# Patient Record
Sex: MALE | URBAN - METROPOLITAN AREA
[De-identification: names, ages, dates, MRNs, and addresses within clinical notes are randomized per-mention and may not be internally consistent; named-entity substitution may affect disease eponyms.]

---

## 2023-06-11 ENCOUNTER — HOSPITAL ENCOUNTER (OUTPATIENT)
Dept: RADIOLOGY | Facility: MEDICAL CENTER | Age: 78
End: 2023-06-11

## 2023-06-11 ENCOUNTER — TELEPHONE (OUTPATIENT)
Dept: SLEEP MEDICINE | Facility: MEDICAL CENTER | Age: 78
End: 2023-06-11

## 2023-06-11 ENCOUNTER — HOSPITAL ENCOUNTER (INPATIENT)
Facility: MEDICAL CENTER | Age: 78
LOS: 4 days | DRG: 871 | End: 2023-06-15
Attending: INTERNAL MEDICINE | Admitting: INTERNAL MEDICINE
Payer: MEDICARE

## 2023-06-11 DIAGNOSIS — N17.1: ICD-10-CM

## 2023-06-11 DIAGNOSIS — R65.21: ICD-10-CM

## 2023-06-11 DIAGNOSIS — Z95.5 HISTORY OF CORONARY ANGIOPLASTY WITH INSERTION OF STENT: ICD-10-CM

## 2023-06-11 DIAGNOSIS — L03.113 CELLULITIS OF RIGHT UPPER EXTREMITY: ICD-10-CM

## 2023-06-11 DIAGNOSIS — A41.9: ICD-10-CM

## 2023-06-11 PROBLEM — I50.20 HEART FAILURE WITH REDUCED EJECTION FRACTION (HCC): Status: ACTIVE | Noted: 2023-06-11

## 2023-06-11 PROBLEM — N18.9 CKD (CHRONIC KIDNEY DISEASE): Status: ACTIVE | Noted: 2023-06-11

## 2023-06-11 PROBLEM — C61 PROSTATE CANCER (HCC): Status: ACTIVE | Noted: 2023-06-11

## 2023-06-11 PROBLEM — J96.01 ACUTE RESPIRATORY FAILURE WITH HYPOXIA (HCC): Status: ACTIVE | Noted: 2023-06-11

## 2023-06-11 PROBLEM — E11.9 DIABETES (HCC): Status: ACTIVE | Noted: 2023-06-11

## 2023-06-11 PROBLEM — I10 HYPERTENSION: Status: ACTIVE | Noted: 2023-06-11

## 2023-06-11 PROBLEM — R53.81 DEBILITY: Status: ACTIVE | Noted: 2023-06-11

## 2023-06-11 LAB
ALBUMIN SERPL BCP-MCNC: 3.3 G/DL (ref 3.2–4.9)
ALBUMIN/GLOB SERPL: 1.1 G/DL
ALP SERPL-CCNC: 35 U/L (ref 30–99)
ALT SERPL-CCNC: <5 U/L (ref 2–50)
ANION GAP SERPL CALC-SCNC: 12 MMOL/L (ref 7–16)
AST SERPL-CCNC: 10 U/L (ref 12–45)
BASOPHILS # BLD AUTO: 0.3 % (ref 0–1.8)
BASOPHILS # BLD: 0.04 K/UL (ref 0–0.12)
BILIRUB SERPL-MCNC: 0.5 MG/DL (ref 0.1–1.5)
BUN SERPL-MCNC: 21 MG/DL (ref 8–22)
CALCIUM ALBUM COR SERPL-MCNC: 9.4 MG/DL (ref 8.5–10.5)
CALCIUM SERPL-MCNC: 8.8 MG/DL (ref 8.5–10.5)
CHLORIDE SERPL-SCNC: 110 MMOL/L (ref 96–112)
CO2 SERPL-SCNC: 20 MMOL/L (ref 20–33)
CREAT SERPL-MCNC: 1.65 MG/DL (ref 0.5–1.4)
EKG IMPRESSION: NORMAL
EOSINOPHIL # BLD AUTO: 0.24 K/UL (ref 0–0.51)
EOSINOPHIL NFR BLD: 2 % (ref 0–6.9)
ERYTHROCYTE [DISTWIDTH] IN BLOOD BY AUTOMATED COUNT: 50.6 FL (ref 35.9–50)
GFR SERPLBLD CREATININE-BSD FMLA CKD-EPI: 43 ML/MIN/1.73 M 2
GLOBULIN SER CALC-MCNC: 3.1 G/DL (ref 1.9–3.5)
GLUCOSE SERPL-MCNC: 138 MG/DL (ref 65–99)
HCT VFR BLD AUTO: 34.1 % (ref 42–52)
HGB BLD-MCNC: 10.9 G/DL (ref 14–18)
IMM GRANULOCYTES # BLD AUTO: 0.05 K/UL (ref 0–0.11)
IMM GRANULOCYTES NFR BLD AUTO: 0.4 % (ref 0–0.9)
LACTATE SERPL-SCNC: 1 MMOL/L (ref 0.5–2)
LYMPHOCYTES # BLD AUTO: 1.02 K/UL (ref 1–4.8)
LYMPHOCYTES NFR BLD: 8.7 % (ref 22–41)
MAGNESIUM SERPL-MCNC: 1.8 MG/DL (ref 1.5–2.5)
MCH RBC QN AUTO: 27.6 PG (ref 27–33)
MCHC RBC AUTO-ENTMCNC: 32 G/DL (ref 32.3–36.5)
MCV RBC AUTO: 86.3 FL (ref 81.4–97.8)
MONOCYTES # BLD AUTO: 1.02 K/UL (ref 0–0.85)
MONOCYTES NFR BLD AUTO: 8.7 % (ref 0–13.4)
NEUTROPHILS # BLD AUTO: 9.37 K/UL (ref 1.82–7.42)
NEUTROPHILS NFR BLD: 79.9 % (ref 44–72)
NRBC # BLD AUTO: 0 K/UL
NRBC BLD-RTO: 0 /100 WBC (ref 0–0.2)
PLATELET # BLD AUTO: 262 K/UL (ref 164–446)
PMV BLD AUTO: 10.8 FL (ref 9–12.9)
POTASSIUM SERPL-SCNC: 4.1 MMOL/L (ref 3.6–5.5)
PROCALCITONIN SERPL-MCNC: 1.13 NG/ML
PROT SERPL-MCNC: 6.4 G/DL (ref 6–8.2)
RBC # BLD AUTO: 3.95 M/UL (ref 4.7–6.1)
SODIUM SERPL-SCNC: 142 MMOL/L (ref 135–145)
WBC # BLD AUTO: 11.7 K/UL (ref 4.8–10.8)

## 2023-06-11 PROCEDURE — 93005 ELECTROCARDIOGRAM TRACING: CPT | Performed by: STUDENT IN AN ORGANIZED HEALTH CARE EDUCATION/TRAINING PROGRAM

## 2023-06-11 PROCEDURE — 87086 URINE CULTURE/COLONY COUNT: CPT

## 2023-06-11 PROCEDURE — 81001 URINALYSIS AUTO W/SCOPE: CPT

## 2023-06-11 PROCEDURE — 770022 HCHG ROOM/CARE - ICU (200)

## 2023-06-11 PROCEDURE — 80053 COMPREHEN METABOLIC PANEL: CPT

## 2023-06-11 PROCEDURE — 83735 ASSAY OF MAGNESIUM: CPT

## 2023-06-11 PROCEDURE — 700101 HCHG RX REV CODE 250: Performed by: INTERNAL MEDICINE

## 2023-06-11 PROCEDURE — 85025 COMPLETE CBC W/AUTO DIFF WBC: CPT

## 2023-06-11 PROCEDURE — 700105 HCHG RX REV CODE 258: Performed by: STUDENT IN AN ORGANIZED HEALTH CARE EDUCATION/TRAINING PROGRAM

## 2023-06-11 PROCEDURE — 87040 BLOOD CULTURE FOR BACTERIA: CPT

## 2023-06-11 PROCEDURE — 99291 CRITICAL CARE FIRST HOUR: CPT | Mod: GC | Performed by: INTERNAL MEDICINE

## 2023-06-11 PROCEDURE — 700102 HCHG RX REV CODE 250 W/ 637 OVERRIDE(OP): Performed by: STUDENT IN AN ORGANIZED HEALTH CARE EDUCATION/TRAINING PROGRAM

## 2023-06-11 PROCEDURE — 83605 ASSAY OF LACTIC ACID: CPT

## 2023-06-11 PROCEDURE — 84145 PROCALCITONIN (PCT): CPT

## 2023-06-11 PROCEDURE — 700105 HCHG RX REV CODE 258: Performed by: INTERNAL MEDICINE

## 2023-06-11 PROCEDURE — 93010 ELECTROCARDIOGRAM REPORT: CPT | Performed by: STUDENT IN AN ORGANIZED HEALTH CARE EDUCATION/TRAINING PROGRAM

## 2023-06-11 PROCEDURE — A9270 NON-COVERED ITEM OR SERVICE: HCPCS | Performed by: STUDENT IN AN ORGANIZED HEALTH CARE EDUCATION/TRAINING PROGRAM

## 2023-06-11 RX ORDER — INSULIN LISPRO 100 [IU]/ML
0.2 INJECTION, SOLUTION INTRAVENOUS; SUBCUTANEOUS
Status: DISCONTINUED | OUTPATIENT
Start: 2023-06-12 | End: 2023-06-11

## 2023-06-11 RX ORDER — ONDANSETRON 4 MG/1
4 TABLET, ORALLY DISINTEGRATING ORAL EVERY 8 HOURS PRN
Status: DISCONTINUED | OUTPATIENT
Start: 2023-06-11 | End: 2023-06-15 | Stop reason: HOSPADM

## 2023-06-11 RX ORDER — SODIUM CHLORIDE, SODIUM LACTATE, POTASSIUM CHLORIDE, CALCIUM CHLORIDE 600; 310; 30; 20 MG/100ML; MG/100ML; MG/100ML; MG/100ML
INJECTION, SOLUTION INTRAVENOUS CONTINUOUS
Status: DISCONTINUED | OUTPATIENT
Start: 2023-06-11 | End: 2023-06-14

## 2023-06-11 RX ORDER — FUROSEMIDE 10 MG/ML
20 INJECTION INTRAMUSCULAR; INTRAVENOUS
Status: DISCONTINUED | OUTPATIENT
Start: 2023-06-12 | End: 2023-06-15 | Stop reason: HOSPADM

## 2023-06-11 RX ORDER — SPIRONOLACTONE 25 MG/1
25 TABLET ORAL
Status: DISCONTINUED | OUTPATIENT
Start: 2023-06-12 | End: 2023-06-15 | Stop reason: HOSPADM

## 2023-06-11 RX ORDER — ASPIRIN 81 MG/1
81 TABLET ORAL DAILY
Status: DISCONTINUED | OUTPATIENT
Start: 2023-06-12 | End: 2023-06-11

## 2023-06-11 RX ORDER — ACETAMINOPHEN 325 MG/1
650 TABLET ORAL EVERY 6 HOURS PRN
Status: DISCONTINUED | OUTPATIENT
Start: 2023-06-11 | End: 2023-06-15 | Stop reason: HOSPADM

## 2023-06-11 RX ORDER — ASPIRIN 81 MG/1
81 TABLET ORAL DAILY
Status: DISCONTINUED | OUTPATIENT
Start: 2023-06-12 | End: 2023-06-15 | Stop reason: HOSPADM

## 2023-06-11 RX ORDER — CLOPIDOGREL BISULFATE 75 MG/1
75 TABLET ORAL DAILY
Status: DISCONTINUED | OUTPATIENT
Start: 2023-06-12 | End: 2023-06-15 | Stop reason: HOSPADM

## 2023-06-11 RX ORDER — TAMSULOSIN HYDROCHLORIDE 0.4 MG/1
0.4 CAPSULE ORAL
Status: DISCONTINUED | OUTPATIENT
Start: 2023-06-12 | End: 2023-06-15 | Stop reason: HOSPADM

## 2023-06-11 RX ORDER — AZITHROMYCIN 250 MG/1
500 TABLET, FILM COATED ORAL DAILY
Status: COMPLETED | OUTPATIENT
Start: 2023-06-12 | End: 2023-06-14

## 2023-06-11 RX ORDER — BISACODYL 10 MG
10 SUPPOSITORY, RECTAL RECTAL
Status: DISCONTINUED | OUTPATIENT
Start: 2023-06-11 | End: 2023-06-15 | Stop reason: HOSPADM

## 2023-06-11 RX ORDER — POLYETHYLENE GLYCOL 3350 17 G/17G
1 POWDER, FOR SOLUTION ORAL
Status: DISCONTINUED | OUTPATIENT
Start: 2023-06-11 | End: 2023-06-15 | Stop reason: HOSPADM

## 2023-06-11 RX ORDER — LABETALOL HYDROCHLORIDE 5 MG/ML
10 INJECTION, SOLUTION INTRAVENOUS EVERY 4 HOURS PRN
Status: DISCONTINUED | OUTPATIENT
Start: 2023-06-11 | End: 2023-06-15 | Stop reason: HOSPADM

## 2023-06-11 RX ORDER — NOREPINEPHRINE BITARTRATE 0.03 MG/ML
0-1 INJECTION, SOLUTION INTRAVENOUS CONTINUOUS
Status: DISCONTINUED | OUTPATIENT
Start: 2023-06-11 | End: 2023-06-12

## 2023-06-11 RX ORDER — GABAPENTIN 300 MG/1
600 CAPSULE ORAL EVERY EVENING
Status: DISCONTINUED | OUTPATIENT
Start: 2023-06-11 | End: 2023-06-11

## 2023-06-11 RX ORDER — MAGNESIUM SULFATE HEPTAHYDRATE 40 MG/ML
2 INJECTION, SOLUTION INTRAVENOUS ONCE
Status: COMPLETED | OUTPATIENT
Start: 2023-06-11 | End: 2023-06-12

## 2023-06-11 RX ORDER — AMOXICILLIN 250 MG
2 CAPSULE ORAL 2 TIMES DAILY
Status: DISCONTINUED | OUTPATIENT
Start: 2023-06-12 | End: 2023-06-15 | Stop reason: HOSPADM

## 2023-06-11 RX ORDER — GABAPENTIN 300 MG/1
600 CAPSULE ORAL 2 TIMES DAILY
Status: DISCONTINUED | OUTPATIENT
Start: 2023-06-11 | End: 2023-06-15 | Stop reason: HOSPADM

## 2023-06-11 RX ORDER — ENOXAPARIN SODIUM 100 MG/ML
40 INJECTION SUBCUTANEOUS DAILY
Status: DISCONTINUED | OUTPATIENT
Start: 2023-06-12 | End: 2023-06-15 | Stop reason: HOSPADM

## 2023-06-11 RX ORDER — FENOFIBRATE 67 MG/1
67 CAPSULE ORAL DAILY
Status: DISCONTINUED | OUTPATIENT
Start: 2023-06-12 | End: 2023-06-15 | Stop reason: HOSPADM

## 2023-06-11 RX ORDER — INSULIN LISPRO 100 [IU]/ML
1-6 INJECTION, SOLUTION INTRAVENOUS; SUBCUTANEOUS
Status: DISCONTINUED | OUTPATIENT
Start: 2023-06-12 | End: 2023-06-15 | Stop reason: HOSPADM

## 2023-06-11 RX ADMIN — NOREPINEPHRINE BITARTRATE 0.1 MCG/KG/MIN: 1 INJECTION, SOLUTION, CONCENTRATE INTRAVENOUS at 21:11

## 2023-06-11 RX ADMIN — SODIUM CHLORIDE, POTASSIUM CHLORIDE, SODIUM LACTATE AND CALCIUM CHLORIDE: 600; 310; 30; 20 INJECTION, SOLUTION INTRAVENOUS at 21:14

## 2023-06-11 RX ADMIN — GABAPENTIN 600 MG: 300 CAPSULE ORAL at 22:45

## 2023-06-11 ASSESSMENT — PAIN DESCRIPTION - PAIN TYPE
TYPE: ACUTE PAIN
TYPE: ACUTE PAIN

## 2023-06-11 ASSESSMENT — ENCOUNTER SYMPTOMS
HEARTBURN: 0
WEIGHT LOSS: 0
HALLUCINATIONS: 0
HEMOPTYSIS: 0
ORTHOPNEA: 0
CLAUDICATION: 0
FOCAL WEAKNESS: 0
ABDOMINAL PAIN: 0
FEVER: 1
BACK PAIN: 0
PALPITATIONS: 0
DIARRHEA: 0
SPUTUM PRODUCTION: 1
CONSTIPATION: 0
DIZZINESS: 0
FLANK PAIN: 0
SPEECH CHANGE: 0
HEADACHES: 0
WEAKNESS: 1
CHILLS: 0
TINGLING: 0
NAUSEA: 0
COUGH: 1
SENSORY CHANGE: 0
NECK PAIN: 0
MYALGIAS: 0
VOMITING: 0
WHEEZING: 0
INSOMNIA: 0
MEMORY LOSS: 0
NERVOUS/ANXIOUS: 0
TREMORS: 0
DEPRESSION: 0
SHORTNESS OF BREATH: 1

## 2023-06-11 ASSESSMENT — LIFESTYLE VARIABLES: SUBSTANCE_ABUSE: 0

## 2023-06-11 NOTE — TELEPHONE ENCOUNTER
"ICU transfer/acceptance call for a Direct Admit    I received a phone call from Dr Cedillo from the [facility] about patient Donte Agustin who is 77 y.o. with the medical problems of sepsis, CHF, cellulitis, renal insuficiency, UTI and on vasopressor support. His last reported EF was 30%.  Chief complain is: \"SOB\"  Patient has a history of 2 days of subjective fever, myalgias, diarrhea, vomiting, sta 78%, vomiting, cellulitis in elbow, dizzines, thirsty. Was found later saturating at 86%--> 2 lt 92%. Initially Tachycardic at 125, s/p 3 lt cristaloids but map was 60, so he was started on levophed. He has an open wound in the right antecubital fossa, but looks dry. His Ekg showed ST, Bnp 3k, trop 0.03, no pulm edema. Current Vassopressor is levophed at 5.      PMH  CHF, cardiomyopathy, CAD s/p stents. DM, HTN, Cr 1.9 baseline CKD, hx of prostate cancer.  ADT order placed for accepted patient.     Please call Dr Intensivist when patient arrives to the hospital.  Please inform the intensivist upon assignment of an ICU bed and then again on arrival of the patient      Gigi Minor MD FACP [facility]  Pulmonary/Critical Care  Available through [employer].    "

## 2023-06-11 NOTE — PROGRESS NOTES
"ICU transfer/acceptance call for a Direct Admit     I received a phone call from Dr Cedillo from the [facility] about patient Donte Agustin who is 77 y.o. with the medical problems of sepsis, CHF, cellulitis, renal insuficiency, UTI and on vasopressor support. His last reported EF was 30%.  Chief complain is: \"SOB\"  Patient has a history of 2 days of subjective fever, myalgias, diarrhea, vomiting, sta 78%, vomiting, cellulitis in elbow, dizzines, thirsty. Was found later saturating at 86%--> 2 lt 92%. Initially Tachycardic at 125, s/p 3 lt cristaloids but map was 60, so he was started on levophed. He has an open wound in the right antecubital fossa, but looks dry. His Ekg showed ST, Bnp 3k, trop 0.03, no pulm edema. Current Vassopressor is levophed at 5.       PMH  CHF, cardiomyopathy, CAD s/p stents. DM, HTN, Cr 1.9 baseline CKD, hx of prostate cancer.  ADT order placed for accepted patient.      Please call Dr Intensivist when patient arrives to the hospital.  Please inform the intensivist upon assignment of an ICU bed and then again on arrival of the patient        Gigi Minor MD FACP [facility]  Pulmonary/Critical Care  Available through [facility].     "

## 2023-06-12 ENCOUNTER — APPOINTMENT (OUTPATIENT)
Dept: RADIOLOGY | Facility: MEDICAL CENTER | Age: 78
DRG: 871 | End: 2023-06-12
Attending: STUDENT IN AN ORGANIZED HEALTH CARE EDUCATION/TRAINING PROGRAM
Payer: MEDICARE

## 2023-06-12 PROBLEM — N39.0 ACUTE UTI: Status: ACTIVE | Noted: 2023-06-12

## 2023-06-12 PROBLEM — L03.113 CELLULITIS OF RIGHT UPPER EXTREMITY: Status: ACTIVE | Noted: 2023-06-12

## 2023-06-12 PROBLEM — N30.00 ACUTE CYSTITIS WITHOUT HEMATURIA: Status: ACTIVE | Noted: 2023-06-12

## 2023-06-12 LAB
ALBUMIN SERPL BCP-MCNC: 3.3 G/DL (ref 3.2–4.9)
ALBUMIN/GLOB SERPL: 1 G/DL
ALP SERPL-CCNC: 35 U/L (ref 30–99)
ALT SERPL-CCNC: <5 U/L (ref 2–50)
ANION GAP SERPL CALC-SCNC: 10 MMOL/L (ref 7–16)
APPEARANCE UR: ABNORMAL
AST SERPL-CCNC: 12 U/L (ref 12–45)
BACTERIA #/AREA URNS HPF: ABNORMAL /HPF
BASOPHILS # BLD AUTO: 0.4 % (ref 0–1.8)
BASOPHILS # BLD: 0.03 K/UL (ref 0–0.12)
BILIRUB SERPL-MCNC: 0.4 MG/DL (ref 0.1–1.5)
BILIRUB UR QL STRIP.AUTO: NEGATIVE
BUN SERPL-MCNC: 17 MG/DL (ref 8–22)
CALCIUM ALBUM COR SERPL-MCNC: 9.6 MG/DL (ref 8.5–10.5)
CALCIUM SERPL-MCNC: 9 MG/DL (ref 8.5–10.5)
CHLORIDE SERPL-SCNC: 108 MMOL/L (ref 96–112)
CO2 SERPL-SCNC: 23 MMOL/L (ref 20–33)
COLOR UR: YELLOW
CREAT SERPL-MCNC: 1.49 MG/DL (ref 0.5–1.4)
EOSINOPHIL # BLD AUTO: 0.26 K/UL (ref 0–0.51)
EOSINOPHIL NFR BLD: 3.3 % (ref 0–6.9)
EPI CELLS #/AREA URNS HPF: NEGATIVE /HPF
ERYTHROCYTE [DISTWIDTH] IN BLOOD BY AUTOMATED COUNT: 52.6 FL (ref 35.9–50)
GFR SERPLBLD CREATININE-BSD FMLA CKD-EPI: 48 ML/MIN/1.73 M 2
GLOBULIN SER CALC-MCNC: 3.2 G/DL (ref 1.9–3.5)
GLUCOSE BLD STRIP.AUTO-MCNC: 115 MG/DL (ref 65–99)
GLUCOSE BLD STRIP.AUTO-MCNC: 117 MG/DL (ref 65–99)
GLUCOSE BLD STRIP.AUTO-MCNC: 121 MG/DL (ref 65–99)
GLUCOSE BLD STRIP.AUTO-MCNC: 131 MG/DL (ref 65–99)
GLUCOSE SERPL-MCNC: 99 MG/DL (ref 65–99)
GLUCOSE UR STRIP.AUTO-MCNC: >=1000 MG/DL
HCT VFR BLD AUTO: 35.3 % (ref 42–52)
HGB BLD-MCNC: 11 G/DL (ref 14–18)
HYALINE CASTS #/AREA URNS LPF: ABNORMAL /LPF
IMM GRANULOCYTES # BLD AUTO: 0.02 K/UL (ref 0–0.11)
IMM GRANULOCYTES NFR BLD AUTO: 0.3 % (ref 0–0.9)
KETONES UR STRIP.AUTO-MCNC: ABNORMAL MG/DL
LACTATE SERPL-SCNC: 0.7 MMOL/L (ref 0.5–2)
LEUKOCYTE ESTERASE UR QL STRIP.AUTO: ABNORMAL
LYMPHOCYTES # BLD AUTO: 0.95 K/UL (ref 1–4.8)
LYMPHOCYTES NFR BLD: 12.1 % (ref 22–41)
MCH RBC QN AUTO: 27.7 PG (ref 27–33)
MCHC RBC AUTO-ENTMCNC: 31.2 G/DL (ref 32.3–36.5)
MCV RBC AUTO: 88.9 FL (ref 81.4–97.8)
MICRO URNS: ABNORMAL
MONOCYTES # BLD AUTO: 0.84 K/UL (ref 0–0.85)
MONOCYTES NFR BLD AUTO: 10.7 % (ref 0–13.4)
NEUTROPHILS # BLD AUTO: 5.78 K/UL (ref 1.82–7.42)
NEUTROPHILS NFR BLD: 73.2 % (ref 44–72)
NITRITE UR QL STRIP.AUTO: NEGATIVE
NRBC # BLD AUTO: 0 K/UL
NRBC BLD-RTO: 0 /100 WBC (ref 0–0.2)
PH UR STRIP.AUTO: 6 [PH] (ref 5–8)
PLATELET # BLD AUTO: 213 K/UL (ref 164–446)
PMV BLD AUTO: 10.6 FL (ref 9–12.9)
POTASSIUM SERPL-SCNC: 4.3 MMOL/L (ref 3.6–5.5)
PROT SERPL-MCNC: 6.5 G/DL (ref 6–8.2)
PROT UR QL STRIP: 30 MG/DL
RBC # BLD AUTO: 3.97 M/UL (ref 4.7–6.1)
RBC # URNS HPF: ABNORMAL /HPF
RBC UR QL AUTO: ABNORMAL
SODIUM SERPL-SCNC: 141 MMOL/L (ref 135–145)
SP GR UR STRIP.AUTO: 1.01
UROBILINOGEN UR STRIP.AUTO-MCNC: 1 MG/DL
WBC # BLD AUTO: 7.9 K/UL (ref 4.8–10.8)
WBC #/AREA URNS HPF: ABNORMAL /HPF

## 2023-06-12 PROCEDURE — 700101 HCHG RX REV CODE 250: Performed by: STUDENT IN AN ORGANIZED HEALTH CARE EDUCATION/TRAINING PROGRAM

## 2023-06-12 PROCEDURE — 99233 SBSQ HOSP IP/OBS HIGH 50: CPT | Performed by: NURSE PRACTITIONER

## 2023-06-12 PROCEDURE — 85025 COMPLETE CBC W/AUTO DIFF WBC: CPT

## 2023-06-12 PROCEDURE — 700102 HCHG RX REV CODE 250 W/ 637 OVERRIDE(OP): Performed by: STUDENT IN AN ORGANIZED HEALTH CARE EDUCATION/TRAINING PROGRAM

## 2023-06-12 PROCEDURE — A9270 NON-COVERED ITEM OR SERVICE: HCPCS | Performed by: STUDENT IN AN ORGANIZED HEALTH CARE EDUCATION/TRAINING PROGRAM

## 2023-06-12 PROCEDURE — 97166 OT EVAL MOD COMPLEX 45 MIN: CPT

## 2023-06-12 PROCEDURE — 700105 HCHG RX REV CODE 258: Performed by: NURSE PRACTITIONER

## 2023-06-12 PROCEDURE — 80053 COMPREHEN METABOLIC PANEL: CPT

## 2023-06-12 PROCEDURE — 99223 1ST HOSP IP/OBS HIGH 75: CPT | Performed by: HOSPITALIST

## 2023-06-12 PROCEDURE — 770020 HCHG ROOM/CARE - TELE (206)

## 2023-06-12 PROCEDURE — 73551 X-RAY EXAM OF FEMUR 1: CPT | Mod: RT

## 2023-06-12 PROCEDURE — 92610 EVALUATE SWALLOWING FUNCTION: CPT

## 2023-06-12 PROCEDURE — 97602 WOUND(S) CARE NON-SELECTIVE: CPT

## 2023-06-12 PROCEDURE — 700111 HCHG RX REV CODE 636 W/ 250 OVERRIDE (IP): Performed by: STUDENT IN AN ORGANIZED HEALTH CARE EDUCATION/TRAINING PROGRAM

## 2023-06-12 PROCEDURE — 83605 ASSAY OF LACTIC ACID: CPT

## 2023-06-12 PROCEDURE — 82962 GLUCOSE BLOOD TEST: CPT

## 2023-06-12 PROCEDURE — 700105 HCHG RX REV CODE 258: Performed by: STUDENT IN AN ORGANIZED HEALTH CARE EDUCATION/TRAINING PROGRAM

## 2023-06-12 PROCEDURE — 700111 HCHG RX REV CODE 636 W/ 250 OVERRIDE (IP): Performed by: INTERNAL MEDICINE

## 2023-06-12 PROCEDURE — 97162 PT EVAL MOD COMPLEX 30 MIN: CPT

## 2023-06-12 RX ORDER — ASPIRIN 81 MG/1
81 TABLET ORAL 3 TIMES DAILY
Status: ON HOLD | COMMUNITY
End: 2023-06-15

## 2023-06-12 RX ORDER — SPIRONOLACTONE 25 MG/1
25 TABLET ORAL DAILY
COMMUNITY

## 2023-06-12 RX ORDER — CLOPIDOGREL BISULFATE 75 MG/1
75 TABLET ORAL DAILY
COMMUNITY

## 2023-06-12 RX ORDER — ACETAMINOPHEN 325 MG/1
650 TABLET ORAL EVERY 4 HOURS PRN
COMMUNITY

## 2023-06-12 RX ORDER — FENOFIBRATE 54 MG/1
54 TABLET ORAL DAILY
COMMUNITY

## 2023-06-12 RX ORDER — FUROSEMIDE 20 MG/1
20 TABLET ORAL DAILY
COMMUNITY

## 2023-06-12 RX ORDER — EMPAGLIFLOZIN 10 MG/1
10 TABLET, FILM COATED ORAL DAILY
COMMUNITY

## 2023-06-12 RX ORDER — SILICONES/ADHESIVE TAPE
COMBINATION PACKAGE (EA) TOPICAL 2 TIMES DAILY
Status: DISCONTINUED | OUTPATIENT
Start: 2023-06-12 | End: 2023-06-15 | Stop reason: HOSPADM

## 2023-06-12 RX ORDER — SACUBITRIL AND VALSARTAN 24; 26 MG/1; MG/1
1 TABLET, FILM COATED ORAL DAILY
COMMUNITY

## 2023-06-12 RX ORDER — GABAPENTIN 600 MG/1
600 TABLET ORAL 2 TIMES DAILY
COMMUNITY

## 2023-06-12 RX ORDER — OMEPRAZOLE 20 MG/1
20 CAPSULE, DELAYED RELEASE ORAL DAILY
COMMUNITY

## 2023-06-12 RX ORDER — TAMSULOSIN HYDROCHLORIDE 0.4 MG/1
0.4 CAPSULE ORAL DAILY
COMMUNITY

## 2023-06-12 RX ADMIN — SODIUM CHLORIDE, POTASSIUM CHLORIDE, SODIUM LACTATE AND CALCIUM CHLORIDE: 600; 310; 30; 20 INJECTION, SOLUTION INTRAVENOUS at 07:29

## 2023-06-12 RX ADMIN — SODIUM CHLORIDE, POTASSIUM CHLORIDE, SODIUM LACTATE AND CALCIUM CHLORIDE: 600; 310; 30; 20 INJECTION, SOLUTION INTRAVENOUS at 13:16

## 2023-06-12 RX ADMIN — ENOXAPARIN SODIUM 40 MG: 100 INJECTION SUBCUTANEOUS at 17:08

## 2023-06-12 RX ADMIN — FENOFIBRATE 67 MG: 67 CAPSULE ORAL at 05:17

## 2023-06-12 RX ADMIN — ASPIRIN 81 MG: 81 TABLET, COATED ORAL at 05:16

## 2023-06-12 RX ADMIN — CLOPIDOGREL BISULFATE 75 MG: 75 TABLET ORAL at 05:16

## 2023-06-12 RX ADMIN — SENNOSIDES AND DOCUSATE SODIUM 2 TABLET: 50; 8.6 TABLET ORAL at 05:17

## 2023-06-12 RX ADMIN — MAGNESIUM SULFATE HEPTAHYDRATE 2 G: 2 INJECTION, SOLUTION INTRAVENOUS at 00:22

## 2023-06-12 RX ADMIN — SENNOSIDES AND DOCUSATE SODIUM 2 TABLET: 50; 8.6 TABLET ORAL at 17:07

## 2023-06-12 RX ADMIN — ACETAMINOPHEN 650 MG: 325 TABLET, FILM COATED ORAL at 17:07

## 2023-06-12 RX ADMIN — ONDANSETRON 4 MG: 4 TABLET, ORALLY DISINTEGRATING ORAL at 20:42

## 2023-06-12 RX ADMIN — GABAPENTIN 600 MG: 300 CAPSULE ORAL at 05:16

## 2023-06-12 RX ADMIN — AZITHROMYCIN MONOHYDRATE 500 MG: 250 TABLET ORAL at 05:16

## 2023-06-12 RX ADMIN — GABAPENTIN 600 MG: 300 CAPSULE ORAL at 17:07

## 2023-06-12 RX ADMIN — ACETAMINOPHEN 650 MG: 325 TABLET, FILM COATED ORAL at 04:31

## 2023-06-12 RX ADMIN — CEFTRIAXONE SODIUM 2000 MG: 10 INJECTION, POWDER, FOR SOLUTION INTRAVENOUS at 05:17

## 2023-06-12 ASSESSMENT — ENCOUNTER SYMPTOMS
MUSCULOSKELETAL NEGATIVE: 1
NAUSEA: 1
FOCAL WEAKNESS: 0
NERVOUS/ANXIOUS: 1
CHILLS: 0
NEUROLOGICAL NEGATIVE: 1
ABDOMINAL PAIN: 0
WEAKNESS: 0
SHORTNESS OF BREATH: 0
NAUSEA: 0
SPUTUM PRODUCTION: 0
SENSORY CHANGE: 0
FEVER: 0
SPEECH CHANGE: 0
EYES NEGATIVE: 1
DIZZINESS: 0
MYALGIAS: 0
VOMITING: 0
RESPIRATORY NEGATIVE: 1
COUGH: 0
HEARTBURN: 0
CARDIOVASCULAR NEGATIVE: 1
FEVER: 1
VOMITING: 1
HEADACHES: 0

## 2023-06-12 ASSESSMENT — COGNITIVE AND FUNCTIONAL STATUS - GENERAL
SUGGESTED CMS G CODE MODIFIER DAILY ACTIVITY: CJ
STANDING UP FROM CHAIR USING ARMS: A LITTLE
DAILY ACTIVITIY SCORE: 24
HELP NEEDED FOR BATHING: A LITTLE
MOBILITY SCORE: 18
TOILETING: A LITTLE
STANDING UP FROM CHAIR USING ARMS: A LITTLE
MOBILITY SCORE: 18
CLIMB 3 TO 5 STEPS WITH RAILING: A LITTLE
WALKING IN HOSPITAL ROOM: A LITTLE
MOVING TO AND FROM BED TO CHAIR: A LITTLE
DAILY ACTIVITIY SCORE: 22
MOVING FROM LYING ON BACK TO SITTING ON SIDE OF FLAT BED: A LITTLE
CLIMB 3 TO 5 STEPS WITH RAILING: A LITTLE
MOVING FROM LYING ON BACK TO SITTING ON SIDE OF FLAT BED: A LOT
SUGGESTED CMS G CODE MODIFIER DAILY ACTIVITY: CH
SUGGESTED CMS G CODE MODIFIER MOBILITY: CK
TURNING FROM BACK TO SIDE WHILE IN FLAT BAD: A LITTLE
SUGGESTED CMS G CODE MODIFIER MOBILITY: CK
WALKING IN HOSPITAL ROOM: A LITTLE
MOVING TO AND FROM BED TO CHAIR: A LITTLE

## 2023-06-12 ASSESSMENT — PATIENT HEALTH QUESTIONNAIRE - PHQ9
1. LITTLE INTEREST OR PLEASURE IN DOING THINGS: NOT AT ALL
SUM OF ALL RESPONSES TO PHQ9 QUESTIONS 1 AND 2: 0
2. FEELING DOWN, DEPRESSED, IRRITABLE, OR HOPELESS: NOT AT ALL

## 2023-06-12 ASSESSMENT — PAIN DESCRIPTION - PAIN TYPE
TYPE: ACUTE PAIN

## 2023-06-12 ASSESSMENT — LIFESTYLE VARIABLES
TOTAL SCORE: 0
ALCOHOL_USE: NO
TOTAL SCORE: 0
AVERAGE NUMBER OF DAYS PER WEEK YOU HAVE A DRINK CONTAINING ALCOHOL: 0
CONSUMPTION TOTAL: NEGATIVE
EVER FELT BAD OR GUILTY ABOUT YOUR DRINKING: NO
TOTAL SCORE: 0
HAVE YOU EVER FELT YOU SHOULD CUT DOWN ON YOUR DRINKING: NO
ON A TYPICAL DAY WHEN YOU DRINK ALCOHOL HOW MANY DRINKS DO YOU HAVE: 0
DOES PATIENT WANT TO STOP DRINKING: NO
EVER HAD A DRINK FIRST THING IN THE MORNING TO STEADY YOUR NERVES TO GET RID OF A HANGOVER: NO
HAVE PEOPLE ANNOYED YOU BY CRITICIZING YOUR DRINKING: NO
HOW MANY TIMES IN THE PAST YEAR HAVE YOU HAD 5 OR MORE DRINKS IN A DAY: 0

## 2023-06-12 ASSESSMENT — FIBROSIS 4 INDEX: FIB4 SCORE: 1.99

## 2023-06-12 ASSESSMENT — ACTIVITIES OF DAILY LIVING (ADL): TOILETING: INDEPENDENT

## 2023-06-12 ASSESSMENT — GAIT ASSESSMENTS
GAIT LEVEL OF ASSIST: STANDBY ASSIST
ASSISTIVE DEVICE: SINGLE POINT CANE
DEVIATION: BRADYKINETIC;SHUFFLED GAIT;DECREASED HEEL STRIKE;DECREASED TOE OFF
DISTANCE (FEET): 40

## 2023-06-12 NOTE — ASSESSMENT & PLAN NOTE
Uses walker at home and reports malaise/fatigue  Likely debility from infection and hospital course    -PT/OT consult  -Fall precautions

## 2023-06-12 NOTE — WOUND TEAM
[facility]  Inpatient Services  Initial Wound and Skin Care Evaluation    Admission Date: 6/11/2023     Last order of IP CONSULT TO WOUND CARE was found on 6/12/2023 from Hospital Encounter on 6/11/2023     HPI, PMH, SH: Reviewed    No past surgical history on file.  Social History     Tobacco Use    Smoking status: Not on file    Smokeless tobacco: Not on file   Substance Use Topics    Alcohol use: Not on file     No chief complaint on file.    Diagnosis: Sepsis (HCC) [A41.9]    Unit where seen by Wound Team: T710/01     WOUND CONSULT/FOLLOW UP RELATED TO:  right elbow      WOUND HISTORY:  Patient states the wound started as a spider bite.     WOUND ASSESSMENT/LDA  Wound 06/12/23 Full Thickness Wound Elbow Lateral Right POA (Active)   Wound Image   06/12/23 1600   Site Assessment Dry;Pink;Pale;Red 06/12/23 1600   Periwound Assessment Pink;Intact 06/12/23 1600   Margins Attached edges;Defined edges 06/12/23 1600   Closure Secondary intention 06/12/23 1600   Drainage Amount None 06/12/23 1600   Treatments Cleansed;Site care;Tape change 06/12/23 1600   Wound Cleansing Normal Saline Irrigation 06/12/23 1600   Periwound Protectant Skin Protectant Wipes to Periwound 06/12/23 1600   Dressing Cleansing/Solutions Not Applicable 06/12/23 1600   Dressing Options Hydrocolloid Thin;Silicone Adhesive Foam 06/12/23 1600   Dressing Changed New 06/12/23 1600   Dressing Status Clean;Dry;Intact 06/12/23 1600   Dressing Change/Treatment Frequency Every 48 hrs, and As Needed 06/12/23 1600   NEXT Dressing Change/Treatment Date 06/14/23 06/12/23 1600   NEXT Weekly Photo (Inpatient Only) 06/19/23 06/12/23 1600   Non-staged Wound Description Full thickness 06/12/23 1600   Wound Length (cm) 3 cm 06/12/23 1600   Wound Width (cm) 2.5 cm 06/12/23 1600   Wound Depth (cm) 0.2 cm 06/12/23 1600   Wound Surface Area (cm^2) 7.5 cm^2 06/12/23 1600   Wound Volume (cm^3) 1.5 cm^3 06/12/23 1600   Number of days: 0         Vascular:    YEHUDA:   No results found.    Lab Values:    Lab Results   Component Value Date/Time    WBC 7.9 06/12/2023 05:14 AM    RBC 3.97 (L) 06/12/2023 05:14 AM    HEMOGLOBIN 11.0 (L) 06/12/2023 05:14 AM    HEMATOCRIT 35.3 (L) 06/12/2023 05:14 AM        Culture Results show:  No results found for this or any previous visit (from the past 720 hour(s)).    Pain Level/Medicated:  no premed, tolerated well.        INTERVENTIONS BY WOUND TEAM:  Chart and images reviewed. Discussed with bedside RN. All areas of concern (based on picture review, LDA review and discussion with bedside RN) have been thoroughly assessed. Documentation of areas based on significant findings. This RN in to assess patient. Performed standard wound care which includes appropriate positioning, dressing removal and non-selective debridement. Pictures and measurements obtained weekly if/when required.  Preparation for Dressing removal: n/a. Open to air  Non-selectively Debrided with:  NS and gauze.  Sharp debridement: n/a  Brenda wound: Cleansed with NS and gauze, Prepped with no sting skin prep  Primary Dressing: hydrocolloid thin  Secondary (Outer) Dressing: adhesive foam     Advanced Wound Care Discharge Planning  Number of Clinicians necessary to complete wound care: 1  Is patient requiring IV pain medications for dressing changes: no  Length of time for dressing change 5 min. (This does not include chart review, pre-medication time, set up, clean up or time spent charting.)    Interdisciplinary consultation: Patient, Bedside RN, Maggy CARLOS (Wound RN),    EVALUATION / RATIONALE FOR TREATMENT:  Most Recent Date:  6/12/23: Patient admitted with full thickness wound to right lateral elbow. Patient states it started as a spider bite, however the wound appears more similar to a moisture and friction injury. Wound is very dry and intact so applied hydrocolloid thin to assist in autolytic debridement of dry intact tissue and maintain a moist wound  healing environment. Surrounding skin is pink and soft.      Goals: Steady decrease in wound area and depth weekly.    WOUND TEAM PLAN OF CARE ([X] for frequency of wound follow up,):   Nursing to follow dressing orders written for wound care. Contact wound team if area fails to progress, deteriorates or with any questions/concerns if something comes up before next scheduled follow up (See below as to whether wound is following and frequency of wound follow up)  Dressing changes by wound team:                   Follow up 3 times weekly:                NPWT change 3 times weekly:     Follow up 1-2 times weekly:      Follow up Bi-Monthly:           Follow up Monthly (High Risk):                        Follow up as needed:   X - please call or reconsult if Right elbow worsens  Other (explain):     NURSING PLAN OF CARE ORDERS (X):  Dressing changes: See Dressing Care orders: X  Skin care: See Skin Care orders:   RN Prevention Protocol:   Rectal tube care: See Rectal Tube Care orders:   Other orders:    RSKIN:   CURRENTLY IN PLACE (X), APPLIED THIS VISIT (A), ORDERED (O):   Q shift :  X  Q shift pressure point assessments:  X    Surface/Positioning   Standard Mattress/Trauma Bed   X       Low Airloss          ICU Low Airloss   Bariatric MELINDA     Waffle cushion        Waffle Overlay          Reposition q 2 hours    turn self  TAPs Turning system     Z Zan Pillow     Offloading/Redistribution LASHELL  Sacral Offloading Dressing (Silicone dressing)     Heel Offloading Dressing (Silicone dressing)         Heel float boots (Prevalon boot)             Float Heels off Bed with Pillows           Respiratory room air  Silicone O2 tubing         Gray Foam Ear protectors     Cannula fixation Device (Tender )          High flow offloading Clip    Elastic head band offloading device      Anchorfast                                                         Trach with Optifoam split foam             Containment/Moisture Prevention      Rectal tube or BMS    Purwick/Condom Cath        Jensen Catheter  X  Barrier wipes           Barrier paste       Antifungal tx      Interdry        Mobilization       Up to chair        Ambulate    X  PT/OT      Nutrition       Dietician        Diabetes Education      PO X    TF     TPN     NPO   # days     Other        Anticipated discharge plans: n/a  LTACH:        SNF/Rehab:                  Home Health Care:           Outpatient Wound Center:            Self/Family Care:        Other:                  Vac Discharge Needs:   Vac Discharge plan is purely a recommendation from wound team and not a requirement for discharge unless otherwise stated by physician.  Not Applicable Pt not on a wound vac:   X    Regular Vac while inpatient, alternative dressing at DC:        Regular Vac in use and continued at DC:            Reg. Vac w/ Skin Sub/Biologic in use. Will need to be changed 2x wkly:      Veraflo Vac while inpatient, ok to transition to Regular Vac on Discharge (Bedside RN to Clamp small instillation tubing at time of DC):           Veraflo Vac while inpatient, would benefit from remaining on Veraflo Vac upon discharge:

## 2023-06-12 NOTE — THERAPY
"Occupational Therapy   Initial Evaluation     Patient Name: Donte Agustin  Age:  77 y.o., Sex:  male  Medical Record #: [id number]  Today's Date: 6/12/2023     Precautions  Precautions: Fall Risk    Assessment    Patient is 77 y.o. male admitted for generalized malaise, fevers, chills, sore throat, RUE bug bite. Pt normally independent with functional mobility and ADLs living in a [facility] apartment with spouse. Pt able to complete functional mobility and ADLs with supervision and use of walking stick. Would ultimately recommend home health therapy but pt politely does not think he needs it. No acute OT needs identified, anticipate pt is at or near his functional baseline.    Plan    DC Equipment Recommendations: None  Discharge Recommendations: Anticipate that the patient will have no further occupational therapy needs after discharge from the hospital (pt refusing need for home health)     Subjective    \"Im 77 not 87, I don't need to go to a care home\"     Objective       06/12/23 1412   Prior Living Situation   Prior Services Home-Independent   Housing / Facility Motel   Bathroom Set up Bathtub / Shower Combination;Grab Bars   Equipment Owned Grab Bar(s) In Tub / Shower;Grab Bar(s) By Toilet;Other (Comments)  (walking stick)   Lives with - Patient's Self Care Capacity Spouse   Prior Level of ADL Function   Self Feeding Independent   Grooming / Hygiene Independent   Bathing Independent   Dressing Independent   Toileting Independent   Prior Level of IADL Function   Medication Management Independent   Laundry Independent   Kitchen Mobility Independent   Finances Independent   Home Management Independent   Shopping Independent   Prior Level Of Mobility Independent With Device in Community   History of Falls   History of Falls Yes   Date of Last Fall   (\"awhile ago\" per patient)   Precautions   Precautions Fall Risk   Cognition    Cognition / Consciousness WDL   Level of Consciousness Alert   Active ROM Upper Body "   Active ROM Upper Body  WDL   Dominant Hand Right   Strength Upper Body   Upper Body Strength  WDL   Sensation Upper Body   Upper Extremity Sensation  WDL   Upper Body Muscle Tone   Upper Body Muscle Tone  WDL   Neurological Concerns   Neurological Concerns No   Coordination Upper Body   Coordination WDL   Balance Assessment   Sitting Balance (Static) Fair   Sitting Balance (Dynamic) Fair   Standing Balance (Static) Fair   Standing Balance (Dynamic) Fair   Weight Shift Sitting Fair   Weight Shift Standing Fair   Comments w/ walking stick   Bed Mobility    Supine to Sit Supervised   Scooting Supervised   Rolling Supervised   ADL Assessment   Grooming Supervision   Upper Body Dressing Supervision   Lower Body Dressing Minimal Assist  (exhibited the ability became fatigued, asked for assistance)   How much help from another person does the patient currently need...   Putting on and taking off regular lower body clothing? 4   Bathing (including washing, rinsing, and drying)? 4   Toileting, which includes using a toilet, bedpan, or urinal? 4   Putting on and taking off regular upper body clothing? 4   Taking care of personal grooming such as brushing teeth? 4   Eating meals? 4   6 Clicks Daily Activity Score 24   Functional Mobility   Sit to Stand Standby Assist   Bed, Chair, Wheelchair Transfer Standby Assist   Transfer Method Stand Step   Mobility bed mobility, hallway mobility, up to chair   Comments w/ walking stick   Activity Tolerance   Sitting in Chair left seated in chair   Standing 10 min   Education Group   Education Provided Role of Occupational Therapist   Role of Occupational Therapist Patient Response Patient;Acceptance;Explanation   Problem List   Problem List None   Anticipated Discharge Equipment and Recommendations   DC Equipment Recommendations None   Discharge Recommendations Anticipate that the patient will have no further occupational therapy needs after discharge from the hospital  (pt refusing  need for home health)   Interdisciplinary Plan of Care Collaboration   IDT Collaboration with  Nursing;Physical Therapist   Patient Position at End of Therapy Seated;Chair Alarm On;Call Light within Reach;Tray Table within Reach;Phone within Reach   Collaboration Comments RN updated

## 2023-06-12 NOTE — CONSULTS
Hospital Medicine Consultation    Date of Service  6/12/2023    Referring Physician  Roosevelt Sterling M.D.    Consulting Physician  Roger Arreguin M.D.    Reason for Consultation  Hospital medicine consultation requested patient admitted from an outlying facility with concerns of sepsis    History of Presenting Illness  Donte Agustin is a 77-year-old male with a past medical history of systolic heart failure (EF 35%, TTE 3-4 months ago), hypertension, diabetes, CAD (s/p stents X 2 10/22) and prostate cancer (radiation over 10 yrs ago), with recent urologic procedure secondary to retention (recent laser therapy),CKI, who was transferred from [facility] to [facility] last night (6/11/23) for sepsis with vasopressor therapy.  He states that for at least 2 days he experienced  fever, chills, nausea, vomiting , myalgias, and new cough. He also states that he was bit by something on his R antecubital area that developed some redness, erythema,that is now improved.  The patient primarily presented to the emergency rooms secondary to urinary retention, lower abdominal pain, upon arrival he was found to be hypoxic with a RA sat of 86%, tachycardia with a HR in the 120's and MAP barely at 60 despite 3L of crystalloid.  Vasopressors and  ABX were initiated and he was transferred to [facility] ICU for critical care management.   The patient at the facility had a Jensen placed and had a large volume urine output, he does have a abnormal urine analysis for infection, but given history of urologic difficulties and prostate cancer.  The patient was volume resuscitated, placed on empiric antibiotics, was weaned off IV pressor medication and was able to be transferred to the medical unit on 6/12.      Review of Systems  Review of Systems   Constitutional:  Positive for fever and malaise/fatigue.   HENT: Negative.     Eyes: Negative.    Respiratory: Negative.     Cardiovascular: Negative.    Gastrointestinal:  Positive for  nausea and vomiting.   Genitourinary:  Positive for urgency.        Urinary retention   Musculoskeletal: Negative.    Skin: Negative.    Neurological: Negative.    Endo/Heme/Allergies: Negative.    Psychiatric/Behavioral:  The patient is nervous/anxious.    All other systems reviewed and are negative.      Past Medical History  Chronic systolic heart failure, ejection fraction 35%  Hypertension  Diabetes type 2  History of coronary artery disease with stenting x2 in 10 of 22,  Prostate cancer, radiation and recent urologic procedure  Chronic kidney condition  BRD    Surgical History  No recent surgical procedure  Several months ago the patient had a urologic procedure that is somewhat unclear at this time  Prior cardiac stenting  Appendectomy history of      Family History  Patient denies contributory family history  His father had prostate cancer as well    Social History  Patient denies current use of tobacco, alcohol    Medications  Per outside records  Jardiance 10 mg daily  Clopidogrel 75 mg daily  Fenofibrate 54 mg daily  Lasix 20 mg daily  Neurontin 600 mg p.o. twice daily  Nitroglycerin as needed  Zofran as needed  Aldactone 25 mg daily  Flomax 0.4 mg daily       Allergies  No Known Allergies    Physical Exam  Temp:  [36.4 °C (97.5 °F)-37 °C (98.6 °F)] 37 °C (98.6 °F)  Pulse:  [63-96] 92  Resp:  [10-30] 18  BP: ()/(50-78) 122/68  SpO2:  [93 %-98 %] 96 %    Physical Exam  Vitals and nursing note reviewed.   Constitutional:       Appearance: He is well-developed. He is ill-appearing.   HENT:      Head: Normocephalic.   Eyes:      Pupils: Pupils are equal, round, and reactive to light.   Cardiovascular:      Rate and Rhythm: Normal rate and regular rhythm.      Heart sounds: Normal heart sounds.   Pulmonary:      Effort: Pulmonary effort is normal.      Breath sounds: Rhonchi present.   Abdominal:      General: Bowel sounds are normal.      Palpations: Abdomen is soft.   Genitourinary:     Penis: Normal.        Rectum: Normal.      Comments: Jensen in place  Musculoskeletal:         General: Normal range of motion.      Cervical back: Normal range of motion.   Skin:     General: Skin is warm.      Comments: Right upper extremity, antecubital superficial skin ulceration with surrounding erythema   Neurological:      Mental Status: He is alert and oriented to person, place, and time.         Fluids  Date 06/12/23 0700 - 06/13/23 0659   Shift 7322-1305 8667-0948 9334-3898 24 Hour Total   INTAKE   P.O. 100   100   I.V. 198.4   198.4   Shift Total 298.4   298.4   OUTPUT   Urine 140   140   Shift Total 140   140   Weight (kg) 95.1 95.1 95.1 95.1       Laboratory  Recent Labs     06/11/23 2126 06/12/23  0514   WBC 11.7* 7.9   RBC 3.95* 3.97*   HEMOGLOBIN 10.9* 11.0*   HEMATOCRIT 34.1* 35.3*   MCV 86.3 88.9   MCH 27.6 27.7   MCHC 32.0* 31.2*   RDW 50.6* 52.6*   PLATELETCT 262 213   MPV 10.8 10.6     Recent Labs     06/11/23 2126 06/12/23  0514   SODIUM 142 141   POTASSIUM 4.1 4.3   CHLORIDE 110 108   CO2 20 23   GLUCOSE 138* 99   BUN 21 17   CREATININE 1.65* 1.49*   CALCIUM 8.8 9.0                     Imaging  DX-FEMUR-1 VIEW RIGHT   Final Result         1.  No radiographic evidence of acute traumatic injury.          Assessment/Plan  * Sepsis (HCC)- (present on admission)  Assessment & Plan  Present on admission  Improved/resolved now, continue medical treatment    Cellulitis of right upper extremity  Assessment & Plan  Currently covered with IV antibiotics in conjunction to other treatment  Topical treatments    Acute cystitis without hematuria  Assessment & Plan  The patient with urinary retention, likely in the basis of prostate cancer  The patient states that he had a Jensen catheter up until not too long ago, this was then removed  The patient presented to the outlying facility with significant urinary retention and UTI  Continue empiric antibiotics  Await cultures  Voiding trials  Might need to have rather urgent  referral back to urology    CKD (chronic kidney disease)  Assessment & Plan  Chronic,  Unclear baseline, the patient currently with a improved creatinine to 1.49  Avoid nephrotoxins    Hypertension  Assessment & Plan  History of, monitor,  Consider beta-blockade and further titration as indicated    Prostate cancer ([facility])  Assessment & Plan  With history of urologic procedure, details unknown  Did have prior urinary retention or urinary incontinence per the patient's report  Jensen catheter placement  Treated UTI  Restart Flomax    Debility  Assessment & Plan  Chronic, consider PT OT    Acute respiratory failure with hypoxia ([facility])  Assessment & Plan  Improved, the patient previously not on home oxygen  Diuresis as tolerated after volume resuscitation  Wean oxygen as tolerated    History of coronary angioplasty with insertion of stent  Assessment & Plan  Patient previously on clopidogrel, resume  GDMT    Heart failure with reduced ejection fraction ([facility])  Assessment & Plan  The patient previously on Lasix and Aldactone therapy  Restart once patient is able to tolerate    Diabetes ([facility])  Assessment & Plan  History of,  Sliding scale insulin for the time being, the patient was previously on oral agents      Plan  Continue current antibiotic therapy  Continue Plavix, Lofibra, Neurontin as previously taking,  Restart Aldactone, Flomax, consider Lasix the patient is improving  Continue Jensen drainage  Follow cultures closely  Topical treatment to the patient's right lower extremity cellulitis  Patient is has a high medical complexity, complex decision making and is at high risk for complication, morbidity, and mortality.  My total time spent caring for the patient on the day of the encounter was 65 minutes.   This does not include time spent on separately billable procedures/tests.    Thank you for consulting with us, we will follow closely while the patient is hospitalized      Please note that this dictation was created  using voice recognition software. I have made every reasonable attempt to correct obvious errors, but I expect that there are errors of grammar and possibly context that I did not discover before finalizing the note.

## 2023-06-12 NOTE — HOSPITAL COURSE
Donte Agustin is a 77-year-old male with a past medical history of systolic heart failure (EF 35%, TTE 3-4 months ago), hypertension, diabetes, CAD (s/p stents X 2 10/22) and prostate cancer (radiation over 10 yrs ago), BPH (recent laser therapy),CKI, who was transferred from [facility] to [facility] last night (6/11/23) for sepsis with vasopressor therapy.  He states that for at least 2 days he experienced  fever, chills, nausea, vomiting , myalgias, and new cough.He also states that he was bit by something on his R antecubital area that developed some redness, erythema,that is now improved.  Upon arrival to [facility] he was found to be hypoxic with a RA sat of 86%, tachycardia with a HR in the 120's and MAP barely at 60 despite 3L of crystalloid.  Vasopressors and  ABX were initiated and he was transferred to [facility] ICU for critical care management.

## 2023-06-12 NOTE — ASSESSMENT & PLAN NOTE
History of stent to RCA, OM October 23, 2022.  Is on Plavix according to wife and outside records, PLUS according to wife only 81mg Aspirin TID?!    -Ordered Fenofibrate equivalent  -Continue DAPT with 75mg Plavix daily and 81mg Aspirin daily as he is 6-12 months post stent  -Also work with wife to bring med list and Pharmacy for confirming outside medications

## 2023-06-12 NOTE — THERAPY
Physical Therapy   Initial Evaluation     Patient Name: Donte Agustin  Age:  77 y.o., Sex:  male  Medical Record #: [id number]  Today's Date: 6/12/2023     Precautions  Precautions: Fall Risk    Assessment  Patient is 77 y.o. male admitted from outside hospital for vasopressor support needed for sepsis and respiratory failure. PMH includes DM, HTN, HFrEF, CAD, prostate cancer, renal insufficiency. Pt agreeable to work with therapist. He appears to be functioning at his prior level and is limited by B foot pain 2/2 OA.  If agreeable, pt would benefit from home health PT to address higher level balance deficits putting him at risk for falls. Patient will not be actively followed for physical therapy services at this time, however may be seen if requested by physician for 1 more visit within 30 days to address any discharge or equipment needs.    Plan    Physical Therapy Initial Treatment Plan   Duration: Discharge Needs Only    DC Equipment Recommendations: None  Discharge Recommendations: Recommend home health for continued physical therapy services (if pt agreeable to HHPT)        06/12/23 1410   Prior Living Situation   Prior Services Home-Independent   Housing / Facility Motel   Steps Into Home 0   Steps In Home 0   Equipment Owned Single Point Cane   Lives with - Patient's Self Care Capacity Spouse   Comments reports wife can assist   Prior Level of Functional Mobility   Bed Mobility Independent   Transfer Status Independent   Ambulation Independent   Ambulation Distance short community   Assistive Devices Used Single Point Cane   Comments reports independent with mobility prior   History of Falls   History of Falls Yes   Cognition    Level of Consciousness Alert   Active ROM Lower Body    Active ROM Lower Body  WDL   Comments functional for gait   Strength Lower Body   Lower Body Strength  WDL   Vision   Vision Comments cataracts   Other Treatments   Other Treatments Provided pt reports B foot OA which limits his  walking   Balance Assessment   Sitting Balance (Static) Fair +   Sitting Balance (Dynamic) Fair   Standing Balance (Static) Fair   Standing Balance (Dynamic) Fair -   Weight Shift Sitting Fair   Weight Shift Standing Fair   Comments SPC   Bed Mobility    Supine to Sit Supervised   Scooting Supervised   Comments left in chair   Gait Analysis   Gait Level Of Assist Standby Assist   Assistive Device Single Point Cane   Distance (Feet) 40   # of Times Distance was Traveled 1   Deviation Bradykinetic;Shuffled Gait;Decreased Heel Strike;Decreased Toe Off   Weight Bearing Status no restrictions   Comments limited by chronic foot OA, baseline gait   Functional Mobility   Sit to Stand Standby Assist   Bed, Chair, Wheelchair Transfer Standby Assist   Transfer Method Stand Step   Mobility in room and hallway   Education Group   Education Provided Role of Physical Therapist   Role of Physical Therapist Patient Response Patient;Acceptance;Demonstration;Action Demonstration   Anticipated Discharge Equipment and Recommendations   DC Equipment Recommendations None   Discharge Recommendations Recommend home health for continued physical therapy services  (if pt agreeable to HHPT)

## 2023-06-12 NOTE — ASSESSMENT & PLAN NOTE
Improved, the patient previously not on home oxygen  Will resume diuresis today, following closely  Repeat bmp in am  Continues to improve, continue to wean as tolerated

## 2023-06-12 NOTE — CARE PLAN
The patient is Watcher - Medium risk of patient condition declining or worsening    Shift Goals  Clinical Goals: MAP >65  Patient Goals: Feel better  Family Goals: Communication    Progress made toward(s) clinical / shift goals:      Problem: Skin Integrity  Goal: Skin integrity is maintained or improved  Outcome: Progressing  Note: Patient turns themself and ICU low air loss mattress in use     Problem: Fall Risk  Goal: Patient will remain free from falls  Outcome: Progressing  Note: Bed alarm on     Problem: Hemodynamics  Goal: Patient's hemodynamics, fluid balance and neurologic status will be stable or improve  Outcome: Progressing  Note: Off pressor support      Problem: Urinary - Renal Perfusion  Goal: Ability to achieve and maintain adequate renal perfusion and functioning will improve  Outcome: Progressing  Note: Adequate urinary output Q2 hours     Patient is not progressing towards the following goals:    Problem: Bowel Elimination  Goal: Establish and maintain regular bowel function  Outcome: Not Progressing  Note: BM PTA - bowel protocol ordered

## 2023-06-12 NOTE — ASSESSMENT & PLAN NOTE
Resolving, will change iv abx to augmentin and possible d/c tomorrow if stable  Will continue to follow  Topical treatments

## 2023-06-12 NOTE — H&P
"History & Physical Note    Date of Admission: 6/11/2023  Admission Status: Inpatient  [facility] Team: [facility] Gold Team  Attending: Jeremy Gonda, M.D.   Senior Resident: Dr. Les Bean  Contact Number: [phone]    Chief Complaint: transfer for vasopressors    History of Present Illness (HPI):   Donte is a 77 y.o. male with PMH of Diabetes Mellitus, HTN, HFrEF (Per wife 35% LVEF on TTE 3-4 months ago), CAD (s/p 2 stent Oct 2022), prostate cancer (s/p radiation and TURP 14 years ago), and renal insufficiency (1.9 baseline creatinine), who was transferred from [facility] on 6/11/2023 for vasopressor support. History obtained via transferring records, patient, and patient's wife/DPOA via phone. Patient recalls 2 days of subjective fever, myalgias, diarrhea, vomiting, new cough, and dizziness. Also reports a right antecubital \"spider bite\" that had purulent discharge, erythema, and swelling during this time as well. At [place], he was found to be hypoxic at 86%, requiring 2L and does not use oxygen at home. He also presented with tachycardia at 125 and despite 3L of crystalloid fluids, his MAP was barely at 60 so he was started on Levophed and antibiotics. He denies chest pain, sick contacts, new medications, bleeding, dysuria (though had boone placed at [place] and has appt with Urology in 2 months for \"reoccurring Prostate cancer\"), unintentional weight loss, or trauma.    Home meds per wife/DPOA (unable to tell dosing as she is legally blind):   Plavix  Lasix  Spironolactone  Jardiance  Coreg PRN for HTN, has not needed to take it  Omeprazole  Zofran  Aspirin (apparently 81mg TID)  Entresto  10. Gabapentin    Patient's Cardiologist is in [facility] named, Dr. DAYTON Gama    At [facility]:  Vitals: Hypotensive, tachycardic up to 125, hypoxic needing 2L NC, but otherwise unremarkable  Labs: Normal WBC at 9.9 (with 85% neutrophils), 11.5 Hgb, 1.9 creatinine, 22 CO2,  normal " lactic acid of 1.0, Troponin-I 0.3, and BNP elevated at 3090.  CXR: Cardiomegaly with minimal atelectasis at left lung base  UA: Leukocyte esterase 25 (small), WBC 21-50/HPF, Bacteria (UR) Few/HPF, Yeast (UR) Present, WBC Clump (UR) Present  Received: 2L NS bolus, 1L LR bolius Zofran, TDAP IM, Zosyn, Tylenol, Vancomycin, Levophed, gabapentin, and topical Lidocaine    Medications per transferring records  10mg Jardiance daily  100,000 units/g topical powder of Nyamyc  75mg Clopidogrel daily  54mg Fenofibrate daily  600mg Gabapentin BID  0.3mg sublingual Nitroglycerine PRN Chest pain  4mg Zofran Q8H PRN Nausea/Vomiting  25mg Spironolactone daily  0.4mg Tamsulosin daily    Review of Systems:   Review of Systems   Constitutional:  Positive for fever and malaise/fatigue. Negative for chills and weight loss.   Respiratory:  Positive for cough, sputum production (white) and shortness of breath. Negative for hemoptysis and wheezing.    Cardiovascular:  Negative for chest pain, palpitations, orthopnea, claudication and leg swelling.   Gastrointestinal:  Negative for abdominal pain, constipation, diarrhea, heartburn, nausea and vomiting.   Genitourinary:  Negative for dysuria, flank pain, frequency, hematuria and urgency.   Musculoskeletal:  Negative for back pain, joint pain, myalgias and neck pain.   Skin:         Right antecubital lesion   Neurological:  Positive for weakness (Generalized). Negative for dizziness, tingling, tremors, sensory change, speech change, focal weakness and headaches.   Psychiatric/Behavioral:  Negative for depression, hallucinations, memory loss, substance abuse and suicidal ideas. The patient is not nervous/anxious and does not have insomnia.        Past Medical History:   Past Medical History was reviewed with patient.   has no past medical history on file.    Past Surgical History: Past Surgical History was reviewed with patient.   has no past surgical history on file.    Medications:  Medications have been reviewed with patient.  None        Allergies: Allergies have been reviewed with patient.  Not on File    Family History:   family history is not on file.     Social History:   Tobacco: Reports recent cigar use but not tobacco use  Alcohol: Reports having a beer, but denies otherwise  Recreational drugs (illegal and prescription):  Denies  Activity Level: Uses walker at home  Living situation:  Lives with wife  Primary Care Provider: reviewed No primary care provider on file.    Physical Exam:   Vitals:  Temp:  [36.7 °C (98 °F)] 36.7 °C (98 °F)    Physical Exam  Constitutional:       General: He is not in acute distress.     Appearance: Normal appearance. He is normal weight. He is not ill-appearing.      Comments: Pleasant demeanor and lying comfortably in bed.   HENT:      Mouth/Throat:      Mouth: Mucous membranes are dry.      Pharynx: Oropharynx is clear. No oropharyngeal exudate or posterior oropharyngeal erythema.   Cardiovascular:      Rate and Rhythm: Normal rate and regular rhythm.      Pulses: Normal pulses.      Heart sounds: Normal heart sounds. No murmur heard.  Pulmonary:      Effort: Pulmonary effort is normal. No respiratory distress.      Breath sounds: No stridor. No wheezing.      Comments: +Bilateral crackles  Abdominal:      General: Abdomen is flat. Bowel sounds are normal. There is no distension.      Palpations: Abdomen is soft. There is no mass.      Tenderness: There is no abdominal tenderness.      Hernia: No hernia is present.   Musculoskeletal:         General: No swelling or tenderness. Normal range of motion.      Right lower leg: No edema.      Left lower leg: No edema.   Skin:     General: Skin is warm and dry.      Coloration: Skin is not jaundiced.      Findings: Bruising (right forearm), erythema (Minimal surround right forearm lesion) and lesion (Right antecubital lesion, without purulent discharge) present.   Neurological:      General: No focal deficit  present.      Mental Status: He is alert and oriented to person, place, and time.      Cranial Nerves: No cranial nerve deficit.      Sensory: No sensory deficit.      Motor: Weakness (5-/5 on B/L LEs) present.      Coordination: Coordination normal.      Deep Tendon Reflexes: Reflexes normal.   Psychiatric:         Mood and Affect: Mood normal.         Behavior: Behavior normal.         Thought Content: Thought content normal.         Labs:   Recent Labs     06/11/23 2126   WBC 11.7*   RBC 3.95*   HEMOGLOBIN 10.9*   HEMATOCRIT 34.1*   MCV 86.3   MCH 27.6   RDW 50.6*   PLATELETCT 262   MPV 10.8   NEUTSPOLYS 79.90*   LYMPHOCYTES 8.70*   MONOCYTES 8.70   EOSINOPHILS 2.00   BASOPHILS 0.30     Recent Labs     06/11/23 2126   SODIUM 142   POTASSIUM 4.1   CHLORIDE 110   CO2 20   GLUCOSE 138*   BUN 21     EKG:  Per my read, sinus rhythm with rate 68, , and evidence of LBBB. No clear evidence of STEMI or infarction.     Imaging:   No orders to display         Previous Data Review: reviewed    Problem Representation:   77 y.o. male with PMH of Diabetes Mellitus, HTN, HFrEF (Per wife 35% LVEF on TTE 3-4 months ago), CAD (s/p 2 stent Oct 2022), prostate cancer (s/p radiation and TURP 14 years ago), and renal insufficiency (1.9 baseline creatinine), who was transferred from [facility] on 6/11/2023 for vasopressor support.     * Sepsis (HCC)- (present on admission)  Assessment & Plan  This is Sepsis Present on admission  SIRS criteria identified on my evaluation include: Tachycardia, with heart rate greater than 90 BPM  Source is unclear, but differential includes CAP, cellulitis, or UTI  Sepsis protocol initiated  Fluid resuscitation ordered per protocol  Crystalloid Fluid Administration: Fluid resuscitation ordered per standard protocol - 30 mL/kg per current or ideal body weight  IV antibiotics as appropriate for source of sepsis  Reassessment: I have reassessed the patient's hemodynamic  status    -Will repeat blood cultures x2 and UA  -Received Zosyn and Vanco at [facility] with UA that could be colonization given patient's history of prolonged boone but is not reporting any urinary symptoms  -Start Ceftriaxone and Azithromycin  -Continue Levophed, will try to wean off  -LR at 100mL/hr as he received 3L in total from [facility]  -If right forearm wound produces any purulent discharge, will get culture of  -Replacing boone with one of our own to reduce infection risk  -Orthostatic vitals, can consider Midodrine if +  -Discussed with Pharmacy over discrepancies between [facility] med list and med list that wife reports. Pharmacy to call patient's outside pharmacy for more details  -Asked wife to bring medications or med list in      Acute respiratory failure with hypoxia (HCC)  Assessment & Plan  On 2L NC and does not use oxygen at home  No tobacco history or COPD, but no PFTs on file here  Etiology possible CAP or HFrEF exacerbation from sepsis  Not hypervolemic on exam    -Pulse ox  -Continue supplemental oxygen  -Goal SPO2 > 90%  -Holding Lasix, Spironolactone, either Entresto/Jardiance given hypotension and Levophed, will resume when appropriate  -Aspiration precautions    History of coronary angioplasty with insertion of stent  Assessment & Plan  History of, according to wife he had 2 coronary stents (unclear which vessel) around October 2022.  Is on Plavix according to wife and outside records, PLUS according to wife only 81mg Aspirin TID?!    -Continue DAPT with 75mg Plavix daily and 81mg Aspirin daily as he is 6-12 months post stent  -Also work with wife to bring med list and Pharmacy for confirming outside medications  -Day team to consider getting records from patient's cardiologist, if needed      Heart failure with reduced ejection fraction (HCC)  Assessment & Plan  Per wife 35% LVEF on TTE 3-4 months ago and history of CAD (s/p 2 stent Oct 2022)  Is on Lasix, Spironolactone, either  "Entresto/Jardiance, and Coreg PRN for HTN (Has not needed to take it for quite awhile according to wife)  Patient's Cardiologist is in [facility] named, Dr. DAYTON Gama    -Telemetry  -ECG here: Per my read, sinus rhythm with rate 68, , and evidence of LBBB. No clear evidence of STEMI or infarction.   -Holding Lasix, Spironolactone, either Entresto/Jardiance given hypotension and Levophed, will resume when appropriate  -Discussed with Pharmacy over discrepancies between [facility] med list and med list that wife reports. Pharmacy to call patient's outside pharmacy for more details  -Asked wife to bring medications or med list in  -Day team to consider getting records from patient's cardiologist, if needed      Hypertension  Assessment & Plan  History of, on HFrEF medications that can lower his BP and \"Coreg as needed for hypertension\" according to his wife, but has not needed any of Coreg for quite awhile    -Holding home medications until off Levophed and will resume as needed  -Discussed with Pharmacy over discrepancies between [facility] med list and med list that wife reports. Pharmacy to call patient's outside pharmacy for more details  -Asked wife to bring medications or med list in      Prostate cancer (HCC)  Assessment & Plan  Prostate cancer (s/p radiation and TURP 14 years ago) and follows with outside Urology. Had long term boone and f/u with Urology in roughly 2 months according to patient for \"reoccurrance of my prostate cancer\"  No unintentional weight loss, dysuria, hematuria (before boone removal/reinsertion), suprapubic pain, etc    -Replacing outside boone with one here  -UA  -CTM, patient to follow up with outside Urology    Debility  Assessment & Plan  Uses walker at home and reports malaise/fatigue  Likely debility from infection and hospital course    -PT/OT consult  -Fall precautions    Diabetes (HCC)  Assessment & Plan  History of, patient states he \"used to have " "diabetes but then I lost like 80 lbs and was told I no longer have it.\" Outside records and med list his wife states are not clear but taking Gabapentin and either Entresto or Jardiance (more for HFrEF).    -Diabetic diet  -SSI  -Holding any Lantus pending blood glucose        "

## 2023-06-12 NOTE — PROGRESS NOTES
4 Eyes Skin Assessment Completed by Burak RN and MARYBEL Horta.    Head WDL  Ears WDL  Nose WDL  Mouth WDL  Neck WDL  Breast/Chest WDL  Shoulder Blades WDL  Spine WDL  (R) Arm/Elbow/Hand Redness and Abrasion insect bite  (L) Arm/Elbow/Hand WDL  Abdomen WDL  Groin WDL  Scrotum/Coccyx/Buttocks WDL  (R) Leg WDL  (L) Leg WDL  (R) Heel/Foot/Toe WDL  (L) Heel/Foot/Toe WDL          Devices In Places ECG, Blood Pressure Cuff, Pulse Ox, Jensen, and Nasal Cannula      Interventions In Place Gray Ear Foams, Sacral Mepilex, Pillows, Q2 Turns, and Low Air Loss Mattress    Possible Skin Injury Yes    Pictures Uploaded Into Epic Yes  Wound Consult Placed N/A  RN Wound Prevention Protocol Ordered Yes    1 bag of personal belongings and a wooden cane at bedside

## 2023-06-12 NOTE — PROGRESS NOTES
4 Eyes Skin Assessment Completed by Parveen GUILLEN RN and Fay SHELDON RN.    Head WDL  Ears WDL  Nose WDL  Mouth WDL  Neck WDL  Breast/Chest WDL  Shoulder Blades WDL  Spine WDL  (R) Arm/Elbow/Hand Redness, Scab, and Discoloration  (L) Arm/Elbow/Hand WDL  Abdomen WDL  Groin Redness and Excoriation  Scrotum/Coccyx/Buttocks WDL  (R) Leg WDL  (L) Leg WDL  (R) Heel/Foot/Toe WDL  (L) Heel/Foot/Toe WDL          Devices In Places Tele Box, Blood Pressure Cuff, Pulse Ox, Jensen, and Nasal Cannula      Interventions In Place NC W/Ear Foams, Pillows, and Heels Loaded W/Pillows    Possible Skin Injury Yes    Pictures Uploaded Into Epic No, needs to be completed  Wound Consult Placed Yes  RN Wound Prevention Protocol Ordered Yes

## 2023-06-12 NOTE — ASSESSMENT & PLAN NOTE
"History of, on HFrEF medications that can lower his BP and \"Coreg as needed for hypertension\" according to his wife, but has not needed any of Coreg for quite awhile    -Holding home medications until blood pressure tolerates being without vasopressor support  -Asked wife to bring medications or med list in    "

## 2023-06-12 NOTE — ASSESSMENT & PLAN NOTE
Present on admission  Improved/resolved now, continue medical treatment, will de escalate abx  Continue to follow

## 2023-06-12 NOTE — ASSESSMENT & PLAN NOTE
With history of urologic procedure, details unknown  Did have prior urinary retention or urinary incontinence per the patient's report  Boone catheter placement - will d/c with booen and he will follow up with his urologist in [place] [facility] [place]  Treated UTI  continue Flomax

## 2023-06-12 NOTE — ASSESSMENT & PLAN NOTE
Apparently has history of renal insufficiency with baseline Cr of 1.9 (unconfirmed and that was his creatinine from transferring hospital)  S/P 3L IV fluids at [place]    -Intake and Output  -Jensen replaced  -LR at 100mL/hr-DC fluids once patient taking p.o.

## 2023-06-12 NOTE — ASSESSMENT & PLAN NOTE
On 2L NC and does not use oxygen at home  No tobacco history or COPD, but no PFTs on file here  Etiology possible CAP or HFrEF exacerbation from sepsis  Not hypervolemic on exam    -Pulse ox  -Continue supplemental oxygen  -Goal SPO2 > 90%  -Holding Lasix, Spironolactone, either Entresto/Jardiance given hypotension and Levophed, will resume when appropriate  -Aspiration precautions

## 2023-06-12 NOTE — ASSESSMENT & PLAN NOTE
"History of, patient states he \"used to have diabetes but then I lost like 80 lbs and was told I no longer have it.\" Outside records and med list his wife states are not clear but taking Gabapentin and either Entresto or Jardiance (more for HFrEF).    -Diabetic diet  -SSI  -Holding any Lantus pending blood glucose  "

## 2023-06-12 NOTE — PROGRESS NOTES
Critical Care Progress Note    Date of admission  6/11/2023    Chief Complain  77 y.o. male admitted 6/11/2023 with sepsis    Hospital Course  Donte Agustin is a 77-year-old male with a past medical history of systolic heart failure (EF 35%, TTE 3-4 months ago), hypertension, diabetes, CAD (s/p stents X 2 10/22) and prostate cancer (radiation over 10 yrs ago), BPH (recent laser therapy),CKI, who was transferred from  to [facility] last night (6/11/23) for sepsis with vasopressor therapy.  He states that for at least 2 days he experienced  fever, chills, nausea, vomiting , myalgias, and new cough.He also states that he was bit by something on his R antecubital area that developed some redness, erythema,that is now improved.  Upon arrival to [facility] he was found to be hypoxic with a RA sat of 86%, tachycardia with a HR in the 120's and MAP barely at 60 despite 3L of crystalloid.  Vasopressors and  ABX were initiated and he was transferred to [facility] ICU for critical care management.      Interval Problem Update  Reviewed last 24 hour events:    -Afebrile  -UA + for infection  -Urine sent for culture  -ABX C3, azithromycin   -SBP goal > 90; MAP goal  >65  -Actively weaned off Levophed early this am- SBP and MAP goal   -2L NC  - lactic normalized     - Pt no longer requires ICU  management and will be transferred to telemetry for continuation telemetry monitoring post treatment of sepsis and HX of HFrF. Vasopressors weaned off this am  Discussed with my attending, Dr. Sterling and the Hospitalist, Dr. Arreguin who will assume care. Please contact Critical Care service for any questions and or concerns        Review of Systems  Review of Systems   Constitutional:  Negative for chills, fever and malaise/fatigue.   HENT:  Negative for hearing loss.    Respiratory:  Negative for cough, sputum production and shortness of breath.    Cardiovascular:  Negative for chest pain.   Gastrointestinal:  Negative for  "abdominal pain, heartburn, nausea and vomiting.   Genitourinary:  Negative for dysuria.        \"I retain urine.  I don't pee that well\"   Musculoskeletal:  Negative for myalgias.   Skin:  Negative for rash.        \"Something bit me on my arm a few days ago.  Its getting better\"   Neurological:  Negative for dizziness, sensory change, speech change, focal weakness, weakness and headaches.        Vital Signs for last 24 hours   Temp:  [36.4 °C (97.5 °F)-36.7 °C (98 °F)] 36.4 °C (97.5 °F)  Pulse:  [63-96] 85  Resp:  [10-30] 13  BP: ()/(50-76) 112/60  SpO2:  [94 %-98 %] 95 %    Hemodynamic parameters for last 24 hours       Respiratory Information for the last 24 hours       Physical Exam   Physical Exam  Vitals and nursing note reviewed.   Constitutional:       Appearance: Normal appearance.   HENT:      Head: Normocephalic and atraumatic.      Mouth/Throat:      Mouth: Mucous membranes are dry.      Pharynx: Oropharynx is clear.   Eyes:      Pupils: Pupils are equal, round, and reactive to light.   Cardiovascular:      Rate and Rhythm: Normal rate and regular rhythm.      Pulses:           Radial pulses are 2+ on the right side and 2+ on the left side.        Dorsalis pedis pulses are 1+ on the right side and 1+ on the left side.      Heart sounds: Heart sounds are distant.   Pulmonary:      Effort: Pulmonary effort is normal.      Breath sounds: Normal air entry. Examination of the right-lower field reveals decreased breath sounds. Examination of the left-lower field reveals decreased breath sounds. Decreased breath sounds present.      Comments: Without resp distress  Chest:   Breasts:     Breasts are symmetrical.   Abdominal:      Palpations: Abdomen is soft.      Comments: Bowel sounds present   Genitourinary:     Comments: Jensen to down drain clear yellow urine   Musculoskeletal:      Right lower leg: No edema.      Left lower leg: No edema.   Skin:     Comments:  Right forearm at antecubital region with " some erythema, bruising and scab noted-No drainage- site is normal temp to touch   Neurological:      Mental Status: He is alert and oriented to person, place, and time. Mental status is at baseline.      GCS: GCS eye subscore is 4. GCS verbal subscore is 5. GCS motor subscore is 6.   Psychiatric:         Attention and Perception: Perception normal.         Mood and Affect: Mood normal.         Behavior: Behavior is cooperative.         Medications  Current Facility-Administered Medications   Medication Dose Route Frequency Provider Last Rate Last Admin    norepinephrine (Levophed) 8 mg in 250 mL NS infusion (premix)  0-1 mcg/kg/min (Ideal) Intravenous Continuous Jeremy M Gonda, M.D.   Paused at 06/12/23 0033    senna-docusate (PERICOLACE or SENOKOT S) 8.6-50 MG per tablet 2 Tablet  2 Tablet Oral BID Lse Bean M.D.   2 Tablet at 06/12/23 0517    And    polyethylene glycol/lytes (MIRALAX) PACKET 1 Packet  1 Packet Oral QDAY PRN Les Bean M.D.        And    magnesium hydroxide (MILK OF MAGNESIA) suspension 30 mL  30 mL Oral QDAY PRN Les Bean M.D.        And    bisacodyl (DULCOLAX) suppository 10 mg  10 mg Rectal QDAY PRN Les Bean M.D.        enoxaparin (Lovenox) inj 40 mg  40 mg Subcutaneous DAILY AT 1800 Les Bean M.D.        acetaminophen (Tylenol) tablet 650 mg  650 mg Oral Q6HRS PRN Les Bean M.D.   650 mg at 06/12/23 0431    labetalol (NORMODYNE/TRANDATE) injection 10 mg  10 mg Intravenous Q4HRS PRN Les Bean M.D.        lactated ringers infusion   Intravenous Continuous Les Bean M.D. 100 mL/hr at 06/11/23 2114 New Bag at 06/11/23 2114    cefTRIAXone (Rocephin) syringe 2,000 mg  2,000 mg Intravenous Q24HRS Les Bean M.D.   2,000 mg at 06/12/23 0517    azithromycin (ZITHROMAX) tablet 500 mg  500 mg Oral DAILY Les Bean M.D.   500 mg at 06/12/23 0516    insulin lispro (HumaLOG,AdmeLOG) injection  1-6 Units Subcutaneous 4X/DAY LORE Bean M.D.        And    dextrose  10 % BOLUS 25 g  25 g Intravenous Q15 MIN PRN Les Bean M.D.        clopidogrel (PLAVIX) tablet 75 mg  75 mg Oral DAILY Les Bean M.D.   75 mg at 06/12/23 0516    [Held by provider] furosemide (LASIX) injection 20 mg  20 mg Intravenous Q DAY Les Bean M.D.        [Held by provider] spironolactone (ALDACTONE) tablet 25 mg  25 mg Oral Q DAY Les Bean M.D.        [Held by provider] tamsulosin (FLOMAX) capsule 0.4 mg  0.4 mg Oral AFTER BREAKFAST Les Bean M.D.        gabapentin (NEURONTIN) capsule 600 mg  600 mg Oral BID Les Bean M.D.   600 mg at 06/12/23 0516    fenofibrate micronized (LOFIBRA) capsule 67 mg  67 mg Oral DAILY Les Bean M.D.   67 mg at 06/12/23 0517    ondansetron (ZOFRAN ODT) dispertab 4 mg  4 mg Oral Q8HRS PRN Les Bean M.D.        aspirin EC tablet 81 mg  81 mg Oral DAILY Les Bean M.D.   81 mg at 06/12/23 0516    MD Alert...ICU Electrolyte Replacement per Pharmacy   Other PHARMACY TO DOSE Jeremy M Gonda, M.D.           Fluids    Intake/Output Summary (Last 24 hours) at 6/12/2023 0617  Last data filed at 6/12/2023 0600  Gross per 24 hour   Intake 1696.46 ml   Output 1975 ml   Net -278.54 ml       Laboratory          Recent Labs     06/11/23 2126 06/12/23  0514   SODIUM 142 141   POTASSIUM 4.1 4.3   CHLORIDE 110 108   CO2 20 23   BUN 21 17   CREATININE 1.65* 1.49*   MAGNESIUM 1.8  --    CALCIUM 8.8 9.0     Recent Labs     06/11/23 2126 06/12/23  0514   ALTSGPT <5 <5   ASTSGOT 10* 12   ALKPHOSPHAT 35 35   TBILIRUBIN 0.5 0.4   GLUCOSE 138* 99     Recent Labs     06/11/23 2126 06/12/23  0514   WBC 11.7* 7.9   NEUTSPOLYS 79.90* 73.20*   LYMPHOCYTES 8.70* 12.10*   MONOCYTES 8.70 10.70   EOSINOPHILS 2.00 3.30   BASOPHILS 0.30 0.40   ASTSGOT 10* 12   ALTSGPT <5 <5   ALKPHOSPHAT 35 35   TBILIRUBIN 0.5 0.4     Recent Labs     06/11/23 2126 06/12/23  0514   RBC 3.95* 3.97*   HEMOGLOBIN 10.9* 11.0*   HEMATOCRIT 34.1* 35.3*   PLATELETCT 262 213       Imaging  X-Ray:  I  have personally reviewed the images and compared with prior images.  EKG:  I have personally reviewed the images and compared with prior images.    Assessment/Plan  * Sepsis (HCC)- (present on admission)  Assessment & Plan  This is Sepsis Present on admission  SIRS criteria identified on my evaluation include: Tachycardia, with heart rate greater than 90 BPM  Source-differential includes CAP, cellulitis,  UTI.  Most likely UTI  Sepsis protocol initiated  Fluid resuscitation ordered per protocol  Crystalloid Fluid Administration: Fluid resuscitation ordered per standard protocol - 30 mL/kg per current or ideal body weight  IV antibiotics as appropriate for source of sepsis  Reassessment: I have reassessed the patient's hemodynamic status    -Will repeat blood cultures x2 and UA- UA + for infection with HX of retention BPH- Has not had chronic boone cath in 2 months  -Received Zosyn and Vanco at [place] with UA that could be colonization given patient's history of prolonged bonoe but is not reporting any urinary symptoms  -Continue Ceftriaxone and Azithromycin  -Levophed, weaned off  -LR at 100mL/hr as he received 3L in total from [facility] - dc once taking PO  -If right forearm wound produces any purulent discharge, will get culture of  -Replacing boone with one of our own to reduce infection risk  -Repeat CBC, CMP, Procal, and Lactic Acid-Lactic acid normalizing      Acute cystitis without hematuria  Assessment & Plan  -Patient reports recurrent urinary retention  -Reports history of BPH with recent laser surgery (within the last 6 months) reports requiring Boone catheter as an outpatient.  States that he has not had Boone catheter in over 2 months  -On arrival to the ED he was found to have urinary retention and positive UA  -Urine culture sent  -Continue ceftriaxone  -Recommend exchanging Boone catheter while on antibiotics      CKD (chronic kidney disease)  Assessment & Plan  Apparently has history of renal  "insufficiency with baseline Cr of 1.9 (unconfirmed and that was his creatinine from transferring hospital)  S/P 3L IV fluids at [place]    -Intake and Output  -Jensen replaced  -LR at 100mL/hr-DC fluids once patient taking p.o.    Hypertension  Assessment & Plan  History of, on HFrEF medications that can lower his BP and \"Coreg as needed for hypertension\" according to his wife, but has not needed any of Coreg for quite awhile    -Holding home medications until blood pressure tolerates being without vasopressor support  -Asked wife to bring medications or med list in      Prostate cancer (HCC)  Assessment & Plan  -Follows with outpatient urology in [place]  -History of prostate cancer (s/p radiation approximately 12 years ago)  -Patient states laser therapy on prostate for treatment of BPH \"a few months ago\"   -Recurrent episodes of urinary retention requiring Jensen catheter placement.  Last had Jensen catheter approximately 2 months ago  -Outside hospital Jensen replaced  -UA appears to be positive for infection-culture sent  -Voiding trial prior to removal of Jensen catheter and or DC home with Jensen catheter with close follow-up with urology-if he DC his home with catheter he will need it exchanged within 1 month time  -CTM, patient to follow up with outside Urology    Debility  Assessment & Plan  Uses walker at home and reports malaise/fatigue  Likely debility from infection and hospital course    -PT/OT consult  -Fall precautions    Acute respiratory failure with hypoxia (HCC)  Assessment & Plan  On 2L NC and does not use oxygen at home  No tobacco history or COPD, but no PFTs on file here  Etiology possible CAP or HFrEF exacerbation from sepsis  Not hypervolemic on exam    -Pulse ox  -Continue supplemental oxygen  -Goal SPO2 > 90%  -Holding Lasix, Spironolactone, either Entresto/Jardiance given hypotension and Levophed, will resume when appropriate  -Aspiration precautions    History of coronary angioplasty with " "insertion of stent  Assessment & Plan  History of stent to RCA, OM October 23, 2022.  Is on Plavix according to wife and outside records, PLUS according to wife only 81mg Aspirin TID?!    -Ordered Fenofibrate equivalent  -Continue DAPT with 75mg Plavix daily and 81mg Aspirin daily as he is 6-12 months post stent  -Also work with wife to bring med list and Pharmacy for confirming outside medications        Heart failure with reduced ejection fraction (HCC)  Assessment & Plan  Per wife 35% LVEF on TTE 3-4 months ago and history of CAD (s/p 2 stent Oct 2022)  Is on Lasix, Spironolactone, either Entresto/Jardiance, and Coreg PRN for HTN (Has not needed to take it for quite awhile according to wife)  Patient's Cardiologist is in [facility] named, Dr. DAYTON Gama  -Holding Lasix, Spironolactone, either Entresto/Jardiance given hypotension and recent wean from Levophed, will resume when appropriate  --Not clear on dry weight, but has boone and will get Is & Os plus daily weights      Diabetes (HCC)  Assessment & Plan  History of, patient states he \"used to have diabetes but then I lost like 80 lbs and was told I no longer have it.\" Outside records and med list his wife states are not clear but taking Gabapentin and either Entresto or Jardiance (more for HFrEF).    -Diabetic diet  -SSI  -Holding any Lantus pending blood glucose         VTE:  Lovenox  Ulcer: Not Indicated  Lines: Central Line  Ongoing indication addressed and Boone Catheter  Ongoing indication addressed    I have performed a physical exam and reviewed and updated ROS and Plan today (6/12/2023). In review of yesterday's note (6/11/2023), there are no changes except as documented above.     Discussed patient condition and risk of morbidity and/or mortality with Family, RN, RT, Therapies, Pharmacy, Dietary, Charge nurse / hot rounds, and Patient  The patient remains critically ill.  Please note that this dictation was created using voice recognition " software. The accuracy of the dictation is limited to the abilities of the software. I have made every reasonable attempt to correct obvious errors, but I expect that there are errors of grammar and possibly content that I did not discover before finalizing the note.

## 2023-06-12 NOTE — ASSESSMENT & PLAN NOTE
Per wife 35% LVEF on TTE 3-4 months ago and history of CAD (s/p 2 stent Oct 2022)  Is on Lasix, Spironolactone, either Entresto/Jardiance, and Coreg PRN for HTN (Has not needed to take it for quite awhile according to wife)  Patient's Cardiologist is in [facility] named, Dr. DAYTON Gama  -Holding Lasix, Spironolactone, either Entresto/Jardiance given hypotension and recent wean from Levophed, will resume when appropriate  --Not clear on dry weight, but has boone and will get Is & Os plus daily weights

## 2023-06-12 NOTE — PROGRESS NOTES
Patient transported via bed, on cardiac monitor.  VSS.  All belongings transported with patient.  Fem TLC removed and new PIV placed.

## 2023-06-12 NOTE — THERAPY
"Speech Language Pathology   Clinical Swallow Evaluation     Patient Name: Donte Agustin  AGE:  77 y.o., SEX:  male  Medical Record #: [id number]  Date of Service: 6/12/2023      History of Present Illness  Patient is a 77 y.o. male who presented to an outside facility with c/o 3 days of generalized malaise, fevers, chills, sore throat, productive cough, and a \"bug bite to his right arm\" (antecubital fossa).  He also noted some nausea and vomiting and diarrhea. Patient was found to be hypoxic with an SPO2 initially of 78% but improved to 92% on 2 L/min nasal cannula.  He was transferred to [facility] for higher level of care.    PMHx CHF, cardiomyopathy, CAD s/p stents. DM, HTN, Cr 1.9 baseline CKD, hx of prostate cancer    No previous hx of SLP services at [facility]    CMHx Sepsis, Acute resp failure w/hypoxia, HTN, Debility      General Information:  Vitals  O2 (LPM): 1  O2 Delivery Device: Silicone Nasal Cannula  Level of Consciousness: Alert, Awake  Patient Behaviors: Agitated, Uncooperative  Orientation: Oriented x 4  Follows Directives: Inconsistent      Prior Living Situation & Level of Function:  Housing / Facility: [facility]  Lives with - Patient's Self Care Capacity: Spouse     Communication: WFL  Swallowing: Patient endorsed hx of \"food getting stuck,\" has not modified his diet or have had significant weightloss. Patient reported having a study completed 10yrs ago; however, does not recall type of study only that he had a \"pocket\" in his throat.       Oral Mechanism Evaluation:  Dentition: Edentulous (does not own dentures)   Facial Symmetry: Equal  Facial Sensation: Equal     Labial Observations: WFL   Lingual Observations: Midline  Motor Speech: WFL            Laryngeal Function:  Secretion Management: Adequate  Voice Quality: WFL  Cough: Perceptually WNL       Subjective  Patient cleared by RN for evaluation. Patient needing max encouragement to cooperate with SLP tasks. Patient c/o hx of food getting \"stuck,\" had some " "type of study done 10yrs ago though unable to recall type of study. Pt reported being told he had a \"pocket\" in his throat. He has not modified diet, no significant weight loss. Pt stated, \"I think it's throat cancer.\"      Assessment  Current Method of Nutrition: Oral diet (RG7/TN0)  Positioning: Siddiqui's (60-90 degrees)  Bolus Administration: Patient  O2 (LPM): 1 O2 Delivery Device: Silicone Nasal Cannula  Factor(s) Affecting Performance: None  Tracheostomy : No        Swallowing Trials:  Swallowing Trials  Thin Liquid (TN0): WFL  Liquidised (LQ3): WFL  Soft & Bite Sized (SB6): WFL  Regular (RG7): WFL      Comments: Oral phrase broadly intact. No cough/throat clear appreciated with PO. Vocal quality remained stable throughout PO trials. Single swallow completed per bolus. No signs of esophageal dysfunction. No overt s/sx of aspiration or c/o globus during evaluation. Given hx of \"food getting stuck,\" patient could benefit from an MBSS, pt declined ALL diagnotic studies. Patient stating, \"I think it's throat cancer,\" \"you can't help me,\" and \"I don't want you to take pictures of my head.\" Discussed patient with MD. Given patient is tolerating regular diet and is declining to participate in dx study/speech therapy, will hold all studies and not actively follow patient. Re-consult with any change in status.     Clinical Impressions  Patient presents with a functional swallow. Given c/o hx of globus sensation, patient would benefit from MBSS to objectively assess swallow function and further POC. Patient declining to participate in diagnotic study/speech therapy, will hold all studies. SLP will not actively follow patient, MD aware. Re-consult with any change in status.      Recommendations  Diet Consistency: Regular solids and thin liquids  Instrumentation: None indicated at this time  Medication: As tolerated  Supervision: Independent  Positioning: Fully upright and midline during oral intake  Risk Management : " Small bites/sips, Slow rate of intake  Oral Care: BID         SLP Treatment Plan  Treatment Plan: None Indicated  SLP Frequency: N/A - Evaluation Only  Estimated Duration: N/A - Evaluation Only      Anticipated Discharge Needs  Discharge Recommendations: Anticipate that the patient will have no further speech therapy needs after discharge from the hospital   Therapy Recommendations Upon DC: Not Indicated                  Erikarichard Sandoval MS,CCC-SLP

## 2023-06-12 NOTE — ASSESSMENT & PLAN NOTE
"-Follows with outpatient urology in [place]  -History of prostate cancer (s/p radiation approximately 12 years ago)  -Patient states laser therapy on prostate for treatment of BPH \"a few months ago\"   -Recurrent episodes of urinary retention requiring Jensen catheter placement.  Last had Jensen catheter approximately 2 months ago  -Outside hospital Jensen replaced  -UA appears to be positive for infection-culture sent  -Voiding trial prior to removal of Jensen catheter and or DC home with Jensen catheter with close follow-up with urology-if he DC his home with catheter he will need it exchanged within 1 month time  -CTM, patient to follow up with outside Urology  "

## 2023-06-12 NOTE — ASSESSMENT & PLAN NOTE
The patient with urinary retention, likely in the basis of prostate cancer  The patient states that he had a Boone catheter up until not too long ago, this was then removed  The patient presented to the outlying facility with significant urinary retention and UTI  Continue empiric antibiotics  Cultures so far negative  Will continue boone due to retention  Patient plans to follow up with his urologist in [place]

## 2023-06-12 NOTE — ASSESSMENT & PLAN NOTE
-Patient reports recurrent urinary retention  -Reports history of BPH with recent laser surgery (within the last 6 months) reports requiring Jensen catheter as an outpatient.  States that he has not had Jensen catheter in over 2 months  -On arrival to the ED he was found to have urinary retention and positive UA  -Urine culture sent  -Continue ceftriaxone  -Recommend exchanging Jensen catheter while on antibiotics

## 2023-06-12 NOTE — DISCHARGE PLANNING
Case Management Discharge Planning    Admission Date: 6/11/2023  GMLOS: 5  ALOS: 1    6-Clicks ADL Score: 22  6-Clicks Mobility Score: 18      Anticipated Discharge Dispo: Discharge Disposition: D/T to home under HHA care in anticipation of covered skilled care (06)    DME Needed: pending hospital course     Action(s) Taken: chart reviewed    Escalations Completed: PFA    Medically Clear: No    Next Steps: f/u with pt and medical team to discuss dc needs and barriers.  Assessment to be completed to assess for HCM needs.    Barriers to Discharge: Medical clearance and Pending PT Evaluation    RNCM noted demographics sheet in media with pt's information and transcribed to Epic; Wife and daughters contact information also added. Pt noted to have Medicare and [employer]; PFA notified. [employer] has MTM benefit's.    0945: as pt was being wheeled out of room to transfer to floor, pt bedside RN stated pt will need a ride home at DC to [place].

## 2023-06-12 NOTE — ASSESSMENT & PLAN NOTE
The patient previously on Lasix and Aldactone therapy  Will resume low dose aldactone today and follow, to insure tolerance  Continue tele

## 2023-06-13 ENCOUNTER — PATIENT OUTREACH (OUTPATIENT)
Dept: SCHEDULING | Facility: IMAGING CENTER | Age: 78
End: 2023-06-13
Payer: MEDICARE

## 2023-06-13 LAB
ALBUMIN SERPL BCP-MCNC: 3.1 G/DL (ref 3.2–4.9)
ALBUMIN/GLOB SERPL: 1 G/DL
ALP SERPL-CCNC: 34 U/L (ref 30–99)
ALT SERPL-CCNC: <5 U/L (ref 2–50)
ANION GAP SERPL CALC-SCNC: 10 MMOL/L (ref 7–16)
AST SERPL-CCNC: 8 U/L (ref 12–45)
BASOPHILS # BLD AUTO: 0.4 % (ref 0–1.8)
BASOPHILS # BLD: 0.02 K/UL (ref 0–0.12)
BILIRUB SERPL-MCNC: 0.2 MG/DL (ref 0.1–1.5)
BUN SERPL-MCNC: 14 MG/DL (ref 8–22)
CALCIUM ALBUM COR SERPL-MCNC: 9.8 MG/DL (ref 8.5–10.5)
CALCIUM SERPL-MCNC: 9.1 MG/DL (ref 8.5–10.5)
CHLORIDE SERPL-SCNC: 106 MMOL/L (ref 96–112)
CO2 SERPL-SCNC: 23 MMOL/L (ref 20–33)
CREAT SERPL-MCNC: 1.43 MG/DL (ref 0.5–1.4)
EOSINOPHIL # BLD AUTO: 0.27 K/UL (ref 0–0.51)
EOSINOPHIL NFR BLD: 5 % (ref 0–6.9)
ERYTHROCYTE [DISTWIDTH] IN BLOOD BY AUTOMATED COUNT: 51 FL (ref 35.9–50)
GFR SERPLBLD CREATININE-BSD FMLA CKD-EPI: 51 ML/MIN/1.73 M 2
GLOBULIN SER CALC-MCNC: 3 G/DL (ref 1.9–3.5)
GLUCOSE BLD STRIP.AUTO-MCNC: 104 MG/DL (ref 65–99)
GLUCOSE BLD STRIP.AUTO-MCNC: 110 MG/DL (ref 65–99)
GLUCOSE BLD STRIP.AUTO-MCNC: 82 MG/DL (ref 65–99)
GLUCOSE BLD STRIP.AUTO-MCNC: 96 MG/DL (ref 65–99)
GLUCOSE SERPL-MCNC: 128 MG/DL (ref 65–99)
HCT VFR BLD AUTO: 33.6 % (ref 42–52)
HGB BLD-MCNC: 10.5 G/DL (ref 14–18)
IMM GRANULOCYTES # BLD AUTO: 0.02 K/UL (ref 0–0.11)
IMM GRANULOCYTES NFR BLD AUTO: 0.4 % (ref 0–0.9)
LYMPHOCYTES # BLD AUTO: 0.99 K/UL (ref 1–4.8)
LYMPHOCYTES NFR BLD: 18.3 % (ref 22–41)
MAGNESIUM SERPL-MCNC: 2.1 MG/DL (ref 1.5–2.5)
MCH RBC QN AUTO: 27.4 PG (ref 27–33)
MCHC RBC AUTO-ENTMCNC: 31.3 G/DL (ref 32.3–36.5)
MCV RBC AUTO: 87.7 FL (ref 81.4–97.8)
MONOCYTES # BLD AUTO: 0.58 K/UL (ref 0–0.85)
MONOCYTES NFR BLD AUTO: 10.7 % (ref 0–13.4)
NEUTROPHILS # BLD AUTO: 3.54 K/UL (ref 1.82–7.42)
NEUTROPHILS NFR BLD: 65.2 % (ref 44–72)
NRBC # BLD AUTO: 0 K/UL
NRBC BLD-RTO: 0 /100 WBC (ref 0–0.2)
NT-PROBNP SERPL IA-MCNC: 8204 PG/ML (ref 0–125)
PHOSPHATE SERPL-MCNC: 2.7 MG/DL (ref 2.5–4.5)
PLATELET # BLD AUTO: 199 K/UL (ref 164–446)
PMV BLD AUTO: 10.7 FL (ref 9–12.9)
POTASSIUM SERPL-SCNC: 4.3 MMOL/L (ref 3.6–5.5)
PROT SERPL-MCNC: 6.1 G/DL (ref 6–8.2)
RBC # BLD AUTO: 3.83 M/UL (ref 4.7–6.1)
SODIUM SERPL-SCNC: 139 MMOL/L (ref 135–145)
WBC # BLD AUTO: 5.4 K/UL (ref 4.8–10.8)

## 2023-06-13 PROCEDURE — 83735 ASSAY OF MAGNESIUM: CPT

## 2023-06-13 PROCEDURE — 770020 HCHG ROOM/CARE - TELE (206)

## 2023-06-13 PROCEDURE — 85025 COMPLETE CBC W/AUTO DIFF WBC: CPT

## 2023-06-13 PROCEDURE — 83880 ASSAY OF NATRIURETIC PEPTIDE: CPT

## 2023-06-13 PROCEDURE — 99232 SBSQ HOSP IP/OBS MODERATE 35: CPT | Performed by: HOSPITALIST

## 2023-06-13 PROCEDURE — 700111 HCHG RX REV CODE 636 W/ 250 OVERRIDE (IP): Performed by: STUDENT IN AN ORGANIZED HEALTH CARE EDUCATION/TRAINING PROGRAM

## 2023-06-13 PROCEDURE — 36415 COLL VENOUS BLD VENIPUNCTURE: CPT

## 2023-06-13 PROCEDURE — 84100 ASSAY OF PHOSPHORUS: CPT

## 2023-06-13 PROCEDURE — 700102 HCHG RX REV CODE 250 W/ 637 OVERRIDE(OP): Performed by: STUDENT IN AN ORGANIZED HEALTH CARE EDUCATION/TRAINING PROGRAM

## 2023-06-13 PROCEDURE — 700105 HCHG RX REV CODE 258: Performed by: NURSE PRACTITIONER

## 2023-06-13 PROCEDURE — 80053 COMPREHEN METABOLIC PANEL: CPT

## 2023-06-13 PROCEDURE — A9270 NON-COVERED ITEM OR SERVICE: HCPCS | Performed by: STUDENT IN AN ORGANIZED HEALTH CARE EDUCATION/TRAINING PROGRAM

## 2023-06-13 PROCEDURE — 82962 GLUCOSE BLOOD TEST: CPT | Mod: 91

## 2023-06-13 RX ADMIN — CLOPIDOGREL BISULFATE 75 MG: 75 TABLET ORAL at 05:25

## 2023-06-13 RX ADMIN — SENNOSIDES AND DOCUSATE SODIUM 2 TABLET: 50; 8.6 TABLET ORAL at 05:24

## 2023-06-13 RX ADMIN — ACETAMINOPHEN 650 MG: 325 TABLET, FILM COATED ORAL at 08:12

## 2023-06-13 RX ADMIN — ENOXAPARIN SODIUM 40 MG: 100 INJECTION SUBCUTANEOUS at 16:29

## 2023-06-13 RX ADMIN — AZITHROMYCIN MONOHYDRATE 500 MG: 250 TABLET ORAL at 05:24

## 2023-06-13 RX ADMIN — FENOFIBRATE 67 MG: 67 CAPSULE ORAL at 05:24

## 2023-06-13 RX ADMIN — GABAPENTIN 600 MG: 300 CAPSULE ORAL at 05:24

## 2023-06-13 RX ADMIN — ASPIRIN 81 MG: 81 TABLET, COATED ORAL at 05:24

## 2023-06-13 RX ADMIN — ACETAMINOPHEN 650 MG: 325 TABLET, FILM COATED ORAL at 01:39

## 2023-06-13 RX ADMIN — SODIUM CHLORIDE, POTASSIUM CHLORIDE, SODIUM LACTATE AND CALCIUM CHLORIDE: 600; 310; 30; 20 INJECTION, SOLUTION INTRAVENOUS at 01:43

## 2023-06-13 RX ADMIN — GABAPENTIN 600 MG: 300 CAPSULE ORAL at 16:29

## 2023-06-13 RX ADMIN — CEFTRIAXONE SODIUM 2000 MG: 10 INJECTION, POWDER, FOR SOLUTION INTRAVENOUS at 05:24

## 2023-06-13 RX ADMIN — TAMSULOSIN HYDROCHLORIDE 0.4 MG: 0.4 CAPSULE ORAL at 08:12

## 2023-06-13 RX ADMIN — ACETAMINOPHEN 650 MG: 325 TABLET, FILM COATED ORAL at 13:51

## 2023-06-13 ASSESSMENT — ENCOUNTER SYMPTOMS
PSYCHIATRIC NEGATIVE: 1
SHORTNESS OF BREATH: 0
BLURRED VISION: 0
PALPITATIONS: 0
ABDOMINAL PAIN: 0
HEADACHES: 0
NAUSEA: 0
EYES NEGATIVE: 1
BRUISES/BLEEDS EASILY: 0
RESPIRATORY NEGATIVE: 1
SORE THROAT: 0
FEVER: 0
COUGH: 0
MUSCULOSKELETAL NEGATIVE: 1
FOCAL WEAKNESS: 0
CHILLS: 0
DEPRESSION: 0
WEAKNESS: 0
CONSTITUTIONAL NEGATIVE: 1
GASTROINTESTINAL NEGATIVE: 1
MYALGIAS: 0
NEUROLOGICAL NEGATIVE: 1
WEIGHT LOSS: 0
DIZZINESS: 0
VOMITING: 0
DIAPHORESIS: 0
CARDIOVASCULAR NEGATIVE: 1

## 2023-06-13 ASSESSMENT — FIBROSIS 4 INDEX: FIB4 SCORE: 1.42

## 2023-06-13 ASSESSMENT — LIFESTYLE VARIABLES: SUBSTANCE_ABUSE: 0

## 2023-06-13 NOTE — FACE TO FACE
Face to Face Supporting Documentation - Home Health    The encounter with this patient was in whole or in part the primary reason for home health admission.    Date of encounter:   Patient:                    MRN:                       YOB: 2021  Donte Agustin  [id number]  1945     Home health to see patient for:  Skilled Nursing care for assessment, interventions & education    Skilled need for:  New Onset Medical Diagnosis sepsis    Skilled nursing interventions to include:  Comment: exam, vitals, pt    Homebound status evidenced by:  Needs the assistance of another person in order to leave the home. Leaving home requires a considerable and taxing effort. There is a normal inability to leave the home.    Community Physician to provide follow up care: Pcp Pt States None     Optional Interventions? No      I certify the face to face encounter for this home health care referral meets the CMS requirements and the encounter/clinical assessment with the patient was, in whole, or in part, for the medical condition(s) listed above, which is the primary reason for home health care. Based on my clinical findings: the service(s) are medically necessary, support the need for home health care, and the homebound criteria are met.  I certify that this patient has had a face to face encounter by myself.  Zeinab Acevedo M.D. - NPI: [id number]

## 2023-06-13 NOTE — CARE PLAN
The patient is Stable - Low risk of patient condition declining or worsening    Shift Goals  Clinical Goals: VS monitoring / Nasuea Control  Patient Goals: Get better / Go home  Family Goals: LASHELL    Progress made toward(s) clinical / shift goals:  Pt Resting throughout night. Pt has headache controlled with prn tylenol. Vitals within normal limits.    Patient is not progressing towards the following goals:

## 2023-06-13 NOTE — DISCHARGE PLANNING
CM visited patient at bedside and spoke to wife Marge via phone to discuss dcp. Choice form signed for [facility]. MD Carrero to put HH order in. Couple lives at the [facility] [address], they have lived there for 3 years. Patient uses a FWW when at home. Patient and wife confirmed that he is getting weaker and needs more assistance at home she is pleased with the recommendation for HH. Mr Agustin will not have a ride back to [place] at the time of discharge, they are both visually impaired and do not drive. Transportation will need to be arranged.       Case Management Discharge Planning    Admission Date: 6/11/2023  GMLOS: 5  ALOS: 2    6-Clicks ADL Score: 24  6-Clicks Mobility Score: 18      Anticipated Discharge Dispo: Discharge Disposition: D/T to home under HHA care in anticipation of covered skilled care (06)    DME Needed: No    Action(s) Taken: Updated Provider/Nurse on Discharge Plan, Patient Conference, DC Assessment Complete (See below), Choice obtained, and Family Conference    Escalations Completed: None    Medically Clear: No    Barriers to Discharge: Medical clearance     Care Transition Team Assessment    Information Source  Orientation Level: Oriented X4  Information Given By: Patient  Informant's Name: Donte  Who is responsible for making decisions for patient? : Patient    Readmission Evaluation  Is this a readmission?: No    Elopement Risk  Legal Hold: No  Ambulatory or Self Mobile in Wheelchair: No-Not an Elopement Risk  Elopement Risk: Not at Risk for Elopement    Interdisciplinary Discharge Planning  Does Admitting Nurse Feel This Could be a Complex Discharge?: No  Primary Care Physician: Kacey Hubbard NP [phone]  Lives with - Patient's Self Care Capacity: Spouse  Patient or legal guardian wants to designate a caregiver: No  Support Systems: Spouse / Significant Other  Housing / Facility: Motel  Do You Take your Prescribed Medications Regularly: Yes  Able to Return to Previous ADL's:  Future Time w/Therapy  Mobility Issues: Yes  Prior Services: Home-Independent  Patient Prefers to be Discharged to:: home  Durable Medical Equipment: Walker    Discharge Preparedness  What is your plan after discharge?: Home health care  What are your discharge supports?: Child, Spouse  Prior Functional Level: Needs Assist with Activities of Daily Living  Difficulity with ADLs: Walking    Functional Assesment  Prior Functional Level: Needs Assist with Activities of Daily Living    Finances  Financial Barriers to Discharge: No  Prescription Coverage: Yes    Vision / Hearing Impairment  Vision Impairment : Yes  Right Eye Vision: Wears Glasses, Impaired  Left Eye Vision: Impaired, Wears Glasses  Hearing Impairment : No         Advance Directive  Advance Directive?: None    Domestic Abuse  Have you ever been the victim of abuse or violence?: No  Physical Abuse or Sexual Abuse: No  Verbal Abuse or Emotional Abuse: No  Possible Abuse/Neglect Reported to:: Not Applicable    Psychological Assessment  History of Substance Abuse: None         Anticipated Discharge Information  Discharge Disposition: D/T to home under HHA care in anticipation of covered skilled care (06)  Discharge Address: [address] [place] [id number]  Discharge Contact Phone Number: [phone]

## 2023-06-13 NOTE — PROGRESS NOTES
Hospital Medicine Daily Progress Note    Date of Service  6/13/2023    Chief Complaint  Donte Agustin is a 77 y.o. male admitted 6/11/2023 with septic shock     Hospital Course  Patient is a 77 year old male with a past medical history of systolic heart failure (EF 35%, TTE 3-4 months ago), hypertension, diabetes, CAD (s/p stents X 2 10/22) and prostate cancer (radiation over 10 yrs ago). He also had a recent urologic procedure secondary to retention (recent laser therapy),CKI/ He was transferred from [facility] to [facility] on 6/11/23 for ICU treatment for septic shock with vasopressor therapy.  He stated that prior to his hospitalization, he had fever, chills, nausea, vomiting , myalgias, and a new cough. He also stated that he was bit by something on his R antecubital area that developed some redness, erythema - that subsequently improved.    He initially presented to the ER at [facility] with urinary retention and lower abdominal pain, but upon arrival he was found to be hypoxic with a RA sat of 86%, tachycardia with a HR in the 120's and MAP barely at 60 despite 3L of crystalloid.  Vasopressors and  ABX were initiated and he was transferred to [facility].  At [facility], a Boone was placed and had a large volume urine output. He did have an abnormal urine analysis for infection.   The patient was volume resuscitated, placed on empiric antibiotics, was weaned off IV pressor medication and was able to be transferred to the medical unit on 6/12.    Interval Problem Update  Axox3, but poor historian, denies pain, denies sob,boone in place, exam and vitals stable. Right AC wound improving, cultures pending. ROS otherwise negative.     I have discussed this patient's plan of care and discharge plan at IDT rounds today with Case Management, Nursing, Nursing leadership, and other members of the IDT team.    Consultants/Specialty  GI    Code Status  Full Code    Disposition  The patient is not medically cleared for  discharge to home or a post-acute facility.  Anticipate discharge to: home with close outpatient follow-up    I have placed the appropriate orders for post-discharge needs.    Review of Systems  Review of Systems   Constitutional: Negative.  Negative for chills, diaphoresis, fever, malaise/fatigue and weight loss.   HENT: Negative.  Negative for sore throat.    Eyes: Negative.  Negative for blurred vision.   Respiratory: Negative.  Negative for cough and shortness of breath.    Cardiovascular: Negative.  Negative for chest pain, palpitations and leg swelling.   Gastrointestinal: Negative.  Negative for abdominal pain, nausea and vomiting.   Genitourinary: Negative.  Negative for dysuria.   Musculoskeletal: Negative.  Negative for myalgias.   Skin: Negative.  Negative for itching and rash.   Neurological: Negative.  Negative for dizziness, focal weakness, weakness and headaches.   Endo/Heme/Allergies: Negative.  Does not bruise/bleed easily.   Psychiatric/Behavioral: Negative.  Negative for depression, substance abuse and suicidal ideas.    All other systems reviewed and are negative.       Physical Exam  Temp:  [36.4 °C (97.5 °F)-37 °C (98.6 °F)] 36.7 °C (98.1 °F)  Pulse:  [82-96] 96  Resp:  [17-18] 17  BP: (100-131)/(56-82) 118/71  SpO2:  [90 %-95 %] 94 %    Physical Exam  Vitals and nursing note reviewed. Exam conducted with a chaperone present.   Constitutional:       General: He is not in acute distress.     Appearance: Normal appearance. He is not diaphoretic.   HENT:      Head: Normocephalic.      Nose: Nose normal.      Mouth/Throat:      Mouth: Mucous membranes are moist.   Eyes:      Pupils: Pupils are equal, round, and reactive to light.   Cardiovascular:      Rate and Rhythm: Normal rate and regular rhythm.      Pulses: Normal pulses.      Heart sounds: Normal heart sounds.   Pulmonary:      Effort: Pulmonary effort is normal.      Breath sounds: Normal breath sounds.   Abdominal:      General: Abdomen is  flat. Bowel sounds are normal.      Palpations: Abdomen is soft.   Musculoskeletal:         General: No swelling or deformity. Normal range of motion.   Skin:     General: Skin is warm and dry.      Capillary Refill: Capillary refill takes less than 2 seconds.   Neurological:      General: No focal deficit present.      Mental Status: He is alert and oriented to person, place, and time.      Cranial Nerves: No cranial nerve deficit.   Psychiatric:         Mood and Affect: Mood normal.         Behavior: Behavior normal.         Fluids    Intake/Output Summary (Last 24 hours) at 6/13/2023 1657  Last data filed at 6/13/2023 0800  Gross per 24 hour   Intake 1120.67 ml   Output 2900 ml   Net -1779.33 ml       Laboratory  Recent Labs     06/11/23 2126 06/12/23 0514 06/13/23 0045   WBC 11.7* 7.9 5.4   RBC 3.95* 3.97* 3.83*   HEMOGLOBIN 10.9* 11.0* 10.5*   HEMATOCRIT 34.1* 35.3* 33.6*   MCV 86.3 88.9 87.7   MCH 27.6 27.7 27.4   MCHC 32.0* 31.2* 31.3*   RDW 50.6* 52.6* 51.0*   PLATELETCT 262 213 199   MPV 10.8 10.6 10.7     Recent Labs     06/11/23 2126 06/12/23 0514 06/13/23 0045   SODIUM 142 141 139   POTASSIUM 4.1 4.3 4.3   CHLORIDE 110 108 106   CO2 20 23 23   GLUCOSE 138* 99 128*   BUN 21 17 14   CREATININE 1.65* 1.49* 1.43*   CALCIUM 8.8 9.0 9.1                   Imaging  DX-FEMUR-1 VIEW RIGHT   Final Result         1.  No radiographic evidence of acute traumatic injury.           Assessment/Plan  * Sepsis (HCC)- (present on admission)  Assessment & Plan  Present on admission  Improved/resolved now, continue medical treatment    Cellulitis of right upper extremity  Assessment & Plan  Currently covered with IV antibiotics in conjunction to other treatment  Slowly improving  Will continue to follow  Topical treatments    Acute cystitis without hematuria  Assessment & Plan  The patient with urinary retention, likely in the basis of prostate cancer  The patient states that he had a Jensen catheter up until not too  long ago, this was then removed  The patient presented to the outlying facility with significant urinary retention and UTI  Continue empiric antibiotics  Cultures so far negative  Will continue boone due to retention  Patient plans to follow up with his urologist in [facility]      CKD (chronic kidney disease)  Assessment & Plan  Chronic,  Unclear baseline, the patient currently with a improved creatinine, now continuing to improve, following  Avoid nephrotoxins    Hypertension  Assessment & Plan  History of, monitor,  Following, will resume aldactone tomorrow if able  following    Prostate cancer ([facility])  Assessment & Plan  With history of urologic procedure, details unknown  Did have prior urinary retention or urinary incontinence per the patient's report  Boone catheter placement  Treated UTI  continue Flomax    Debility  Assessment & Plan  Chronic, consider PT OT    Acute respiratory failure with hypoxia ([facility])  Assessment & Plan  Improved, the patient previously not on home oxygen  Diuresis as tolerated after volume resuscitation  Continues to improve, continue to wean as tolerated    History of coronary angioplasty with insertion of stent  Assessment & Plan  Continue asa and plavix, following    Heart failure with reduced ejection fraction ([facility])  Assessment & Plan  The patient previously on Lasix and Aldactone therapy  Will start to taper back on tomorrow if bp remains stable  following    Diabetes ([facility])  Assessment & Plan  History of,  Sliding scale insulin for the time being, the patient was previously on oral agents         VTE prophylaxis: SCDs/TEDs    I have performed a physical exam and reviewed and updated ROS and Plan today (6/13/2023). In review of yesterday's note (6/12/2023), there are no changes except as documented above.

## 2023-06-13 NOTE — PROGRESS NOTES
Med rec complete per patient's spouse over phone  No known allergies  Pharmacy: [facility] in [place]

## 2023-06-13 NOTE — PROGRESS NOTES
Unable to obtain med rec at time  Patient unable to report home meds  Left VM for spouse and daughter @ [phone]

## 2023-06-13 NOTE — DISCHARGE PLANNING
1015  Received Choice form at 1002  Agency/Facility Name: [facility]   Referral sent per Choice form @ Unable to send, need HH order & F2F.

## 2023-06-14 LAB
ALBUMIN SERPL BCP-MCNC: 3.3 G/DL (ref 3.2–4.9)
ALBUMIN/GLOB SERPL: 1 G/DL
ALP SERPL-CCNC: 36 U/L (ref 30–99)
ALT SERPL-CCNC: <5 U/L (ref 2–50)
ANION GAP SERPL CALC-SCNC: 12 MMOL/L (ref 7–16)
AST SERPL-CCNC: 14 U/L (ref 12–45)
BACTERIA UR CULT: NORMAL
BASOPHILS # BLD AUTO: 0.4 % (ref 0–1.8)
BASOPHILS # BLD: 0.02 K/UL (ref 0–0.12)
BILIRUB SERPL-MCNC: 0.2 MG/DL (ref 0.1–1.5)
BUN SERPL-MCNC: 14 MG/DL (ref 8–22)
CALCIUM ALBUM COR SERPL-MCNC: 9.8 MG/DL (ref 8.5–10.5)
CALCIUM SERPL-MCNC: 9.2 MG/DL (ref 8.5–10.5)
CHLORIDE SERPL-SCNC: 105 MMOL/L (ref 96–112)
CO2 SERPL-SCNC: 23 MMOL/L (ref 20–33)
CREAT SERPL-MCNC: 1.4 MG/DL (ref 0.5–1.4)
EOSINOPHIL # BLD AUTO: 0.26 K/UL (ref 0–0.51)
EOSINOPHIL NFR BLD: 5.5 % (ref 0–6.9)
ERYTHROCYTE [DISTWIDTH] IN BLOOD BY AUTOMATED COUNT: 50.4 FL (ref 35.9–50)
GFR SERPLBLD CREATININE-BSD FMLA CKD-EPI: 52 ML/MIN/1.73 M 2
GLOBULIN SER CALC-MCNC: 3.4 G/DL (ref 1.9–3.5)
GLUCOSE BLD STRIP.AUTO-MCNC: 102 MG/DL (ref 65–99)
GLUCOSE BLD STRIP.AUTO-MCNC: 134 MG/DL (ref 65–99)
GLUCOSE BLD STRIP.AUTO-MCNC: 148 MG/DL (ref 65–99)
GLUCOSE BLD STRIP.AUTO-MCNC: 151 MG/DL (ref 65–99)
GLUCOSE SERPL-MCNC: 109 MG/DL (ref 65–99)
HCT VFR BLD AUTO: 34.9 % (ref 42–52)
HGB BLD-MCNC: 10.9 G/DL (ref 14–18)
IMM GRANULOCYTES # BLD AUTO: 0.01 K/UL (ref 0–0.11)
IMM GRANULOCYTES NFR BLD AUTO: 0.2 % (ref 0–0.9)
LYMPHOCYTES # BLD AUTO: 0.85 K/UL (ref 1–4.8)
LYMPHOCYTES NFR BLD: 17.9 % (ref 22–41)
MAGNESIUM SERPL-MCNC: 1.9 MG/DL (ref 1.5–2.5)
MCH RBC QN AUTO: 27 PG (ref 27–33)
MCHC RBC AUTO-ENTMCNC: 31.2 G/DL (ref 32.3–36.5)
MCV RBC AUTO: 86.6 FL (ref 81.4–97.8)
MONOCYTES # BLD AUTO: 0.52 K/UL (ref 0–0.85)
MONOCYTES NFR BLD AUTO: 10.9 % (ref 0–13.4)
NEUTROPHILS # BLD AUTO: 3.1 K/UL (ref 1.82–7.42)
NEUTROPHILS NFR BLD: 65.1 % (ref 44–72)
NRBC # BLD AUTO: 0 K/UL
NRBC BLD-RTO: 0 /100 WBC (ref 0–0.2)
PHOSPHATE SERPL-MCNC: 3.2 MG/DL (ref 2.5–4.5)
PLATELET # BLD AUTO: 234 K/UL (ref 164–446)
PMV BLD AUTO: 10.6 FL (ref 9–12.9)
POTASSIUM SERPL-SCNC: 4.6 MMOL/L (ref 3.6–5.5)
PROT SERPL-MCNC: 6.7 G/DL (ref 6–8.2)
RBC # BLD AUTO: 4.03 M/UL (ref 4.7–6.1)
SIGNIFICANT IND 70042: NORMAL
SITE SITE: NORMAL
SODIUM SERPL-SCNC: 140 MMOL/L (ref 135–145)
SOURCE SOURCE: NORMAL
WBC # BLD AUTO: 4.8 K/UL (ref 4.8–10.8)

## 2023-06-14 PROCEDURE — 700102 HCHG RX REV CODE 250 W/ 637 OVERRIDE(OP): Performed by: STUDENT IN AN ORGANIZED HEALTH CARE EDUCATION/TRAINING PROGRAM

## 2023-06-14 PROCEDURE — 36415 COLL VENOUS BLD VENIPUNCTURE: CPT

## 2023-06-14 PROCEDURE — 85025 COMPLETE CBC W/AUTO DIFF WBC: CPT

## 2023-06-14 PROCEDURE — 770020 HCHG ROOM/CARE - TELE (206)

## 2023-06-14 PROCEDURE — A9270 NON-COVERED ITEM OR SERVICE: HCPCS | Performed by: STUDENT IN AN ORGANIZED HEALTH CARE EDUCATION/TRAINING PROGRAM

## 2023-06-14 PROCEDURE — 700111 HCHG RX REV CODE 636 W/ 250 OVERRIDE (IP): Performed by: STUDENT IN AN ORGANIZED HEALTH CARE EDUCATION/TRAINING PROGRAM

## 2023-06-14 PROCEDURE — 80053 COMPREHEN METABOLIC PANEL: CPT

## 2023-06-14 PROCEDURE — 83735 ASSAY OF MAGNESIUM: CPT

## 2023-06-14 PROCEDURE — 82962 GLUCOSE BLOOD TEST: CPT | Mod: 91

## 2023-06-14 PROCEDURE — 99232 SBSQ HOSP IP/OBS MODERATE 35: CPT | Performed by: HOSPITALIST

## 2023-06-14 PROCEDURE — 84100 ASSAY OF PHOSPHORUS: CPT

## 2023-06-14 RX ORDER — AMOXICILLIN AND CLAVULANATE POTASSIUM 875; 125 MG/1; MG/1
1 TABLET, FILM COATED ORAL EVERY 12 HOURS
Status: DISCONTINUED | OUTPATIENT
Start: 2023-06-15 | End: 2023-06-15 | Stop reason: HOSPADM

## 2023-06-14 RX ADMIN — INSULIN LISPRO 1 UNITS: 100 INJECTION, SOLUTION INTRAVENOUS; SUBCUTANEOUS at 20:10

## 2023-06-14 RX ADMIN — SENNOSIDES AND DOCUSATE SODIUM 2 TABLET: 50; 8.6 TABLET ORAL at 05:09

## 2023-06-14 RX ADMIN — CLOPIDOGREL BISULFATE 75 MG: 75 TABLET ORAL at 05:09

## 2023-06-14 RX ADMIN — GABAPENTIN 600 MG: 300 CAPSULE ORAL at 16:32

## 2023-06-14 RX ADMIN — GABAPENTIN 600 MG: 300 CAPSULE ORAL at 05:09

## 2023-06-14 RX ADMIN — ACETAMINOPHEN 650 MG: 325 TABLET, FILM COATED ORAL at 20:07

## 2023-06-14 RX ADMIN — TAMSULOSIN HYDROCHLORIDE 0.4 MG: 0.4 CAPSULE ORAL at 07:33

## 2023-06-14 RX ADMIN — FENOFIBRATE 67 MG: 67 CAPSULE ORAL at 05:09

## 2023-06-14 RX ADMIN — ENOXAPARIN SODIUM 40 MG: 100 INJECTION SUBCUTANEOUS at 16:33

## 2023-06-14 RX ADMIN — AZITHROMYCIN MONOHYDRATE 500 MG: 250 TABLET ORAL at 05:09

## 2023-06-14 RX ADMIN — ASPIRIN 81 MG: 81 TABLET, COATED ORAL at 05:09

## 2023-06-14 RX ADMIN — CEFTRIAXONE SODIUM 2000 MG: 10 INJECTION, POWDER, FOR SOLUTION INTRAVENOUS at 05:08

## 2023-06-14 ASSESSMENT — ENCOUNTER SYMPTOMS
GASTROINTESTINAL NEGATIVE: 1
HEADACHES: 0
NEUROLOGICAL NEGATIVE: 1
CONSTITUTIONAL NEGATIVE: 1
SORE THROAT: 0
PSYCHIATRIC NEGATIVE: 1
CHILLS: 0
DIZZINESS: 0
COUGH: 0
WEAKNESS: 0
SHORTNESS OF BREATH: 0
ABDOMINAL PAIN: 0
EYES NEGATIVE: 1
RESPIRATORY NEGATIVE: 1
DEPRESSION: 0
CARDIOVASCULAR NEGATIVE: 1
NAUSEA: 0
VOMITING: 0
DIAPHORESIS: 0
BLURRED VISION: 0
MUSCULOSKELETAL NEGATIVE: 1
FOCAL WEAKNESS: 0
PALPITATIONS: 0
FEVER: 0
MYALGIAS: 0
BRUISES/BLEEDS EASILY: 0
WEIGHT LOSS: 0

## 2023-06-14 ASSESSMENT — LIFESTYLE VARIABLES: SUBSTANCE_ABUSE: 0

## 2023-06-14 ASSESSMENT — PATIENT HEALTH QUESTIONNAIRE - PHQ9
SUM OF ALL RESPONSES TO PHQ9 QUESTIONS 1 AND 2: 0
2. FEELING DOWN, DEPRESSED, IRRITABLE, OR HOPELESS: NOT AT ALL
1. LITTLE INTEREST OR PLEASURE IN DOING THINGS: NOT AT ALL

## 2023-06-14 ASSESSMENT — FIBROSIS 4 INDEX: FIB4 SCORE: 2.12

## 2023-06-14 ASSESSMENT — PAIN DESCRIPTION - PAIN TYPE: TYPE: ACUTE PAIN

## 2023-06-14 NOTE — FACE TO FACE
Face to Face Supporting Documentation - Home Health    The encounter with this patient was in whole or in part the primary reason for home health admission.    Date of encounter:   Patient:                    MRN:                       YOB: 2021  Donte Agustin  [id number]  1945     Home health to see patient for:  Skilled Nursing care for assessment, interventions & education    Skilled need for:  New Onset Medical Diagnosis sepsis    Skilled nursing interventions to include:  Comment: exam, vitals    Homebound status evidenced by:  Needs the assistance of another person in order to leave the home. Leaving home requires a considerable and taxing effort. There is a normal inability to leave the home.    Community Physician to provide follow up care: Pcp Pt States None     Optional Interventions? No      I certify the face to face encounter for this home health care referral meets the CMS requirements and the encounter/clinical assessment with the patient was, in whole, or in part, for the medical condition(s) listed above, which is the primary reason for home health care. Based on my clinical findings: the service(s) are medically necessary, support the need for home health care, and the homebound criteria are met.  I certify that this patient has had a face to face encounter by myself.  Zeinab Acevedo M.D. - NPI: [id number]

## 2023-06-14 NOTE — PROGRESS NOTES
Hospital Medicine Daily Progress Note    Date of Service  6/14/2023    Chief Complaint  Donte Agustin is a 77 y.o. male admitted 6/11/2023 with septic shock     Hospital Course  Patient is a 77 year old male with a past medical history of systolic heart failure (EF 35%, TTE 3-4 months ago), hypertension, diabetes, CAD (s/p stents X 2 10/22) and prostate cancer (radiation over 10 yrs ago). He also had a recent urologic procedure secondary to retention (recent laser therapy),CKI/ He was transferred from [facility] to [facility] on 6/11/23 for ICU treatment for septic shock with vasopressor therapy.  He stated that prior to his hospitalization, he had fever, chills, nausea, vomiting , myalgias, and a new cough. He also stated that he was bit by something on his R antecubital area that developed some redness, erythema - that subsequently improved.    He initially presented to the ER at [facility] with urinary retention and lower abdominal pain, but upon arrival he was found to be hypoxic with a RA sat of 86%, tachycardia with a HR in the 120's and MAP barely at 60 despite 3L of crystalloid.  Vasopressors and  ABX were initiated and he was transferred to [facility].  At [facility], a Jensen was placed and had a large volume urine output. He did have an abnormal urine analysis  The patient was volume resuscitated, placed on empiric antibiotics, was weaned off IV pressor medication and was able to be transferred to the medical unit on 6/12.    Interval Problem Update  Axox3, recall improving, he denies pain, R arm infection improving, did well with PT and OT., vitals stable. ROS otherwise negative     I have discussed this patient's plan of care and discharge plan at IDT rounds today with Case Management, Nursing, Nursing leadership, and other members of the IDT team.    Consultants/Specialty  GI    Code Status  Full Code    Disposition  Medically Cleared  I have placed the appropriate orders for post-discharge  needs.    Review of Systems  Review of Systems   Constitutional: Negative.  Negative for chills, diaphoresis, fever, malaise/fatigue and weight loss.   HENT: Negative.  Negative for sore throat.    Eyes: Negative.  Negative for blurred vision.   Respiratory: Negative.  Negative for cough and shortness of breath.    Cardiovascular: Negative.  Negative for chest pain, palpitations and leg swelling.   Gastrointestinal: Negative.  Negative for abdominal pain, nausea and vomiting.   Genitourinary: Negative.  Negative for dysuria.   Musculoskeletal: Negative.  Negative for myalgias.   Skin: Negative.  Negative for itching and rash.   Neurological: Negative.  Negative for dizziness, focal weakness, weakness and headaches.   Endo/Heme/Allergies: Negative.  Does not bruise/bleed easily.   Psychiatric/Behavioral: Negative.  Negative for depression, substance abuse and suicidal ideas.    All other systems reviewed and are negative.       Physical Exam  Temp:  [36.7 °C (98.1 °F)-37 °C (98.6 °F)] 36.7 °C (98.1 °F)  Pulse:  [] 105  Resp:  [17-18] 18  BP: (109-131)/(51-87) 126/87  SpO2:  [91 %-94 %] 94 %    Physical Exam  Vitals and nursing note reviewed. Exam conducted with a chaperone present.   Constitutional:       General: He is not in acute distress.     Appearance: Normal appearance. He is not diaphoretic.   HENT:      Head: Normocephalic.      Nose: Nose normal.      Mouth/Throat:      Mouth: Mucous membranes are moist.   Eyes:      Pupils: Pupils are equal, round, and reactive to light.   Cardiovascular:      Rate and Rhythm: Normal rate and regular rhythm.      Pulses: Normal pulses.      Heart sounds: Normal heart sounds.   Pulmonary:      Effort: Pulmonary effort is normal.      Breath sounds: Normal breath sounds.   Abdominal:      General: Abdomen is flat. Bowel sounds are normal.      Palpations: Abdomen is soft.   Musculoskeletal:         General: No swelling or deformity. Normal range of motion.   Skin:      General: Skin is warm and dry.      Capillary Refill: Capillary refill takes less than 2 seconds.   Neurological:      General: No focal deficit present.      Mental Status: He is alert and oriented to person, place, and time.      Cranial Nerves: No cranial nerve deficit.   Psychiatric:         Mood and Affect: Mood normal.         Behavior: Behavior normal.         Fluids    Intake/Output Summary (Last 24 hours) at 6/14/2023 0730  Last data filed at 6/14/2023 0503  Gross per 24 hour   Intake 2559 ml   Output 5200 ml   Net -2641 ml         Laboratory  Recent Labs     06/12/23  0514 06/13/23  0045 06/14/23  0236   WBC 7.9 5.4 4.8   RBC 3.97* 3.83* 4.03*   HEMOGLOBIN 11.0* 10.5* 10.9*   HEMATOCRIT 35.3* 33.6* 34.9*   MCV 88.9 87.7 86.6   MCH 27.7 27.4 27.0   MCHC 31.2* 31.3* 31.2*   RDW 52.6* 51.0* 50.4*   PLATELETCT 213 199 234   MPV 10.6 10.7 10.6       Recent Labs     06/12/23 0514 06/13/23  0045 06/14/23  0236   SODIUM 141 139 140   POTASSIUM 4.3 4.3 4.6   CHLORIDE 108 106 105   CO2 23 23 23   GLUCOSE 99 128* 109*   BUN 17 14 14   CREATININE 1.49* 1.43* 1.40   CALCIUM 9.0 9.1 9.2                     Imaging  DX-FEMUR-1 VIEW RIGHT   Final Result         1.  No radiographic evidence of acute traumatic injury.             Assessment/Plan  * Sepsis (HCC)- (present on admission)  Assessment & Plan  Present on admission  Improved/resolved now, continue medical treatment    Cellulitis of right upper extremity  Assessment & Plan  Currently covered with IV antibiotics in conjunction to other treatment  Slowly improving  Will continue to follow  Topical treatments    Acute cystitis without hematuria  Assessment & Plan  The patient with urinary retention, likely in the basis of prostate cancer  The patient states that he had a Jensen catheter up until not too long ago, this was then removed  The patient presented to the outlying facility with significant urinary retention and UTI  Continue empiric antibiotics  Cultures so  far negative  Will continue boone due to retention  Patient plans to follow up with his urologist in [facility]      CKD (chronic kidney disease)  Assessment & Plan  Chronic,  Unclear baseline, the patient currently with a improved creatinine, now continuing to improve, following  Avoid nephrotoxins    Hypertension  Assessment & Plan  History of, monitor,  Following, will resume aldactone tomorrow if able  following    Prostate cancer (HCC)  Assessment & Plan  With history of urologic procedure, details unknown  Did have prior urinary retention or urinary incontinence per the patient's report  Boone catheter placement  Treated UTI  continue Flomax    Debility  Assessment & Plan  Chronic, consider PT OT    Acute respiratory failure with hypoxia ([facility])  Assessment & Plan  Improved, the patient previously not on home oxygen  Diuresis as tolerated after volume resuscitation  Continues to improve, continue to wean as tolerated    History of coronary angioplasty with insertion of stent  Assessment & Plan  Continue asa and plavix, following    Heart failure with reduced ejection fraction ([facility])  Assessment & Plan  The patient previously on Lasix and Aldactone therapy  Will start to taper back on tomorrow if bp remains stable  following    Diabetes ([facility])  Assessment & Plan  History of,  Sliding scale insulin for the time being, the patient was previously on oral agents         VTE prophylaxis: SCDs/TEDs    I have performed a physical exam and reviewed and updated ROS and Plan today (6/14/2023). In review of yesterday's note (6/13/2023), there are no changes except as documented above.

## 2023-06-14 NOTE — DISCHARGE PLANNING
0755  Received Choice form on 6/13  Agency/Facility Name: [facility]   Referral sent per Choice form @ 0755     1042  [facility]ency[facility] Name: [facility]   Spoke To: Amara   Outcome: DPA called to verify referral received, and per Amara not quite yet received but may have been faxed to the [facility] office and will be forwarded over to the [facility] office. Amara to call back to verify received.     1458  Agency/Facility Name: [facility]   Outcome: DPA left VM regarding referral updates.

## 2023-06-14 NOTE — DOCUMENTATION QUERY
[facility]                                                                       Query Response Note      PATIENT:               LEIGH ANN MOORE  ACCT #:                  [id number]  MRN:                     [id number]  :                      1945  ADMIT DATE:       2023 8:31 PM  DISCH DATE:          RESPONDING  PROVIDER #:        [id number]           QUERY TEXT:    Based on the noted clinical findings and your clinical judgement, can you please clarify in documentation the relationship, if any, between UTI/acute cystitis and the boone catheter.    The patient's Clinical Indicators include:  Documentation noted that this patient was a transfer from [facility] for sepsis from unclear but differential dx includes; CAP, cellulitis, or UTI source (H&P).  He has a history of prostate cancer with possible recurrence s/p radiation/TURP 14 yrs ago.  Boone placed at [place], replaced on arrival to [facility].  Findings:  urinalysis showed packed with WBC's; lg leukocyte esterase; few bacteria; neg nitrite; lg occult blood.  Urine and blood cult negative  Treatment:  IV abx changed to ceftriaxone and azithromycin; replaced boone; ? urine colonization given history of prolonged boone (denies urinary s/s)  Risk:  Patient age with sepsis and significant urinary tract history     Thank you,  Do Ferrera RN, BSN  Clinical    [facility] via [employer]  X [phone]  [email or web address]  Options provided:   -- Condition UTI/acute cystitis is due to or associated with the boone catheter   -- Condition UTI/acute cystitis is not due to or associated with the boone catheter   -- Other explanation, Please specify      Query created by: Do Ferrera on 2023 12:40 PM    RESPONSE TEXT:    Condition UTI/acute cystitis is not due to or associated with the boone catheter          Electronically signed by:   JENNIFER GARZA MD 6/14/2023 2:14 PM

## 2023-06-14 NOTE — CARE PLAN
The patient is Stable - Low risk of patient condition declining or worsening    Shift Goals  Clinical Goals: VS monitoring / [place] control  Patient Goals: Get Better / Pain Control  Family Goals: LASHELL    Progress made toward(s) clinical / shift goals:  Pt resting comfortably throughout shift. Vitals within normal limits. IV fluids appropriate. Pt alert and Oriented.    Patient is not progressing towards the following goals:

## 2023-06-15 ENCOUNTER — PHARMACY VISIT (OUTPATIENT)
Dept: PHARMACY | Facility: MEDICAL CENTER | Age: 78
End: 2023-06-15
Payer: COMMERCIAL

## 2023-06-15 VITALS
SYSTOLIC BLOOD PRESSURE: 119 MMHG | TEMPERATURE: 98.1 F | RESPIRATION RATE: 16 BRPM | HEART RATE: 114 BPM | OXYGEN SATURATION: 90 % | BODY MASS INDEX: 30.2 KG/M2 | WEIGHT: 203.93 LBS | HEIGHT: 69 IN | DIASTOLIC BLOOD PRESSURE: 78 MMHG

## 2023-06-15 PROBLEM — A41.9 SEPSIS (HCC): Status: RESOLVED | Noted: 2023-06-11 | Resolved: 2023-06-15

## 2023-06-15 PROBLEM — N30.00 ACUTE CYSTITIS WITHOUT HEMATURIA: Status: RESOLVED | Noted: 2023-06-12 | Resolved: 2023-06-15

## 2023-06-15 PROBLEM — J96.01 ACUTE RESPIRATORY FAILURE WITH HYPOXIA (HCC): Status: RESOLVED | Noted: 2023-06-11 | Resolved: 2023-06-15

## 2023-06-15 LAB
ALBUMIN SERPL BCP-MCNC: 3.2 G/DL (ref 3.2–4.9)
ALBUMIN/GLOB SERPL: 0.9 G/DL
ALP SERPL-CCNC: 37 U/L (ref 30–99)
ALT SERPL-CCNC: 7 U/L (ref 2–50)
ANION GAP SERPL CALC-SCNC: 16 MMOL/L (ref 7–16)
AST SERPL-CCNC: 13 U/L (ref 12–45)
BASOPHILS # BLD AUTO: 0.8 % (ref 0–1.8)
BASOPHILS # BLD: 0.04 K/UL (ref 0–0.12)
BILIRUB SERPL-MCNC: 0.2 MG/DL (ref 0.1–1.5)
BUN SERPL-MCNC: 14 MG/DL (ref 8–22)
CALCIUM ALBUM COR SERPL-MCNC: 9.9 MG/DL (ref 8.5–10.5)
CALCIUM SERPL-MCNC: 9.3 MG/DL (ref 8.5–10.5)
CHLORIDE SERPL-SCNC: 102 MMOL/L (ref 96–112)
CO2 SERPL-SCNC: 19 MMOL/L (ref 20–33)
CREAT SERPL-MCNC: 1.36 MG/DL (ref 0.5–1.4)
EOSINOPHIL # BLD AUTO: 0.27 K/UL (ref 0–0.51)
EOSINOPHIL NFR BLD: 5.4 % (ref 0–6.9)
ERYTHROCYTE [DISTWIDTH] IN BLOOD BY AUTOMATED COUNT: 48.7 FL (ref 35.9–50)
GFR SERPLBLD CREATININE-BSD FMLA CKD-EPI: 54 ML/MIN/1.73 M 2
GLOBULIN SER CALC-MCNC: 3.7 G/DL (ref 1.9–3.5)
GLUCOSE SERPL-MCNC: 89 MG/DL (ref 65–99)
HCT VFR BLD AUTO: 37.4 % (ref 42–52)
HGB BLD-MCNC: 11.9 G/DL (ref 14–18)
IMM GRANULOCYTES # BLD AUTO: 0.02 K/UL (ref 0–0.11)
IMM GRANULOCYTES NFR BLD AUTO: 0.4 % (ref 0–0.9)
LYMPHOCYTES # BLD AUTO: 0.97 K/UL (ref 1–4.8)
LYMPHOCYTES NFR BLD: 19.2 % (ref 22–41)
MAGNESIUM SERPL-MCNC: 1.8 MG/DL (ref 1.5–2.5)
MCH RBC QN AUTO: 27.2 PG (ref 27–33)
MCHC RBC AUTO-ENTMCNC: 31.8 G/DL (ref 32.3–36.5)
MCV RBC AUTO: 85.4 FL (ref 81.4–97.8)
MONOCYTES # BLD AUTO: 0.63 K/UL (ref 0–0.85)
MONOCYTES NFR BLD AUTO: 12.5 % (ref 0–13.4)
NEUTROPHILS # BLD AUTO: 3.11 K/UL (ref 1.82–7.42)
NEUTROPHILS NFR BLD: 61.7 % (ref 44–72)
NRBC # BLD AUTO: 0 K/UL
NRBC BLD-RTO: 0 /100 WBC (ref 0–0.2)
PHOSPHATE SERPL-MCNC: 3.3 MG/DL (ref 2.5–4.5)
PLATELET # BLD AUTO: 261 K/UL (ref 164–446)
PMV BLD AUTO: 10.7 FL (ref 9–12.9)
POTASSIUM SERPL-SCNC: 4.3 MMOL/L (ref 3.6–5.5)
PROT SERPL-MCNC: 6.9 G/DL (ref 6–8.2)
RBC # BLD AUTO: 4.38 M/UL (ref 4.7–6.1)
SODIUM SERPL-SCNC: 137 MMOL/L (ref 135–145)
WBC # BLD AUTO: 5 K/UL (ref 4.8–10.8)

## 2023-06-15 PROCEDURE — 84100 ASSAY OF PHOSPHORUS: CPT

## 2023-06-15 PROCEDURE — 99239 HOSP IP/OBS DSCHRG MGMT >30: CPT | Performed by: HOSPITALIST

## 2023-06-15 PROCEDURE — 80053 COMPREHEN METABOLIC PANEL: CPT

## 2023-06-15 PROCEDURE — 83735 ASSAY OF MAGNESIUM: CPT

## 2023-06-15 PROCEDURE — 700102 HCHG RX REV CODE 250 W/ 637 OVERRIDE(OP): Performed by: HOSPITALIST

## 2023-06-15 PROCEDURE — RXMED WILLOW AMBULATORY MEDICATION CHARGE: Performed by: HOSPITALIST

## 2023-06-15 PROCEDURE — A9270 NON-COVERED ITEM OR SERVICE: HCPCS | Performed by: HOSPITALIST

## 2023-06-15 PROCEDURE — 85025 COMPLETE CBC W/AUTO DIFF WBC: CPT

## 2023-06-15 PROCEDURE — A9270 NON-COVERED ITEM OR SERVICE: HCPCS | Performed by: STUDENT IN AN ORGANIZED HEALTH CARE EDUCATION/TRAINING PROGRAM

## 2023-06-15 PROCEDURE — 36415 COLL VENOUS BLD VENIPUNCTURE: CPT

## 2023-06-15 PROCEDURE — 700102 HCHG RX REV CODE 250 W/ 637 OVERRIDE(OP): Performed by: STUDENT IN AN ORGANIZED HEALTH CARE EDUCATION/TRAINING PROGRAM

## 2023-06-15 RX ORDER — AMOXICILLIN AND CLAVULANATE POTASSIUM 875; 125 MG/1; MG/1
1 TABLET, FILM COATED ORAL EVERY 12 HOURS
Qty: 6 TABLET | Refills: 0 | Status: ACTIVE | OUTPATIENT
Start: 2023-06-15 | End: 2023-06-18

## 2023-06-15 RX ORDER — SILICONES/ADHESIVE TAPE
1 COMBINATION PACKAGE (EA) TOPICAL 2 TIMES DAILY
Qty: 14 G | Refills: 0 | Status: ACTIVE | OUTPATIENT
Start: 2023-06-15

## 2023-06-15 RX ORDER — ASPIRIN 81 MG/1
81 TABLET ORAL DAILY
Qty: 100 TABLET | Refills: 0 | Status: SHIPPED | OUTPATIENT
Start: 2023-06-16

## 2023-06-15 RX ADMIN — AMOXICILLIN AND CLAVULANATE POTASSIUM 1 TABLET: 875; 125 TABLET, FILM COATED ORAL at 05:45

## 2023-06-15 RX ADMIN — DEXTROMETHORPHAN HYDROBROMIDE, GUAIFENESIN, PHENYLEPHRINE HYDROCHLORIDE: 20; 200; 10 SOLUTION ORAL at 08:31

## 2023-06-15 RX ADMIN — TAMSULOSIN HYDROCHLORIDE 0.4 MG: 0.4 CAPSULE ORAL at 08:31

## 2023-06-15 RX ADMIN — ACETAMINOPHEN 650 MG: 325 TABLET, FILM COATED ORAL at 02:19

## 2023-06-15 RX ADMIN — FENOFIBRATE 67 MG: 67 CAPSULE ORAL at 05:45

## 2023-06-15 RX ADMIN — ASPIRIN 81 MG: 81 TABLET, COATED ORAL at 05:44

## 2023-06-15 RX ADMIN — SPIRONOLACTONE 25 MG: 25 TABLET ORAL at 05:44

## 2023-06-15 RX ADMIN — SENNOSIDES AND DOCUSATE SODIUM 2 TABLET: 50; 8.6 TABLET ORAL at 05:45

## 2023-06-15 RX ADMIN — CLOPIDOGREL BISULFATE 75 MG: 75 TABLET ORAL at 05:44

## 2023-06-15 RX ADMIN — GABAPENTIN 600 MG: 300 CAPSULE ORAL at 05:44

## 2023-06-15 ASSESSMENT — PAIN DESCRIPTION - PAIN TYPE: TYPE: ACUTE PAIN

## 2023-06-15 NOTE — DISCHARGE SUMMARY
Discharge Summary    CHIEF COMPLAINT ON ADMISSION  No chief complaint on file.      Reason for Admission  Sepsis     Admission Date  6/11/2023    CODE STATUS  Full Code    HPI & HOSPITAL COURSE  Patient is a 77 year old male with a past medical history of systolic heart failure (EF 35%, TTE 3-4 months ago), hypertension, diabetes, CAD (s/p stents X 2 10/22) and prostate cancer (radiation over 10 yrs ago). He also had a recent urologic procedure secondary to retention (recent laser therapy),CKI/ He was transferred from [facility] to [facility] on 6/11/23 for ICU treatment for septic shock with vasopressor therapy.  He stated that prior to his hospitalization, he had fever, chills, nausea, vomiting , myalgias, and a new cough. He also stated that he was bit by something on his R antecubital area that developed some redness, erythema - that subsequently improved.    He initially presented to the ER at [place] with urinary retention and lower abdominal pain, but upon arrival he was found to be hypoxic with a RA sat of 86%, tachycardia with a HR in the 120's and MAP barely at 60 despite 3L of crystalloid.  Vasopressors and  ABX were initiated and he was transferred to [facility].  At [place], a Boone was placed and had a large volume urine output. He did have an abnormal urine analysis  The patient was volume resuscitated, placed on empiric antibiotics, was weaned off IV pressor medication and was able to be transferred to the medical unit on 6/12. Cultures remained negative and his sepsis and cellulitis greatly improved. He was then transitioned to oral antibiotics to complete the treatment course. Due to his acute and recurrent urinary retention, a boone was placed and he is being discharged with this and has plans to follow up with his urologist in [place] in 1-2 weeks. His chronic renal disease is stable and will require ongoing outpatient monitoring and management along with his htn, dm, chf, chronic anemia  and prostate cancer. His acute hypoxic respiratory failure resolved with diuretics and was related to acute on chronic heart failure.     He is being discharged with home health and agrees to close outpatient follow up and to return to the er if needed.     Therefore, he is discharged in fair and stable condition to home with organized home healthcare and close outpatient follow-up.    The patient met 2-midnight criteria for an inpatient stay at the time of discharge.    Discharge Date  6/15/23    FOLLOW UP ITEMS POST DISCHARGE  Pcp  Urology in [place]    DISCHARGE DIAGNOSES  Principal Problem (Resolved):    Sepsis (HCC) (POA: Yes)  Active Problems:    Diabetes (HCC) (POA: Unknown)    Heart failure with reduced ejection fraction (HCC) (POA: Unknown)    History of coronary angioplasty with insertion of stent (POA: Unknown)    Debility (POA: Unknown)    Prostate cancer (HCC) (POA: Unknown)    Hypertension (POA: Unknown)    CKD (chronic kidney disease) (POA: Unknown)    Cellulitis of right upper extremity (POA: Unknown)  Resolved Problems:    Acute respiratory failure with hypoxia (HCC) (POA: Unknown)    Acute cystitis without hematuria (POA: Unknown)      FOLLOW UP  No future appointments.  Quinn BIRD  [address] [place] [address]# [phone]  Go on 6/23/2023  Please go to your follow up appointment on Friday June 23, 2023 at 10:30am.    [place] [address]  [phone]  Go on 6/21/2023  Arrive at 8:00am for hospital follow up care for your heart failure. This is a walk in clinic, you can go anytime between 8am and 5pm but the wait will be longer the later in the day you go.      MEDICATIONS ON DISCHARGE     Medication List        START taking these medications        Instructions   amoxicillin-clavulanate 875-125 MG Tabs  Commonly known as: AUGMENTIN   Take 1 Tablet by mouth every 12 hours for 3 days.  Dose: 1 Tablet      Poly Bacitracin [id number] UNIT/GM Oint   Apply 1 Each topically 2 times a day.  Dose: 1 Each            CHANGE how you take these medications        Instructions   aspirin 81 MG EC tablet  Start taking on: June 16, 2023  What changed: when to take this   Take 1 Tablet by mouth every day.  Dose: 81 mg            CONTINUE taking these medications        Instructions   acetaminophen 325 MG Tabs  Commonly known as: Tylenol   Take 650 mg by mouth every four hours as needed.  Dose: 650 mg     clopidogrel 75 MG Tabs  Commonly known as: PLAVIX   Take 75 mg by mouth every day.  Dose: 75 mg     Entresto 24-26 MG Tabs  Generic drug: sacubitril-valsartan   Take 1 Tablet by mouth every day.  Dose: 1 Tablet     fenofibrate 54 MG tablet  Commonly known as: TRICOR   Take 54 mg by mouth every day.  Dose: 54 mg     furosemide 20 MG Tabs  Commonly known as: LASIX   Take 20 mg by mouth every day.  Dose: 20 mg     gabapentin 600 MG tablet  Commonly known as: NEURONTIN   Take 600 mg by mouth 2 times a day.  Dose: 600 mg     Jardiance 10 MG Tabs tablet  Generic drug: Empagliflozin   Take 10 mg by mouth every day.  Dose: 10 mg     omeprazole 20 MG delayed-release capsule  Commonly known as: PRILOSEC   Take 20 mg by mouth every day.  Dose: 20 mg     spironolactone 25 MG Tabs  Commonly known as: ALDACTONE   Take 25 mg by mouth every day.  Dose: 25 mg     tamsulosin 0.4 MG capsule  Commonly known as: FLOMAX   Take 0.4 mg by mouth every day.  Dose: 0.4 mg              Allergies  No Known Allergies    DIET  Orders Placed This Encounter   Procedures    Diet Order Diet: Consistent CHO (Diabetic)     Standing Status:   Standing     Number of Occurrences:   1     Order Specific Question:   Diet:     Answer:   Consistent CHO (Diabetic) [4]       ACTIVITY  As tolerated.  Weight bearing as tolerated    CONSULTATIONS  Critical care    PROCEDURES  DX-FEMUR-1 VIEW RIGHT   Final Result         1.  No radiographic evidence of acute traumatic injury.             LABORATORY  Lab Results   Component Value Date    SODIUM 137 06/15/2023    POTASSIUM 4.3 06/15/2023    CHLORIDE 102 06/15/2023    CO2 19 (L) 06/15/2023    GLUCOSE 89 06/15/2023    BUN 14 06/15/2023    CREATININE 1.36 06/15/2023        Lab Results   Component Value Date    WBC 5.0 06/15/2023    HEMOGLOBIN 11.9 (L) 06/15/2023    HEMATOCRIT 37.4 (L) 06/15/2023    PLATELETCT 261 06/15/2023        Total time of the discharge process exceeds 45 minutes.

## 2023-06-15 NOTE — WOUND TEAM
Received call from floor RNFay regarding pts right AC. Per RN and chart review wound team evaluated the wound on 06/12/23 by Annie and a hydrocolloid thin was ordered. MD has since ordered polysporin BID. Wound care dressing order discontinued please continue to follow MD order.

## 2023-06-15 NOTE — DISCHARGE PLANNING
DC Transport Scheduled    Received request at: 6/15/2023 at 1103    Transport Company Scheduled:  [employer]  Spoke with Apurva at [facility] to schedule transport.    Scheduled Date: 6/15/2023  Scheduled Time: 1300    Destination: Home at [address] [facility]     Notified care team of scheduled transport via Voalte.     If there are any changes needed to the DC transportation scheduled, please contact [facility] at ext. [phone] between the hours of 7402-4283 Mon-Fri. If outside those hours, contact the ED Case Manager at ext. [phone].

## 2023-06-15 NOTE — DISCHARGE PLANNING
"Patient previously agreeable to HH for physical therapy, he is now declining HH. His wife Sherin confirms that \"he will be ok with his 2 sons and me taking care of him\". Patient will be transported back to his home with no DME, or the support of home health per patient and spouse request        Case Management Discharge Planning    Admission Date: 6/11/2023  GMLOS: 5  ALOS: 4    6-Clicks ADL Score: 24  6-Clicks Mobility Score: 18      Anticipated Discharge Dispo: Discharge Disposition: Discharged to home/self care (01)  Discharge Address: [address] [place] [address]  Discharge Contact Phone Number: [phone]    DME Needed: No    Action(s) Taken: Updated Provider/Nurse on Discharge Plan, Patient Conference, Transport Arranged , and Family Conference    Escalations Completed: None    Medically Clear: Yes    Barriers to Discharge: None         "

## 2023-06-15 NOTE — DISCHARGE PLANNING
0840  Agency/Facility Name: [facility]    Spoke To: Amara   Outcome: Amara called to notify DPA that Pt does not have PCP in [place]. Amara is asking if CM can ask Pt he does see any kind of doctor in [place].

## 2023-06-15 NOTE — PROGRESS NOTES
Received bedside report from RN, pt care assumed, VSS, pt assessment complete. Pt AAOx4, tele monitor on. No signs of acute distress noted at this time. Plan of care discussed with pt and verbalizes no questions. Pt denies any additional needs at this time. Bed locked/in lowest position, bed alarm on, pt educated on fall risk and verbalized understanding, call light within reach, hourly rounding initiated.

## 2023-06-15 NOTE — DISCHARGE INSTRUCTIONS
HF Patient Discharge Instructions  Monitor your weight daily, and maintain a weight chart, to track your weight changes.   Activity as tolerated, unless your Doctor has ordered otherwise.  Follow a low fat, low cholesterol, low salt diet unless instructed otherwise by your Doctor. Read the labels on the back of food products and track your intake of fat, cholesterol and salt.   Fluid Restriction No. If a Fluid Restriction has been ordered by your Doctor, measure fluids with a measuring cup to ensure that you are not exceeding the restriction.   No smoking.  Oxygen No. If your Doctor has ordered that you wear Oxygen at home, it is important to wear it as ordered.  Did you receive an explanation from staff on the importance of taking each of your medications and why it is necessary to keep taking them unless your doctor says to stop? Yes  Were all of your questions answered about how to manage your heart failure and what to do if you have increased signs and symptoms after you go home? Yes  Do you feel like your heart failure care team involved you in the care treatment plan and allowed you to make decisions regarding your care while in the hospital and addressed any discharge needs you might have? Yes    See the educational handout provided at discharge for more information on monitoring your daily weight, activity and diet. This also explains more about Heart Failure, symptoms of a flare-up and some of the tests that you have undergone.     Warning Signs of a Flare-Up include:  Swelling in the ankles or lower legs.  Shortness of breath, while at rest, or while doing normal activities.   Shortness of breath at night when in bed, or coughing in bed.   Requiring more pillows to sleep at night, or needing to sit up at night to sleep.  Feeling weak, dizzy or fatigued.     When to call your Doctor:  Call [facility] seven days a week from 8:00 a.m. to 8:00 p.m. for medical questions [phone].  Call your  Primary Care Physician or Cardiologist if:   You experience any pain radiating to your jaw or neck.  You have any difficulty breathing.  You experience weight gain of 3 lbs in a day or 5 lbs in a week.   You feel any palpitations or irregular heartbeats.  You become dizzy or lose consciousness.   If you have had an angiogram or had a pacemaker or AICD placed, and experience:  Bleeding, drainage or swelling at the surgical / puncture site.  Fever greater than 100.0 F  Shock from internal defibrillator.  Cool and / or numb extremities.     Please access the [facility] My HF Guide/Heart Failure Interactive Workbook:   [email or web address]

## 2023-06-16 LAB
BACTERIA BLD CULT: NORMAL
SIGNIFICANT IND 70042: NORMAL
SITE SITE: NORMAL
SOURCE SOURCE: NORMAL

## 2023-06-17 LAB
BACTERIA BLD CULT: NORMAL
SIGNIFICANT IND 70042: NORMAL
SITE SITE: NORMAL
SOURCE SOURCE: NORMAL

## 2023-10-30 NOTE — ASSESSMENT & PLAN NOTE
----- Message from Anu Contreras PharmD sent at 10/30/2023 10:49 AM CDT -----  Regarding: Tymlos  Our system dispense quantity for Tymlos is 1.56ml or one pen per month. Do you want a one month or 3 month supply dispensed? And with how many refills?     Thanks,  Anu     This is Sepsis Present on admission  SIRS criteria identified on my evaluation include: Tachycardia, with heart rate greater than 90 BPM  Source-differential includes CAP, cellulitis,  UTI.  Most likely UTI  Sepsis protocol initiated  Fluid resuscitation ordered per protocol  Crystalloid Fluid Administration: Fluid resuscitation ordered per standard protocol - 30 mL/kg per current or ideal body weight  IV antibiotics as appropriate for source of sepsis  Reassessment: I have reassessed the patient's hemodynamic status    -Will repeat blood cultures x2 and UA- UA + for infection with HX of retention BPH- Has not had chronic boone cath in 2 months  -Received Zosyn and Vanco at [place] with UA that could be colonization given patient's history of prolonged boone but is not reporting any urinary symptoms  -Continue Ceftriaxone and Azithromycin  -Levophed, weaned off  -LR at 100mL/hr as he received 3L in total from [facility] - dc once taking PO  -If right forearm wound produces any purulent discharge, will get culture of  -Replacing boone with one of our own to reduce infection risk  -Repeat CBC, CMP, Procal, and Lactic Acid-Lactic acid normalizing

## 2024-05-07 NOTE — ASSESSMENT & PLAN NOTE
Chronic,  Unclear baseline, the patient currently with a improved creatinine, now continuing to improve, following  Avoid nephrotoxins   Yeyo Lo

## 2025-02-18 ENCOUNTER — TELEPHONE (OUTPATIENT)
Dept: CARDIOLOGY | Facility: MEDICAL CENTER | Age: 80
End: 2025-02-18
Payer: MEDICARE

## 2025-02-18 NOTE — TELEPHONE ENCOUNTER
LES    Caller:  Dr. Clay Gomez    Topic/issue:   Calling regarding the Echo that LES read yesterday.     Thank you,   Margy LUCAS

## 2025-02-18 NOTE — TELEPHONE ENCOUNTER
ADD- JI    Caller:  Dr. Clay Gomez     Topic/issue:   Calling regarding the Echo that MK read yesterday.      Thank you,   Margy LUCAS

## 2025-04-06 ENCOUNTER — HOSPITAL ENCOUNTER (INPATIENT)
Facility: MEDICAL CENTER | Age: 80
LOS: 2 days | DRG: 280 | End: 2025-04-08
Attending: EMERGENCY MEDICINE | Admitting: INTERNAL MEDICINE
Payer: MEDICARE

## 2025-04-06 ENCOUNTER — APPOINTMENT (OUTPATIENT)
Dept: RADIOLOGY | Facility: MEDICAL CENTER | Age: 80
DRG: 280 | End: 2025-04-06
Attending: STUDENT IN AN ORGANIZED HEALTH CARE EDUCATION/TRAINING PROGRAM
Payer: MEDICARE

## 2025-04-06 ENCOUNTER — APPOINTMENT (OUTPATIENT)
Dept: RADIOLOGY | Facility: MEDICAL CENTER | Age: 80
DRG: 280 | End: 2025-04-06
Attending: EMERGENCY MEDICINE
Payer: MEDICARE

## 2025-04-06 ENCOUNTER — TELEPHONE (OUTPATIENT)
Dept: CARDIOLOGY | Facility: MEDICAL CENTER | Age: 80
End: 2025-04-06
Payer: MEDICARE

## 2025-04-06 ENCOUNTER — APPOINTMENT (OUTPATIENT)
Dept: CARDIOLOGY | Facility: MEDICAL CENTER | Age: 80
DRG: 280 | End: 2025-04-06
Payer: MEDICARE

## 2025-04-06 DIAGNOSIS — I50.20 HEART FAILURE WITH REDUCED EJECTION FRACTION (HCC): ICD-10-CM

## 2025-04-06 DIAGNOSIS — I21.3 ST ELEVATION MYOCARDIAL INFARCTION (STEMI), UNSPECIFIED ARTERY (HCC): ICD-10-CM

## 2025-04-06 DIAGNOSIS — I25.10 CORONARY ARTERY DISEASE INVOLVING NATIVE HEART WITHOUT ANGINA PECTORIS, UNSPECIFIED VESSEL OR LESION TYPE: ICD-10-CM

## 2025-04-06 DIAGNOSIS — Z95.5 HISTORY OF CORONARY ANGIOPLASTY WITH INSERTION OF STENT: ICD-10-CM

## 2025-04-06 DIAGNOSIS — E11.42 DIABETIC POLYNEUROPATHY ASSOCIATED WITH TYPE 2 DIABETES MELLITUS (HCC): ICD-10-CM

## 2025-04-06 DIAGNOSIS — Z72.0 TOBACCO ABUSE: ICD-10-CM

## 2025-04-06 PROBLEM — I35.0 SEVERE AORTIC STENOSIS: Status: ACTIVE | Noted: 2025-04-06

## 2025-04-06 PROBLEM — R79.89 ELEVATED TROPONIN: Status: ACTIVE | Noted: 2025-04-06

## 2025-04-06 PROBLEM — I21.9 ACUTE MI (HCC): Status: ACTIVE | Noted: 2025-04-06

## 2025-04-06 PROBLEM — K21.9 GERD (GASTROESOPHAGEAL REFLUX DISEASE): Status: ACTIVE | Noted: 2025-04-06

## 2025-04-06 PROBLEM — N40.0 BPH (BENIGN PROSTATIC HYPERPLASIA): Status: ACTIVE | Noted: 2025-04-06

## 2025-04-06 PROBLEM — E11.40 DIABETIC NEUROPATHY (HCC): Status: ACTIVE | Noted: 2025-04-06

## 2025-04-06 PROBLEM — E78.5 HYPERLIPIDEMIA: Status: ACTIVE | Noted: 2025-04-06

## 2025-04-06 PROBLEM — J96.10 CHRONIC RESPIRATORY FAILURE (HCC): Status: ACTIVE | Noted: 2025-04-06

## 2025-04-06 PROBLEM — N40.0 BPH (BENIGN PROSTATIC HYPERPLASIA): Status: RESOLVED | Noted: 2025-04-06 | Resolved: 2025-04-06

## 2025-04-06 LAB
ALBUMIN SERPL BCP-MCNC: 3.5 G/DL (ref 3.2–4.9)
ALBUMIN/GLOB SERPL: 1 G/DL
ALP SERPL-CCNC: 66 U/L (ref 30–99)
ALT SERPL-CCNC: <5 U/L (ref 2–50)
ANION GAP SERPL CALC-SCNC: 12 MMOL/L (ref 7–16)
APTT PPP: 34.4 SEC (ref 24.7–36)
AST SERPL-CCNC: 15 U/L (ref 12–45)
B PARAP IS1001 DNA NPH QL NAA+NON-PROBE: NOT DETECTED
B PERT.PT PRMT NPH QL NAA+NON-PROBE: NOT DETECTED
BASOPHILS # BLD AUTO: 0.2 % (ref 0–1.8)
BASOPHILS # BLD: 0.03 K/UL (ref 0–0.12)
BILIRUB SERPL-MCNC: 0.5 MG/DL (ref 0.1–1.5)
BUN SERPL-MCNC: 13 MG/DL (ref 8–22)
C PNEUM DNA NPH QL NAA+NON-PROBE: NOT DETECTED
CALCIUM ALBUM COR SERPL-MCNC: 9.5 MG/DL (ref 8.5–10.5)
CALCIUM SERPL-MCNC: 9.1 MG/DL (ref 8.5–10.5)
CHLORIDE SERPL-SCNC: 105 MMOL/L (ref 96–112)
CO2 SERPL-SCNC: 23 MMOL/L (ref 20–33)
CREAT SERPL-MCNC: 0.81 MG/DL (ref 0.5–1.4)
EKG IMPRESSION: NORMAL
EOSINOPHIL # BLD AUTO: 0.03 K/UL (ref 0–0.51)
EOSINOPHIL NFR BLD: 0.2 % (ref 0–6.9)
ERYTHROCYTE [DISTWIDTH] IN BLOOD BY AUTOMATED COUNT: 47.4 FL (ref 35.9–50)
FLUAV RNA NPH QL NAA+NON-PROBE: NOT DETECTED
FLUBV RNA NPH QL NAA+NON-PROBE: NOT DETECTED
GFR SERPLBLD CREATININE-BSD FMLA CKD-EPI: 91 ML/MIN/1.73 M 2
GLOBULIN SER CALC-MCNC: 3.6 G/DL (ref 1.9–3.5)
GLUCOSE BLD STRIP.AUTO-MCNC: 119 MG/DL (ref 65–99)
GLUCOSE BLD STRIP.AUTO-MCNC: 127 MG/DL (ref 65–99)
GLUCOSE BLD STRIP.AUTO-MCNC: 156 MG/DL (ref 65–99)
GLUCOSE SERPL-MCNC: 174 MG/DL (ref 65–99)
HADV DNA NPH QL NAA+NON-PROBE: NOT DETECTED
HCOV 229E RNA NPH QL NAA+NON-PROBE: NOT DETECTED
HCOV HKU1 RNA NPH QL NAA+NON-PROBE: NOT DETECTED
HCOV NL63 RNA NPH QL NAA+NON-PROBE: NOT DETECTED
HCOV OC43 RNA NPH QL NAA+NON-PROBE: NOT DETECTED
HCT VFR BLD AUTO: 38.4 % (ref 42–52)
HGB BLD-MCNC: 12.3 G/DL (ref 14–18)
HMPV RNA NPH QL NAA+NON-PROBE: NOT DETECTED
HOLDING TUBE BB 8507: NORMAL
HPIV1 RNA NPH QL NAA+NON-PROBE: NOT DETECTED
HPIV2 RNA NPH QL NAA+NON-PROBE: NOT DETECTED
HPIV3 RNA NPH QL NAA+NON-PROBE: NOT DETECTED
HPIV4 RNA NPH QL NAA+NON-PROBE: NOT DETECTED
IMM GRANULOCYTES # BLD AUTO: 0.06 K/UL (ref 0–0.11)
IMM GRANULOCYTES NFR BLD AUTO: 0.4 % (ref 0–0.9)
INR PPP: 1.11 (ref 0.87–1.13)
LIPASE SERPL-CCNC: 10 U/L (ref 11–82)
LV EJECT FRACT  99904: 25
LYMPHOCYTES # BLD AUTO: 0.53 K/UL (ref 1–4.8)
LYMPHOCYTES NFR BLD: 3.7 % (ref 22–41)
M PNEUMO DNA NPH QL NAA+NON-PROBE: NOT DETECTED
MCH RBC QN AUTO: 28 PG (ref 27–33)
MCHC RBC AUTO-ENTMCNC: 32 G/DL (ref 32.3–36.5)
MCV RBC AUTO: 87.5 FL (ref 81.4–97.8)
MONOCYTES # BLD AUTO: 1.08 K/UL (ref 0–0.85)
MONOCYTES NFR BLD AUTO: 7.5 % (ref 0–13.4)
NEUTROPHILS # BLD AUTO: 12.75 K/UL (ref 1.82–7.42)
NEUTROPHILS NFR BLD: 88 % (ref 44–72)
NRBC # BLD AUTO: 0 K/UL
NRBC BLD-RTO: 0 /100 WBC (ref 0–0.2)
NT-PROBNP SERPL IA-MCNC: 6318 PG/ML (ref 0–125)
PLATELET # BLD AUTO: 198 K/UL (ref 164–446)
PMV BLD AUTO: 10.9 FL (ref 9–12.9)
POTASSIUM SERPL-SCNC: 4.1 MMOL/L (ref 3.6–5.5)
PROT SERPL-MCNC: 7.1 G/DL (ref 6–8.2)
PROTHROMBIN TIME: 14.3 SEC (ref 12–14.6)
RBC # BLD AUTO: 4.39 M/UL (ref 4.7–6.1)
RSV RNA NPH QL NAA+NON-PROBE: NOT DETECTED
RV+EV RNA NPH QL NAA+NON-PROBE: NOT DETECTED
SARS-COV-2 RNA NPH QL NAA+NON-PROBE: NOTDETECTED
SODIUM SERPL-SCNC: 140 MMOL/L (ref 135–145)
TROPONIN T SERPL-MCNC: 60 NG/L (ref 6–19)
TROPONIN T SERPL-MCNC: 72 NG/L (ref 6–19)
TROPONIN T SERPL-MCNC: 75 NG/L (ref 6–19)
TROPONIN T SERPL-MCNC: 78 NG/L (ref 6–19)
TROPONIN T SERPL-MCNC: 81 NG/L (ref 6–19)
UFH PPP CHRO-ACNC: 0.35 IU/ML
UFH PPP CHRO-ACNC: 0.48 IU/ML
WBC # BLD AUTO: 14.5 K/UL (ref 4.8–10.8)

## 2025-04-06 PROCEDURE — A9270 NON-COVERED ITEM OR SERVICE: HCPCS | Performed by: INTERNAL MEDICINE

## 2025-04-06 PROCEDURE — 36415 COLL VENOUS BLD VENIPUNCTURE: CPT

## 2025-04-06 PROCEDURE — 84484 ASSAY OF TROPONIN QUANT: CPT

## 2025-04-06 PROCEDURE — 99291 CRITICAL CARE FIRST HOUR: CPT

## 2025-04-06 PROCEDURE — 85610 PROTHROMBIN TIME: CPT

## 2025-04-06 PROCEDURE — 700111 HCHG RX REV CODE 636 W/ 250 OVERRIDE (IP): Performed by: INTERNAL MEDICINE

## 2025-04-06 PROCEDURE — 99233 SBSQ HOSP IP/OBS HIGH 50: CPT | Performed by: INTERNAL MEDICINE

## 2025-04-06 PROCEDURE — 83690 ASSAY OF LIPASE: CPT

## 2025-04-06 PROCEDURE — 99291 CRITICAL CARE FIRST HOUR: CPT | Mod: GC | Performed by: INTERNAL MEDICINE

## 2025-04-06 PROCEDURE — 82962 GLUCOSE BLOOD TEST: CPT

## 2025-04-06 PROCEDURE — 93005 ELECTROCARDIOGRAM TRACING: CPT | Mod: TC | Performed by: EMERGENCY MEDICINE

## 2025-04-06 PROCEDURE — 70450 CT HEAD/BRAIN W/O DYE: CPT

## 2025-04-06 PROCEDURE — 700117 HCHG RX CONTRAST REV CODE 255: Performed by: INTERNAL MEDICINE

## 2025-04-06 PROCEDURE — 93306 TTE W/DOPPLER COMPLETE: CPT

## 2025-04-06 PROCEDURE — 80053 COMPREHEN METABOLIC PANEL: CPT

## 2025-04-06 PROCEDURE — 700111 HCHG RX REV CODE 636 W/ 250 OVERRIDE (IP)

## 2025-04-06 PROCEDURE — A9270 NON-COVERED ITEM OR SERVICE: HCPCS

## 2025-04-06 PROCEDURE — 83880 ASSAY OF NATRIURETIC PEPTIDE: CPT

## 2025-04-06 PROCEDURE — 770022 HCHG ROOM/CARE - ICU (200)

## 2025-04-06 PROCEDURE — 85520 HEPARIN ASSAY: CPT

## 2025-04-06 PROCEDURE — 700111 HCHG RX REV CODE 636 W/ 250 OVERRIDE (IP): Mod: JZ | Performed by: INTERNAL MEDICINE

## 2025-04-06 PROCEDURE — 700102 HCHG RX REV CODE 250 W/ 637 OVERRIDE(OP): Performed by: INTERNAL MEDICINE

## 2025-04-06 PROCEDURE — 700102 HCHG RX REV CODE 250 W/ 637 OVERRIDE(OP)

## 2025-04-06 PROCEDURE — 85025 COMPLETE CBC W/AUTO DIFF WBC: CPT

## 2025-04-06 PROCEDURE — 99223 1ST HOSP IP/OBS HIGH 75: CPT | Performed by: STUDENT IN AN ORGANIZED HEALTH CARE EDUCATION/TRAINING PROGRAM

## 2025-04-06 PROCEDURE — 85730 THROMBOPLASTIN TIME PARTIAL: CPT

## 2025-04-06 PROCEDURE — 0202U NFCT DS 22 TRGT SARS-COV-2: CPT

## 2025-04-06 PROCEDURE — 93306 TTE W/DOPPLER COMPLETE: CPT | Mod: 26 | Performed by: INTERNAL MEDICINE

## 2025-04-06 PROCEDURE — 71045 X-RAY EXAM CHEST 1 VIEW: CPT

## 2025-04-06 RX ORDER — CLOPIDOGREL BISULFATE 75 MG/1
75 TABLET ORAL DAILY
Status: DISCONTINUED | OUTPATIENT
Start: 2025-04-06 | End: 2025-04-08 | Stop reason: HOSPADM

## 2025-04-06 RX ORDER — FAMOTIDINE 40 MG/1
40 TABLET, FILM COATED ORAL DAILY
Status: ON HOLD | COMMUNITY
End: 2025-04-08

## 2025-04-06 RX ORDER — TAMSULOSIN HYDROCHLORIDE 0.4 MG/1
0.4 CAPSULE ORAL
Status: DISCONTINUED | OUTPATIENT
Start: 2025-04-06 | End: 2025-04-08 | Stop reason: HOSPADM

## 2025-04-06 RX ORDER — FUROSEMIDE 10 MG/ML
40 INJECTION INTRAMUSCULAR; INTRAVENOUS ONCE
Status: COMPLETED | OUTPATIENT
Start: 2025-04-06 | End: 2025-04-06

## 2025-04-06 RX ORDER — OMEPRAZOLE 20 MG/1
20 CAPSULE, DELAYED RELEASE ORAL DAILY
Status: DISCONTINUED | OUTPATIENT
Start: 2025-04-06 | End: 2025-04-08 | Stop reason: HOSPADM

## 2025-04-06 RX ORDER — FUROSEMIDE 10 MG/ML
40 INJECTION INTRAMUSCULAR; INTRAVENOUS
Status: DISCONTINUED | OUTPATIENT
Start: 2025-04-06 | End: 2025-04-08

## 2025-04-06 RX ORDER — HEPARIN SODIUM 5000 [USP'U]/100ML
0-30 INJECTION, SOLUTION INTRAVENOUS CONTINUOUS
Status: DISCONTINUED | OUTPATIENT
Start: 2025-04-06 | End: 2025-04-07

## 2025-04-06 RX ORDER — PANTOPRAZOLE SODIUM 40 MG/1
40 TABLET, DELAYED RELEASE ORAL DAILY
COMMUNITY

## 2025-04-06 RX ORDER — DEXTROSE MONOHYDRATE 25 G/50ML
25 INJECTION, SOLUTION INTRAVENOUS
Status: DISCONTINUED | OUTPATIENT
Start: 2025-04-06 | End: 2025-04-07

## 2025-04-06 RX ORDER — CLOPIDOGREL BISULFATE 75 MG/1
75 TABLET ORAL DAILY
Status: CANCELLED | OUTPATIENT
Start: 2025-04-06

## 2025-04-06 RX ORDER — GABAPENTIN 300 MG/1
600 CAPSULE ORAL 2 TIMES DAILY
Status: DISCONTINUED | OUTPATIENT
Start: 2025-04-06 | End: 2025-04-08 | Stop reason: HOSPADM

## 2025-04-06 RX ORDER — ASPIRIN 81 MG/1
81 TABLET ORAL DAILY
Status: DISCONTINUED | OUTPATIENT
Start: 2025-04-07 | End: 2025-04-08 | Stop reason: HOSPADM

## 2025-04-06 RX ORDER — NITROGLYCERIN 0.4 MG/1
0.4 TABLET SUBLINGUAL
Status: DISCONTINUED | OUTPATIENT
Start: 2025-04-06 | End: 2025-04-08 | Stop reason: HOSPADM

## 2025-04-06 RX ORDER — FENOFIBRATE 54 MG/1
54 TABLET ORAL DAILY
Status: DISCONTINUED | OUTPATIENT
Start: 2025-04-06 | End: 2025-04-06

## 2025-04-06 RX ORDER — GUAIFENESIN/DEXTROMETHORPHAN 100-10MG/5
10 SYRUP ORAL EVERY 6 HOURS PRN
Status: DISCONTINUED | OUTPATIENT
Start: 2025-04-06 | End: 2025-04-08 | Stop reason: HOSPADM

## 2025-04-06 RX ORDER — ASPIRIN 325 MG
650 TABLET ORAL
Status: ON HOLD | COMMUNITY
End: 2025-04-08

## 2025-04-06 RX ORDER — ACETAMINOPHEN 325 MG/1
650 TABLET ORAL EVERY 6 HOURS PRN
Status: DISCONTINUED | OUTPATIENT
Start: 2025-04-06 | End: 2025-04-07

## 2025-04-06 RX ORDER — DIGOXIN 125 MCG
125 TABLET ORAL
Status: ON HOLD | COMMUNITY
End: 2025-04-08

## 2025-04-06 RX ORDER — NICOTINE 21 MG/24HR
14 PATCH, TRANSDERMAL 24 HOURS TRANSDERMAL
Status: DISCONTINUED | OUTPATIENT
Start: 2025-04-06 | End: 2025-04-06

## 2025-04-06 RX ORDER — HEPARIN SODIUM 1000 [USP'U]/ML
4000 INJECTION, SOLUTION INTRAVENOUS; SUBCUTANEOUS ONCE
Status: COMPLETED | OUTPATIENT
Start: 2025-04-06 | End: 2025-04-06

## 2025-04-06 RX ORDER — HEPARIN SODIUM 1000 [USP'U]/ML
2000 INJECTION, SOLUTION INTRAVENOUS; SUBCUTANEOUS PRN
Status: DISCONTINUED | OUTPATIENT
Start: 2025-04-06 | End: 2025-04-07

## 2025-04-06 RX ORDER — HYDROMORPHONE HYDROCHLORIDE 1 MG/ML
0.25 INJECTION, SOLUTION INTRAMUSCULAR; INTRAVENOUS; SUBCUTANEOUS
Status: DISCONTINUED | OUTPATIENT
Start: 2025-04-06 | End: 2025-04-08 | Stop reason: HOSPADM

## 2025-04-06 RX ORDER — INSULIN LISPRO 100 [IU]/ML
1-6 INJECTION, SOLUTION INTRAVENOUS; SUBCUTANEOUS EVERY 6 HOURS
Status: DISCONTINUED | OUTPATIENT
Start: 2025-04-06 | End: 2025-04-07

## 2025-04-06 RX ORDER — NICOTINE 21 MG/24HR
14 PATCH, TRANSDERMAL 24 HOURS TRANSDERMAL
Status: DISCONTINUED | OUTPATIENT
Start: 2025-04-07 | End: 2025-04-07

## 2025-04-06 RX ORDER — ASPIRIN 81 MG/1
81 TABLET ORAL DAILY
Status: ON HOLD | COMMUNITY
End: 2025-04-08

## 2025-04-06 RX ORDER — OXYCODONE HYDROCHLORIDE 5 MG/1
5 TABLET ORAL
Refills: 0 | Status: DISCONTINUED | OUTPATIENT
Start: 2025-04-06 | End: 2025-04-08 | Stop reason: HOSPADM

## 2025-04-06 RX ORDER — ACETAMINOPHEN AND CODEINE PHOSPHATE 300; 30 MG/1; MG/1
1 TABLET ORAL 4 TIMES DAILY
COMMUNITY

## 2025-04-06 RX ORDER — PANTOPRAZOLE SODIUM 40 MG/1
40 TABLET, DELAYED RELEASE ORAL DAILY
Status: DISCONTINUED | OUTPATIENT
Start: 2025-04-06 | End: 2025-04-06

## 2025-04-06 RX ORDER — OMEPRAZOLE 20 MG/1
20 CAPSULE, DELAYED RELEASE ORAL DAILY
Status: DISCONTINUED | OUTPATIENT
Start: 2025-04-06 | End: 2025-04-06

## 2025-04-06 RX ORDER — OXYCODONE HYDROCHLORIDE 5 MG/1
2.5 TABLET ORAL
Refills: 0 | Status: DISCONTINUED | OUTPATIENT
Start: 2025-04-06 | End: 2025-04-08 | Stop reason: HOSPADM

## 2025-04-06 RX ADMIN — INSULIN LISPRO 1 UNITS: 100 INJECTION, SOLUTION INTRAVENOUS; SUBCUTANEOUS at 17:48

## 2025-04-06 RX ADMIN — OXYCODONE 5 MG: 5 TABLET ORAL at 12:01

## 2025-04-06 RX ADMIN — HUMAN ALBUMIN MICROSPHERES AND PERFLUTREN 3 ML: 10; .22 INJECTION, SOLUTION INTRAVENOUS at 13:45

## 2025-04-06 RX ADMIN — GABAPENTIN 600 MG: 300 CAPSULE ORAL at 17:35

## 2025-04-06 RX ADMIN — TAMSULOSIN HYDROCHLORIDE 0.4 MG: 0.4 CAPSULE ORAL at 10:45

## 2025-04-06 RX ADMIN — HEPARIN SODIUM 12 UNITS/KG/HR: 5000 INJECTION, SOLUTION INTRAVENOUS at 11:46

## 2025-04-06 RX ADMIN — FUROSEMIDE 40 MG: 10 INJECTION, SOLUTION INTRAVENOUS at 17:35

## 2025-04-06 RX ADMIN — GABAPENTIN 600 MG: 300 CAPSULE ORAL at 10:45

## 2025-04-06 RX ADMIN — FUROSEMIDE 40 MG: 10 INJECTION INTRAMUSCULAR; INTRAVENOUS at 10:46

## 2025-04-06 RX ADMIN — OMEPRAZOLE 20 MG: 20 CAPSULE, DELAYED RELEASE ORAL at 10:45

## 2025-04-06 RX ADMIN — CLOPIDOGREL BISULFATE 75 MG: 75 TABLET, FILM COATED ORAL at 12:43

## 2025-04-06 RX ADMIN — HEPARIN SODIUM 4000 UNITS: 1000 INJECTION, SOLUTION INTRAVENOUS; SUBCUTANEOUS at 11:46

## 2025-04-06 RX ADMIN — OXYCODONE 2.5 MG: 5 TABLET ORAL at 19:06

## 2025-04-06 ASSESSMENT — PAIN DESCRIPTION - PAIN TYPE
TYPE: ACUTE PAIN;SURGICAL PAIN
TYPE: ACUTE PAIN
TYPE: ACUTE PAIN;CHRONIC PAIN
TYPE: ACUTE PAIN
TYPE: ACUTE PAIN
TYPE: ACUTE PAIN;SURGICAL PAIN
TYPE: ACUTE PAIN;SURGICAL PAIN

## 2025-04-06 ASSESSMENT — ENCOUNTER SYMPTOMS
FEVER: 1
PALPITATIONS: 0
VOMITING: 0
DIZZINESS: 1
NAUSEA: 0
CONSTIPATION: 0
COUGH: 1
ABDOMINAL PAIN: 0
SHORTNESS OF BREATH: 1
HEADACHES: 1
DIARRHEA: 0
SPUTUM PRODUCTION: 1
CHILLS: 0

## 2025-04-06 ASSESSMENT — COPD QUESTIONNAIRES
DURING THE PAST 4 WEEKS HOW MUCH DID YOU FEEL SHORT OF BREATH: NONE/LITTLE OF THE TIME
HAVE YOU SMOKED AT LEAST 100 CIGARETTES IN YOUR ENTIRE LIFE: YES
DO YOU EVER COUGH UP ANY MUCUS OR PHLEGM?: NO/ONLY WITH OCCASIONAL COLDS OR INFECTIONS
COPD SCREENING SCORE: 4

## 2025-04-06 ASSESSMENT — FIBROSIS 4 INDEX: FIB4 SCORE: 1.45

## 2025-04-06 NOTE — TELEPHONE ENCOUNTER
Brief Cardiology Note:    I was called to discuss this patients care with Dr. Thorpe at [facility] for patient Mr. Agustin for suspected STEMI. Patient is a 80 yo M with medical history of CAD s/p stents, severe aortic stenosis, systolic heart failure with LVEF 35%, chronic LBBB, diabetes, remote prostate cancer (radiation over 10 years ago) came to [facility] due to chest pain and syncope. Stable vitals. His ECG showed sinus tachycardia or junctional tachycardia with significant ST elevation more than 1 mm over inferior leads, more than 5 mm over V2-V5, compared to his prior ECGs. Not sure the transfer time from [place] to [facility] probably more than 120 min. tPK was recommended and indications/contraindications by [place]. Loading dose of asa and heparin gtt by [place]. Patient will be transferred to [facility]. We will see patient once he is here.     Electronically Signed by:  Estefani Abbott MD  Cardiac electrophysiologist  4/6/2025  4:50 AM

## 2025-04-06 NOTE — CONSULTS
"  Cardiology Progress Note  Date:    4/6/2025     Patient ID:  Name:              Donte Agustin     YOB: 1945  Age:                 79 y.o.  male   MRN:               [id number]                                                             History of Present Illness:  Per Dr. Abbott's note, \"Mr. Agustin is a 80 yo M with medical history of CAD s/p stents, severe aortic stenosis, systolic heart failure with LVEF 35%, chronic LBBB, diabetes, remote prostate cancer (radiation over 10 years ago) was transferred from [facility] for STEMI. Patient reports that he has intermittent chest pain for 2-3 days. He took tylenol for chest pain but not improve his symptoms and he went to [place] ER. At [facility], stable vitals. His ECG showed sinus tachycardia or junctional tachycardia with significant ST elevation more than 1 mm over inferior leads, more than 5 mm over V2-V5, compared to his prior ECGs. I was called at 4 am. I consider pt has STEMI. Not sure the transfer time from [place] to [facility] probably more than 120 min. tPA was recommended and indications/contraindications by . Loading dose of asa and heparin gtt by [place]. Pt got tPA at 4:22 am and actually pt didn't get heparin gtt instead of therapeutic Lovenox at 4:23 am. After patient transferred to [facility], his chest pain resolved, but a little bit nausea. Stable vitals on admission. ECG showed sinus tachycardia or junctional tachycardia. Inferior ST elevation resolved and ST elevation over precordial leads much improved. I did bedside echo showed LVEF 15-20%, no pericardial effusion, no significant evidence of aortic dissection.\"    Events/Subjective:  This morning, he reports no significant chest pain.  He is not able to describe the events of last night other than to say that he felt distressed.  He defers questioning to his wife and states that she would better be able to describe his symptoms and what occurred.  His troponin level did not " significantly elevate overnight.  He remains tachycardic with unclear underlying rhythm.    Hospital Medications:    Current Facility-Administered Medications:     heparin infusion 25,000 units in 500 mL 0.45% NACL, 0-30 Units/kg/hr (Adjusted), Intravenous, Continuous, Nathaniel Balderrama M.D., Last Rate: 18.2 mL/hr at 04/06/25 1146, 12 Units/kg/hr at 04/06/25 1146    heparin injection 2,000 Units, 2,000 Units, Intravenous, PRN, Nathaniel Balderrama M.D.    Respiratory Therapy Consult, , Nebulization, Continuous RT, Prince ROSALIND Wood D.O.    acetaminophen (Tylenol) tablet 650 mg, 650 mg, Oral, Q6HRS PRN, Prince ROSALIND Wood D.O.    Notify provider if pain remains uncontrolled, , , CONTINUOUS **AND** Use the Numeric Rating Scale (NRS), Calderón-Baker Faces (WBF), or FLACC on regular floors and Critical-Care Pain Observation Tool (CPOT) on ICUs/Trauma to assess pain, , , CONTINUOUS **AND** Pulse Ox, , , CONTINUOUS **AND** Pharmacy Consult Request ...Pain Management Review 1 Each, 1 Each, Other, PHARMACY TO DOSE **AND** If patient difficult to arouse and/or has respiratory depression (respiratory rate of 10 or less), stop any opiates that are currently infusing and call a Rapid Response., , , CONTINUOUS, Prince ROSALIND Wood D.O.    oxyCODONE immediate-release (Roxicodone) tablet 2.5 mg, 2.5 mg, Oral, Q3HRS PRN **OR** oxyCODONE immediate-release (Roxicodone) tablet 5 mg, 5 mg, Oral, Q3HRS PRN, 5 mg at 04/06/25 1201 **OR** HYDROmorphone (Dilaudid) injection 0.25 mg, 0.25 mg, Intravenous, Q3HRS PRN, Prince ROSALIND Wood D.O.    guaiFENesin dextromethorphan (Robitussin DM) 100-10 MG/5ML syrup 10 mL, 10 mL, Oral, Q6HRS PRN, Prince ROSALIND Wood D.O.    insulin GLARGINE (Lantus,Semglee) injection, 0.2 Units/kg/day, Subcutaneous, Q EVENING **AND** insulin lispro (HumaLOG,AdmeLOG) subcutaneous injection, 1-6 Units, Subcutaneous, Q6HRS **AND** POC blood glucose manual result, , , Q6H **AND** NOTIFY MD and PharmD, , , Once **AND** Administer 20 grams of glucose  (approximately 8 ounces of fruit juice) every 15 minutes PRN FSBG less than 70 mg/dL, , , PRN **AND** dextrose 50 % (D50W) injection 25 g, 25 g, Intravenous, Q15 MIN PRN, Prince ROSALIND Wood D.O.    tamsulosin (Flomax) capsule 0.4 mg, 0.4 mg, Oral, AFTER BREAKFAST, Prince ROSALIND Wood D.O., 0.4 mg at 04/06/25 1045    gabapentin (Neurontin) capsule 600 mg, 600 mg, Oral, BID, Prince ROSALIND Wood D.O., 600 mg at 04/06/25 1045    [START ON 4/7/2025] aspirin EC tablet 81 mg, 81 mg, Oral, DAILY, Prince ROSALIND Wood D.O.    [Held by provider] nicotine (Nicoderm) 14 MG/24HR 14 mg, 14 mg, Transdermal, Daily-0600 **AND** [START ON 4/7/2025] Nicotine Replacement Patient Education Materials, , , Once **AND** [DISCONTINUED] nicotine polacrilex (Nicorette) 2 MG piece 2 mg, 2 mg, Oral, Q HOUR PRN, Prince ROSALIND Wood D.O.    nitroglycerin (Nitrostat) tablet 0.4 mg, 0.4 mg, Sublingual, Q5 MIN PRN, Prince ROSALIND Wood D.O.    omeprazole (PriLOSEC) capsule 20 mg, 20 mg, Oral, DAILY, Nathaniel Balderrama M.D., 20 mg at 04/06/25 1045    clopidogrel (Plavix) tablet 75 mg, 75 mg, Oral, DAILY, Charbel Hoffman M.D., 75 mg at 04/06/25 1243    Current Outpatient Medications:  Medications Prior to Admission   Medication Sig Dispense Refill Last Dose/Taking    aspirin 81 MG EC tablet Take 81 mg by mouth every day.   4/5/2025    acetaminophen-codeine #3 (TYLENOL #3) 300-30 MG Tab Take 1 Tablet by mouth 4 times a day.   4/5/2025 at  6:00 PM    digoxin (LANOXIN) 125 MCG Tab Take 125 mcg by mouth every 48 hours.   4/5/2025 Morning    pantoprazole (PROTONIX) 40 MG Tablet Delayed Response Take 40 mg by mouth every day.   4/5/2025    famotidine (PEPCID) 40 MG Tab Take 40 mg by mouth every day.   4/5/2025    aspirin (ASA) 325 MG Tab Take 650 mg by mouth one time as needed for Mild Pain. 650 mg = 2 tabs   4/5/2025 at 11:00 PM    furosemide (LASIX) 40 MG Tab Take 40 mg by mouth every 48 hours.   4/5/2025 Morning    clopidogrel (PLAVIX) 75 MG Tab Take 75 mg by mouth every day.   4/5/2025  "   tamsulosin (FLOMAX) 0.4 MG capsule Take 0.4 mg by mouth every day.   4/5/2025    gabapentin (NEURONTIN) 600 MG tablet Take 600 mg by mouth 3 times a day.   4/5/2025 Evening    acetaminophen (TYLENOL) 500 MG Tab Take 1,000 mg by mouth every four hours as needed for Mild Pain. 1,000 mg = 2 tabs   4/5/2025 at 11:00 PM     Medication Allergy:  No Known Allergies    Vitals:  Weight/BMI: Body mass index is 31.18 kg/m².  /63   Pulse 99   Temp 36.8 °C (98.2 °F) (Temporal)   Resp (!) 21   Ht 1.702 m (5' 7\")   Wt 90.3 kg (199 lb 1.2 oz)   SpO2 93%   Vitals:    04/06/25 1200 04/06/25 1201 04/06/25 1300 04/06/25 1400   BP: 109/66 109/66 118/67 120/63   Pulse: (!) 110 (!) 111 (!) 105 99   Resp: (!) 22 13 (!) 22 (!) 21   Temp:       TempSrc:       SpO2: 94% 94% 93% 93%   Weight:       Height:         Oxygen Therapy:  Pulse Oximetry: 93 %, O2 (LPM): 2, O2 Delivery Device: Nasal Cannula    Physical Exam:  Constitutional: Chronically ill-appearing, no acute distress  HENMT: Normocephalic, atraumatic  Eyes: EOMI, no discharge.  Neck: JVD to mid neck.  Cardiovascular: Regular, tachycardic, 1+ bilateral lower extremity edema, warm to touch  Lungs: Increased respiratory effort, 3L supplemental oxygen  Abdomen: Milldy obese, nondistended  Neurologic: Alert, AO x 2  Psychiatric: Difficult to asses    Lab Data Review:  Recent Results (from the past 24 hours)   Troponin    Collection Time: 04/06/25  5:40 AM   Result Value Ref Range    Troponin T 60 (H) 6 - 19 ng/L   proBrain Natriuretic Peptide, NT    Collection Time: 04/06/25  5:40 AM   Result Value Ref Range    NT-proBNP 6318 (H) 0 - 125 pg/mL   CBC With Differential    Collection Time: 04/06/25  5:40 AM   Result Value Ref Range    WBC 14.5 (H) 4.8 - 10.8 K/uL    RBC 4.39 (L) 4.70 - 6.10 M/uL    Hemoglobin 12.3 (L) 14.0 - 18.0 g/dL    Hematocrit 38.4 (L) 42.0 - 52.0 %    MCV 87.5 81.4 - 97.8 fL    MCH 28.0 27.0 - 33.0 pg    MCHC 32.0 (L) 32.3 - 36.5 g/dL    RDW 47.4 35.9 - " 50.0 fL    Platelet Count 198 164 - 446 K/uL    MPV 10.9 9.0 - 12.9 fL    Neutrophils-Polys 88.00 (H) 44.00 - 72.00 %    Lymphocytes 3.70 (L) 22.00 - 41.00 %    Monocytes 7.50 0.00 - 13.40 %    Eosinophils 0.20 0.00 - 6.90 %    Basophils 0.20 0.00 - 1.80 %    Immature Granulocytes 0.40 0.00 - 0.90 %    Nucleated RBC 0.00 0.00 - 0.20 /100 WBC    Neutrophils (Absolute) 12.75 (H) 1.82 - 7.42 K/uL    Lymphs (Absolute) 0.53 (L) 1.00 - 4.80 K/uL    Monos (Absolute) 1.08 (H) 0.00 - 0.85 K/uL    Eos (Absolute) 0.03 0.00 - 0.51 K/uL    Baso (Absolute) 0.03 0.00 - 0.12 K/uL    Immature Granulocytes (abs) 0.06 0.00 - 0.11 K/uL    NRBC (Absolute) 0.00 K/uL   Comp Metabolic Panel    Collection Time: 04/06/25  5:40 AM   Result Value Ref Range    Sodium 140 135 - 145 mmol/L    Potassium 4.1 3.6 - 5.5 mmol/L    Chloride 105 96 - 112 mmol/L    Co2 23 20 - 33 mmol/L    Anion Gap 12.0 7.0 - 16.0    Glucose 174 (H) 65 - 99 mg/dL    Bun 13 8 - 22 mg/dL    Creatinine 0.81 0.50 - 1.40 mg/dL    Calcium 9.1 8.5 - 10.5 mg/dL    Correct Calcium 9.5 8.5 - 10.5 mg/dL    AST(SGOT) 15 12 - 45 U/L    ALT(SGPT) <5 2 - 50 U/L    Alkaline Phosphatase 66 30 - 99 U/L    Total Bilirubin 0.5 0.1 - 1.5 mg/dL    Albumin 3.5 3.2 - 4.9 g/dL    Total Protein 7.1 6.0 - 8.2 g/dL    Globulin 3.6 (H) 1.9 - 3.5 g/dL    A-G Ratio 1.0 g/dL   Lipase    Collection Time: 04/06/25  5:40 AM   Result Value Ref Range    Lipase 10 (L) 11 - 82 U/L   Prothrombin Time    Collection Time: 04/06/25  5:40 AM   Result Value Ref Range    PT 14.3 12.0 - 14.6 sec    INR 1.11 0.87 - 1.13   aPTT    Collection Time: 04/06/25  5:40 AM   Result Value Ref Range    APTT 34.4 24.7 - 36.0 sec   HOLD BLOOD BANK SPECIMEN (NOT TESTED)    Collection Time: 04/06/25  5:40 AM   Result Value Ref Range    Holding Tube - Bb DONE    ESTIMATED GFR    Collection Time: 04/06/25  5:40 AM   Result Value Ref Range    GFR (CKD-EPI) 91 >60 mL/min/1.73 m 2   EKG    Collection Time: 04/06/25  5:50 AM   Result  Value Ref Range    Report       [facility] Emergency Dept.    Test Date:  2025  Pt Name:    LEIGH ANN MOORE                Department: ER  MRN:        [id number]                      Room:       RD 01  Gender:     Male                         Technician: [id number]  :        1945                   Requested By:ER TRIAGE PROTOCOL  Order #:    [id number]                    Reading MD: Mesfin Sandoval    Measurements  Intervals                                Axis  Rate:       118                          P:          212  HI:         114                          QRS:        106  QRSD:       150                          T:          -11  QT:         367  QTc:        515    Interpretive Statements  Sinus rhythm+  LEFT BUNDLE BRANCH BLOCK  Anterior infarct, acute (LAD)  Compared to ECG 2023 21:40:02  Intraventricular conduction delay now present  Myocardial infarct finding now present  Sinus rhythm no longer present    Electronically Signed On 2025 05:50:54 PDT by Mesfin Sandoval     TROPONIN    Collection Time: 25  8:15 AM   Result Value Ref Range    Troponin T 72 (H) 6 - 19 ng/L   POCT glucose device results    Collection Time: 25  8:16 AM   Result Value Ref Range    POC Glucose, Blood 127 (H) 65 - 99 mg/dL   Respiratory Panel by PCR (Inpatient ONLY)    Collection Time: 25 11:30 AM    Specimen: Nasopharyngeal; Respirate   Result Value Ref Range    Adenovirus, PCR Not Detected     SARS-CoV-2 (COVID-19) RNA by TARA NotDetected     Coronavirus 229E, PCR Not Detected     Coronavirus HKU1, PCR Not Detected     Coronavirus NL63, PCR Not Detected     Coronavirus OC43, PCR Not Detected     Human Metapneumovirus, PCR Not Detected     Rhino/Enterovirus, PCR Not Detected     Influenza A, PCR Not Detected     Influenza B, PCR Not Detected     Parainfluenza 1, PCR Not Detected     Parainfluenza 2, PCR Not Detected     Parainfluenza 3, PCR Not Detected     Parainfluenza 4, PCR Not Detected      RSV (Respiratory Syncytial Virus), PCR Not Detected     Bordetella parapertussis ([id number]), PCR Not Detected     Bordetella pertussis (ptxP), PCR Not Detected     Mycoplasma pneumoniae, PCR Not Detected     Chlamydia pneumoniae, PCR Not Detected    Heparin Xa (Unfractionated)    Collection Time: 04/06/25 11:30 AM   Result Value Ref Range    Heparin Xa (UFH) 0.35 IU/mL   POCT glucose device results    Collection Time: 04/06/25 11:32 AM   Result Value Ref Range    POC Glucose, Blood 119 (H) 65 - 99 mg/dL   TROPONIN    Collection Time: 04/06/25 11:58 AM   Result Value Ref Range    Troponin T 75 (H) 6 - 19 ng/L   EC-ECHOCARDIOGRAM COMPLETE W/ CONT    Collection Time: 04/06/25  1:45 PM   Result Value Ref Range    Left Ventrical Ejection Fraction 25    TROPONIN    Collection Time: 04/06/25  2:15 PM   Result Value Ref Range    Troponin T 78 (H) 6 - 19 ng/L     Echocardiogram:   Pending    Chest X-ray:  Perihilar prominence    ECG:  LBBB, tachycardia, unclear underlying rhythm    Assessment:  # Possible STEMI, s/p administration of fibrinolytics  # Chronic left bundle branch block  # Ischemic cardiomyopathy  # Coronary artery disease, history of PCI  # Aortic stenosis, unclear severity  # Type 2 diabetes    The patient's ECG was potentially consistent with, but not definitive for STEMI.  Assessment is challenging due to underlying left bundle branch block and tachycardia.  He is also unable to describe his symptoms last night well, although he denies any chest pain at this time.  His troponin trend, however, is not suggestive of a significant ischemic event.  On examination, he does appear volume overloaded and, unless his troponin increases in a significant manner that is clearly consistent with an acute coronary event or he develops recurrent chest pain, would not proceed with cardiac catheterization until he is optimized.    Recommendations:  -Keep n.p.o. for now pending troponin trend, review of echocardiogram, and goals  of care  -Transition to heparin drip  -Continue aspirin and clopidogrel  -Initiate intravenous diuresis  -Restart neurohormonal blockade when clinically stable    Disposition:  Cardiology will continue to follow. Timing of discharge at this time remains unclear.      Charbel Hoffman MD  Cardiology Inpatient Service.  [facility].  [phone].  Kim Castillo.

## 2025-04-06 NOTE — ED NOTES
Pt transferred to room from trauma bay, pt on 5 L NC with cardiac and o2 monitor in place. Pt denies nausea at this time.

## 2025-04-06 NOTE — PROGRESS NOTES
Pt BIB [facility] STEMI pre-alert from [facility]. Patient presented to ER there around 0300 today with chief complaint of abdominal pain. Received 45 mg TNK at 0422, in addition to 324 mg ASA, 90 mg subq lovenox, and 80 mg atorvastatin. EKG performed here, STEMI cancelled by Dr. Abbott at 0540. Report given to MARYBEL Chow.

## 2025-04-06 NOTE — ASSESSMENT & PLAN NOTE
AT [facility] ED, presented elevated trop of 60. With EKG, concerning for STEMI  - Same plan as acute MI

## 2025-04-06 NOTE — CONSULTS
"  Cardiology Progress Note  Date:    4/6/2025     Patient ID:  Name:              Donte Agustin     YOB: 1945  Age:                 79 y.o.  male   MRN:               [id number]                                                             History of Present Illness:  Per Dr. Abbott's note, \"Mr. Agustin is a 78 yo M with medical history of CAD s/p stents, severe aortic stenosis, systolic heart failure with LVEF 35%, chronic LBBB, diabetes, remote prostate cancer (radiation over 10 years ago) was transferred from [facility] for STEMI. Patient reports that he has intermittent chest pain for 2-3 days. He took tylenol for chest pain but not improve his symptoms and he went to [place] [facility]. At [facility], stable vitals. His ECG showed sinus tachycardia or junctional tachycardia with significant ST elevation more than 1 mm over inferior leads, more than 5 mm over V2-V5, compared to his prior ECGs. I was called at 4 am. I consider pt has STEMI. Not sure the transfer time from [place] to [facility] probably more than 120 min. tPA was recommended and indications/contraindications by . Loading dose of asa and heparin gtt by [place]. Pt got tPA at 4:22 am and actually pt didn't get heparin gtt instead of therapeutic Lovenox at 4:23 am. After patient transferred to [facility], his chest pain resolved, but a little bit nausea. Stable vitals on admission. ECG showed sinus tachycardia or junctional tachycardia. Inferior ST elevation resolved and ST elevation over precordial leads much improved. I did bedside echo showed LVEF 15-20%, no pericardial effusion, no significant evidence of aortic dissection.\"    Events/Subjective:  This morning, he reports no significant chest pain.  He is not able to describe the events of last night other than to say that he felt distressed.  He defers questioning to his wife and states that she would better be able to describe his symptoms and what occurred.  His troponin level did not " significantly elevate overnight.  He remains tachycardic with unclear underlying rhythm.    Hospital Medications:    Current Facility-Administered Medications:     heparin infusion 25,000 units in 500 mL 0.45% NACL, 0-30 Units/kg/hr (Adjusted), Intravenous, Continuous, Nathaniel Balderrama M.D., Last Rate: 18.2 mL/hr at 04/06/25 1146, 12 Units/kg/hr at 04/06/25 1146    heparin injection 2,000 Units, 2,000 Units, Intravenous, PRN, Nathaniel Balderrama M.D.    Respiratory Therapy Consult, , Nebulization, Continuous RT, Prince ROSALIND Wood D.O.    acetaminophen (Tylenol) tablet 650 mg, 650 mg, Oral, Q6HRS PRN, Prince ROSALIND Wood D.O.    Notify provider if pain remains uncontrolled, , , CONTINUOUS **AND** Use the Numeric Rating Scale (NRS), Calderón-Baker Faces (WBF), or FLACC on regular floors and Critical-Care Pain Observation Tool (CPOT) on ICUs/Trauma to assess pain, , , CONTINUOUS **AND** Pulse Ox, , , CONTINUOUS **AND** Pharmacy Consult Request ...Pain Management Review 1 Each, 1 Each, Other, PHARMACY TO DOSE **AND** If patient difficult to arouse and/or has respiratory depression (respiratory rate of 10 or less), stop any opiates that are currently infusing and call a Rapid Response., , , CONTINUOUS, Prince ROSALIND Wood D.O.    oxyCODONE immediate-release (Roxicodone) tablet 2.5 mg, 2.5 mg, Oral, Q3HRS PRN **OR** oxyCODONE immediate-release (Roxicodone) tablet 5 mg, 5 mg, Oral, Q3HRS PRN, 5 mg at 04/06/25 1201 **OR** HYDROmorphone (Dilaudid) injection 0.25 mg, 0.25 mg, Intravenous, Q3HRS PRN, Prince ROSALIND Wood D.O.    guaiFENesin dextromethorphan (Robitussin DM) 100-10 MG/5ML syrup 10 mL, 10 mL, Oral, Q6HRS PRN, Prince ROSALIND Wood D.O.    insulin GLARGINE (Lantus,Semglee) injection, 0.2 Units/kg/day, Subcutaneous, Q EVENING **AND** insulin lispro (HumaLOG,AdmeLOG) subcutaneous injection, 1-6 Units, Subcutaneous, Q6HRS **AND** POC blood glucose manual result, , , Q6H **AND** NOTIFY MD and PharmD, , , Once **AND** Administer 20 grams of glucose  (approximately 8 ounces of fruit juice) every 15 minutes PRN FSBG less than 70 mg/dL, , , PRN **AND** dextrose 50 % (D50W) injection 25 g, 25 g, Intravenous, Q15 MIN PRN, Prince ROSALIND Wood D.O.    tamsulosin (Flomax) capsule 0.4 mg, 0.4 mg, Oral, AFTER BREAKFAST, Prince ROSALIND Wood D.O., 0.4 mg at 04/06/25 1045    gabapentin (Neurontin) capsule 600 mg, 600 mg, Oral, BID, Prince ROSALIND Wood D.O., 600 mg at 04/06/25 1045    [START ON 4/7/2025] aspirin EC tablet 81 mg, 81 mg, Oral, DAILY, Prince ROSALIND Wood D.O.    [Held by provider] nicotine (Nicoderm) 14 MG/24HR 14 mg, 14 mg, Transdermal, Daily-0600 **AND** [START ON 4/7/2025] Nicotine Replacement Patient Education Materials, , , Once **AND** [DISCONTINUED] nicotine polacrilex (Nicorette) 2 MG piece 2 mg, 2 mg, Oral, Q HOUR PRN, Prince ROSALIND Wood D.O.    nitroglycerin (Nitrostat) tablet 0.4 mg, 0.4 mg, Sublingual, Q5 MIN PRN, Prince ROSALIND Wood D.O.    omeprazole (PriLOSEC) capsule 20 mg, 20 mg, Oral, DAILY, Nathaniel Balderrama M.D., 20 mg at 04/06/25 1045    clopidogrel (Plavix) tablet 75 mg, 75 mg, Oral, DAILY, Charbel Hoffman M.D., 75 mg at 04/06/25 1243    furosemide (Lasix) injection 40 mg, 40 mg, Intravenous, BID DIURETIC, Charbel Hoffman M.D.    Current Outpatient Medications:  Medications Prior to Admission   Medication Sig Dispense Refill Last Dose/Taking    aspirin 81 MG EC tablet Take 81 mg by mouth every day.   4/5/2025    acetaminophen-codeine #3 (TYLENOL #3) 300-30 MG Tab Take 1 Tablet by mouth 4 times a day.   4/5/2025 at  6:00 PM    digoxin (LANOXIN) 125 MCG Tab Take 125 mcg by mouth every 48 hours.   4/5/2025 Morning    pantoprazole (PROTONIX) 40 MG Tablet Delayed Response Take 40 mg by mouth every day.   4/5/2025    famotidine (PEPCID) 40 MG Tab Take 40 mg by mouth every day.   4/5/2025    aspirin (ASA) 325 MG Tab Take 650 mg by mouth one time as needed for Mild Pain. 650 mg = 2 tabs   4/5/2025 at 11:00 PM    furosemide (LASIX) 40 MG Tab Take 40 mg by mouth every 48  "hours.   4/5/2025 Morning    clopidogrel (PLAVIX) 75 MG Tab Take 75 mg by mouth every day.   4/5/2025    tamsulosin (FLOMAX) 0.4 MG capsule Take 0.4 mg by mouth every day.   4/5/2025    gabapentin (NEURONTIN) 600 MG tablet Take 600 mg by mouth 3 times a day.   4/5/2025 Evening    acetaminophen (TYLENOL) 500 MG Tab Take 1,000 mg by mouth every four hours as needed for Mild Pain. 1,000 mg = 2 tabs   4/5/2025 at 11:00 PM     Medication Allergy:  No Known Allergies    Vitals:  Weight/BMI: Body mass index is 31.18 kg/m².  /63   Pulse 99   Temp 36.8 °C (98.2 °F) (Temporal)   Resp (!) 21   Ht 1.702 m (5' 7\")   Wt 90.3 kg (199 lb 1.2 oz)   SpO2 93%   Vitals:    04/06/25 1200 04/06/25 1201 04/06/25 1300 04/06/25 1400   BP: 109/66 109/66 118/67 120/63   Pulse: (!) 110 (!) 111 (!) 105 99   Resp: (!) 22 13 (!) 22 (!) 21   Temp:       TempSrc:       SpO2: 94% 94% 93% 93%   Weight:       Height:         Oxygen Therapy:  Pulse Oximetry: 93 %, O2 (LPM): 2, O2 Delivery Device: Nasal Cannula    Physical Exam:  Constitutional: Chronically ill-appearing, no acute distress  HENMT: Normocephalic, atraumatic  Eyes: EOMI, no discharge.  Neck: JVD to mid neck.  Cardiovascular: Regular, tachycardic, 1+ bilateral lower extremity edema, warm to touch  Lungs: Increased respiratory effort, 3L supplemental oxygen  Abdomen: Milldy obese, nondistended  Neurologic: Alert, AO x 2  Psychiatric: Difficult to asses    Lab Data Review:  Recent Results (from the past 24 hours)   Troponin    Collection Time: 04/06/25  5:40 AM   Result Value Ref Range    Troponin T 60 (H) 6 - 19 ng/L   proBrain Natriuretic Peptide, NT    Collection Time: 04/06/25  5:40 AM   Result Value Ref Range    NT-proBNP 6318 (H) 0 - 125 pg/mL   CBC With Differential    Collection Time: 04/06/25  5:40 AM   Result Value Ref Range    WBC 14.5 (H) 4.8 - 10.8 K/uL    RBC 4.39 (L) 4.70 - 6.10 M/uL    Hemoglobin 12.3 (L) 14.0 - 18.0 g/dL    Hematocrit 38.4 (L) 42.0 - 52.0 %    " MCV 87.5 81.4 - 97.8 fL    MCH 28.0 27.0 - 33.0 pg    MCHC 32.0 (L) 32.3 - 36.5 g/dL    RDW 47.4 35.9 - 50.0 fL    Platelet Count 198 164 - 446 K/uL    MPV 10.9 9.0 - 12.9 fL    Neutrophils-Polys 88.00 (H) 44.00 - 72.00 %    Lymphocytes 3.70 (L) 22.00 - 41.00 %    Monocytes 7.50 0.00 - 13.40 %    Eosinophils 0.20 0.00 - 6.90 %    Basophils 0.20 0.00 - 1.80 %    Immature Granulocytes 0.40 0.00 - 0.90 %    Nucleated RBC 0.00 0.00 - 0.20 /100 WBC    Neutrophils (Absolute) 12.75 (H) 1.82 - 7.42 K/uL    Lymphs (Absolute) 0.53 (L) 1.00 - 4.80 K/uL    Monos (Absolute) 1.08 (H) 0.00 - 0.85 K/uL    Eos (Absolute) 0.03 0.00 - 0.51 K/uL    Baso (Absolute) 0.03 0.00 - 0.12 K/uL    Immature Granulocytes (abs) 0.06 0.00 - 0.11 K/uL    NRBC (Absolute) 0.00 K/uL   Comp Metabolic Panel    Collection Time: 04/06/25  5:40 AM   Result Value Ref Range    Sodium 140 135 - 145 mmol/L    Potassium 4.1 3.6 - 5.5 mmol/L    Chloride 105 96 - 112 mmol/L    Co2 23 20 - 33 mmol/L    Anion Gap 12.0 7.0 - 16.0    Glucose 174 (H) 65 - 99 mg/dL    Bun 13 8 - 22 mg/dL    Creatinine 0.81 0.50 - 1.40 mg/dL    Calcium 9.1 8.5 - 10.5 mg/dL    Correct Calcium 9.5 8.5 - 10.5 mg/dL    AST(SGOT) 15 12 - 45 U/L    ALT(SGPT) <5 2 - 50 U/L    Alkaline Phosphatase 66 30 - 99 U/L    Total Bilirubin 0.5 0.1 - 1.5 mg/dL    Albumin 3.5 3.2 - 4.9 g/dL    Total Protein 7.1 6.0 - 8.2 g/dL    Globulin 3.6 (H) 1.9 - 3.5 g/dL    A-G Ratio 1.0 g/dL   Lipase    Collection Time: 04/06/25  5:40 AM   Result Value Ref Range    Lipase 10 (L) 11 - 82 U/L   Prothrombin Time    Collection Time: 04/06/25  5:40 AM   Result Value Ref Range    PT 14.3 12.0 - 14.6 sec    INR 1.11 0.87 - 1.13   aPTT    Collection Time: 04/06/25  5:40 AM   Result Value Ref Range    APTT 34.4 24.7 - 36.0 sec   HOLD BLOOD BANK SPECIMEN (NOT TESTED)    Collection Time: 04/06/25  5:40 AM   Result Value Ref Range    Holding Tube - Bb DONE    ESTIMATED GFR    Collection Time: 04/06/25  5:40 AM   Result Value  Ref Range    GFR (CKD-EPI) 91 >60 mL/min/1.73 m 2   EKG    Collection Time: 25  5:50 AM   Result Value Ref Range    Report       [facility] Emergency Dept.    Test Date:  2025  Pt Name:    LEIGH ANN MOORE                Department: ER  MRN:        [id number]                      Room:       [facility]  Gender:     Male                         Technician: [id number]  :        1945                   Requested By:ER TRIAGE PROTOCOL  Order #:    [id number]                    Reading MD: Mesfin Sandoval    Measurements  Intervals                                Axis  Rate:       118                          P:          212  OH:         114                          QRS:        106  QRSD:       150                          T:          -11  QT:         367  QTc:        515    Interpretive Statements  Sinus rhythm+  LEFT BUNDLE BRANCH BLOCK  Anterior infarct, acute (LAD)  Compared to ECG 2023 21:40:02  Intraventricular conduction delay now present  Myocardial infarct finding now present  Sinus rhythm no longer present    Electronically Signed On 2025 05:50:54 PDT by Mesfin Sandoval     TROPONIN    Collection Time: 25  8:15 AM   Result Value Ref Range    Troponin T 72 (H) 6 - 19 ng/L   POCT glucose device results    Collection Time: 25  8:16 AM   Result Value Ref Range    POC Glucose, Blood 127 (H) 65 - 99 mg/dL   Respiratory Panel by PCR (Inpatient ONLY)    Collection Time: 25 11:30 AM    Specimen: Nasopharyngeal; Respirate   Result Value Ref Range    Adenovirus, PCR Not Detected     SARS-CoV-2 (COVID-19) RNA by TARA NotDetected     Coronavirus 229E, PCR Not Detected     Coronavirus HKU1, PCR Not Detected     Coronavirus NL63, PCR Not Detected     Coronavirus OC43, PCR Not Detected     Human Metapneumovirus, PCR Not Detected     Rhino/Enterovirus, PCR Not Detected     Influenza A, PCR Not Detected     Influenza B, PCR Not Detected     Parainfluenza 1, PCR Not Detected      Parainfluenza 2, PCR Not Detected     Parainfluenza 3, PCR Not Detected     Parainfluenza 4, PCR Not Detected     RSV (Respiratory Syncytial Virus), PCR Not Detected     Bordetella parapertussis ([id number]), PCR Not Detected     Bordetella pertussis (ptxP), PCR Not Detected     Mycoplasma pneumoniae, PCR Not Detected     Chlamydia pneumoniae, PCR Not Detected    Heparin Xa (Unfractionated)    Collection Time: 04/06/25 11:30 AM   Result Value Ref Range    Heparin Xa (UFH) 0.35 IU/mL   POCT glucose device results    Collection Time: 04/06/25 11:32 AM   Result Value Ref Range    POC Glucose, Blood 119 (H) 65 - 99 mg/dL   TROPONIN    Collection Time: 04/06/25 11:58 AM   Result Value Ref Range    Troponin T 75 (H) 6 - 19 ng/L   EC-ECHOCARDIOGRAM COMPLETE W/ CONT    Collection Time: 04/06/25  1:45 PM   Result Value Ref Range    Left Ventrical Ejection Fraction 25    TROPONIN    Collection Time: 04/06/25  2:15 PM   Result Value Ref Range    Troponin T 78 (H) 6 - 19 ng/L     Echocardiogram:   Pending    Chest X-ray:  Perihilar prominence    ECG:  LBBB, tachycardia, unclear underlying rhythm    Assessment:  # Possible STEMI, s/p administration of fibrinolytics  # Chronic left bundle branch block  # Ischemic cardiomyopathy  # Coronary artery disease, history of PCI  # Aortic stenosis, unclear severity  # Type 2 diabetes    The patient's ECG was potentially consistent with, but not definitive for STEMI.  Assessment is challenging due to underlying left bundle branch block and tachycardia.  He is also unable to describe his symptoms last night well, although he denies any chest pain at this time.  His troponin trend, however, is not suggestive of a significant ischemic event.  On examination, he does appear volume overloaded and, unless his troponin increases in a significant manner that is clearly consistent with an acute coronary event or he develops recurrent chest pain, would not proceed with cardiac catheterization until he  is optimized.    Recommendations:  -Keep n.p.o. for now pending troponin trend, review of echocardiogram, and goals of care  -Transition to heparin drip  -Continue aspirin and clopidogrel  -Initiate intravenous diuresis  -Restart neurohormonal blockade when clinically stable    Disposition:  Cardiology will continue to follow. Timing of discharge at this time remains unclear.    Billing:  A substantial change in the patient's plan was required today, in particular, the decision was made to not take the patient to the cardiac catheterization laboratory urgently as there was not clear evidence of an acute coronary syndrome during follow-up this morning.  As such, a separate and billable reevaluation of the patient was required.      Charbel Hoffman MD  Cardiology Inpatient Service.  [facility].  [phone].  Kim Castillo.

## 2025-04-06 NOTE — ASSESSMENT & PLAN NOTE
From note on 6/11/2023, patient had two stents placed on 10/2022. Patient stated that he had only one stent placed in 10/2022. Received 324mg Aspirin at [facility] this morning  - Continue home Aspirin 81 mg daily (starting from 4/7/25)  - Rest of plan same as Acute MI  - Will need medical record for previous stent placement on 10/2022

## 2025-04-06 NOTE — ASSESSMENT & PLAN NOTE
Patient stated that he used oxygen 5-7L at home. He has been smoking 1.5 pack per day for past one and half year. No significant past medical history of smoking. Patient is poor historian. He stated that he had pulmonary function test in the past. CXR presented left lung opacities, no significant change compared to 6/11/2023. Reports productive cough started about 15 days ago.  - Supplement O2 as needed  - Wean oxygen to 88-92%  - RT therapy protocol placed  - PFT as outpatient

## 2025-04-06 NOTE — ASSESSMENT & PLAN NOTE
AT [facility], EKG presented  ST elevation V2-V4, concerning for acute STEMI. Received 45 mg TNK around 0422, 324 mg ASA,  Lovenox 90mg and atorvastatin 80mg at [place].  Patient is admitted to cardiac ICU for overnight monitoring symptoms and trending troponin.  Overnight, patient continues to have slight chest pain and uptrending troponin.   - Continue heparin drip   - Aspirin 81 mg and Plavix 75 mg daily  - If patient become hypertensive, will consider nitro infusion  - Sublingual nitroglycerin prn for chest pain   - Continue high-dose intensity statin  - Cardiology following, appreciate recommendation. Considering cardiac cath 4/7/25

## 2025-04-06 NOTE — ED TRIAGE NOTES
"Chief Complaint   Patient presents with    Chest Pain     Patient transferred from [facility] for STEMI. Patient initially presented to [facility] for RLQ pain with nausea and vomiting. Code STEMI cancelled by Cardiology in trauma bay     PTA patient received 45mg TNK at 0422, 324 ASA,90mg lovonox, 80mg atorvastatin.    Patient brought in by EMS for above complaint. Patient placed on monitor, and chart up for ERP.    Medications given en route:none    Ht 1.702 m (5' 7\")   Wt 90.3 kg (199 lb 1.2 oz)   BMI 31.18 kg/m²   "

## 2025-04-06 NOTE — PROGRESS NOTES
"  Cardiology Progress Note  Date:    4/6/2025     Patient ID:  Name:              Donte Agustin     YOB: 1945  Age:                 79 y.o.  male   MRN:               [id number]                                                             History of Present Illness:  Per Dr. Abbott's note, \"Mr. Agustin is a 80 yo M with medical history of CAD s/p stents, severe aortic stenosis, systolic heart failure with LVEF 35%, chronic LBBB, diabetes, remote prostate cancer (radiation over 10 years ago) was transferred from [facility] for STEMI. Patient reports that he has intermittent chest pain for 2-3 days. He took tylenol for chest pain but not improve his symptoms and he went to [place] [facility]. At [facility], stable vitals. His ECG showed sinus tachycardia or junctional tachycardia with significant ST elevation more than 1 mm over inferior leads, more than 5 mm over V2-V5, compared to his prior ECGs. I was called at 4 am. I consider pt has STEMI. Not sure the transfer time from [place] to [facility] probably more than 120 min. tPA was recommended and indications/contraindications by . Loading dose of asa and heparin gtt by [place]. Pt got tPA at 4:22 am and actually pt didn't get heparin gtt instead of therapeutic Lovenox at 4:23 am. After patient transferred to [facility], his chest pain resolved, but a little bit nausea. Stable vitals on admission. ECG showed sinus tachycardia or junctional tachycardia. Inferior ST elevation resolved and ST elevation over precordial leads much improved. I did bedside echo showed LVEF 15-20%, no pericardial effusion, no significant evidence of aortic dissection.\"    Events/Subjective:  This morning, he reports no significant chest pain.  He is not able to describe the events of last night other than to say that he felt distressed.  He defers questioning to his wife and states that she would better be able to describe his symptoms and what occurred.  His troponin level did not " significantly elevate overnight.  He remains tachycardic with unclear underlying rhythm.    Hospital Medications:    Current Facility-Administered Medications:     heparin infusion 25,000 units in 500 mL 0.45% NACL, 0-30 Units/kg/hr (Adjusted), Intravenous, Continuous, Nathaniel Balderrama M.D., Last Rate: 18.2 mL/hr at 04/06/25 1146, 12 Units/kg/hr at 04/06/25 1146    heparin injection 2,000 Units, 2,000 Units, Intravenous, PRN, Nathaniel Balderrama M.D.    Respiratory Therapy Consult, , Nebulization, Continuous RT, Prince ROSALIND Wood D.O.    acetaminophen (Tylenol) tablet 650 mg, 650 mg, Oral, Q6HRS PRN, Prince ROSALIND Wood D.O.    Notify provider if pain remains uncontrolled, , , CONTINUOUS **AND** Use the Numeric Rating Scale (NRS), Calderón-Baker Faces (WBF), or FLACC on regular floors and Critical-Care Pain Observation Tool (CPOT) on ICUs/Trauma to assess pain, , , CONTINUOUS **AND** Pulse Ox, , , CONTINUOUS **AND** Pharmacy Consult Request ...Pain Management Review 1 Each, 1 Each, Other, PHARMACY TO DOSE **AND** If patient difficult to arouse and/or has respiratory depression (respiratory rate of 10 or less), stop any opiates that are currently infusing and call a Rapid Response., , , CONTINUOUS, Prince ROSALIND Wood D.O.    oxyCODONE immediate-release (Roxicodone) tablet 2.5 mg, 2.5 mg, Oral, Q3HRS PRN **OR** oxyCODONE immediate-release (Roxicodone) tablet 5 mg, 5 mg, Oral, Q3HRS PRN, 5 mg at 04/06/25 1201 **OR** HYDROmorphone (Dilaudid) injection 0.25 mg, 0.25 mg, Intravenous, Q3HRS PRN, Prince ROSALIND Wood D.O.    guaiFENesin dextromethorphan (Robitussin DM) 100-10 MG/5ML syrup 10 mL, 10 mL, Oral, Q6HRS PRN, Prince ROSALIND Wood D.O.    insulin GLARGINE (Lantus,Semglee) injection, 0.2 Units/kg/day, Subcutaneous, Q EVENING **AND** insulin lispro (HumaLOG,AdmeLOG) subcutaneous injection, 1-6 Units, Subcutaneous, Q6HRS **AND** POC blood glucose manual result, , , Q6H **AND** NOTIFY MD and PharmD, , , Once **AND** Administer 20 grams of glucose  (approximately 8 ounces of fruit juice) every 15 minutes PRN FSBG less than 70 mg/dL, , , PRN **AND** dextrose 50 % (D50W) injection 25 g, 25 g, Intravenous, Q15 MIN PRN, Prince ROSALIND Wood D.O.    tamsulosin (Flomax) capsule 0.4 mg, 0.4 mg, Oral, AFTER BREAKFAST, Prince ROSALIND Wood D.O., 0.4 mg at 04/06/25 1045    gabapentin (Neurontin) capsule 600 mg, 600 mg, Oral, BID, Prince ROSALIND Wood D.O., 600 mg at 04/06/25 1045    [START ON 4/7/2025] aspirin EC tablet 81 mg, 81 mg, Oral, DAILY, Prince ROSALIND Wood D.O.    [Held by provider] nicotine (Nicoderm) 14 MG/24HR 14 mg, 14 mg, Transdermal, Daily-0600 **AND** [START ON 4/7/2025] Nicotine Replacement Patient Education Materials, , , Once **AND** [DISCONTINUED] nicotine polacrilex (Nicorette) 2 MG piece 2 mg, 2 mg, Oral, Q HOUR PRN, Prince ROSALIND Wood D.O.    nitroglycerin (Nitrostat) tablet 0.4 mg, 0.4 mg, Sublingual, Q5 MIN PRN, Prince ROSALIND Wood D.O.    omeprazole (PriLOSEC) capsule 20 mg, 20 mg, Oral, DAILY, Nathaniel Balderrama M.D., 20 mg at 04/06/25 1045    clopidogrel (Plavix) tablet 75 mg, 75 mg, Oral, DAILY, Charbel Hoffman M.D., 75 mg at 04/06/25 1243    furosemide (Lasix) injection 40 mg, 40 mg, Intravenous, BID DIURETIC, Charbel Hoffman M.D.    Current Outpatient Medications:  Medications Prior to Admission   Medication Sig Dispense Refill Last Dose/Taking    aspirin 81 MG EC tablet Take 81 mg by mouth every day.   4/5/2025    acetaminophen-codeine #3 (TYLENOL #3) 300-30 MG Tab Take 1 Tablet by mouth 4 times a day.   4/5/2025 at  6:00 PM    digoxin (LANOXIN) 125 MCG Tab Take 125 mcg by mouth every 48 hours.   4/5/2025 Morning    pantoprazole (PROTONIX) 40 MG Tablet Delayed Response Take 40 mg by mouth every day.   4/5/2025    famotidine (PEPCID) 40 MG Tab Take 40 mg by mouth every day.   4/5/2025    aspirin (ASA) 325 MG Tab Take 650 mg by mouth one time as needed for Mild Pain. 650 mg = 2 tabs   4/5/2025 at 11:00 PM    furosemide (LASIX) 40 MG Tab Take 40 mg by mouth every 48  "hours.   4/5/2025 Morning    clopidogrel (PLAVIX) 75 MG Tab Take 75 mg by mouth every day.   4/5/2025    tamsulosin (FLOMAX) 0.4 MG capsule Take 0.4 mg by mouth every day.   4/5/2025    gabapentin (NEURONTIN) 600 MG tablet Take 600 mg by mouth 3 times a day.   4/5/2025 Evening    acetaminophen (TYLENOL) 500 MG Tab Take 1,000 mg by mouth every four hours as needed for Mild Pain. 1,000 mg = 2 tabs   4/5/2025 at 11:00 PM     Medication Allergy:  No Known Allergies    Vitals:  Weight/BMI: Body mass index is 31.18 kg/m².  /63   Pulse 99   Temp 36.8 °C (98.2 °F) (Temporal)   Resp (!) 21   Ht 1.702 m (5' 7\")   Wt 90.3 kg (199 lb 1.2 oz)   SpO2 93%   Vitals:    04/06/25 1200 04/06/25 1201 04/06/25 1300 04/06/25 1400   BP: 109/66 109/66 118/67 120/63   Pulse: (!) 110 (!) 111 (!) 105 99   Resp: (!) 22 13 (!) 22 (!) 21   Temp:       TempSrc:       SpO2: 94% 94% 93% 93%   Weight:       Height:         Oxygen Therapy:  Pulse Oximetry: 93 %, O2 (LPM): 2, O2 Delivery Device: Nasal Cannula    Physical Exam:  Constitutional: Chronically ill-appearing, no acute distress  HENMT: Normocephalic, atraumatic  Eyes: EOMI, no discharge.  Neck: JVD to mid neck.  Cardiovascular: Regular, tachycardic, 1+ bilateral lower extremity edema, warm to touch  Lungs: Increased respiratory effort, 3L supplemental oxygen  Abdomen: Milldy obese, nondistended  Neurologic: Alert, AO x 2  Psychiatric: Difficult to asses    Lab Data Review:  Recent Results (from the past 24 hours)   Troponin    Collection Time: 04/06/25  5:40 AM   Result Value Ref Range    Troponin T 60 (H) 6 - 19 ng/L   proBrain Natriuretic Peptide, NT    Collection Time: 04/06/25  5:40 AM   Result Value Ref Range    NT-proBNP 6318 (H) 0 - 125 pg/mL   CBC With Differential    Collection Time: 04/06/25  5:40 AM   Result Value Ref Range    WBC 14.5 (H) 4.8 - 10.8 K/uL    RBC 4.39 (L) 4.70 - 6.10 M/uL    Hemoglobin 12.3 (L) 14.0 - 18.0 g/dL    Hematocrit 38.4 (L) 42.0 - 52.0 %    " MCV 87.5 81.4 - 97.8 fL    MCH 28.0 27.0 - 33.0 pg    MCHC 32.0 (L) 32.3 - 36.5 g/dL    RDW 47.4 35.9 - 50.0 fL    Platelet Count 198 164 - 446 K/uL    MPV 10.9 9.0 - 12.9 fL    Neutrophils-Polys 88.00 (H) 44.00 - 72.00 %    Lymphocytes 3.70 (L) 22.00 - 41.00 %    Monocytes 7.50 0.00 - 13.40 %    Eosinophils 0.20 0.00 - 6.90 %    Basophils 0.20 0.00 - 1.80 %    Immature Granulocytes 0.40 0.00 - 0.90 %    Nucleated RBC 0.00 0.00 - 0.20 /100 WBC    Neutrophils (Absolute) 12.75 (H) 1.82 - 7.42 K/uL    Lymphs (Absolute) 0.53 (L) 1.00 - 4.80 K/uL    Monos (Absolute) 1.08 (H) 0.00 - 0.85 K/uL    Eos (Absolute) 0.03 0.00 - 0.51 K/uL    Baso (Absolute) 0.03 0.00 - 0.12 K/uL    Immature Granulocytes (abs) 0.06 0.00 - 0.11 K/uL    NRBC (Absolute) 0.00 K/uL   Comp Metabolic Panel    Collection Time: 04/06/25  5:40 AM   Result Value Ref Range    Sodium 140 135 - 145 mmol/L    Potassium 4.1 3.6 - 5.5 mmol/L    Chloride 105 96 - 112 mmol/L    Co2 23 20 - 33 mmol/L    Anion Gap 12.0 7.0 - 16.0    Glucose 174 (H) 65 - 99 mg/dL    Bun 13 8 - 22 mg/dL    Creatinine 0.81 0.50 - 1.40 mg/dL    Calcium 9.1 8.5 - 10.5 mg/dL    Correct Calcium 9.5 8.5 - 10.5 mg/dL    AST(SGOT) 15 12 - 45 U/L    ALT(SGPT) <5 2 - 50 U/L    Alkaline Phosphatase 66 30 - 99 U/L    Total Bilirubin 0.5 0.1 - 1.5 mg/dL    Albumin 3.5 3.2 - 4.9 g/dL    Total Protein 7.1 6.0 - 8.2 g/dL    Globulin 3.6 (H) 1.9 - 3.5 g/dL    A-G Ratio 1.0 g/dL   Lipase    Collection Time: 04/06/25  5:40 AM   Result Value Ref Range    Lipase 10 (L) 11 - 82 U/L   Prothrombin Time    Collection Time: 04/06/25  5:40 AM   Result Value Ref Range    PT 14.3 12.0 - 14.6 sec    INR 1.11 0.87 - 1.13   aPTT    Collection Time: 04/06/25  5:40 AM   Result Value Ref Range    APTT 34.4 24.7 - 36.0 sec   HOLD BLOOD BANK SPECIMEN (NOT TESTED)    Collection Time: 04/06/25  5:40 AM   Result Value Ref Range    Holding Tube - Bb DONE    ESTIMATED GFR    Collection Time: 04/06/25  5:40 AM   Result Value  Ref Range    GFR (CKD-EPI) 91 >60 mL/min/1.73 m 2   EKG    Collection Time: 25  5:50 AM   Result Value Ref Range    Report       [facility] Emergency Dept.    Test Date:  2025  Pt Name:    LEIGH ANN MOORE                Department: ER  MRN:        [id number]                      Room:       [facility]  Gender:     Male                         Technician: [id number]  :        1945                   Requested By:ER TRIAGE PROTOCOL  Order #:    [id number]                    Reading MD: Mesfin Sandoval    Measurements  Intervals                                Axis  Rate:       118                          P:          212  NJ:         114                          QRS:        106  QRSD:       150                          T:          -11  QT:         367  QTc:        515    Interpretive Statements  Sinus rhythm+  LEFT BUNDLE BRANCH BLOCK  Anterior infarct, acute (LAD)  Compared to ECG 2023 21:40:02  Intraventricular conduction delay now present  Myocardial infarct finding now present  Sinus rhythm no longer present    Electronically Signed On 2025 05:50:54 PDT by Mesfin Sandoval     TROPONIN    Collection Time: 25  8:15 AM   Result Value Ref Range    Troponin T 72 (H) 6 - 19 ng/L   POCT glucose device results    Collection Time: 25  8:16 AM   Result Value Ref Range    POC Glucose, Blood 127 (H) 65 - 99 mg/dL   Respiratory Panel by PCR (Inpatient ONLY)    Collection Time: 25 11:30 AM    Specimen: Nasopharyngeal; Respirate   Result Value Ref Range    Adenovirus, PCR Not Detected     SARS-CoV-2 (COVID-19) RNA by TARA NotDetected     Coronavirus 229E, PCR Not Detected     Coronavirus HKU1, PCR Not Detected     Coronavirus NL63, PCR Not Detected     Coronavirus OC43, PCR Not Detected     Human Metapneumovirus, PCR Not Detected     Rhino/Enterovirus, PCR Not Detected     Influenza A, PCR Not Detected     Influenza B, PCR Not Detected     Parainfluenza 1, PCR Not Detected      Parainfluenza 2, PCR Not Detected     Parainfluenza 3, PCR Not Detected     Parainfluenza 4, PCR Not Detected     RSV (Respiratory Syncytial Virus), PCR Not Detected     Bordetella parapertussis (UP5391), PCR Not Detected     Bordetella pertussis (ptxP), PCR Not Detected     Mycoplasma pneumoniae, PCR Not Detected     Chlamydia pneumoniae, PCR Not Detected    Heparin Xa (Unfractionated)    Collection Time: 04/06/25 11:30 AM   Result Value Ref Range    Heparin Xa (UFH) 0.35 IU/mL   POCT glucose device results    Collection Time: 04/06/25 11:32 AM   Result Value Ref Range    POC Glucose, Blood 119 (H) 65 - 99 mg/dL   TROPONIN    Collection Time: 04/06/25 11:58 AM   Result Value Ref Range    Troponin T 75 (H) 6 - 19 ng/L   EC-ECHOCARDIOGRAM COMPLETE W/ CONT    Collection Time: 04/06/25  1:45 PM   Result Value Ref Range    Left Ventrical Ejection Fraction 25    TROPONIN    Collection Time: 04/06/25  2:15 PM   Result Value Ref Range    Troponin T 78 (H) 6 - 19 ng/L     Echocardiogram:   Pending    Chest X-ray:  Perihilar prominence    ECG:  LBBB, tachycardia, unclear underlying rhythm    Assessment:  # Possible STEMI, s/p administration of fibrinolytics  # Chronic left bundle branch block  # Ischemic cardiomyopathy  # Coronary artery disease, history of PCI  # Aortic stenosis, unclear severity  # Type 2 diabetes    The patient's ECG was potentially consistent with, but not definitive for STEMI.  Assessment is challenging due to underlying left bundle branch block and tachycardia.  He is also unable to describe his symptoms last night well, although he denies any chest pain at this time.  His troponin trend, however, is not suggestive of a significant ischemic event.  On examination, he does appear volume overloaded and, unless his troponin increases in a significant manner that is clearly consistent with an acute coronary event or he develops recurrent chest pain, would not proceed with cardiac catheterization until he  is optimized.    Recommendations:  -Keep n.p.o. for now pending troponin trend, review of echocardiogram, and goals of care  -Transition to heparin drip  -Continue aspirin and clopidogrel  -Initiate intravenous diuresis  -Restart neurohormonal blockade when clinically stable    Disposition:  Cardiology will continue to follow. Timing of discharge at this time remains unclear.    Billing:  A substantial change in the patient's plan was required today, in particular, the decision was made to not take the patient to the cardiac catheterization laboratory urgently as there was not clear evidence of an acute coronary syndrome during follow-up this morning.  As such, a separate and billable reevaluation of the patient was required.      Charbel Hoffman MD  Cardiology Inpatient Service.  [facility].  [phone].  Kim Castillo.

## 2025-04-06 NOTE — ED NOTES
Med Rec complete per spouse via phone call   Allergies reviewed  Antibiotics in the past 30 days:no  Anticoagulant in past 14 days:no  Pharmacy patient utilizes:[facility] in [place]    Spouse states pt takes both Aspirin strength's     Pt also on Plavix     Spouse states pt was on antibiotics 1.5 months ago for an infection of his right ear

## 2025-04-06 NOTE — H&P
[facility] Internal Medicine History & Physical Note    Date of Service  4/6/2025    [facility] Team: [facility] ICU Gold Team   Attending: Nathaniel Balderrama M.d.  Resident: Dr. Wood  Contact Number: [phone]    Primary Care Physician  Pcp Pt States None    Consultants  cardiology    Specialist Names: Dr. Abbott    Code Status  Full Code    Chief Complaint  Chief Complaint   Patient presents with    Chest Pain     Patient transferred from [facility] for STEMI. Patient initially presented to [facility] for RLQ pain with nausea and vomiting. Code STEMI cancelled by Cardiology in trauma bay       History of Presenting Illness (HPI):  Donte Agustin is a 79 y.o. male with medial history of systolic heart failure (EF 35%), ), hypertension, diabetes, chronic respiratory failure (on 5-7L at home?)  CAD (s/p stents X 2 10/22) and prostate cancer (radiation over 10 yrs ago). who was transferred from [facility] due to STEMI.    Patient stated that he has been having chest pain past 3-4 days, located at left lateral chest wall and described as sharp. Tylenol helps relives the pain but not all the time. Walking makes the pain worse. He reports fevers and sweats that started about the same time. He also reports more leg swelling and cough with sputum that describes as dark green or clear. His wife recommended him to visit emergency room so he presented [facility]. Patient stated that he had one stent placed in October 2022. Denies ongoing chest pain.     At [facility] from chart review, vital sign was remarkable of tachycardia 128 and tachypnea 22. CBC presented leukocytosis of 12.1 and chronic anemia of 13.1. CMP was remarkable of hypomagnesia of 1.1. Elevated Pro-BNP 5150. Normal lactic acid of 1.6. Troponin was 0.04. EKG presented ST elevation V2-V4, concerning for STEMI. Received 45 mg TNK around 0422, 324 mg ASA,  Lovenox 90mg and atorvastatin 80mg. He was transferred to [facility] for further  treatment including LHC.    From chart review, he was admitted on 2/15/25 for acute heart failure and discharged on 2/19/2025. He has echocardiogram during hospitalization and presented severe hear failure with EF of 20-25% and severe aortic stenosis, low-grade, low flow with valve area of 1cm^2, grade 2 diastolic function with mildly enlarged right ventricle, decreased right ventricular systolic function, mild MR, TR and mild pulmonary hypertension. He was offered to transfer to different hospital for TAVR but patient refused and wanted to go home.    At [facility] ED, vital sign was remarkable for tachycardia of 110 and saturating 90s at 5L O2. CBC was remarkable of leukocytosis of 14.5 and chronic normocytic anemia of 12.3. CMP is generally unremarkable. Elevated trop 60 and BNP of 6318 (8204 06/2023). CXR presented persistent left sided opacities similar to last CXR 6/11/2023. Cardiologist was consulted and considering cardiac cath early today.     I discussed the plan of care with patient.    Review of Systems  Review of Systems   Constitutional:  Positive for fever. Negative for chills.   Respiratory:  Positive for cough, sputum production and shortness of breath.    Cardiovascular:  Positive for chest pain and leg swelling. Negative for palpitations.   Gastrointestinal:  Negative for abdominal pain, constipation, diarrhea, nausea and vomiting.   Neurological:  Positive for dizziness and headaches.       Past Medical History   has no past medical history on file.    Surgical History   has no past surgical history on file.     Family History  family history is not on file.   Family history reviewed with patient.     Social History  Tobacco: Smoked 1.5 packs per day started 1.5 years ago  Alcohol: Never  Recreational drugs (illegal or prescription): Never  Employment: Retired  Living Situation: Living with his wife    Allergies  No Known Allergies    Medications  Prior to Admission Medications   Prescriptions Last  Dose Informant Patient Reported? Taking?   Empagliflozin (JARDIANCE) 10 MG Tab tablet  Family Member Yes No   Sig: Take 10 mg by mouth every day.   acetaminophen (TYLENOL) 325 MG Tab  Family Member Yes No   Sig: Take 650 mg by mouth every four hours as needed.   aspirin 81 MG EC tablet   No No   Sig: Take 1 Tablet by mouth every day.   bacitracin-polymyxin b (POLYSPORIN) 500-40515 UNIT/GM Ointment   No No   Sig: Apply 1 Each topically 2 times a day.   clopidogrel (PLAVIX) 75 MG Tab  Family Member Yes No   Sig: Take 75 mg by mouth every day.   fenofibrate (TRICOR) 54 MG tablet  Family Member Yes No   Sig: Take 54 mg by mouth every day.   furosemide (LASIX) 20 MG Tab  Family Member Yes No   Sig: Take 20 mg by mouth every day.   gabapentin (NEURONTIN) 600 MG tablet  Family Member Yes No   Sig: Take 600 mg by mouth 2 times a day.   omeprazole (PRILOSEC) 20 MG delayed-release capsule  Family Member Yes No   Sig: Take 20 mg by mouth every day.   sacubitril-valsartan (ENTRESTO) 24-26 MG Tab  Family Member Yes No   Sig: Take 1 Tablet by mouth every day.   spironolactone (ALDACTONE) 25 MG Tab  Family Member Yes No   Sig: Take 25 mg by mouth every day.   tamsulosin (FLOMAX) 0.4 MG capsule  Family Member Yes No   Sig: Take 0.4 mg by mouth every day.      Facility-Administered Medications: None       Physical Exam  Temp:  [36.8 °C (98.2 °F)] 36.8 °C (98.2 °F)  Pulse:  [101-110] 101  Resp:  [16-24] 24  BP: ()/(50-67) 105/57  SpO2:  [90 %-96 %] 96 %  Blood Pressure : 115/65       Pulse: (!) 110   Respiration: 18   Pulse Oximetry: 92 %       Physical Exam  Constitutional:       General: He is not in acute distress.     Appearance: He is not ill-appearing.   HENT:      Head: Normocephalic and atraumatic.      Mouth/Throat:      Mouth: Mucous membranes are moist.      Pharynx: Oropharynx is clear. No oropharyngeal exudate.   Eyes:      Extraocular Movements: Extraocular movements intact.      Pupils: Pupils are equal, round,  and reactive to light.   Cardiovascular:      Rate and Rhythm: Regular rhythm. Tachycardia present.      Heart sounds: Murmur heard.      Comments: Systolic murmur  Pulmonary:      Effort: Pulmonary effort is normal. No respiratory distress.      Breath sounds: No wheezing.   Abdominal:      General: Abdomen is flat.      Palpations: Abdomen is soft.      Tenderness: There is no abdominal tenderness. There is no guarding or rebound.   Musculoskeletal:         General: No tenderness. Normal range of motion.      Cervical back: Normal range of motion.      Right lower leg: No edema.      Left lower leg: No edema.   Skin:     General: Skin is warm and dry.   Neurological:      General: No focal deficit present.      Mental Status: He is alert and oriented to person, place, and time.         Laboratory:  Recent Labs     04/06/25  0540   WBC 14.5*   RBC 4.39*   HEMOGLOBIN 12.3*   HEMATOCRIT 38.4*   MCV 87.5   MCH 28.0   MCHC 32.0*   RDW 47.4   PLATELETCT 198   MPV 10.9     Recent Labs     04/06/25  0540   SODIUM 140   POTASSIUM 4.1   CHLORIDE 105   CO2 23   GLUCOSE 174*   BUN 13   CREATININE 0.81   CALCIUM 9.1     Recent Labs     04/06/25  0540   ALTSGPT <5   ASTSGOT 15   ALKPHOSPHAT 66   TBILIRUBIN 0.5   LIPASE 10*   GLUCOSE 174*     Recent Labs     04/06/25  0540   APTT 34.4   INR 1.11     Recent Labs     04/06/25  0540   NTPROBNP 6318*         Recent Labs     04/06/25  0540 04/06/25  0815   TROPONINT 60* 72*       Imaging:  DX-CHEST-PORTABLE (1 VIEW)   Final Result      Persistent left-sided airspace disease      EC-ECHOCARDIOGRAM COMPLETE W/O CONT    (Results Pending)       X-Ray:  I have personally reviewed the images and compared with prior images.  EKG:  I have personally reviewed the images and compared with prior images.    Assessment/Plan:  Problem Representation:   Donte Agustin is a 79 y.o. male with medial history of systolic heart failure (EF 35%), ), hypertension, diabetes, chronic respiratory failure (on  5-7L at home?)  CAD (s/p stents X 2 10/22) and prostate cancer (radiation over 10 yrs ago). who was transferred from [facility] due to STEMI.    I anticipate this patient will require at least two midnights for appropriate medical management, necessitating inpatient admission because to treat STEMI     Patient will need a ICU (Adult and Pediatrics) bed on EMERGENCY service .  The need is secondary to STEMI .    * Acute MI (HCC)- (present on admission)  Assessment & Plan  AT [facility], EKG presented  ST elevation V2-V4, concerning for acute STEMI. Received 45 mg TNK around 0422, 324 mg ASA,  Lovenox 90mg and atorvastatin 80mg at [facility]. Cardiology was consulted at [facility], considering cardiac cath early today (4/6/25)  - Admit to ICU cardiac floor  - Tele  - Start heparin drip with bolus 1030 (6 hours after last dose of Lovenox)  - If patient become hypertensive, will consider nitro infusion  - Sublingual nitroglycerin prn for chest pain   - Cardiology following, appreciate recommendation. Considering cardiac cath his morning (4/6/25)    CAD (coronary artery disease) s/p Stent 10/2022  Assessment & Plan  From note on 6/11/2023, patient had two stents placed on 10/2022. Patient stated that he had only one stent placed in 10/2022. Received 324mg Aspirin at [facility] this morning  - Continue home Aspirin 81 mg daily (starting from 4/7/25)  - Hold home plavix since patient received TNK this morning at [facility]  - Rest of plan same as Acute MI  - Will need medical record for previous stent placement on 10/2022    Heart failure with reduced ejection fraction (HCC) (LVEF 20-25% Echo 2/2025)- (present on admission)  Assessment & Plan  Echocardiogram on 2/25 presented severe hear failure with EF of 20-25% and severe aortic stenosis, low-grade, low flow with valve area of 1cm^2, grade 2 diastolic function with mildly enlarged right ventricle, decreased right ventricular systolic function,  mild MR, TR and mild pulmonary hypertension.   - Hold home digoxin an lasix in the setting of acute coronary syndrome.  - After [facility], he will need GDMT medications  - Repeating Echocardiogram      Elevated troponin  Assessment & Plan  AT [facility] ED, presented elevated trop of 60. With EKG, concerning for STEMI  - Same plan as acute MI    Chronic respiratory failure (HCC)  Assessment & Plan  Patient stated that he used oxygen 5-7L at home. He has been smoking 1.5 pack per day for past one and half year. No significant past medical history of smoking. Patient is poor historian. He stated that he had pulmonary function test in the past. CXR presented left lung opacities, no significant change compared to 6/11/2023. Reports productive cough started about 15 days ago.  - Supplement O2 as needed  - Wean oxygen to 88-92%  - RT therapy protocol placed  - PFT as outpatient    Severe aortic stenosis  Assessment & Plan  Echo 2/2025 presented severe aortic stenosis. He was offered TAVR in 2/25 but patient refused to proceed it.  - Repeating echocardiogram    Diabetes (HCC)- (present on admission)  Assessment & Plan  Not on any home medication  - Repeating A1c AM  - SSI  - Hypoglycemic protocol    GERD (gastroesophageal reflux disease)  Assessment & Plan  - Continue home pantoperazole    BPH (benign prostatic hyperplasia)  Assessment & Plan  - Continue home tamsulosin    Hyperlipidemia  Assessment & Plan  Received atorvastatin 80mg at [facility]  - Continue home fenofibrate  - Consider high intensity statin if patient does not have allergy nor intolerance in the past.  - Lipid panel tomorrow AM    Diabetic neuropathy (HCC)  Assessment & Plan  - Continue home gabapentin 600mg BID      Code Status = Full code  VTE prophylaxis: SCDs/TEDs and therapeutic anticoagulation with Heparin infusion

## 2025-04-06 NOTE — ASSESSMENT & PLAN NOTE
Echocardiogram on 2/25 presented severe hear failure with EF of 20-25% and severe aortic stenosis, low-grade, low flow with valve area of 1cm^2, grade 2 diastolic function with mildly enlarged right ventricle, decreased right ventricular systolic function, mild MR, TR and mild pulmonary hypertension.   Repeated echo on 4/6 shows severe heart failure with LVEF 25%, NT proBNP above 6000  Patient is not obvious fluid overload  - Lasix twice daily  - Daily weight and strict I/O  - Metoprolol tartrate 25 mg twice daily, Farxiga 10 mg after cardiac cath procedure  - Daily BMP

## 2025-04-06 NOTE — ED PROVIDER NOTES
ED Provider Note        CHIEF COMPLAINT  Chief Complaint   Patient presents with    Chest Pain     Patient transferred from [facility] for STEMI. Patient initially presented to [facility] for RLQ pain with nausea and vomiting. Code STEMI cancelled by Cardiology in trauma bay         [facility]    OUTSIDE HISTORIAN(S):  EMS provide report    Donte Agustin is a 79 y.o. male who presents to the Emergency Department with STEMI.  The patient began having abdominal pain and presented to outside emergency department where he had acute EKG changes concerning for STEMI.  The patient was given TNK and Lovenox and transferred.  He reports that he is currently pain-free.  He denies any abdominal pain, only nausea.    REVIEW OF SYSTEMS  See [facility] for further details. All other systems are negative.     PAST MEDICAL HISTORY   History reviewed. No pertinent past medical history.    SURGICAL HISTORY  History reviewed. No pertinent surgical history.    FAMILY HISTORY  History reviewed. No pertinent family history.    SOCIAL HISTORY        CURRENT MEDICATIONS  Home Medications       Reviewed by Geraldo Burton R.N. (Registered Nurse) on 04/06/25 at 0557  Med List Status: Not Addressed     Medication Last Dose Status   acetaminophen (TYLENOL) 325 MG Tab  Active   aspirin 81 MG EC tablet  Active   bacitracin-polymyxin b (POLYSPORIN) 500-10830 UNIT/GM Ointment  Active   clopidogrel (PLAVIX) 75 MG Tab  Active   Empagliflozin (JARDIANCE) 10 MG Tab tablet  Active   fenofibrate (TRICOR) 54 MG tablet  Active   furosemide (LASIX) 20 MG Tab  Active   gabapentin (NEURONTIN) 600 MG tablet  Active   omeprazole (PRILOSEC) 20 MG delayed-release capsule  Active   sacubitril-valsartan (ENTRESTO) 24-26 MG Tab  Active   spironolactone (ALDACTONE) 25 MG Tab  Active   tamsulosin (FLOMAX) 0.4 MG capsule  Active                  Audit from Redirected Encounters    **Home medications have not yet been reviewed for this encounter**         ALLERGIES  No Known  "Allergies    PHYSICAL EXAM  VITAL SIGNS: /53   Pulse (!) 109   Resp 18   Ht 1.702 m (5' 7\")   Wt 90.3 kg (199 lb 1.2 oz)   SpO2 90%   BMI 31.18 kg/m²   Gen: Alert unwell appearing  HENT: ATNC  Eyes: Normal conjunctiva  Neck: trachea midline  Resp: no respiratory distress, CTAB  CV: No JVD, RRR, no m/r/g. Equal radial pulses  Abd: non-distended, non-tender  Ext: No deformities, no edema  Psych: normal mood  Neuro: speech fluent, moves all extremities    DIAGNOSTIC STUDIES / PROCEDURES  EKG  Results for orders placed or performed during the hospital encounter of 25   EKG   Result Value Ref Range    Report       [facility] Emergency Dept.    Test Date:  2025  Pt Name:    LEIGH ANN MOORE                Department: ER  MRN:        [id number]                      Room:       [facility]  Gender:     Male                         Technician: [id number]  :        [id number]                   Requested By:ER TRIAGE PROTOCOL  Order #:    [id number]                    Reading MD: Mesfin Sandoval    Measurements  Intervals                                Axis  Rate:       118                          P:          212  DC:         114                          QRS:        106  QRSD:       150                          T:          -11  QT:         367  QTc:        515    Interpretive Statements  Sinus rhythm+  LEFT BUNDLE BRANCH BLOCK  Anterior infarct, acute (LAD)  Compared to ECG 2023 21:40:02  Intraventricular conduction delay now present  Myocardial infarct finding now present  Sinus rhythm no longer present    Electronically Signed On 2025 05:50:54 PDT by Mesfin Sandoval       I independently interpreted this EKG    LABS  Labs Reviewed   CBC WITH DIFFERENTIAL - Abnormal; Notable for the following components:       Result Value    WBC 14.5 (*)     RBC 4.39 (*)     Hemoglobin 12.3 (*)     Hematocrit 38.4 (*)     MCHC 32.0 (*)     Neutrophils-Polys 88.00 (*)     Lymphocytes 3.70 (*)     Neutrophils " (Absolute) 12.75 (*)     Lymphs (Absolute) 0.53 (*)     Monos (Absolute) 1.08 (*)     All other components within normal limits   HOLD BLOOD BANK SPECIMEN (NOT TESTED)   TROPONIN   PROBRAIN NATRIURETIC PEPTIDE, NT   COMP METABOLIC PANEL   LIPASE   PROTHROMBIN TIME   APTT         RADIOLOGY  I have independently interpreted the diagnostic imaging associated with this visit.  My preliminary interpretation is as follows: CXR: cardiomegaly  Radiologist interpretation:    DX-CHEST-PORTABLE (1 VIEW)   Final Result      Persistent left-sided airspace disease          COURSE & MEDICAL DECISION MAKING  Pertinent Labs & Imaging studies were reviewed. (See chart for details)        EXTERNAL RECORDS REVIEWED  External ED Note STEMI at outside facility, received TNK, Lovenox      INITIAL ASSESSMENT AND PLAN  Care Narrative: Patient presents with STEMI after receiving lytics.  He has already received Lovenox, will defer continuing heparin as the patient's is not due for redosing.  Arrival to the emergency department he is having no chest pain, no indication for Cath Lab activation at this time.  Will continue medical management according to cardiology.    Patient's presentation does not appear to be consistent with aortic dissection.  Low suspicion for DVT/PE    ADDITIONAL PROBLEM LIST AND DISPOSITION  Point of Care Ultrasound    ED POINT OF CARE ULTRASOUND: LIMITED CARDIAC    Indication for exam: Chest pain  LVEF: Markedly diminished  Pericardial Effusion:not present  RV Strain:not present  Pulmonary B Lines:not present    Image retained through [facility] as seen below:             This study is a limited ultrasound examination performed and interpreted to evaluate for limited conditions as outlined above. There may be other clinically important information contained in the images that is outside this scope. When clinically warranted, a comprehensive ultrasound through the appropriate department is considered.    I have discussed  management of the patient with the following medical professionals: Cardiology, Dr. Abbott, intensivist          FINAL IMPRESSION  1. ST elevation myocardial infarction (STEMI), unspecified artery (HCC)             Case discussed with Dr. Balderrama , who will evaluate the patient for hospitalization. Patient will be hospitalized in critical condition.        CRITICAL CARE  The very real possibilty of a deterioration of this patient's condition required the highest level of my preparedness for sudden, emergent intervention.  I provided critical care services, which included medication orders, frequent reevaluations of the patient's condition and response to treatment, ordering and reviewing test results, and discussing the case with various consultants.  The critical care time associated with the care of the patient was 31 minutes. Review chart for interventions. This time is exclusive of any other billable procedures.         This dictation was created using voice recognition software. The accuracy of the dictation is limited to the abilities of the software. I expect there may be some errors of grammar and possibly content. The nursing notes were reviewed and certain aspects of this information were incorporated into this note.

## 2025-04-06 NOTE — ASSESSMENT & PLAN NOTE
Echo 2/2025 presented severe aortic stenosis. He was offered TAVR in 2/25 but patient refused to proceed it.  Repeated echo on 4/6 showed mild aortic stenosis

## 2025-04-06 NOTE — ED NOTES
Bedside report received from off going RN, assumed care of patient.  POC discussed with patient. Call light within reach, all needs addressed at this time.       Fall risk interventions in place: Keep floor surfaces clean and dry (all applicable per [facility] Fall risk assessment)   Continuous monitoring: Cardiac Leads, Pulse Ox, or Blood Pressure  IVF/IV medications: Not Applicable   Oxygen: How many liters 5L  Bedside sitter: Not Applicable   Isolation: Not Applicable

## 2025-04-06 NOTE — CONSULTS
Cardiology Consult Note  Date & Time note created:    4/6/2025   6:13 AM     Referring MD:  Dr. Mesfin Sandoval    Patient ID:   Name:             Donte Agustin     YOB: 1945  Age:                 79 y.o.  male   MRN:               [id number]                                                             Chief Complaint / Reason for consult:  STEMI s/p tPA     History of Present Illness:    Mr. Agustin is a 80 yo M with medical history of CAD s/p stents, severe aortic stenosis, systolic heart failure with LVEF 35%, chronic LBBB, diabetes, remote prostate cancer (radiation over 10 years ago) was transferred from [facility] for STEMI. Patient reports that he has intermittent chest pain for 2-3 days. He took tylenol for chest pain but not improve his symptoms and he went to [place] [facility]. At [facility], stable vitals. His ECG showed sinus tachycardia or junctional tachycardia with significant ST elevation more than 1 mm over inferior leads, more than 5 mm over V2-V5, compared to his prior ECGs. I was called at 4 am. I consider pt has STEMI. Not sure the transfer time from [place] to [facility] probably more than 120 min. tPA was recommended and indications/contraindications by [place]. Loading dose of asa and heparin gtt by [place]. Pt got tPA at 4:22 am and actually pt didn't get heparin gtt instead of therapeutic Lovenox at 4:23 am. After patient transferred to [facility], his chest pain resolved, but a little bit nausea. Stable vitals on admission. ECG showed sinus tachycardia or junctional tachycardia. Inferior ST elevation resolved and ST elevation over precordial leads much improved. I did bedside echo showed LVEF 15-20%, no pericardial effusion, no significant evidence of aortic dissection.     Review of Systems:      Constitutional: Denies fevers, Denies weight changes  Eyes: Denies changes in vision, no eye pain  Ears/Nose/Throat/Mouth: Denies nasal congestion or sore throat   Cardiovascular: Improved chest  pain, denies palpitations   Respiratory: improved shortness of breath , Denies cough  Gastrointestinal/Hepatic: reports nausea, Denies abdominal pain, vomiting, diarrhea, constipation or GI bleeding   Genitourinary: Denies dysuria or frequency  Musculoskeletal/Rheum: Denies  joint pain and swelling   Neurological: Denies headache, confusion, memory loss or focal weakness/parasthesias  Psychiatric: denies mood disorder             Past Medical History:   History reviewed. No pertinent past medical history.  There are no active hospital problems to display for this patient.      Past Surgical History:  History reviewed. No pertinent surgical history.    Hospital Medications:  No current facility-administered medications for this encounter.    Current Outpatient Medications:     bacitracin-polymyxin b (POLYSPORIN) [id number] UNIT/GM Ointment, Apply 1 Each topically 2 times a day., Disp: 14 g, Rfl: 0    aspirin 81 MG EC tablet, Take 1 Tablet by mouth every day., Disp: 100 Tablet, Rfl: 0    omeprazole (PRILOSEC) 20 MG delayed-release capsule, Take 20 mg by mouth every day., Disp: , Rfl:     Empagliflozin (JARDIANCE) 10 MG Tab tablet, Take 10 mg by mouth every day., Disp: , Rfl:     sacubitril-valsartan (ENTRESTO) 24-26 MG Tab, Take 1 Tablet by mouth every day., Disp: , Rfl:     furosemide (LASIX) 20 MG Tab, Take 20 mg by mouth every day., Disp: , Rfl:     clopidogrel (PLAVIX) 75 MG Tab, Take 75 mg by mouth every day., Disp: , Rfl:     tamsulosin (FLOMAX) 0.4 MG capsule, Take 0.4 mg by mouth every day., Disp: , Rfl:     spironolactone (ALDACTONE) 25 MG Tab, Take 25 mg by mouth every day., Disp: , Rfl:     fenofibrate (TRICOR) 54 MG tablet, Take 54 mg by mouth every day., Disp: , Rfl:     gabapentin (NEURONTIN) 600 MG tablet, Take 600 mg by mouth 2 times a day., Disp: , Rfl:     acetaminophen (TYLENOL) 325 MG Tab, Take 650 mg by mouth every four hours as needed., Disp: , Rfl:     Current Outpatient Medications:  (Not in a  "hospital admission)      Medication Allergy:  No Known Allergies    Family History:  History reviewed. No pertinent family history.    Social History:  Social History     Socioeconomic History    Marital status: Unknown     Spouse name: Not on file    Number of children: Not on file    Years of education: Not on file    Highest education level: Not on file   Occupational History    Not on file   Tobacco Use    Smoking status: Not on file    Smokeless tobacco: Not on file   Substance and Sexual Activity    Alcohol use: Not on file    Drug use: Not on file    Sexual activity: Not on file   Other Topics Concern    Not on file   Social History Narrative    Not on file     Social Drivers of Health     Financial Resource Strain: Not on file   Food Insecurity: Not on file   Transportation Needs: Not on file   Physical Activity: Not on file   Stress: Not on file   Social Connections: Not on file   Intimate Partner Violence: Not on file   Housing Stability: Not on file         Physical Exam:  Vitals/ General Appearance:   Weight/BMI: Body mass index is 31.18 kg/m².  /67   Pulse (!) 110   Resp 18   Ht 1.702 m (5' 7\")   Wt 90.3 kg (199 lb 1.2 oz)   SpO2 95%   Vitals:    04/06/25 0500 04/06/25 0555 04/06/25 0558   BP:   115/67   Pulse:   (!) 110   Resp:   18   SpO2:   95%   Weight: 90.3 kg (199 lb 1.2 oz)     Height:  1.702 m (5' 7\")      Oxygen Therapy:  Pulse Oximetry: 95 %    Constitutional:   No acute distress  HENMT:  Normocephalic, Atraumatic.  Eyes:  EOMI, No discharge.  Neck:  no JVD.  Cardiovascular:  Normal heart rate, tachycardia, regular rhythm, systolic murmur.  Extremitites with trace edema.  Lungs:  No respiratory distress.  Abdomen: Soft, No tenderness, No guarding, No rebound.  Skin: No significant rash.  Neurologic: Alert & oriented x 3, No focal deficits noted, cranial nerves II through X are intact.  Psychiatric: Affect normal, Judgment normal, Mood normal.      MDM (Data Review):     Records " reviewed and summarized in current documentation    Lab Data Review:  Recent Results (from the past 24 hours)   CBC With Differential    Collection Time: 25  5:40 AM   Result Value Ref Range    WBC 14.5 (H) 4.8 - 10.8 K/uL    RBC 4.39 (L) 4.70 - 6.10 M/uL    Hemoglobin 12.3 (L) 14.0 - 18.0 g/dL    Hematocrit 38.4 (L) 42.0 - 52.0 %    MCV 87.5 81.4 - 97.8 fL    MCH 28.0 27.0 - 33.0 pg    MCHC 32.0 (L) 32.3 - 36.5 g/dL    RDW 47.4 35.9 - 50.0 fL    Platelet Count 198 164 - 446 K/uL    MPV 10.9 9.0 - 12.9 fL    Neutrophils-Polys 88.00 (H) 44.00 - 72.00 %    Lymphocytes 3.70 (L) 22.00 - 41.00 %    Monocytes 7.50 0.00 - 13.40 %    Eosinophils 0.20 0.00 - 6.90 %    Basophils 0.20 0.00 - 1.80 %    Immature Granulocytes 0.40 0.00 - 0.90 %    Nucleated RBC 0.00 0.00 - 0.20 /100 WBC    Neutrophils (Absolute) 12.75 (H) 1.82 - 7.42 K/uL    Lymphs (Absolute) 0.53 (L) 1.00 - 4.80 K/uL    Monos (Absolute) 1.08 (H) 0.00 - 0.85 K/uL    Eos (Absolute) 0.03 0.00 - 0.51 K/uL    Baso (Absolute) 0.03 0.00 - 0.12 K/uL    Immature Granulocytes (abs) 0.06 0.00 - 0.11 K/uL    NRBC (Absolute) 0.00 K/uL   HOLD BLOOD BANK SPECIMEN (NOT TESTED)    Collection Time: 25  5:40 AM   Result Value Ref Range    Holding Tube - Bb DONE    EKG    Collection Time: 25  5:50 AM   Result Value Ref Range    Report       [facility] Emergency Dept.    Test Date:  2025  Pt Name:    LEIGH ANN MOORE                Department: ER  MRN:        [id number]                      Room:       [facility]  Gender:     Male                         Technician: [id number]  :        1945                   Requested By:ER TRIAGE PROTOCOL  Order #:    [id number]                    Reading MD: Mesfin Sandoval    Measurements  Intervals                                Axis  Rate:       118                          P:          212  WY:         114                          QRS:        106  QRSD:       150                          T:          -11  QT:          367  QTc:        515    Interpretive Statements  Sinus rhythm+  LEFT BUNDLE BRANCH BLOCK  Anterior infarct, acute (LAD)  Compared to ECG 06/11/2023 21:40:02  Intraventricular conduction delay now present  Myocardial infarct finding now present  Sinus rhythm no longer present    Electronically Signed On 04- 05:50:54 PDT by Mesfin Sandoval         Imaging/Procedures Review:    Chest Xray:  Reviewed    EKG:   As in HPI.     MDM (Assessment and Plan):       STEMI s/p tPA  History of CAD s/p stents  Dilated cardiomyopathy with LVEF 15-20%  History of severe aortic stenosis    - s/p tPA at 4:22 am and actually pt didn't get heparin gtt instead of therapeutic Lovenox at 4:23 am, and much improved ST elevation and symptoms  - Hold STEMI activation for now, will consider cardiac cath early today. Discussed risks, benefits, rationale, appropriateness and alternatives to coronary angiography with IV sedation in great detail with the patient.  Complications including but not limited to death, stroke, MI, urgent bypass surgery, contrast nephropathy, vascular complications, bleeding and infection were explained.  In addition, we discussed that 10% of patients will experience small to moderate bruising at the side of the arterial puncture.  Risks of major complications such as heart attack or stroke caused by the angiogram is less than 1%; the risk of death is approximately 1 in 1000.  The potential outcomes associated with the procedure (possible PCI, possible CABG, possible medical Rx only) were also discussed at length.  Patient voices understanding and with shared decision making, is in agreement to proceed.  - We will continue to follow him    Thank you for referring this patient to our cardiology service.  We will follow patient with you.      Estefani Abbott MD. PhD  Cardiology Inpatient Service.  [facility].  [phone].  [place], [place].

## 2025-04-07 ENCOUNTER — APPOINTMENT (OUTPATIENT)
Dept: RADIOLOGY | Facility: MEDICAL CENTER | Age: 80
DRG: 280 | End: 2025-04-07
Attending: STUDENT IN AN ORGANIZED HEALTH CARE EDUCATION/TRAINING PROGRAM
Payer: MEDICARE

## 2025-04-07 ENCOUNTER — APPOINTMENT (OUTPATIENT)
Dept: CARDIOLOGY | Facility: MEDICAL CENTER | Age: 80
DRG: 280 | End: 2025-04-07
Payer: MEDICARE

## 2025-04-07 ENCOUNTER — APPOINTMENT (OUTPATIENT)
Dept: RADIOLOGY | Facility: MEDICAL CENTER | Age: 80
DRG: 280 | End: 2025-04-07
Payer: MEDICARE

## 2025-04-07 PROBLEM — I27.20 PULMONARY HYPERTENSION (HCC): Status: ACTIVE | Noted: 2025-04-07

## 2025-04-07 PROBLEM — J44.9 COPD (CHRONIC OBSTRUCTIVE PULMONARY DISEASE) (HCC): Status: ACTIVE | Noted: 2025-04-07

## 2025-04-07 PROBLEM — J90 PLEURAL EFFUSION: Status: ACTIVE | Noted: 2025-04-07

## 2025-04-07 PROBLEM — Z72.0 TOBACCO ABUSE: Status: ACTIVE | Noted: 2025-04-07

## 2025-04-07 LAB
ALBUMIN SERPL BCP-MCNC: 3.4 G/DL (ref 3.2–4.9)
ALBUMIN/GLOB SERPL: 0.9 G/DL
ALP SERPL-CCNC: 62 U/L (ref 30–99)
ALT SERPL-CCNC: <5 U/L (ref 2–50)
ANION GAP SERPL CALC-SCNC: 10 MMOL/L (ref 7–16)
AST SERPL-CCNC: 13 U/L (ref 12–45)
BASOPHILS # BLD AUTO: 0.2 % (ref 0–1.8)
BASOPHILS # BLD: 0.02 K/UL (ref 0–0.12)
BILIRUB SERPL-MCNC: 0.8 MG/DL (ref 0.1–1.5)
BUN SERPL-MCNC: 15 MG/DL (ref 8–22)
CALCIUM ALBUM COR SERPL-MCNC: 9.7 MG/DL (ref 8.5–10.5)
CALCIUM SERPL-MCNC: 9.2 MG/DL (ref 8.5–10.5)
CHLORIDE SERPL-SCNC: 98 MMOL/L (ref 96–112)
CHOLEST SERPL-MCNC: 126 MG/DL (ref 100–199)
CO2 SERPL-SCNC: 28 MMOL/L (ref 20–33)
CREAT SERPL-MCNC: 0.89 MG/DL (ref 0.5–1.4)
EKG IMPRESSION: NORMAL
EOSINOPHIL # BLD AUTO: 0.1 K/UL (ref 0–0.51)
EOSINOPHIL NFR BLD: 0.9 % (ref 0–6.9)
ERYTHROCYTE [DISTWIDTH] IN BLOOD BY AUTOMATED COUNT: 47.8 FL (ref 35.9–50)
EST. AVERAGE GLUCOSE BLD GHB EST-MCNC: 140 MG/DL
GFR SERPLBLD CREATININE-BSD FMLA CKD-EPI: 88 ML/MIN/1.73 M 2
GLOBULIN SER CALC-MCNC: 3.6 G/DL (ref 1.9–3.5)
GLUCOSE BLD STRIP.AUTO-MCNC: 126 MG/DL (ref 65–99)
GLUCOSE BLD STRIP.AUTO-MCNC: 89 MG/DL (ref 65–99)
GLUCOSE BLD STRIP.AUTO-MCNC: 91 MG/DL (ref 65–99)
GLUCOSE SERPL-MCNC: 112 MG/DL (ref 65–99)
HBA1C MFR BLD: 6.5 % (ref 4–5.6)
HCT VFR BLD AUTO: 38.1 % (ref 42–52)
HDLC SERPL-MCNC: 53 MG/DL
HGB BLD-MCNC: 12.7 G/DL (ref 14–18)
IMM GRANULOCYTES # BLD AUTO: 0.04 K/UL (ref 0–0.11)
IMM GRANULOCYTES NFR BLD AUTO: 0.4 % (ref 0–0.9)
LDLC SERPL CALC-MCNC: 48 MG/DL
LYMPHOCYTES # BLD AUTO: 1.06 K/UL (ref 1–4.8)
LYMPHOCYTES NFR BLD: 10 % (ref 22–41)
MCH RBC QN AUTO: 29.3 PG (ref 27–33)
MCHC RBC AUTO-ENTMCNC: 33.3 G/DL (ref 32.3–36.5)
MCV RBC AUTO: 88 FL (ref 81.4–97.8)
MONOCYTES # BLD AUTO: 1.01 K/UL (ref 0–0.85)
MONOCYTES NFR BLD AUTO: 9.6 % (ref 0–13.4)
NEUTROPHILS # BLD AUTO: 8.32 K/UL (ref 1.82–7.42)
NEUTROPHILS NFR BLD: 78.9 % (ref 44–72)
NRBC # BLD AUTO: 0 K/UL
NRBC BLD-RTO: 0 /100 WBC (ref 0–0.2)
PLATELET # BLD AUTO: 172 K/UL (ref 164–446)
PMV BLD AUTO: 11.3 FL (ref 9–12.9)
POTASSIUM SERPL-SCNC: 3.5 MMOL/L (ref 3.6–5.5)
PROT SERPL-MCNC: 7 G/DL (ref 6–8.2)
RBC # BLD AUTO: 4.33 M/UL (ref 4.7–6.1)
SODIUM SERPL-SCNC: 136 MMOL/L (ref 135–145)
TRIGL SERPL-MCNC: 127 MG/DL (ref 0–149)
TROPONIN T SERPL-MCNC: 78 NG/L (ref 6–19)
TROPONIN T SERPL-MCNC: 80 NG/L (ref 6–19)
UFH PPP CHRO-ACNC: 0.11 IU/ML
UFH PPP CHRO-ACNC: 0.3 IU/ML
WBC # BLD AUTO: 10.6 K/UL (ref 4.8–10.8)

## 2025-04-07 PROCEDURE — 93458 L HRT ARTERY/VENTRICLE ANGIO: CPT | Mod: 26 | Performed by: INTERNAL MEDICINE

## 2025-04-07 PROCEDURE — A9270 NON-COVERED ITEM OR SERVICE: HCPCS | Performed by: EMERGENCY MEDICINE

## 2025-04-07 PROCEDURE — 82962 GLUCOSE BLOOD TEST: CPT | Mod: 91

## 2025-04-07 PROCEDURE — 99222 1ST HOSP IP/OBS MODERATE 55: CPT | Performed by: HOSPITALIST

## 2025-04-07 PROCEDURE — 700102 HCHG RX REV CODE 250 W/ 637 OVERRIDE(OP)

## 2025-04-07 PROCEDURE — A9270 NON-COVERED ITEM OR SERVICE: HCPCS

## 2025-04-07 PROCEDURE — 700101 HCHG RX REV CODE 250

## 2025-04-07 PROCEDURE — 700102 HCHG RX REV CODE 250 W/ 637 OVERRIDE(OP): Performed by: EMERGENCY MEDICINE

## 2025-04-07 PROCEDURE — 700102 HCHG RX REV CODE 250 W/ 637 OVERRIDE(OP): Performed by: HOSPITALIST

## 2025-04-07 PROCEDURE — 700117 HCHG RX CONTRAST REV CODE 255: Performed by: INTERNAL MEDICINE

## 2025-04-07 PROCEDURE — 93010 ELECTROCARDIOGRAM REPORT: CPT | Performed by: INTERNAL MEDICINE

## 2025-04-07 PROCEDURE — 700111 HCHG RX REV CODE 636 W/ 250 OVERRIDE (IP): Performed by: INTERNAL MEDICINE

## 2025-04-07 PROCEDURE — 700111 HCHG RX REV CODE 636 W/ 250 OVERRIDE (IP): Mod: JZ | Performed by: INTERNAL MEDICINE

## 2025-04-07 PROCEDURE — 700111 HCHG RX REV CODE 636 W/ 250 OVERRIDE (IP): Mod: JZ

## 2025-04-07 PROCEDURE — 700105 HCHG RX REV CODE 258: Performed by: INTERNAL MEDICINE

## 2025-04-07 PROCEDURE — 99233 SBSQ HOSP IP/OBS HIGH 50: CPT | Mod: GV | Performed by: EMERGENCY MEDICINE

## 2025-04-07 PROCEDURE — 770020 HCHG ROOM/CARE - TELE (206)

## 2025-04-07 PROCEDURE — 84484 ASSAY OF TROPONIN QUANT: CPT

## 2025-04-07 PROCEDURE — 93005 ELECTROCARDIOGRAM TRACING: CPT | Mod: TC | Performed by: STUDENT IN AN ORGANIZED HEALTH CARE EDUCATION/TRAINING PROGRAM

## 2025-04-07 PROCEDURE — A9270 NON-COVERED ITEM OR SERVICE: HCPCS | Performed by: INTERNAL MEDICINE

## 2025-04-07 PROCEDURE — 700102 HCHG RX REV CODE 250 W/ 637 OVERRIDE(OP): Performed by: INTERNAL MEDICINE

## 2025-04-07 PROCEDURE — 302135 SEQUENTIAL COMPRESSION MACHINE: Performed by: EMERGENCY MEDICINE

## 2025-04-07 PROCEDURE — A9270 NON-COVERED ITEM OR SERVICE: HCPCS | Performed by: HOSPITALIST

## 2025-04-07 PROCEDURE — 80061 LIPID PANEL: CPT

## 2025-04-07 PROCEDURE — 700111 HCHG RX REV CODE 636 W/ 250 OVERRIDE (IP)

## 2025-04-07 PROCEDURE — 4A023N7 MEASUREMENT OF CARDIAC SAMPLING AND PRESSURE, LEFT HEART, PERCUTANEOUS APPROACH: ICD-10-PCS | Performed by: INTERNAL MEDICINE

## 2025-04-07 PROCEDURE — 71045 X-RAY EXAM CHEST 1 VIEW: CPT

## 2025-04-07 PROCEDURE — 83036 HEMOGLOBIN GLYCOSYLATED A1C: CPT

## 2025-04-07 PROCEDURE — B2111ZZ FLUOROSCOPY OF MULTIPLE CORONARY ARTERIES USING LOW OSMOLAR CONTRAST: ICD-10-PCS | Performed by: INTERNAL MEDICINE

## 2025-04-07 PROCEDURE — 94669 MECHANICAL CHEST WALL OSCILL: CPT

## 2025-04-07 PROCEDURE — 80053 COMPREHEN METABOLIC PANEL: CPT

## 2025-04-07 PROCEDURE — 99152 MOD SED SAME PHYS/QHP 5/>YRS: CPT | Performed by: INTERNAL MEDICINE

## 2025-04-07 PROCEDURE — 99232 SBSQ HOSP IP/OBS MODERATE 35: CPT | Mod: FS | Performed by: INTERNAL MEDICINE

## 2025-04-07 PROCEDURE — 85520 HEPARIN ASSAY: CPT | Mod: 91

## 2025-04-07 PROCEDURE — 85025 COMPLETE CBC W/AUTO DIFF WBC: CPT

## 2025-04-07 PROCEDURE — 99152 MOD SED SAME PHYS/QHP 5/>YRS: CPT

## 2025-04-07 RX ORDER — LIDOCAINE HYDROCHLORIDE 20 MG/ML
INJECTION, SOLUTION INFILTRATION; PERINEURAL
Status: COMPLETED
Start: 2025-04-07 | End: 2025-04-07

## 2025-04-07 RX ORDER — LIDOCAINE 4 G/G
1 PATCH TOPICAL EVERY 24 HOURS
Status: DISCONTINUED | OUTPATIENT
Start: 2025-04-07 | End: 2025-04-08 | Stop reason: HOSPADM

## 2025-04-07 RX ORDER — HEPARIN SODIUM 1000 [USP'U]/ML
INJECTION, SOLUTION INTRAVENOUS; SUBCUTANEOUS
Status: COMPLETED
Start: 2025-04-07 | End: 2025-04-07

## 2025-04-07 RX ORDER — INSULIN LISPRO 100 [IU]/ML
1-6 INJECTION, SOLUTION INTRAVENOUS; SUBCUTANEOUS
Status: DISCONTINUED | OUTPATIENT
Start: 2025-04-08 | End: 2025-04-08 | Stop reason: HOSPADM

## 2025-04-07 RX ORDER — HEPARIN SODIUM 200 [USP'U]/100ML
INJECTION, SOLUTION INTRAVENOUS
Status: COMPLETED
Start: 2025-04-07 | End: 2025-04-07

## 2025-04-07 RX ORDER — ATORVASTATIN CALCIUM 40 MG/1
40 TABLET, FILM COATED ORAL EVERY EVENING
Status: DISCONTINUED | OUTPATIENT
Start: 2025-04-07 | End: 2025-04-08 | Stop reason: HOSPADM

## 2025-04-07 RX ORDER — VERAPAMIL HYDROCHLORIDE 2.5 MG/ML
INJECTION INTRAVENOUS
Status: COMPLETED
Start: 2025-04-07 | End: 2025-04-07

## 2025-04-07 RX ORDER — DAPAGLIFLOZIN 10 MG/1
10 TABLET, FILM COATED ORAL DAILY
Status: DISCONTINUED | OUTPATIENT
Start: 2025-04-07 | End: 2025-04-08 | Stop reason: HOSPADM

## 2025-04-07 RX ORDER — DEXTROSE MONOHYDRATE 25 G/50ML
25 INJECTION, SOLUTION INTRAVENOUS
Status: DISCONTINUED | OUTPATIENT
Start: 2025-04-07 | End: 2025-04-08 | Stop reason: HOSPADM

## 2025-04-07 RX ORDER — POTASSIUM CHLORIDE 1500 MG/1
60 TABLET, EXTENDED RELEASE ORAL ONCE
Status: COMPLETED | OUTPATIENT
Start: 2025-04-07 | End: 2025-04-07

## 2025-04-07 RX ORDER — ACETAMINOPHEN 500 MG
1000 TABLET ORAL EVERY 8 HOURS PRN
Status: DISCONTINUED | OUTPATIENT
Start: 2025-04-07 | End: 2025-04-08 | Stop reason: HOSPADM

## 2025-04-07 RX ORDER — MIDAZOLAM HYDROCHLORIDE 1 MG/ML
INJECTION INTRAMUSCULAR; INTRAVENOUS
Status: COMPLETED
Start: 2025-04-07 | End: 2025-04-07

## 2025-04-07 RX ORDER — METOPROLOL TARTRATE 25 MG/1
25 TABLET, FILM COATED ORAL 2 TIMES DAILY
Status: DISCONTINUED | OUTPATIENT
Start: 2025-04-07 | End: 2025-04-08

## 2025-04-07 RX ORDER — SODIUM CHLORIDE 9 MG/ML
INJECTION, SOLUTION INTRAVENOUS CONTINUOUS
Status: DISCONTINUED | OUTPATIENT
Start: 2025-04-07 | End: 2025-04-08

## 2025-04-07 RX ORDER — NICOTINE 21 MG/24HR
21 PATCH, TRANSDERMAL 24 HOURS TRANSDERMAL
Status: DISCONTINUED | OUTPATIENT
Start: 2025-04-07 | End: 2025-04-08 | Stop reason: HOSPADM

## 2025-04-07 RX ADMIN — LIDOCAINE 1 PATCH: 4 PATCH TOPICAL at 05:55

## 2025-04-07 RX ADMIN — SODIUM CHLORIDE: 9 INJECTION, SOLUTION INTRAVENOUS at 17:14

## 2025-04-07 RX ADMIN — FENTANYL CITRATE 50 MCG: 50 INJECTION, SOLUTION INTRAMUSCULAR; INTRAVENOUS at 16:37

## 2025-04-07 RX ADMIN — FUROSEMIDE 40 MG: 10 INJECTION, SOLUTION INTRAVENOUS at 05:19

## 2025-04-07 RX ADMIN — POTASSIUM CHLORIDE 60 MEQ: 1500 TABLET, EXTENDED RELEASE ORAL at 09:36

## 2025-04-07 RX ADMIN — NICOTINE TRANSDERMAL SYSTEM 21 MG: 21 PATCH, EXTENDED RELEASE TRANSDERMAL at 15:39

## 2025-04-07 RX ADMIN — NITROGLYCERIN 10 ML: 20 INJECTION INTRAVENOUS at 16:27

## 2025-04-07 RX ADMIN — CLOPIDOGREL BISULFATE 75 MG: 75 TABLET, FILM COATED ORAL at 05:17

## 2025-04-07 RX ADMIN — FUROSEMIDE 40 MG: 10 INJECTION, SOLUTION INTRAVENOUS at 15:39

## 2025-04-07 RX ADMIN — HEPARIN SODIUM 2000 UNITS: 1000 INJECTION, SOLUTION INTRAVENOUS; SUBCUTANEOUS at 07:08

## 2025-04-07 RX ADMIN — HEPARIN SODIUM 2000 UNITS: 200 INJECTION, SOLUTION INTRAVENOUS at 16:24

## 2025-04-07 RX ADMIN — OXYCODONE 5 MG: 5 TABLET ORAL at 17:54

## 2025-04-07 RX ADMIN — HEPARIN SODIUM: 1000 INJECTION, SOLUTION INTRAVENOUS; SUBCUTANEOUS at 16:28

## 2025-04-07 RX ADMIN — GABAPENTIN 600 MG: 300 CAPSULE ORAL at 17:08

## 2025-04-07 RX ADMIN — ASPIRIN 81 MG: 81 TABLET, COATED ORAL at 05:17

## 2025-04-07 RX ADMIN — VERAPAMIL HYDROCHLORIDE 2.5 MG: 2.5 INJECTION, SOLUTION INTRAVENOUS at 16:27

## 2025-04-07 RX ADMIN — DAPAGLIFLOZIN 10 MG: 10 TABLET, FILM COATED ORAL at 14:25

## 2025-04-07 RX ADMIN — HEPARIN SODIUM 16 UNITS/KG/HR: 5000 INJECTION, SOLUTION INTRAVENOUS at 13:19

## 2025-04-07 RX ADMIN — OMEPRAZOLE 20 MG: 20 CAPSULE, DELAYED RELEASE ORAL at 05:17

## 2025-04-07 RX ADMIN — IOHEXOL 44 ML: 350 INJECTION, SOLUTION INTRAVENOUS at 16:37

## 2025-04-07 RX ADMIN — TAMSULOSIN HYDROCHLORIDE 0.4 MG: 0.4 CAPSULE ORAL at 09:36

## 2025-04-07 RX ADMIN — METOPROLOL TARTRATE 25 MG: 25 TABLET, FILM COATED ORAL at 17:08

## 2025-04-07 RX ADMIN — MIDAZOLAM HYDROCHLORIDE 2 MG: 1 INJECTION, SOLUTION INTRAMUSCULAR; INTRAVENOUS at 16:35

## 2025-04-07 RX ADMIN — LIDOCAINE HYDROCHLORIDE: 20 INJECTION, SOLUTION INFILTRATION; PERINEURAL at 16:24

## 2025-04-07 RX ADMIN — ATORVASTATIN CALCIUM 40 MG: 40 TABLET, FILM COATED ORAL at 17:08

## 2025-04-07 RX ADMIN — ACETAMINOPHEN 1000 MG: 500 TABLET ORAL at 15:39

## 2025-04-07 RX ADMIN — GABAPENTIN 600 MG: 300 CAPSULE ORAL at 05:17

## 2025-04-07 SDOH — ECONOMIC STABILITY: TRANSPORTATION INSECURITY
IN THE PAST 12 MONTHS, HAS LACK OF RELIABLE TRANSPORTATION KEPT YOU FROM MEDICAL APPOINTMENTS, MEETINGS, WORK OR FROM GETTING THINGS NEEDED FOR DAILY LIVING?: NO

## 2025-04-07 SDOH — ECONOMIC STABILITY: TRANSPORTATION INSECURITY
IN THE PAST 12 MONTHS, HAS THE LACK OF TRANSPORTATION KEPT YOU FROM MEDICAL APPOINTMENTS OR FROM GETTING MEDICATIONS?: NO

## 2025-04-07 ASSESSMENT — COGNITIVE AND FUNCTIONAL STATUS - GENERAL
WALKING IN HOSPITAL ROOM: A LOT
CLIMB 3 TO 5 STEPS WITH RAILING: TOTAL
TURNING FROM BACK TO SIDE WHILE IN FLAT BAD: A LITTLE
HELP NEEDED FOR BATHING: A LOT
TOILETING: A LOT
SUGGESTED CMS G CODE MODIFIER DAILY ACTIVITY: CJ
DAILY ACTIVITIY SCORE: 20
SUGGESTED CMS G CODE MODIFIER MOBILITY: CK
STANDING UP FROM CHAIR USING ARMS: A LITTLE
MOBILITY SCORE: 16
MOVING FROM LYING ON BACK TO SITTING ON SIDE OF FLAT BED: A LITTLE

## 2025-04-07 ASSESSMENT — PAIN DESCRIPTION - PAIN TYPE
TYPE: ACUTE PAIN
TYPE: CHRONIC PAIN
TYPE: ACUTE PAIN

## 2025-04-07 ASSESSMENT — ENCOUNTER SYMPTOMS
PALPITATIONS: 0
SHORTNESS OF BREATH: 1
NAUSEA: 0
HEADACHES: 0
VOMITING: 0
MYALGIAS: 1
ORTHOPNEA: 0
BACK PAIN: 0
CHILLS: 0
DIZZINESS: 0
PSYCHIATRIC NEGATIVE: 1
FEVER: 0
SHORTNESS OF BREATH: 0
BLURRED VISION: 0
COUGH: 0
COUGH: 1
ABDOMINAL PAIN: 0
HEARTBURN: 0
NECK PAIN: 0

## 2025-04-07 ASSESSMENT — LIFESTYLE VARIABLES
ON A TYPICAL DAY WHEN YOU DRINK ALCOHOL HOW MANY DRINKS DO YOU HAVE: 0
TOTAL SCORE: 0
HAVE PEOPLE ANNOYED YOU BY CRITICIZING YOUR DRINKING: NO
TOTAL SCORE: 0
HOW MANY TIMES IN THE PAST YEAR HAVE YOU HAD 5 OR MORE DRINKS IN A DAY: 0
TOTAL SCORE: 0
HAVE YOU EVER FELT YOU SHOULD CUT DOWN ON YOUR DRINKING: NO
EVER HAD A DRINK FIRST THING IN THE MORNING TO STEADY YOUR NERVES TO GET RID OF A HANGOVER: NO
AVERAGE NUMBER OF DAYS PER WEEK YOU HAVE A DRINK CONTAINING ALCOHOL: 0
EVER FELT BAD OR GUILTY ABOUT YOUR DRINKING: NO
CONSUMPTION TOTAL: NEGATIVE
ALCOHOL_USE: NO

## 2025-04-07 ASSESSMENT — SOCIAL DETERMINANTS OF HEALTH (SDOH)
WITHIN THE PAST 12 MONTHS, THE FOOD YOU BOUGHT JUST DIDN'T LAST AND YOU DIDN'T HAVE MONEY TO GET MORE: NEVER TRUE
WITHIN THE LAST YEAR, HAVE YOU BEEN AFRAID OF YOUR PARTNER OR EX-PARTNER?: NO
WITHIN THE LAST YEAR, HAVE TO BEEN RAPED OR FORCED TO HAVE ANY KIND OF SEXUAL ACTIVITY BY YOUR PARTNER OR EX-PARTNER?: NO
WITHIN THE LAST YEAR, HAVE YOU BEEN KICKED, HIT, SLAPPED, OR OTHERWISE PHYSICALLY HURT BY YOUR PARTNER OR EX-PARTNER?: NO
WITHIN THE LAST YEAR, HAVE YOU BEEN HUMILIATED OR EMOTIONALLY ABUSED IN OTHER WAYS BY YOUR PARTNER OR EX-PARTNER?: NO
WITHIN THE PAST 12 MONTHS, YOU WORRIED THAT YOUR FOOD WOULD RUN OUT BEFORE YOU GOT THE MONEY TO BUY MORE: NEVER TRUE
IN THE PAST 12 MONTHS, HAS THE ELECTRIC, GAS, OIL, OR WATER COMPANY THREATENED TO SHUT OFF SERVICE IN YOUR HOME?: NO

## 2025-04-07 ASSESSMENT — PATIENT HEALTH QUESTIONNAIRE - PHQ9
2. FEELING DOWN, DEPRESSED, IRRITABLE, OR HOPELESS: NOT AT ALL
SUM OF ALL RESPONSES TO PHQ9 QUESTIONS 1 AND 2: 0
1. LITTLE INTEREST OR PLEASURE IN DOING THINGS: NOT AT ALL

## 2025-04-07 ASSESSMENT — FIBROSIS 4 INDEX: FIB4 SCORE: 2.74

## 2025-04-07 NOTE — ASSESSMENT & PLAN NOTE
Ischemic cardiomyopathy ejection fraction 2025%  Currently on IV Lasix  Once he is stabilized consider goal-directed medical therapy as tolerated based on creatinine and blood pressure

## 2025-04-07 NOTE — CONSULTS
Hospital Medicine Consultation    Date of Service  4/7/2025    Referring Physician  Mesfin Ham M.D.    Consulting Physician  Richard Liao M.D.    Reason for Consultation  STEMI    History of Presenting Illness  79 y.o. male who presented 4/6/2025 with chest pain and shortness of breath.  Mr. Agustin has past medical history of coronary artery disease with 2 previous stents 2022, COPD 2 liters oxygen dependent still smoking,  ischemic cardiomyopathy last ejection fraction was 35%, prostate cancer that was admitted to this hospital 2023 with septic shock due to cellulitis.  On 4/6/2025 he presented to [facility] with chest pain and shortness of breath where he was found to have dynamic EKG changes concerning for STEMI.  He was given TNK, weight-based Lovenox, and transferred here for higher level of care.  He was admitted to the intensive care unit with cardiology consultation.  Echocardiogram revealed ejection fraction of 25% with RSVP of 55 mmHg.  He was given dual antiplatelet therapy and IV heparin drip.  Due to volume overload he was initiated on IV Lasix.     His wife of 54 years is at bedside.     Review of Systems  Review of Systems   Respiratory:  Negative for shortness of breath.    Cardiovascular:  Positive for chest pain.       Past Medical History  Prostate cancer  CAD with CHF  COPD on O2    Surgical History  Prostate cancer surgery twice    Family History  family history is not on file.    Social History   Lives with his wife  He smokes    Medications  Prior to Admission Medications   Prescriptions Last Dose Informant Patient Reported? Taking?   acetaminophen (TYLENOL) 500 MG Tab 4/5/2025 at 11:00 PM Significant Other Yes Yes   Sig: Take 1,000 mg by mouth every four hours as needed for Mild Pain. 1,000 mg = 2 tabs   acetaminophen-codeine #3 (TYLENOL #3) 300-30 MG Tab 4/5/2025 at  6:00 PM Significant Other Yes Yes   Sig: Take 1 Tablet by mouth 4 times a day.    aspirin (ASA) 325 MG Tab 4/5/2025 at 11:00 PM Significant Other Yes Yes   Sig: Take 650 mg by mouth one time as needed for Mild Pain. 650 mg = 2 tabs   aspirin 81 MG EC tablet 4/5/2025 Significant Other Yes Yes   Sig: Take 81 mg by mouth every day.   clopidogrel (PLAVIX) 75 MG Tab 4/5/2025 Significant Other Yes Yes   Sig: Take 75 mg by mouth every day.   digoxin (LANOXIN) 125 MCG Tab 4/5/2025 Morning Significant Other Yes Yes   Sig: Take 125 mcg by mouth every 48 hours.   famotidine (PEPCID) 40 MG Tab 4/5/2025 Significant Other Yes Yes   Sig: Take 40 mg by mouth every day.   furosemide (LASIX) 40 MG Tab 4/5/2025 Morning Significant Other Yes Yes   Sig: Take 40 mg by mouth every 48 hours.   gabapentin (NEURONTIN) 600 MG tablet 4/5/2025 Evening Significant Other Yes Yes   Sig: Take 600 mg by mouth 3 times a day.   pantoprazole (PROTONIX) 40 MG Tablet Delayed Response 4/5/2025 Significant Other Yes Yes   Sig: Take 40 mg by mouth every day.   tamsulosin (FLOMAX) 0.4 MG capsule 4/5/2025 Significant Other Yes Yes   Sig: Take 0.4 mg by mouth every day.      Facility-Administered Medications: None       Allergies  No Known Allergies    Physical Exam  Temp:  [35.6 °C (96.1 °F)-36.1 °C (96.9 °F)] 36.1 °C (96.9 °F)  Pulse:  [] 88  Resp:  [7-33] 21  BP: ()/(44-67) 98/54  SpO2:  [87 %-99 %] 99 %    Physical Exam  Vitals and nursing note reviewed.   Constitutional:       Appearance: He is ill-appearing.   Cardiovascular:      Rate and Rhythm: Normal rate and regular rhythm.   Pulmonary:      Effort: Pulmonary effort is normal.      Breath sounds: Normal breath sounds.      Comments: 3 liters oxygen  Musculoskeletal:      Right lower leg: No edema.      Left lower leg: No edema.   Neurological:      General: No focal deficit present.      Mental Status: He is alert and oriented to person, place, and time.         Fluids  Date 04/07/25 0700 - 04/08/25 0659   Shift 8814-0343 7152-9109 5127-4941 24 Hour Total   INTAKE    I.V. 74.2   74.2   Shift Total 74.2   74.2   OUTPUT   Urine 725   725   Shift Total 725   725   Weight (kg) 90.3 90.3 90.3 90.3       Laboratory  Recent Labs     04/06/25  0540 04/07/25  0213   WBC 14.5* 10.6   RBC 4.39* 4.33*   HEMOGLOBIN 12.3* 12.7*   HEMATOCRIT 38.4* 38.1*   MCV 87.5 88.0   MCH 28.0 29.3   MCHC 32.0* 33.3   RDW 47.4 47.8   PLATELETCT 198 172   MPV 10.9 11.3     Recent Labs     04/06/25  0540 04/07/25  0213   SODIUM 140 136   POTASSIUM 4.1 3.5*   CHLORIDE 105 98   CO2 23 28   GLUCOSE 174* 112*   BUN 13 15   CREATININE 0.81 0.89   CALCIUM 9.1 9.2     Recent Labs     04/06/25  0540   APTT 34.4   INR 1.11                 Imaging  DX-CHEST-LIMITED (1 VIEW)   Final Result      1.  Left lower lung airspace disease.   2.  Possible left pleural effusion.   3.  Cardiomegaly.      DX-CHEST-PORTABLE (1 VIEW)   Final Result         1.  Hazy left pulmonary infiltrates, slightly increased since prior study   2.  Right lung base atelectasis or infiltrate, new since prior study   3.  Cardiomegaly      CT-HEAD W/O   Final Result         1.  No acute intracranial abnormality is identified, there are nonspecific white matter changes, commonly associated with small vessel ischemic disease.  Associated mild cerebral atrophy is noted.   2.  Atherosclerosis.               EC-ECHOCARDIOGRAM COMPLETE W/ CONT   Final Result      EC-ECHOCARDIOGRAM COMPLETE W/O CONT         DX-CHEST-PORTABLE (1 VIEW)   Final Result      Persistent left-sided airspace disease      CL-LEFT HEART CATHETERIZATION WITH POSSIBLE INTERVENTION    (Results Pending)       Assessment/Plan  * Acute MI (HCC)- (present on admission)  Assessment & Plan  Status post TNK prior to transfer  ICU admission on an IV heparin drip  Echo reveals EF 25%  Heart cath on 4/7  Aspirin and plavix  High intensity statin    Heart failure with reduced ejection fraction (HCC) (LVEF 20-25% Echo 2/2025)- (present on admission)  Assessment & Plan  Ischemic cardiomyopathy  ejection fraction 2025%  Currently on IV Lasix  Once he is stabilized consider goal-directed medical therapy as tolerated based on creatinine and blood pressure    Tobacco abuse- (present on admission)  Assessment & Plan  Cessation discussed  Nicotine patch     Pleural effusion- (present on admission)  Assessment & Plan  Left sided.   IV lasix  Follow urine output  BMP ordered for the morning    COPD (chronic obstructive pulmonary disease) ([facility])- (present on admission)  Assessment & Plan  Oxygen dependent 2 liters  Without exacerbation     Pulmonary hypertension ([facility])- (present on admission)  Assessment & Plan  RSVP 55 mmHg  This in conjunction with decreased ejection fraction but him at significant risk of volume overload    Chronic respiratory failure ([facility])  Assessment & Plan  On 2 liters baseline  On 3 liters currently  Continuous pulse oxymetry    CAD (coronary artery disease) s/p Stent 10/2022  Assessment & Plan  Previously 2 stents    Diabetes ([facility])- (present on admission)  Assessment & Plan  A1C 6.5  Farxiga started.

## 2025-04-07 NOTE — PROGRESS NOTES
Report received from [facility] Cath lab RN. Patient brought to T625. Patient is alert and on 3L oxymask (increased up to 5L by this RN for hypoxia); he denies chest pain. Patient has a right radial cath site with a TR band with 13ml, per report received. Patient's vital signs are set to take automatically on the GE monitor every 15 minutes. Patient has been educated to limit movement with his right wrist; he verbalizes understanding.

## 2025-04-07 NOTE — PROGRESS NOTES
After 2200 neuro assessment on 4/6, notified MD that pt appeared increasingly lethargic with increasingly slurred speech. MD noted minor R facial drooping on exam. STAT head CT ordered and pt transported to CT.

## 2025-04-07 NOTE — PROGRESS NOTES
4 Eyes Skin Assessment Completed by MARYBEL Recinos and MARYBEL Boyd.    Head WDL  Ears WDL  Nose WDL  Mouth WDL  Neck WDL  Breast/Chest WDL  Shoulder Blades WDL  Spine WDL  (R) Arm/Elbow/Hand Bruising  (L) Arm/Elbow/Hand Bruising  Abdomen Bruising  Groin WDL  Scrotum/Coccyx/Buttocks Redness and Blanching  (R) Leg WDL  (L) Leg bruising  (R) Heel/Foot/Toe Scab  (L) Heel/Foot/Toe Scab          Devices In Places ECG, Blood Pressure Cuff, and Pulse Ox      Interventions In Place Gray Ear Foams, Heel Mepilex, Sacral Mepilex, Pillows, Heels Loaded W/Pillows, and Pressure Redistribution Mattress    Possible Skin Injury No    Pictures Uploaded Into Epic Yes  Wound Consult Placed N/A  RN Wound Prevention Protocol Ordered No

## 2025-04-07 NOTE — PROCEDURES
Cardiac Catheterization report    4/7/2025  4:42 PM    Referring MD: Dr. Fischer    Indication/Preoperative diagnosis:  Chest pain rule out ACS  Left lower lobe pneumonia  Cardiomyopathy  CAD status post prior PCI  TNK administration for left bundle branch block    Postoperative diagnosis:  Patent previously placed RCA stent  Mild nonobstructive coronary artery disease  LVEDP 9 mmHg  Left lower lobe pneumonia  No evidence of acute coronary syndrome  Nonischemic cardiomyopathy    Recommendations:  Guideline directed medical therapy   Cardiovascular Risk factor modification      Procedures performed:  Coronary arteriograms  Left heart catheterization   Supervision moderate sedation      FINDINGS:  I.  HEMODYNAMICS   Ao: 92/51 mmHg   LVEDP: 9 mmHg   Gradient on LV pullback: No    II. CORONARY ANGIOGRAPHY:  Left main coronary artery: Large bifurcating no CAD  Left anterior descending artery: Large-caliber wraparound apex mild nonobstructive CAD  Left circumflex coronary artery: Moderate caliber to large caliber mild nonobstructive CAD.  Right coronary artery: Small caliber widely patent previously placed stent.      HISTORY:  79-year-old male presents with an outside facility for shortness of breath and left-sided chest discomfort.  He was noted to have a left bundle branch block and was administered thrombolytic therapy for presumed acute coronary syndrome.  Since arrival his troponins have been flat and he has no objective evidence of acute coronary syndrome.  He does however have leukocytosis that is improving, elevated procalcitonin, consolidation of the left lower lobe with overlying pleural effusion.  He was subsequently found to have a reduced ejection fraction not previously known.  He is referred for coronary angiography in this context.    Procedure details:  The risks and benefits of cardiac catheterization and possible intervention were explained to the patient including death, heart attack, stroke, and  emergency surgery.  The patient verbalized understanding and wished to proceed.  The patient was brought to the cardiac catheterization laboratory in the fasting state and prepped and draped in the usual sterile fashion.  The right wrist was locally anesthetized with lidocaine and the right radial artery was cannulated with 5/6-Malagasy equipment and standard radial cocktail was given.  Coronary angiography was performed using standard  diagnostic catheters in the usual fashion and used to cross the aortic valve to perform  left heart catheterization. All catheters and guidewires were removed.  A TR-Band was placed without difficulty to achieve patent hemostasis.  Patient tolerated procedure well left the Cath Lab in stable condition.    Complications:  None apparent    Sedation time:  Moderate sedation directly monitored by me during the case while supervising the administration of the sedation medication by an independent trained RN to assist in the monitoring of the patient's level of consciousness and physiological status. I, the supervising physician was present the entire time from beginning of medication administration until the end of the procedure from 1620 until 1639. For detailed administration records please see the moderate sedation documentation in the media tab.    Following the procedure I discussed the results with the patient and the patients designated contact/family member who did not answer and a voicemail was left.    Jay Gates MD, FACC, [facility]

## 2025-04-07 NOTE — CARE PLAN
Problem: Pain - Standard  Goal: Alleviation of pain or a reduction in pain to the patient’s comfort goal  Outcome: Progressing  Note: Pain improved with ordered PRNs      Problem: Acute Care of the Heart Failure Patient  Goal: Optimal Outcome for the HF Patient  Outcome: Progressing     Problem: Oxygenation/Respiratory Function  Goal: Patient will Achieve/Maintain Optimum Respiratory Rate/Effort  Outcome: Progressing     Problem: Urinary Elimination  Goal: Establish and maintain regular urinary output  Outcome: Progressing  Note: Condom cath applied, increased UOP after diuretics

## 2025-04-07 NOTE — WOUND TEAM
[facility]  Inpatient Services  Wound and Skin Care Brief Evaluation    Admission Date: 4/6/2025     Last order of IP CONSULT TO WOUND CARE was found on 4/6/2025 from Hospital Encounter on 4/6/2025     HPI, PMH, SH: Reviewed    Chief Complaint   Patient presents with    Chest Pain     Patient transferred from [facility] for STEMI. Patient initially presented to [facility] for RLQ pain with nausea and vomiting. Code STEMI cancelled by Cardiology in trauma bay     Diagnosis: Acute MI (HCC) [I21.9]    Unit where seen by Wound Team: T625/00     Wound consult placed regarding left posterior ear. Chart and images reviewed. This discussed with bedside RN Grisel. This clinician in to assess patient. Patient pleasant and agreeable. Left posterior ear pink and intact.    No pressure injuries or advanced wound care needs identified. Wound consult completed. No further follow up unless indicated and consulted.     Left Ear         PREVENTATIVE INTERVENTIONS:    Q shift  - performed per nursing policy  Q shift pressure point assessments - performed per nursing policy    Surface/Positioning  ICU Low Airloss - Currently in Place  Reposition q 2 hours - Currently in Place  TAPs Turning system - Currently in Place    Offloading/Redistribution  Sacral offloading dressing (Silicone dressing) - Currently in Place  Heel offloading dressing (Silicone dressing) - Currently in Place  Float Heels off Bed with Pillows - Currently in Place           Respiratory  Silicone O2 tubing - Currently in Place  Gray Foam Ear protectors - Currently in Place

## 2025-04-07 NOTE — CARE PLAN
The patient is Watcher - Medium risk of patient condition declining or worsening    Shift Goals  Clinical Goals: serial neuro assessments, hemodynamic stability  Patient Goals: rest  Family Goals: updates    Problem: Pain - Standard  Goal: Alleviation of pain or a reduction in pain to the patient’s comfort goal  Outcome: Progressing  Note: Assessed q2h and PRN. Pt c/o 7/10 L arm and chest pain moderately relieved by oxycodone and lidocaine patch.     Problem: Hemodynamic Status  Goal: Stable Vital Signs and Fluid Balance  Outcome: Progressing  Note: Pt hemodynamically stable this shift.     Progress made toward(s) clinical / shift goals:  See above.

## 2025-04-07 NOTE — PROGRESS NOTES
Cardiology Follow-up Note    Name:   Donte Agustin     YOB: 1945  Age:   79 y.o.  male   MRN:   [id number]        Attending Provider: Dr Mesfin Ham M.D.     Chief Complaint: Chest Pain (Patient transferred from [facility] for STEMI. Patient initially presented to [facility] for RLQ pain with nausea and vomiting. Code STEMI cancelled by Cardiology in trauma bay)       Reason for cardiology consult: STEMI    Outpatient Cardiologist:  Dr. Gama, in [place], [place]    History of Present Illness  Donte Agustin is 79 y.o. male with prior medical history significant for CAD s/p stents x 2 in 2022, severe aortic stenosis, systolic heart failure with LVEF 35%, chronic LBBB, diabetes, remote prostate cancer (radiation over 10 years ago)  who was admitted transfer from [facility] on 4/6/2025 with STEMI.  S/p tPA and Lovenox initiation at [facility].  On arrival to [facility], STEMI was canceled due to the resolution of ST elevation over precordial leads.      Interim Events 04/07/25   :  - Personal Telemetry interpretation: Sinus-sinus tachycardia.  1 episode of 7 beats of V. tach at almost 10 PM yesterday  - Overnight events: No Cardiac events   Reports left arm soreness that radiates to the left armpit.  Unsure of the quality or severity.  Patient denies, shortness of breath, edema, dizziness/lightheadedness, or palpitations.   - Vitals: 102/56  - Labs reviewed: Potassium 3.5, creatinine 0.89.  Troponin remains flat 60s-80  - I/O's: 1 L out yesterday  - Weight: 199 pounds           Review of Systems   Constitutional:  Positive for malaise/fatigue.   Respiratory:  Positive for cough. Negative for shortness of breath.    Cardiovascular:  Negative for chest pain, palpitations and leg swelling.   Musculoskeletal:  Positive for myalgias.        Left arm is sore   Neurological:  Negative for dizziness and headaches.   Psychiatric/Behavioral: Negative.          Medical History  History reviewed.  "No pertinent past medical history.      History reviewed. No pertinent family history.             No Known Allergies      Medications   Scheduled Medications   Medication Dose Frequency    lidocaine  1 Patch Q24HR    Pharmacy Consult Request  1 Each PHARMACY TO DOSE    insulin lispro  1-6 Units Q6HRS    tamsulosin  0.4 mg AFTER BREAKFAST    gabapentin  600 mg BID    aspirin  81 mg DAILY    [Held by provider] nicotine  14 mg Daily-0600    omeprazole  20 mg DAILY    clopidogrel  75 mg DAILY    furosemide  40 mg BID DIURETIC           Physical Exam    Body mass index is 31.18 kg/m².     /56   Pulse 89   Temp (!) 35.6 °C (96.1 °F) (Temporal)   Resp 19   Ht 1.702 m (5' 7\")   Wt 90.3 kg (199 lb 1.2 oz)   SpO2 96%      Vitals:    04/07/25 0500 04/07/25 0515 04/07/25 0600 04/07/25 0700   BP: 101/58  103/56 102/56   Pulse: 86 93 92 89   Resp: 15 (!) 27 (!) 33 19   Temp:       TempSrc:       SpO2: 99% 96% 93% 96%   Weight:       Height:            Oxygen Therapy:  Pulse Oximetry: 96 %, O2 (LPM): 5, O2 Delivery Device: Silicone Nasal Cannula      Physical Exam  Constitutional:       Appearance: He is ill-appearing.   Cardiovascular:      Rate and Rhythm: Regular rhythm. Tachycardia present.      Heart sounds: No murmur heard.  Pulmonary:      Breath sounds: Decreased breath sounds present. No wheezing or rales.   Abdominal:      General: There is no distension.      Palpations: Abdomen is soft.   Musculoskeletal:      Right lower leg: No edema.      Left lower leg: No edema.   Skin:     General: Skin is warm and dry.      Coloration: Skin is pale.   Neurological:      Mental Status: He is alert. Mental status is at baseline. He is disoriented.   Psychiatric:         Behavior: Behavior normal.               Labs (personally reviewed):     Estimated Creatinine Clearance: 74.5 mL/min (by C-G formula based on SCr of 0.89 mg/dL).    No results found for: \"BNPBTYPENAT\"  Recent Labs     04/06/25  0540 04/07/25  0213 " "  CREATININE 0.81 0.89   BUN 13 15   POTASSIUM 4.1 3.5*   SODIUM 140 136   CALCIUM 9.1 9.2   CO2 23 28   ALBUMIN 3.5 3.4     Recent Labs     04/06/25  0540 04/07/25  0213   GLUCOSE 174* 112*     Recent Labs     04/06/25  0540 04/07/25  0213   ASTSGOT 15 13   ALTSGPT <5 <5   ALKPHOSPHAT 66 62   INR 1.11  --      Recent Labs     04/06/25  0540 04/07/25  0213   WBC 14.5* 10.6   HEMOGLOBIN 12.3* 12.7*   PLATELETCT 198 172     Recent Labs     04/06/25  0540 04/06/25  0815 04/06/25  1415 04/06/25  1947/25  0213   TROPONINT 60*   < > 78* 81* 80*   NTPROBNP 6318*  --   --   --   --    HBA1C  --   --   --   --  6.5*    < > = values in this interval not displayed.     No results found for: \"CHOLSTRLTOT\", \"LDL\", \"HDL\", \"TRIGLYCERIDE\"    Imaging:  DX-CHEST-PORTABLE (1 VIEW)   Final Result         1.  Hazy left pulmonary infiltrates, slightly increased since prior study   2.  Right lung base atelectasis or infiltrate, new since prior study   3.  Cardiomegaly      CT-HEAD W/O   Final Result         1.  No acute intracranial abnormality is identified, there are nonspecific white matter changes, commonly associated with small vessel ischemic disease.  Associated mild cerebral atrophy is noted.   2.  Atherosclerosis.               EC-ECHOCARDIOGRAM COMPLETE W/ CONT   Final Result      EC-ECHOCARDIOGRAM COMPLETE W/O CONT         DX-CHEST-PORTABLE (1 VIEW)   Final Result      Persistent left-sided airspace disease          Cardiac Imaging and Procedures Review        ECHOCARDIOGRAM 4/6/2025:  Severely reduced left ventricular systolic function.  The left ventricular ejection fraction is visually estimated to be 25-  30%.  Severe global hypokinesis with abnormal septal motion.  Diastolic function is difficult to assess due to underlying rhythm.  The right ventricle is not well visualized, but appears grossly normal   in size with reduced systolic function.  Estimated right ventricular systolic pressure is 55 mmHg.  Biatrial " dilation.  Mild mitral regurgitation.  Mild aortic stenosis. Dimensionless index is 0.46.  Dilated inferior vena cava without inspiratory collapse.        Principal Problem:    Acute MI (HCC) (POA: Yes)  Active Problems:    Diabetes (HCC) (POA: Yes)    Heart failure with reduced ejection fraction (HCC) (LVEF 20-25% Echo 2/2025) (POA: Yes)    CAD (coronary artery disease) s/p Stent 10/2022 (POA: Unknown)    Severe aortic stenosis (POA: Unknown)    Chronic respiratory failure (HCC) (POA: Unknown)    Elevated troponin (POA: Unknown)    Diabetic neuropathy (HCC) (POA: Unknown)    Hyperlipidemia (POA: Unknown)    GERD (gastroesophageal reflux disease) (POA: Unknown)    BPH (benign prostatic hyperplasia) (POA: Unknown)  Resolved Problems:    * No resolved hospital problems. *      Assessment and Medical Decision Making:    STEMI  S/p tPA administration on 4/6/2025  Chronic left bundle branch block  Ischemic cardiomyopathy  HFrEF, LVEF 25-30%  Coronary artery disease, s/p PCI 2022  Aortic stenosis, mild  Mitral regurgitation, mild  Diabetes mellitus type 2  Hypertension      Recommendations:  STEMI s/p tPA  Coronary artery disease,   -S/p PCI in 2022 at [place], [place] on 10/23/2022 -First OM ([place] stent 2.5 x 22) and to proximal RCA ([place] stent 2.25 x 18) per wallet stent card  -Antiocoagulant: Heparin gtt   -continue aspirin 81 mg daily  -Clopidogrel 75 mg daily   -HI Statin, start atorvastatin 40 mg nightly- for goal LDL <70, ideally <55. Last LDL  -Obtain lipid panel  -BB -start metoprolol tartrate 25 mg twice daily.  Transition to succinate if tolerates well.  - echocardiogram shows severely decreased systolic function with LVEF 25-30% and severe global hypokinesis with abnormal septal motion.  Mild MR, mild AS.  Preserved RV systolic function.  -Plan for coronary angiogram today  -Keep n.p.o. status    Ischemic cardiomyopathy  LVEF 25-30%  Volume status  - appears slightly overloaded.  NT proBNP  60 318  Systolic blood pressures.  Initiate GDMT when clinically appropriate  -Diuretic: Furosemide 40 mg IV twice daily  -SGLT2i: Start Farxiga 10 milligram daily after the procedure  -Labs: Daily BMP  -Daily weights; Strict I+O’s: 1 L out yesterday          No future appointments.     I personally discussed his case with  Dr Mesfin Ham M.D.    Please contact me with any questions.  Thank you for allowing us to participate in the care of Mr. Agustin.        COLIN Singh  [facility]  [phone]      Please see Dr. Fischer  attestation for further details and MDM. I, COLIN Singh performed a portion of the service face-to-face with the same patient on the same date of service independently of Dr. Fischer FOR 15 minutes preparing to see the patient, reviewing hospital notes and tests, obtaining history from the patient, performing a medically appropriate exam, counseling and educating the patient, ordering medications/tests/procedures/referrals as clinically indicated, and documenting information in the electronic medical record.    Please note this dictation was created using voice recognition software.  I have made every reasonable attempt to correct obvious errors, but there may be errors of grammar and possibly content that I did not discover before finalizing the note.

## 2025-04-07 NOTE — ASSESSMENT & PLAN NOTE
RSVP 55 mmHg  This in conjunction with decreased ejection fraction but him at significant risk of volume overload

## 2025-04-07 NOTE — PROGRESS NOTES
4 Eyes Skin Assessment Completed by MARYBEL Comer and MARYBEL Rodriguez.    Head WDL  Ears Redness and Blanching, Non-asif on left ear   Nose WDL  Mouth WDL  Neck WDL  Breast/Chest WDL  Shoulder Blades WDL  Spine Redness and Blanching  (R) Arm/Elbow/Hand Redness, Blanching, and Scab, Bruising   (L) Arm/Elbow/Hand Redness, Blanching, and Scab  Abdomen WDL  Groin Redness and Blanching  Scrotum/Coccyx/Buttocks Redness and Blanching  (R) Leg Scab and Swelling  (L) Leg Scab and Swelling  (R) Heel/Foot/Toe Redness and Blanching  (L) Heel/Foot/Toe Redness and Blanching          Devices In Places ECG, Blood Pressure Cuff, Pulse Ox, SCD's, and Nasal Cannula      Interventions In Place Gray Ear Foams, Heel Mepilex, Sacral Mepilex, TAP System, Q2 Turns, Barrier Cream, and Heels Loaded W/Pillows    Possible Skin Injury Yes    Pictures Uploaded Into Epic Yes  Wound Consult Placed Yes  RN Wound Prevention Protocol Ordered Yes

## 2025-04-07 NOTE — PROGRESS NOTES
This RN contacted Anna Tabares, APRN, to clarify if patient is ok to get Farxiga before going to cath lab. Per Francy, patient is ok to get Farxiga. She questioned whether patient is on 9L now, and this RN confirmed that he is. Raysa came to bedside to assess patient. STAT chest x ray ordered and reviewed by APRN. Patient sat up straight (he was previously refusing) and his oxygen saturation improved. According to patient he is on a baseline of 2L home O2 (this RN was told in report that patient is on 5L baseline). Chest X ray reviewed by APRN. Patient titrated down to 7L oxymask with an oxygen saturation of 94%.

## 2025-04-07 NOTE — PROGRESS NOTES
[facility] Internal Medicine ICU Daily Progress Note    Date of Service  4/7/2025    [facility] Team: [facility] ICU Gold Team   Attending: Mefsin Ham M.d.  Senior Resident: Dr. Isra Todd  Contact Number: [phone]    Chief Complaint  Donte Agustin is a 79 y.o. male admitted 4/6/2025 with chest pain.     Hospital Course  Donte Agustin is 79 y.o. male with prior medical history significant for CAD s/p stents x 2 in 2022, severe aortic stenosis, systolic heart failure with LVEF 35%, chronic LBBB, diabetes, remote prostate cancer (radiation over 10 years ago)  who was admitted transfer from [facility] on 4/6/2025 with STEMI.  S/p tPA and Lovenox initiation at [place] [facility].  On arrival to [facility], STEMI was canceled due to the resolution of ST elevation over precordial leads.     Interval Problem Update  24-hour update:   -Overnight no acute distress, Patient continues to complain chest pain, but is getting better on the Nitropatch.   -Troponin slightly trending up, peaked at around 81.  EKG has no significant change. Cardiologist has evaluated him at bedside in the morning and plan to do cardiac cath today.    -Patient is otherwise hemodynamically stable, no pressor needed; he is on 5 L oxygen which is close to his baseline  -24-hour I/O: -482.4  -Hemoglobin 12.7, normal renal and liver function, LDL 48  -Echo shows severely reduced left ventricular systolic function, which is estimated to be 25-30%.  -Patient will be transferred out of ICU today.     I have discussed this patient's plan of care and discharge plan at IDT rounds today with Case Management, Nursing, Nursing leadership, and other members of the IDT team.    Consultants/Specialty  cardiology    Code Status  Full Code    Disposition  The patient is not medically cleared for discharge to home or a post-acute facility.      Patient will be transferred to telemetry today.     Review of Systems  Review of Systems   Constitutional:  Negative for chills and fever.   Eyes:  Negative  for blurred vision.   Respiratory:  Positive for shortness of breath. Negative for cough.    Cardiovascular:  Positive for chest pain. Negative for palpitations, orthopnea and leg swelling.   Gastrointestinal:  Negative for abdominal pain, heartburn, nausea and vomiting.   Musculoskeletal:  Negative for back pain and neck pain.   Neurological:  Negative for dizziness and headaches.        Physical Exam  Temp:  [35.6 °C (96.1 °F)-36.1 °C (96.9 °F)] 35.8 °C (96.4 °F)  Pulse:  [] 93  Resp:  [7-33] 18  BP: ()/(44-67) 104/58  SpO2:  [87 %-99 %] 97 %    Physical Exam  Constitutional:       General: He is not in acute distress.     Appearance: He is ill-appearing.   HENT:      Head: Normocephalic.      Nose: Nose normal.   Eyes:      Extraocular Movements: Extraocular movements intact.      Pupils: Pupils are equal, round, and reactive to light.   Cardiovascular:      Rate and Rhythm: Normal rate and regular rhythm.   Pulmonary:      Effort: Pulmonary effort is normal. No respiratory distress.      Breath sounds: No wheezing.      Comments: Breathing sounds decreased bilateral slightly  Abdominal:      General: Abdomen is flat.      Tenderness: There is no abdominal tenderness. There is no guarding.   Musculoskeletal:         General: No swelling or tenderness.   Skin:     General: Skin is warm.      Capillary Refill: Capillary refill takes 2 to 3 seconds.   Neurological:      General: No focal deficit present.      Mental Status: He is alert and oriented to person, place, and time.         Fluids    Intake/Output Summary (Last 24 hours) at 4/7/2025 1235  Last data filed at 4/7/2025 1200  Gross per 24 hour   Intake 666.8 ml   Output 1950 ml   Net -1283.2 ml       Laboratory  Recent Labs     04/06/25  0540 04/07/25  0213   WBC 14.5* 10.6   RBC 4.39* 4.33*   HEMOGLOBIN 12.3* 12.7*   HEMATOCRIT 38.4* 38.1*   MCV 87.5 88.0   MCH 28.0 29.3   MCHC 32.0* 33.3   RDW 47.4 47.8   PLATELETCT 198 172   MPV 10.9 11.3      Recent Labs     04/06/25  0540 04/07/25  0213   SODIUM 140 136   POTASSIUM 4.1 3.5*   CHLORIDE 105 98   CO2 23 28   GLUCOSE 174* 112*   BUN 13 15   CREATININE 0.81 0.89   CALCIUM 9.1 9.2     Recent Labs     04/06/25  0540   APTT 34.4   INR 1.11         Recent Labs     04/07/25  0937   TRIGLYCERIDE 127   HDL 53   LDL 48       Imaging  DX-CHEST-PORTABLE (1 VIEW)   Final Result         1.  Hazy left pulmonary infiltrates, slightly increased since prior study   2.  Right lung base atelectasis or infiltrate, new since prior study   3.  Cardiomegaly      CT-HEAD W/O   Final Result         1.  No acute intracranial abnormality is identified, there are nonspecific white matter changes, commonly associated with small vessel ischemic disease.  Associated mild cerebral atrophy is noted.   2.  Atherosclerosis.               EC-ECHOCARDIOGRAM COMPLETE W/ CONT   Final Result      EC-ECHOCARDIOGRAM COMPLETE W/O CONT         DX-CHEST-PORTABLE (1 VIEW)   Final Result      Persistent left-sided airspace disease      CL-LEFT HEART CATHETERIZATION WITH POSSIBLE INTERVENTION    (Results Pending)        Assessment/Plan  Problem Representation:    * Acute MI (HCC)- (present on admission)  Assessment & Plan  AT [facility], EKG presented  ST elevation V2-V4, concerning for acute STEMI. Received 45 mg TNK around 0422, 324 mg ASA,  Lovenox 90mg and atorvastatin 80mg at [place].  Patient is admitted to cardiac ICU for overnight monitoring symptoms and trending troponin.  Overnight, patient continues to have slight chest pain and uptrending troponin.   - Continue heparin drip   - Aspirin 81 mg and Plavix 75 mg daily  - If patient become hypertensive, will consider nitro infusion  - Sublingual nitroglycerin prn for chest pain   - Continue high-dose intensity statin  - Cardiology following, appreciate recommendation. Considering cardiac cath 4/7/25    BPH (benign prostatic hyperplasia)  Assessment & Plan  - Continue home  tamsulosin    GERD (gastroesophageal reflux disease)  Assessment & Plan  - Continue home pantoperazole    Hyperlipidemia  Assessment & Plan  Received atorvastatin 80mg at [place], LDL less than 55  - Continue atorvastatin 40 mg      Diabetic neuropathy (HCC)  Assessment & Plan  - Continue home gabapentin 600mg BID    Elevated troponin  Assessment & Plan  AT [facility] ED, presented elevated trop of 60. With EKG, concerning for STEMI  - Same plan as acute MI    Chronic respiratory failure (HCC)  Assessment & Plan  Patient stated that he used oxygen 5-7L at home. He has been smoking 1.5 pack per day for past one and half year. No significant past medical history of smoking. Patient is poor historian. He stated that he had pulmonary function test in the past. CXR presented left lung opacities, no significant change compared to 6/11/2023. Reports productive cough started about 15 days ago.  - Supplement O2 as needed  - Wean oxygen to 88-92%  - RT therapy protocol placed  - PFT as outpatient    Severe aortic stenosis  Assessment & Plan  Echo 2/2025 presented severe aortic stenosis. He was offered TAVR in 2/25 but patient refused to proceed it.  Repeated echo on 4/6 showed mild aortic stenosis    CAD (coronary artery disease) s/p Stent 10/2022  Assessment & Plan  From note on 6/11/2023, patient had two stents placed on 10/2022. Patient stated that he had only one stent placed in 10/2022. Received 324mg Aspirin at [facility] this morning  - Continue home Aspirin 81 mg daily (starting from 4/7/25)  - Rest of plan same as Acute MI  - Will need medical record for previous stent placement on 10/2022    Heart failure with reduced ejection fraction (HCC) (LVEF 20-25% Echo 2/2025)- (present on admission)  Assessment & Plan  Echocardiogram on 2/25 presented severe hear failure with EF of 20-25% and severe aortic stenosis, low-grade, low flow with valve area of 1cm^2, grade 2 diastolic function with mildly enlarged right ventricle,  decreased right ventricular systolic function, mild MR, TR and mild pulmonary hypertension.   Repeated echo on 4/6 shows severe heart failure with LVEF 25%, NT proBNP above 6000  Patient is not obvious fluid overload  - Lasix twice daily  - Daily weight and strict I/O  - Metoprolol tartrate 25 mg twice daily, Farxiga 10 mg after cardiac cath procedure  - Daily BMP      Diabetes (HCC)- (present on admission)  Assessment & Plan  Not on any home medication, HbA1c 6.5  - SSI  - Hypoglycemic protocol         VTE prophylaxis: therapeutic anticoagulation with heaprin    I have performed a physical exam and reviewed and updated ROS and Plan today (4/7/2025). In review of yesterday's note (4/6/2025), there are no changes except as documented above.

## 2025-04-07 NOTE — ASSESSMENT & PLAN NOTE
Status post TNK prior to transfer  ICU admission on an IV heparin drip  Echo reveals EF 25%  Heart cath on 4/7  Aspirin and plavix  High intensity statin

## 2025-04-07 NOTE — HOSPITAL COURSE
Donte Agustin is 79 y.o. male with prior medical history significant for CAD s/p stents x 2 in 2022, severe aortic stenosis, systolic heart failure with LVEF 35%, chronic LBBB, diabetes, remote prostate cancer (radiation over 10 years ago)  who was admitted transfer from [facility] on 4/6/2025 with STEMI.  S/p tPA and Lovenox initiation at [place] [facility].  On arrival to [facility], STEMI was canceled due to the resolution of ST elevation over precordial leads.

## 2025-04-08 ENCOUNTER — PHARMACY VISIT (OUTPATIENT)
Dept: PHARMACY | Facility: MEDICAL CENTER | Age: 80
End: 2025-04-08
Payer: COMMERCIAL

## 2025-04-08 ENCOUNTER — PATIENT OUTREACH (OUTPATIENT)
Dept: SCHEDULING | Facility: IMAGING CENTER | Age: 80
End: 2025-04-08
Payer: MEDICARE

## 2025-04-08 VITALS
BODY MASS INDEX: 30.31 KG/M2 | DIASTOLIC BLOOD PRESSURE: 60 MMHG | SYSTOLIC BLOOD PRESSURE: 107 MMHG | OXYGEN SATURATION: 98 % | RESPIRATION RATE: 20 BRPM | WEIGHT: 193.12 LBS | TEMPERATURE: 97.9 F | HEIGHT: 67 IN | HEART RATE: 84 BPM

## 2025-04-08 LAB
ANION GAP SERPL CALC-SCNC: 10 MMOL/L (ref 7–16)
BUN SERPL-MCNC: 21 MG/DL (ref 8–22)
CALCIUM SERPL-MCNC: 9 MG/DL (ref 8.5–10.5)
CHLORIDE SERPL-SCNC: 100 MMOL/L (ref 96–112)
CHOLEST SERPL-MCNC: 122 MG/DL (ref 100–199)
CO2 SERPL-SCNC: 28 MMOL/L (ref 20–33)
CREAT SERPL-MCNC: 1.02 MG/DL (ref 0.5–1.4)
GFR SERPLBLD CREATININE-BSD FMLA CKD-EPI: 76 ML/MIN/1.73 M 2
GLUCOSE BLD STRIP.AUTO-MCNC: 141 MG/DL (ref 65–99)
GLUCOSE BLD STRIP.AUTO-MCNC: 202 MG/DL (ref 65–99)
GLUCOSE BLD STRIP.AUTO-MCNC: 97 MG/DL (ref 65–99)
GLUCOSE SERPL-MCNC: 137 MG/DL (ref 65–99)
HDLC SERPL-MCNC: 44 MG/DL
LDLC SERPL CALC-MCNC: 53 MG/DL
POTASSIUM SERPL-SCNC: 4 MMOL/L (ref 3.6–5.5)
PROCALCITONIN SERPL-MCNC: 0.1 NG/ML
SODIUM SERPL-SCNC: 138 MMOL/L (ref 135–145)
TRIGL SERPL-MCNC: 127 MG/DL (ref 0–149)

## 2025-04-08 PROCEDURE — 700101 HCHG RX REV CODE 250

## 2025-04-08 PROCEDURE — 80048 BASIC METABOLIC PNL TOTAL CA: CPT

## 2025-04-08 PROCEDURE — 700102 HCHG RX REV CODE 250 W/ 637 OVERRIDE(OP): Performed by: INTERNAL MEDICINE

## 2025-04-08 PROCEDURE — A9270 NON-COVERED ITEM OR SERVICE: HCPCS | Performed by: HOSPITALIST

## 2025-04-08 PROCEDURE — A9270 NON-COVERED ITEM OR SERVICE: HCPCS | Performed by: INTERNAL MEDICINE

## 2025-04-08 PROCEDURE — 700102 HCHG RX REV CODE 250 W/ 637 OVERRIDE(OP)

## 2025-04-08 PROCEDURE — A9270 NON-COVERED ITEM OR SERVICE: HCPCS

## 2025-04-08 PROCEDURE — 94669 MECHANICAL CHEST WALL OSCILL: CPT

## 2025-04-08 PROCEDURE — 99233 SBSQ HOSP IP/OBS HIGH 50: CPT | Mod: FS | Performed by: INTERNAL MEDICINE

## 2025-04-08 PROCEDURE — 84145 PROCALCITONIN (PCT): CPT

## 2025-04-08 PROCEDURE — 99239 HOSP IP/OBS DSCHRG MGMT >30: CPT | Performed by: STUDENT IN AN ORGANIZED HEALTH CARE EDUCATION/TRAINING PROGRAM

## 2025-04-08 PROCEDURE — 82962 GLUCOSE BLOOD TEST: CPT | Mod: 91

## 2025-04-08 PROCEDURE — 700102 HCHG RX REV CODE 250 W/ 637 OVERRIDE(OP): Performed by: HOSPITALIST

## 2025-04-08 PROCEDURE — 80061 LIPID PANEL: CPT

## 2025-04-08 PROCEDURE — 700105 HCHG RX REV CODE 258: Performed by: INTERNAL MEDICINE

## 2025-04-08 PROCEDURE — 36415 COLL VENOUS BLD VENIPUNCTURE: CPT

## 2025-04-08 PROCEDURE — RXMED WILLOW AMBULATORY MEDICATION CHARGE: Performed by: STUDENT IN AN ORGANIZED HEALTH CARE EDUCATION/TRAINING PROGRAM

## 2025-04-08 RX ORDER — FUROSEMIDE 20 MG/1
20 TABLET ORAL DAILY
Qty: 30 TABLET | Refills: 3 | Status: SHIPPED | OUTPATIENT
Start: 2025-04-08

## 2025-04-08 RX ORDER — ACETAMINOPHEN 500 MG
1000 TABLET ORAL EVERY 6 HOURS PRN
Qty: 100 TABLET | Refills: 0 | Status: SHIPPED | OUTPATIENT
Start: 2025-04-08

## 2025-04-08 RX ORDER — LOSARTAN POTASSIUM 25 MG/1
25 TABLET ORAL DAILY
Qty: 100 TABLET | Refills: 3 | Status: SHIPPED | OUTPATIENT
Start: 2025-04-08 | End: 2026-05-13

## 2025-04-08 RX ORDER — METOPROLOL SUCCINATE 50 MG/1
50 TABLET, EXTENDED RELEASE ORAL EVERY EVENING
Qty: 30 TABLET | Refills: 3 | Status: SHIPPED | OUTPATIENT
Start: 2025-04-08

## 2025-04-08 RX ORDER — ATORVASTATIN CALCIUM 40 MG/1
40 TABLET, FILM COATED ORAL EVERY EVENING
Qty: 100 TABLET | Refills: 3 | Status: SHIPPED | OUTPATIENT
Start: 2025-04-08 | End: 2026-05-13

## 2025-04-08 RX ORDER — DAPAGLIFLOZIN 10 MG/1
10 TABLET, FILM COATED ORAL DAILY
Qty: 100 TABLET | Refills: 3 | Status: SHIPPED | OUTPATIENT
Start: 2025-04-09 | End: 2026-05-14

## 2025-04-08 RX ORDER — METOPROLOL SUCCINATE 50 MG/1
50 TABLET, EXTENDED RELEASE ORAL EVERY EVENING
Status: DISCONTINUED | OUTPATIENT
Start: 2025-04-08 | End: 2025-04-08 | Stop reason: HOSPADM

## 2025-04-08 RX ORDER — LOSARTAN POTASSIUM 25 MG/1
25 TABLET ORAL
Status: DISCONTINUED | OUTPATIENT
Start: 2025-04-08 | End: 2025-04-08 | Stop reason: HOSPADM

## 2025-04-08 RX ORDER — NICOTINE 21 MG/24HR
1 PATCH, TRANSDERMAL 24 HOURS TRANSDERMAL EVERY 24 HOURS
Qty: 28 PATCH | Refills: 1 | Status: SHIPPED | OUTPATIENT
Start: 2025-04-08

## 2025-04-08 RX ORDER — ASPIRIN 81 MG/1
81 TABLET ORAL DAILY
Qty: 100 TABLET | Refills: 0 | Status: SHIPPED | OUTPATIENT
Start: 2025-04-08

## 2025-04-08 RX ORDER — NITROGLYCERIN 0.4 MG/1
0.4 TABLET SUBLINGUAL PRN
Qty: 25 TABLET | Refills: 0 | Status: SHIPPED | OUTPATIENT
Start: 2025-04-08

## 2025-04-08 RX ADMIN — METOPROLOL TARTRATE 25 MG: 25 TABLET, FILM COATED ORAL at 04:25

## 2025-04-08 RX ADMIN — TAMSULOSIN HYDROCHLORIDE 0.4 MG: 0.4 CAPSULE ORAL at 08:24

## 2025-04-08 RX ADMIN — INSULIN LISPRO 2 UNITS: 100 INJECTION, SOLUTION INTRAVENOUS; SUBCUTANEOUS at 08:34

## 2025-04-08 RX ADMIN — DAPAGLIFLOZIN 10 MG: 10 TABLET, FILM COATED ORAL at 04:10

## 2025-04-08 RX ADMIN — CLOPIDOGREL BISULFATE 75 MG: 75 TABLET, FILM COATED ORAL at 04:10

## 2025-04-08 RX ADMIN — OMEPRAZOLE 20 MG: 20 CAPSULE, DELAYED RELEASE ORAL at 04:10

## 2025-04-08 RX ADMIN — GABAPENTIN 600 MG: 300 CAPSULE ORAL at 04:10

## 2025-04-08 RX ADMIN — LIDOCAINE 1 PATCH: 4 PATCH TOPICAL at 04:09

## 2025-04-08 RX ADMIN — ASPIRIN 81 MG: 81 TABLET, COATED ORAL at 06:00

## 2025-04-08 RX ADMIN — NICOTINE TRANSDERMAL SYSTEM 21 MG: 21 PATCH, EXTENDED RELEASE TRANSDERMAL at 04:10

## 2025-04-08 RX ADMIN — SODIUM CHLORIDE: 9 INJECTION, SOLUTION INTRAVENOUS at 00:08

## 2025-04-08 ASSESSMENT — ENCOUNTER SYMPTOMS
MYALGIAS: 1
PSYCHIATRIC NEGATIVE: 1
HEADACHES: 0
SHORTNESS OF BREATH: 0
COUGH: 1
DIZZINESS: 0
PALPITATIONS: 0

## 2025-04-08 ASSESSMENT — PAIN DESCRIPTION - PAIN TYPE: TYPE: ACUTE PAIN

## 2025-04-08 ASSESSMENT — FIBROSIS 4 INDEX: FIB4 SCORE: 2.74

## 2025-04-08 NOTE — PROGRESS NOTES
Report received from Laura NO. Updated on POC.  Assumed care of patient upon arrival to unit. Patient currently A & O x 4; on 5 L O2 NC; Pt placed on monitor, monitor room notified. Patient oriented to unit and to call light system. Call light within reach. Pt educated to fall risk. Fall precautions in place. Pt provided with personal grooming items. Bed locked and in lowest position. All questions answered. No other needs indicated at this time.

## 2025-04-08 NOTE — PROGRESS NOTES
4 Eyes Skin Assessment Completed by MARYBEL Ruano and JOSE Fried.    Head WDL  Ears WDL  Nose WDL  Mouth WDL  Neck WDL  Breast/Chest WDL  Shoulder Blades WDL  Spine WDL  (R) Arm/Elbow/Hand Scab  (L) Arm/Elbow/Hand WDL  Abdomen WDL  Groin Moisture  Scrotum/Coccyx/Buttocks Redness and Blanching  (R) Leg WDL  (L) Leg WDL  (R) Heel/Foot/Toe Redness and Blanching  (L) Heel/Foot/Toe Redness and Blanching          Devices In Places Tele Box, Blood Pressure Cuff, and Pulse Ox      Interventions In Place NC W/Ear Foams, Heel Mepilex, Sacral Mepilex, and TAP System    Possible Skin Injury No    Pictures Uploaded Into Epic Yes  Wound Consult Placed N/A  RN Wound Prevention Protocol Ordered Yes

## 2025-04-08 NOTE — PROGRESS NOTES
Bedside report received from off going RN/tech: Rebecca NO, assumed care of patient.     Fall Risk Score: MODERATE RISK  Fall risk interventions in place: Provide patient/family education based on risk assessment, Educate patient/family to call staff for assistance when getting out of bed, Place fall precaution signage outside patient door, Place patient in room close to nursing station, Utilize bed/chair fall alarm, and Bed alarm connected correctly  Bed type: Regular ( Score less than 17 interventions in place)  Patient on cardiac monitor: Yes  IVF/IV medications: Not Applicable   Oxygen: How many liters 5L  Bedside sitter: Not Applicable   Isolation: Not applicable

## 2025-04-08 NOTE — DISCHARGE PLANNING
Case Management Discharge Planning    Admission Date: 4/6/2025  GMLOS: 4.1  ALOS: 2    6-Clicks ADL Score: 20  6-Clicks Mobility Score: 16  PT and/or OT Eval ordered: No  Post-acute Referrals Ordered: No  Post-acute Choice Obtained: No  Has referral(s) been sent to post-acute provider:  No    Anticipated Discharge Dispo: Discharge Disposition: Discharged to home/self care (01)  Discharge Address: [address] [place] [address] [address]OM [address] [place] [address]    DME Needed: No    Action(s) Taken: Transport Arranged     MD has medically cleared patient for discharge home today. Pt has refused SNF placement and would like to D/C back to his motel with his wife.     RNCM requested transportation for 1330 via Ride line to pt's confirmed address on file. Pending confirmation from ride line.     Bedside nurse notified of transport time.     Escalations Completed: None    Medically Clear: Yes    Next Steps: [facility] transported patient and spouse to their home address on file.     Barriers to Discharge: Transportation    Is the patient up for discharge tomorrow: No        165.1

## 2025-04-08 NOTE — PROGRESS NOTES
Monitor Summary:    Sinus rhythm to sinus tachycardia; heart rate range: . Patient has occasional PVCs and a BBB.

## 2025-04-08 NOTE — PROGRESS NOTES
Report given to Rebecca; including that patient is on [id number] vital sign checks post cath lab, and that TR band has 7mls in it left. Patient transported to T802 where Rebecca was at bedside to receive patient.

## 2025-04-08 NOTE — DISCHARGE SUMMARY
Discharge Summary    CHIEF COMPLAINT ON ADMISSION  Chief Complaint   Patient presents with    Chest Pain     Patient transferred from [facility] for STEMI. Patient initially presented to [facility] for RLQ pain with nausea and vomiting. Code STEMI cancelled by Cardiology in trauma bay       Reason for Admission  STEMI     Admission Date  4/6/2025    CODE STATUS  Full Code    HPI & HOSPITAL COURSE  Mr. Donte Agustin is a 79 y.o. male with a  past medical history of coronary artery disease with 2 previous stents 2022, COPD 2 liters oxygen dependent ongoing tobacco use,  ischemic cardiomyopathy last ejection fraction was 35%, prostate cancer, who presented  On 4/6/2025. Initially presented to [facility] with chest pain and shortness of breath where he was found to have dynamic EKG changes concerning for STEMI.  He was given TNK, weight-based Lovenox, and transferred here for higher level of care.  He was admitted to the intensive care unit with cardiology consultation.  Echocardiogram revealed ejection fraction of 25% with RSVP of 55 mmHg.  He was given dual antiplatelet therapy and IV heparin drip.  Chest xray does show Left lower lobe opacity/effusion however he has no infection symptoms, likely atelectasis and pulmonary edema. Due to volume overload he was initiated on IV Lasix. He underwent left heart cath on 4/7 which showed patent RCA stent, mild non-obstructing coronary artery disease. Echocardiogram completed showing EF 25% with global hypokinesis with septal akinesis. He was started on goal directed medical therapy with metoprolol XL, losartan, farxiga. MRA was not initiated due to hypotension. Patient was educated on importance of medication compliance and follow up with cardiology (he has not seen in 2 years). Discussed that his new medications need to be closely monitored and may require further adjustment.   Patient is at his functional baseline which is essentially  bedbound, he can assist with transfer, his wife does all his cares. He states he has all DME at home and refuses to go to SNF for rehab.   At this time he is hemodynamically stable and wants to go home. Thus he will discharge home with Outpatient follow up.     Therefore, he is discharged in fair and stable condition to home with close outpatient follow-up.    The patient met 2-midnight criteria for an inpatient stay at the time of discharge.    Discharge Date  4/8/2025    FOLLOW UP ITEMS POST DISCHARGE  Blood pressure/volume status   Cardiology follow up - may need AICD in future if EF remains <30%      DISCHARGE DIAGNOSES  Principal Problem:    Acute MI (HCC) (POA: Yes)  Active Problems:    Diabetes (HCC) (POA: Yes)    Heart failure with reduced ejection fraction (HCC) (LVEF 20-25% Echo 2/2025) (POA: Yes)    CAD (coronary artery disease) s/p Stent 10/2022 (POA: Unknown)    Severe aortic stenosis (POA: Unknown)    Chronic respiratory failure (HCC) (POA: Unknown)    Elevated troponin (POA: Unknown)    Diabetic neuropathy (HCC) (POA: Unknown)    Hyperlipidemia (POA: Unknown)    GERD (gastroesophageal reflux disease) (POA: Unknown)    BPH (benign prostatic hyperplasia) (POA: Unknown)    Pulmonary hypertension (HCC) (POA: Yes)    COPD (chronic obstructive pulmonary disease) (HCC) (POA: Yes)    Pleural effusion (POA: Yes)    Tobacco abuse (POA: Yes)  Resolved Problems:    * No resolved hospital problems. *      FOLLOW UP  Future Appointments   Date Time Provider Department Center   4/22/2025  1:30 PM COLIN Beck None     No follow-up provider specified.    MEDICATIONS ON DISCHARGE     Medication List        START taking these medications        Instructions   atorvastatin 40 MG Tabs  Commonly known as: Lipitor   Take 1 Tablet by mouth every evening.  Dose: 40 mg     dapagliflozin propanediol 10 MG Tabs  Start taking on: April 9, 2025  Commonly known as: Farxiga   Take 1 Tablet by mouth every  day.  Dose: 10 mg     losartan 25 MG Tabs  Commonly known as: Cozaar   Take 1 Tablet by mouth every day.  Dose: 25 mg     metoprolol SR 50 MG Tb24  Commonly known as: Toprol XL   Take 1 Tablet by mouth every evening.  Dose: 50 mg     nicotine 21 MG/24HR Pt24  Commonly known as: Nicoderm   Place 1 Patch on the skin every 24 hours.  Dose: 1 Patch     nitroglycerin 0.4 MG Subl  Commonly known as: Nitrostat   Place 1 Tablet under the tongue as needed for Chest Pain.  Dose: 0.4 mg            CHANGE how you take these medications        Instructions   acetaminophen 500 MG Tabs  What changed: when to take this  Commonly known as: Tylenol   Take 2 Tablets by mouth every 6 hours as needed for Mild Pain. 1,000 mg = 2 tabs  Dose: 1,000 mg     aspirin 81 MG EC tablet  What changed: Another medication with the same name was removed. Continue taking this medication, and follow the directions you see here.   Take 1 Tablet by mouth every day.  Dose: 81 mg     furosemide 20 MG Tabs  What changed:   medication strength  how much to take  when to take this  Commonly known as: Lasix   Take 1 Tablet by mouth every day.  Dose: 20 mg            CONTINUE taking these medications        Instructions   acetaminophen-codeine #3 300-30 MG Tabs  Commonly known as: Tylenol #3   Take 1 Tablet by mouth 4 times a day.  Dose: 1 Tablet     clopidogrel 75 MG Tabs  Commonly known as: Plavix   Take 75 mg by mouth every day.  Dose: 75 mg     gabapentin 600 MG tablet  Commonly known as: Neurontin   Take 600 mg by mouth 3 times a day.  Dose: 600 mg     pantoprazole 40 MG Tbec  Commonly known as: Protonix   Take 40 mg by mouth every day.  Dose: 40 mg     tamsulosin 0.4 MG capsule  Commonly known as: Flomax   Take 0.4 mg by mouth every day.  Dose: 0.4 mg            STOP taking these medications      digoxin 125 MCG Tabs  Commonly known as: Lanoxin     famotidine 40 MG Tabs  Commonly known as: Pepcid              Allergies  No Known Allergies    DIET  Orders  Placed This Encounter   Procedures    Diet Order Diet: Cardiac     Standing Status:   Standing     Number of Occurrences:   1     Diet::   Cardiac [6]       ACTIVITY  As tolerated.      CONSULTATIONS  Cardiology     PROCEDURES  Procedures performed:  Coronary arteriograms  Left heart catheterization   Supervision moderate sedation    LABORATORY  Lab Results   Component Value Date    SODIUM 138 04/08/2025    POTASSIUM 4.0 04/08/2025    CHLORIDE 100 04/08/2025    CO2 28 04/08/2025    GLUCOSE 137 (H) 04/08/2025    BUN 21 04/08/2025    CREATININE 1.02 04/08/2025        Lab Results   Component Value Date    WBC 10.6 04/07/2025    HEMOGLOBIN 12.7 (L) 04/07/2025    HEMATOCRIT 38.1 (L) 04/07/2025    PLATELETCT 172 04/07/2025        Total time of the discharge process exceeds 40 minutes.

## 2025-04-08 NOTE — PROGRESS NOTES
Cardiology Follow-up Note    Name:   Donte Agustin     YOB: 1945  Age:   79 y.o.  male   MRN:   [id number]        Attending Provider: Dr Mesfin Ham M.D.     Chief Complaint: Chest Pain (Patient transferred from [facility] for STEMI. Patient initially presented to [facility] for RLQ pain with nausea and vomiting. Code STEMI cancelled by Cardiology in trauma bay)       Reason for cardiology consult: STEMI    Outpatient Cardiologist:  Dr. Gama, in [place], [place]    History of Present Illness  Donte Agustin is 79 y.o. male with prior medical history significant for CAD s/p stents x 2 in 2022, severe aortic stenosis, systolic heart failure with LVEF 35%, chronic LBBB, diabetes, remote prostate cancer (radiation over 10 years ago)  who was admitted transfer from [facility] on 4/6/2025 with STEMI.  S/p tPA and Lovenox initiation at [facility].  On arrival to [facility], STEMI was canceled due to the resolution of ST elevation over precordial leads.      Interim Events 04/07/25   :  - Personal Telemetry interpretation: Sinus-sinus tachycardia.  1 episode of 7 beats of V. tach at almost 10 PM yesterday  - Overnight events: No Cardiac events   Reports left arm soreness that radiates to the left armpit.  Unsure of the quality or severity.  Patient denies, shortness of breath, edema, dizziness/lightheadedness, or palpitations.   - Vitals: 102/56  - Labs reviewed: Potassium 3.5, creatinine 0.89.  Troponin remains flat 60s-80  - I/O's: 1 L out yesterday  - Weight: 199 pounds    Interim Events 04/08/25   :  - Personal Telemetry interpretation: Sinus rhythm with bundle branch block  - Overnight events: Underwent left heart cath yesterday, nonobstructive, patent stents  Patient denies chest pain, edema, dizziness/lightheadedness, or palpitations.  Reports cough.  - Vitals: 107/60  - Labs reviewed: Creatinine 1.02  - I/O's: 1.9 L out yesterday  - Weight: 193 pounds         Review of Systems  "  Constitutional:  Positive for malaise/fatigue.   Respiratory:  Positive for cough. Negative for shortness of breath.    Cardiovascular:  Negative for chest pain, palpitations and leg swelling.   Musculoskeletal:  Positive for myalgias.   Neurological:  Negative for dizziness and headaches.   Psychiatric/Behavioral: Negative.          Medical History  History reviewed. No pertinent past medical history.      History reviewed. No pertinent family history.      Social History     Tobacco Use    Smoking status: Every Day     Current packs/day: 0.50     Average packs/day: 0.5 packs/day for 40.3 years (20.1 ttl pk-yrs)     Types: Cigarettes     Start date: 1985     Passive exposure: Current    Smokeless tobacco: Never         No Known Allergies      Medications   Scheduled Medications   Medication Dose Frequency    metoprolol SR  50 mg Q EVENING    losartan  25 mg Q DAY    lidocaine  1 Patch Q24HR    atorvastatin  40 mg Q EVENING    dapagliflozin propanediol  10 mg DAILY    nicotine  21 mg Daily-0600    insulin lispro  1-6 Units [facility]X/[facility]    Pharmacy Consult Request  1 Each PHARMACY TO DOSE    tamsulosin  0.4 mg AFTER BREAKFAST    gabapentin  600 mg BID    aspirin  81 mg DAILY    omeprazole  20 mg DAILY    clopidogrel  75 mg DAILY           Physical Exam    Body mass index is 30.25 kg/m².     /60   Pulse 84   Temp 36.6 °C (97.9 °F) (Temporal)   Resp 20   Ht 1.702 m (5' 7\")   Wt 87.6 kg (193 lb 2 oz)   SpO2 98%      Vitals:    04/08/25 0741 04/08/25 0742 04/08/25 1129 04/08/25 1244   BP: 104/53  101/60 107/60   Pulse: 82 75 84    Resp: 20 16 20    Temp: 36.5 °C (97.7 °F)  36.6 °C (97.9 °F)    TempSrc: Temporal  Temporal    SpO2: 96% 93% 96% 98%   Weight:       Height:            Oxygen Therapy:  Pulse Oximetry: 98 %, O2 (LPM): 3, O2 Delivery Device: Silicone Nasal Cannula      Physical Exam  Constitutional:       Appearance: He is ill-appearing.   Cardiovascular:      Rate and Rhythm: Regular rhythm. " "Tachycardia present.      Heart sounds: No murmur heard.  Pulmonary:      Breath sounds: Decreased breath sounds present. No wheezing or rales.   Abdominal:      General: There is no distension.      Palpations: Abdomen is soft.   Musculoskeletal:      Right lower leg: No edema.      Left lower leg: No edema.   Skin:     General: Skin is warm and dry.      Coloration: Skin is pale.   Neurological:      Mental Status: He is alert. Mental status is at baseline. He is disoriented.   Psychiatric:         Behavior: Behavior normal.               Labs (personally reviewed):     Estimated Creatinine Clearance: 64.1 mL/min (by C-G formula based on SCr of 1.02 mg/dL).    No results found for: \"BNPBTYPENAT\"  Recent Labs     04/06/25  0540 04/07/25 0213 04/08/25  0425   CREATININE 0.81 0.89 1.02   BUN 13 15 21   POTASSIUM 4.1 3.5* 4.0   SODIUM 140 136 138   CALCIUM 9.1 9.2 9.0   CO2 23 28 28   ALBUMIN 3.5 3.4  --      Recent Labs     04/06/25  0540 04/07/25 0213 04/08/25  0425   GLUCOSE 174* 112* 137*     Recent Labs     04/06/25  0540 04/07/25 0213   ASTSGOT 15 13   ALTSGPT <5 <5   ALKPHOSPHAT 66 62   INR 1.11  --      Recent Labs     04/06/25  0540 04/07/25  0213   WBC 14.5* 10.6   HEMOGLOBIN 12.3* 12.7*   PLATELETCT 198 172     Recent Labs     04/06/25  0540 04/06/25  0815 04/06/25  1947/25  0213 04/07/25  0937   TROPONINT 60*   < > 81* 80* 78*   NTPROBNP 6318*  --   --   --   --    HBA1C  --   --   --  6.5*  --     < > = values in this interval not displayed.     Lab Results   Component Value Date/Time    CHOLSTRLTOT 122 04/08/2025 04:25 AM    LDL 53 04/08/2025 04:25 AM    HDL 44 04/08/2025 04:25 AM    TRIGLYCERIDE 127 04/08/2025 04:25 AM       Imaging:  DX-CHEST-LIMITED (1 VIEW)   Final Result      1.  Left lower lung airspace disease.   2.  Possible left pleural effusion.   3.  Cardiomegaly.      DX-CHEST-PORTABLE (1 VIEW)   Final Result         1.  Hazy left pulmonary infiltrates, slightly increased since prior " study   2.  Right lung base atelectasis or infiltrate, new since prior study   3.  Cardiomegaly      CT-HEAD W/O   Final Result         1.  No acute intracranial abnormality is identified, there are nonspecific white matter changes, commonly associated with small vessel ischemic disease.  Associated mild cerebral atrophy is noted.   2.  Atherosclerosis.               EC-ECHOCARDIOGRAM COMPLETE W/ CONT   Final Result      EC-ECHOCARDIOGRAM COMPLETE W/O CONT         DX-CHEST-PORTABLE (1 VIEW)   Final Result      Persistent left-sided airspace disease      CL-LEFT HEART CATHETERIZATION WITH POSSIBLE INTERVENTION    (Results Pending)       Cardiac Imaging and Procedures Review        ECHOCARDIOGRAM 4/6/2025:  Severely reduced left ventricular systolic function.  The left ventricular ejection fraction is visually estimated to be 25-  30%.  Severe global hypokinesis with abnormal septal motion.  Diastolic function is difficult to assess due to underlying rhythm.  The right ventricle is not well visualized, but appears grossly normal   in size with reduced systolic function.  Estimated right ventricular systolic pressure is 55 mmHg.  Biatrial dilation.  Mild mitral regurgitation.  Mild aortic stenosis. Dimensionless index is 0.46.  Dilated inferior vena cava without inspiratory collapse.    Cardiac catheterization 4/7/2025:  Indication/Preoperative diagnosis:  Chest pain rule out ACS  Left lower lobe pneumonia  Cardiomyopathy  CAD status post prior PCI  TNK administration for left bundle branch block     Postoperative diagnosis:  Patent previously placed RCA stent  Mild nonobstructive coronary artery disease  LVEDP 9 mmHg  Left lower lobe pneumonia  No evidence of acute coronary syndrome  Nonischemic cardiomyopathy     Recommendations:  Guideline directed medical therapy   Cardiovascular Risk factor modification  FINDINGS:  I.  HEMODYNAMICS              Ao: 92/51 mmHg              LVEDP: 9 mmHg              Gradient on LV  pullback: No     II. CORONARY ANGIOGRAPHY:  Left main coronary artery: Large bifurcating no CAD  Left anterior descending artery: Large-caliber wraparound apex mild nonobstructive CAD  Left circumflex coronary artery: Moderate caliber to large caliber mild nonobstructive CAD.  Right coronary artery: Small caliber widely patent previously placed stent.          Principal Problem:    Acute MI (HCC) (POA: Yes)  Active Problems:    Diabetes (HCC) (POA: Yes)    Heart failure with reduced ejection fraction (HCC) (LVEF 20-25% Echo 2/2025) (POA: Yes)    CAD (coronary artery disease) s/p Stent 10/2022 (POA: Unknown)    Severe aortic stenosis (POA: Unknown)    Chronic respiratory failure (HCC) (POA: Unknown)    Elevated troponin (POA: Unknown)    Diabetic neuropathy (HCC) (POA: Unknown)    Hyperlipidemia (POA: Unknown)    GERD (gastroesophageal reflux disease) (POA: Unknown)    BPH (benign prostatic hyperplasia) (POA: Unknown)    Pulmonary hypertension (HCC) (POA: Yes)    COPD (chronic obstructive pulmonary disease) (HCC) (POA: Yes)    Pleural effusion (POA: Yes)    Tobacco abuse (POA: Yes)  Resolved Problems:    * No resolved hospital problems. *      Assessment and Medical Decision Making:    STEMI  S/p tPA administration on 4/6/2025  Chronic left bundle branch block  Ischemic cardiomyopathy  HFrEF, LVEF 25-30%  Coronary artery disease, s/p PCI 2022  Aortic stenosis, mild  Mitral regurgitation, mild  Diabetes mellitus type 2  Hypertension      Recommendations:  STEMI s/p tPA  Coronary artery disease,   -S/p PCI in 2022 at [place], [place] on 10/23/2022 -First OM ([employer] stent 2.5 x 22) and to proximal RCA ([employer] stent 2.25 x 18) per wallet stent card  -Left heart cath 4/7/2025 - nonobstructive CAD, prior stent to RCA is patent.  -continue aspirin 81 mg daily  -Clopidogrel 75 mg daily   -HI Statin, start atorvastatin 40 mg nightly- for goal LDL <70, ideally <55. Last LDL 53.  -BB -transition to metoprolol  succinate 50 mg nightly  -Start losartan 25 mg daily  - echocardiogram shows severely decreased systolic function with LVEF 25-30% and severe global hypokinesis with abnormal septal motion.  Mild MR, mild AS.  Preserved RV systolic function.    Ischemic cardiomyopathy  LVEF 25-30%  Volume status  -euvolemic status.  LVEDP 9 mmHg.  Chronically on 2 L O2.  -Diuretic: Furosemide 20 mg as needed upon discharge.  -SGLT2i: Farxiga 10 milligram daily   -Losartan 25 mg daily  -Address spironolactone initiation in the outpatient setting given intermittent soft BP  -Labs: Daily BMP  -Daily weights; Strict I+O’s:       Disposition-cardiology will sign off with close cardiology follow-up.  Appointment is arranged.  Patient prefers to stay with [facility] rather than go back to [facility].      Future Appointments   Date Time Provider Department Center   4/22/2025  1:30 PM COLIN Beck [facility] None         Please contact me with any questions.  Thank you for allowing us to participate in the care of Mr. Agustin.        COLIN Singh  [facility]  [phone]      Please see Dr. Heck  attestation for further details and MDM. I, COLIN Singh performed a portion of the service face-to-face with the same patient on the same date of service independently of Dr. Heck FOR 15 minutes preparing to see the patient, reviewing hospital notes and tests, obtaining history from the patient, performing a medically appropriate exam, counseling and educating the patient, ordering medications/tests/procedures/referrals as clinically indicated, and documenting information in the electronic medical record.    Please note this dictation was created using voice recognition software.  I have made every reasonable attempt to correct obvious errors, but there may be errors of grammar and possibly content that I did not discover before finalizing the note.

## 2025-04-08 NOTE — PROGRESS NOTES
4 Eyes Skin Assessment Completed by MARYBEL Ruano and JOSE Fried.    Head WDL  Ears WDL  Nose WDL  Mouth WDL  Neck WDL  Breast/Chest WDL  Shoulder Blades WDL  Spine WDL  (R) Arm/Elbow/Hand Bruising and Scab  (L) Arm/Elbow/Hand Bruising  Abdomen WDL  Groin Redness and Blanching  Scrotum/Coccyx/Buttocks Redness and Blanching  (R) Leg WDL  (L) Leg WDL  (R) Heel/Foot/Toe Redness and Blanching  (L) Heel/Foot/Toe Redness and Blanching          Devices In Places Tele Box and Nasal Cannula      Interventions In Place NC W/Ear Foams    Possible Skin Injury No    Pictures Uploaded Into Epic Yes  Wound Consult Placed N/A  RN Wound Prevention Protocol Ordered No

## 2025-04-08 NOTE — CARE PLAN
The patient is Watcher - Medium risk of patient condition declining or worsening    Shift Goals  Clinical Goals: NIH scale, monitor chest pain, go to cath lab  Patient Goals: rest  Family Goals: food    Progress made toward(s) clinical / shift goals:  NIH scale completed, patient's chest pain has been monitored (he currently denies chest pain, however, he is a poor historian), patient went to cath lab.     Patient is cleared for food now that he is post procedure-snacks offered.     Patient is not progressing towards the following goals: Patient has not received as much rest today as he would have liked.        Problem: Pain - Standard  Goal: Alleviation of pain or a reduction in pain to the patient’s comfort goal  Outcome: Progressing  Note: Patient has two types of pain: chest/left arm pain, and chronic neuropathy pain. He has been given tylenol and gabapentin for his neuropathy pain. He denies chest pain/left arm pain since returning from cath lab. Patient's current bilateral foot pain is a level 7- he has been given gabapentin and has other medications on the MAR if the gabapentin does not work.      Problem: Care Map:  Day 2 Optimal Outcome for the Heart Failure Patient  Goal: Day 2:  Optimal Care of the heart failure patient  Intervention: For patient's with heart failure exacerbation, identify precipitant (diet, med compliance, etc.) and direct education towards lifestyle changes to prevent exacerbations.  Note: Discussed with patient the need to stop smoking to prevent further damage to his heart.   Intervention: Daily weight documented.  Use stand up scale if patient able. Compare to previous weight  Note: Daily weight: 87.1kg bedscale  Intervention: Assess and document 2 hour post diuretic output  Note: Patient received lasix and his urine output is being measured via condom catheter.   Intervention: Document ambulation tolerance (functional assessment) in ADL flowsheet daily  Note: Patient refusing  mobility, despite strong education.  Intervention: Assess edema every shift (peripheral, sacral, periorbital, perineal/scrotal, and abdominal)  Note: Edema not noted by this RN.

## 2025-04-08 NOTE — DISCHARGE PLANNING
"Case Management Discharge Planning    Admission Date: 4/6/2025  GMLOS: 4.1  ALOS: 2    6-Clicks ADL Score: 20  6-Clicks Mobility Score: 16  PT and/or OT Eval ordered: Yes  Post-acute Referrals Ordered: No  Post-acute Choice Obtained: No  Has referral(s) been sent to post-acute provider:  No    Anticipated Discharge Dispo: Discharge Disposition: Discharged to home/self care (01)  Discharge Address: [address] [place] [address] [address]OM [address] [place] [id number]    DME Needed: No    Action(s) Taken: DC Assessment Complete (See below)    RNCM met with patient and spouse at bedside to complete assessment and discuss discharge planning. Pt is alert and oriented X4; however pt stated \" are you from adult protective services\". RNCM explained role and spouse provided information contained in this assessment.     Patient's spouse reports they live together in a motel located in [facility]; at baseline he uses a walker and wheelchair. Pt is on 2L oxygen supplied by [employer].     The patient's has a PCP in [place]; preferred pharmacy is [facility]. Insurance coverage confirmed via Medicare and [employer].     Per spouse, pt smokes but does not use alcohol or recreational drugs. No MH concerns reported by pt's spouse.     Pt's spouse reports they will need assistance with transportation home when patient is medically cleared. Pt has [employer]; spouse stated she will need to ride back with pt as well.     RNCM discussed discharge planning. Pt is pending hospital course and PT evaluation. HCM following and will assist with DCP.    Escalations Completed: None    Medically Clear: No    Next Steps: F/U with medical team regarding D/C needs/ barriers.     Barriers to Discharge: Medical clearance and Pending PT Evaluation    Is the patient up for discharge tomorrow: No    Care Transition Team Assessment    Information Source  Orientation Level: Oriented X4  Information Given By: Spouse  Informant's Name: Nikole Duff  Who is " responsible for making decisions for patient? : Patient    Readmission Evaluation  Is this a readmission?: No    Elopement Risk  Legal Hold: No  Ambulatory or Self Mobile in Wheelchair: Yes  Disoriented: No  Psychiatric Symptoms: None  History of Wandering: No  Elopement this Admit: No  Vocalizing Wanting to Leave: No  Displays Behaviors, Body Language Wanting to Leave: No-Not at Risk for Elopement  Elopement Risk: Not at Risk for Elopement    Interdisciplinary Discharge Planning  Lives with - Patient's Self Care Capacity: Spouse  Patient or legal guardian wants to designate a caregiver: No  Support Systems: Family Member(s)  Housing / Facility: Transitional Living ([facility])    Discharge Preparedness  What is your plan after discharge?: Uncertain - pending medical team collaboration  What are your discharge supports?: Spouse, Child  Prior Functional Level: Uses Wheelchair, Uses Walker, Needs Assist with Activities of Daily Living, Needs Assist with Medication Management    Functional Assesment  Prior Functional Level: Uses Wheelchair, Uses Walker, Needs Assist with Activities of Daily Living, Needs Assist with Medication Management    Finances  Financial Barriers to Discharge: No  Prescription Coverage: Yes    Vision / Hearing Impairment  Right Eye Vision: Impaired  Left Eye Vision: Impaired    Advance Directive  Advance Directive?: None    Domestic Abuse  Have you ever been the victim of abuse or violence?: No  Possible Abuse/Neglect Reported to:: Not Applicable    Psychological Assessment  History of Substance Abuse: None  History of Psychiatric Problems: No    Discharge Risks or Barriers  Discharge risks or barriers?: Complex medical needs  Patient risk factors: Complex medical needs    Anticipated Discharge Information  Discharge Disposition: Discharged to home/self care (01)  Discharge Address: [address] [place] [id number]

## 2025-04-08 NOTE — PROGRESS NOTES
Patient left with all his things and meds delivered to bedside. IV removed, patient off the box, monitor room called and notified. D/c paper discussed. Patient left with medical transport [facility] with wife.

## 2025-04-08 NOTE — CARE PLAN
The patient is Stable - Low risk of patient condition declining or worsening    Shift Goals  Clinical Goals: hemodynamic stability  Patient Goals: rest  Family Goals: updates    Progress made toward(s) clinical / shift goals:    Problem: Knowledge Deficit - Standard  Goal: Patient and family/care givers will demonstrate understanding of plan of care, disease process/condition, diagnostic tests and medications  Outcome: Progressing     Problem: Pain - Standard  Goal: Alleviation of pain or a reduction in pain to the patient’s comfort goal  Outcome: Progressing     Problem: Acute Care of the Heart Failure Patient  Goal: Optimal Outcome for the HF Patient  Outcome: Progressing     Problem: Hemodynamic Status  Goal: Stable Vital Signs and Fluid Balance  Outcome: Progressing     Problem: Oxygenation/Respiratory Function  Goal: Patient will Achieve/Maintain Optimum Respiratory Rate/Effort  Outcome: Progressing       Patient is not progressing towards the following goals:

## 2025-04-08 NOTE — CARE PLAN
Problem: Knowledge Deficit - Standard  Goal: Patient and family/care givers will demonstrate understanding of plan of care, disease process/condition, diagnostic tests and medications  Outcome: Met     Problem: Pain - Standard  Goal: Alleviation of pain or a reduction in pain to the patient’s comfort goal  Outcome: Met     Problem: Acute Care of the Heart Failure Patient  Goal: Optimal Outcome for the HF Patient  Outcome: Met  Patient given booklet, went over HF education meds delivered to bedside. Went over stop light tool      Problem: Hemodynamic Status  Goal: Stable Vital Signs and Fluid Balance  Outcome: Met     Problem: Oxygenation/Respiratory Function  Goal: Patient will Achieve/Maintain Optimum Respiratory Rate/Effort  Outcome: Met   The patient is Stable - Low risk of patient condition declining or worsening    Shift Goals  Clinical Goals: Optimize cardiac meds, safety, VSS  Patient Goals: Updates  Family Goals: At bedside    Progress made toward(s) clinical / shift goals:      Patient is not progressing towards the following goals:

## 2025-04-08 NOTE — DISCHARGE PLANNING
DC Transport Scheduled    Transport Company Scheduled:  [employer]  Spoke with Alicia at [facility] to schedule transport.    Scheduled Date: 4/8/2025  Scheduled Time: 1345    Transport Type: [facility]  Destination: Patient's Home   Destination address: [address] [place] [address]    Notified care team of scheduled transport via [employer].     If there are any changes needed to the DC transportation scheduled, please contact [facility] at ext. [phone] between the hours of [phone]. If outside those hours, contact the ED Case Manager at ext. [phone].

## 2025-04-08 NOTE — DISCHARGE INSTRUCTIONS
You have heart failure, because of this you have been started on several new medications to help improve your heart function. You need to take medications as directed and follow up closely with a cardiologist as these medications need to be monitored and may need further adjustment   Recommend you take lasix 20 mg daily to prevent fluid from accumulating, if you feel dehydrated you can change to every other day or every third day, you should be checking your weight daily to help monitor your fluid status   If you develop any worsening shortness of breath, swelling, chest pain or other concerning symptoms, seek medical attention     HF Patient Discharge Instructions  Monitor your weight daily, and maintain a weight chart, to track your weight changes.   Activity as tolerated, unless your Doctor has ordered otherwise. Other activity order: as tolerated.  Follow a low fat, low cholesterol, low salt diet unless instructed otherwise by your Doctor. Read the labels on the back of food products and track your intake of fat, cholesterol and salt.   Fluid Restriction No. If a Fluid Restriction has been ordered by your Doctor, measure fluids with a measuring cup to ensure that you are not exceeding the restriction.   No smoking.  Oxygen No. If your Doctor has ordered that you wear Oxygen at home, it is important to wear it as ordered.  Did you receive an explanation from staff on the importance of taking each of your medications and why it is necessary to keep taking them unless your doctor says to stop? Yes  Were all of your questions answered about how to manage your heart failure and what to do if you have increased signs and symptoms after you go home? Yes  Do you feel like your heart failure care team involved you in the care treatment plan and allowed you to make decisions regarding your care while in the hospital and addressed any discharge needs you might have? Yes    See the educational handout provided at discharge  for more information on monitoring your daily weight, activity and diet. This also explains more about Heart Failure, symptoms of a flare-up and some of the tests that you have undergone.     Warning Signs of a Flare-Up include:  Swelling in the ankles or lower legs.  Shortness of breath, while at rest, or while doing normal activities.   Shortness of breath at night when in bed, or coughing in bed.   Requiring more pillows to sleep at night, or needing to sit up at night to sleep.  Feeling weak, dizzy or fatigued.     When to call your Doctor:  Call your Primary Care Physician or Cardiologist if:   You experience any pain radiating to your jaw or neck.  You have any difficulty breathing.  You experience weight gain of 3 lbs in a day or 5 lbs in a week.   You feel any palpitations or irregular heartbeats.  You become dizzy or lose consciousness.   If you have had an angiogram or had a pacemaker or AICD placed, and experience:  Bleeding, drainage or swelling at the surgical / puncture site.  Fever greater than 100.0 F  Shock from internal defibrillator.  Cool and / or numb extremities.     Please access the AHA My HF Guide/Heart Failure Interactive Workbook:   [email or web address]  Diagnosis:  Acute Coronary Syndrome (ACS) is a diagnosis that encompasses cardiac-related chest pain and heart attack. ACS occurs when the blood flow to the heart muscle is severely reduced or cut off completely due to a slow process called atherosclerosis.  Atherosclerosis is a disease in which the coronary arteries become narrow from a buildup of fat, cholesterol, and other substances that combine to form plaque. If the plaque breaks, a blood clot will form and block the blood flow to the heart muscle. This lack of blood flow can cause damage or death to the heart muscle which is called a heart attack or Myocardial Infarction (MI). There are two different types of MIs:  ST Elevation Myocardial Infarction or STEMI (the  most severe type of heart attack) and Non-ST Elevation Myocardial Infarction or NSTEMI.    Treatment Plan:  Cardiac Diet  - Low fat, low salt, low cholesterol   Cardiac Rehab  - Your doctor has ordered you a referral to [facility].  Call [phone] to schedule an appointment.  Attend my follow-up appointment with my Cardiologist.  Take my medications as prescribed by my doctor  Exercise daily      Medications:  Certain medications are used to treat ACS.  Remember to always take medications as prescribed and never stop talking medications unless told by your doctor.    You have been prescribed the following medicatons:    Aspirin - Aspirin is used as a blood thinning medication and you will require this medication indefinitely.  Anti-platelet/blood thinner - Your Anti-platelet/Blood thinning medication is called plavix, and is used in combination with aspirin to prevent clots from forming in your heart and/or around your stent.  Your doctor will determine how long you need to be on this medicine.  Beta-Blocker - Beta-Blocker metoprolol is used to lower blood pressure and heart rate, and/or helps your heart heal after a heart attack.  Statin - Statin lipitor is used to lower cholesterol.  Angiotensin Receptor Blocker (ARB) - Angiotensin Receptor Blocker losartan is used to lower blood pressure and treat heart failure.  Nitroglycerine - Nitroglycerine is used to relieve chest pain.

## 2025-04-09 NOTE — DOCUMENTATION QUERY
[facility]                                                                       Query Response Note      PATIENT:               LEIGH ANN MOORE  ACCT #:                  [id number]  MRN:                     [id number]  :                      1945  ADMIT DATE:       2025 5:20 AM  DISCH DATE:        2025 2:33 PM  RESPONDING  PROVIDER #:        [id number]           QUERY TEXT:    HFrEF is documented in the Medical Record.  Can the acuity of the heart failure be further specified based on the clinical indicators and required treatments?    The patient's Clinical Indicators include:  H&P and Progress Notes:  Heart failure with reduced ejection fraction.  Respiratory failure: + pulmonary edema  Interventional cardiology consult: does appear volume overloaded.  1+ bilateral LE edema   NT-proBNP 6318  Risk factors: severe AS, dilated cardiomyopathy, htn, STEMI, chronic HF  Treatment:  IV Lasix, daily weight, strict I/O, Metoprolol, Farxiga    Important Note:  if new diagnosis or change to documentation made via query please include in daily documentation and/or DC Summary    Thank you,  Mandi Zheng RN, BSN, CCDS  Clinical   Connect via [employer]  Options provided:   -- Exacerbation or decompensation of HFrEF   -- Acute on Chronic HFrEF   -- Chronic HFrEF   -- Other explanation, (please specify other explanation)   -- Unable to determine      Query created by: Mandi Zheng on 2025 7:05 AM    RESPONSE TEXT:    Exacerbation or decompensation of HFrEF          Electronically signed by:  EULA UREÑA 2025 7:22 AM

## 2025-04-10 LAB — GLUCOSE BLD STRIP.AUTO-MCNC: 94 MG/DL (ref 65–99)

## 2025-04-22 NOTE — DOCUMENTATION QUERY
[facility]                                                                       Query Response Note      PATIENT:               LEIGH ANN MOORE  ACCT #:                  [id number]  MRN:                     [id number]  :                      1945  ADMIT DATE:       2025 5:20 AM  DISCH DATE:        2025 2:33 PM  RESPONDING  PROVIDER #:        [id number]           QUERY TEXT:    Chronic Kidney Disease (CKD) is documented in the Medical Record.  Please specify the disease stage (includes probable or suspected)     Stages are defined by the National Kid[employer] Foundation as follows:  CKD Stage I  GFR >= 90 ml / min per 1.73 m2 and persistent albuminuria  CKD Stage 2 GFR between 60 and 89 with persistent albuminuria  CKD Stage 3 GFR between 30 and 59   CKD Stage 4 GFR between 15 and 29   CKD Stage 5 GFR between <15 or End Stage Renal Disease    The patient's clinical indicators include:  Per H&P:CKD? -improved  Monitor UO  Serial BMP-current BUN/creatinine normal with GFR 91-GFR   Renally dose medications as clinically appropriate  Jensen catheter as needed  Avoid nephrotoxins as able but will need heart cath   Per progress note 25: #CKD-Baseline creatinine 0.9  Options provided:   -- Chronic kidney disease Stage 1   -- Chronic kidney disease Stage 2   -- Chronic kidney disease Stage 3   -- Chronic kidney disease Stage 4   -- Chronic kidney disease Stage 5   -- Chronic kidney disease Stage 5, requiring dialysis   -- End Stage Renal Disease   -- Unable to determine   -- Other explanation, (Please specify the other explanation)   -- Findings of no clinical significance      Query created by: Trina Jauregui on 2025 8:45 AM    RESPONSE TEXT:    Chronic kidney disease Stage 2          Electronically signed by:  EULA UREÑA 2025 7:38 AM

## 2025-04-25 ENCOUNTER — TELEPHONE (OUTPATIENT)
Dept: CARDIOLOGY | Facility: MEDICAL CENTER | Age: 80
End: 2025-04-25
Payer: MEDICARE

## 2025-05-26 ENCOUNTER — APPOINTMENT (OUTPATIENT)
Dept: RADIOLOGY | Facility: MEDICAL CENTER | Age: 80
DRG: 193 | End: 2025-05-26
Attending: INTERNAL MEDICINE
Payer: MEDICARE

## 2025-05-26 ENCOUNTER — APPOINTMENT (OUTPATIENT)
Dept: RADIOLOGY | Facility: MEDICAL CENTER | Age: 80
DRG: 193 | End: 2025-05-26
Attending: EMERGENCY MEDICINE
Payer: MEDICARE

## 2025-05-26 ENCOUNTER — HOSPITAL ENCOUNTER (INPATIENT)
Facility: MEDICAL CENTER | Age: 80
LOS: 5 days | End: 2025-05-31
Attending: EMERGENCY MEDICINE | Admitting: INTERNAL MEDICINE
Payer: MEDICARE

## 2025-05-26 DIAGNOSIS — R07.9 CHEST PAIN, UNSPECIFIED TYPE: ICD-10-CM

## 2025-05-26 DIAGNOSIS — R09.02 HYPOXEMIA: ICD-10-CM

## 2025-05-26 DIAGNOSIS — J18.9 PNEUMONIA DUE TO INFECTIOUS ORGANISM, UNSPECIFIED LATERALITY, UNSPECIFIED PART OF LUNG: Primary | ICD-10-CM

## 2025-05-26 PROBLEM — J96.21 ACUTE ON CHRONIC RESPIRATORY FAILURE WITH HYPOXIA (HCC): Status: ACTIVE | Noted: 2025-05-26

## 2025-05-26 PROBLEM — I25.10 CAD (CORONARY ARTERY DISEASE): Status: RESOLVED | Noted: 2025-04-06 | Resolved: 2025-05-26

## 2025-05-26 PROBLEM — I44.7 LBBB (LEFT BUNDLE BRANCH BLOCK): Status: ACTIVE | Noted: 2025-05-26

## 2025-05-26 LAB
ALBUMIN SERPL BCP-MCNC: 3.6 G/DL (ref 3.2–4.9)
ALBUMIN/GLOB SERPL: 1 G/DL
ALP SERPL-CCNC: 75 U/L (ref 30–99)
ALT SERPL-CCNC: <5 U/L (ref 2–50)
ANION GAP SERPL CALC-SCNC: 12 MMOL/L (ref 7–16)
AST SERPL-CCNC: 15 U/L (ref 12–45)
BASE EXCESS BLDA CALC-SCNC: -2 MMOL/L (ref -4–3)
BASOPHILS # BLD AUTO: 0.3 % (ref 0–1.8)
BASOPHILS # BLD: 0.04 K/UL (ref 0–0.12)
BILIRUB SERPL-MCNC: 0.6 MG/DL (ref 0.1–1.5)
BODY TEMPERATURE: 37.4 CENTIGRADE
BUN SERPL-MCNC: 12 MG/DL (ref 8–22)
CALCIUM ALBUM COR SERPL-MCNC: 9.5 MG/DL (ref 8.5–10.5)
CALCIUM SERPL-MCNC: 9.2 MG/DL (ref 8.5–10.5)
CHLORIDE SERPL-SCNC: 103 MMOL/L (ref 96–112)
CO2 SERPL-SCNC: 20 MMOL/L (ref 20–33)
CREAT SERPL-MCNC: 1.04 MG/DL (ref 0.5–1.4)
EKG IMPRESSION: NORMAL
EOSINOPHIL # BLD AUTO: 0.05 K/UL (ref 0–0.51)
EOSINOPHIL NFR BLD: 0.4 % (ref 0–6.9)
ERYTHROCYTE [DISTWIDTH] IN BLOOD BY AUTOMATED COUNT: 52.3 FL (ref 35.9–50)
FLUAV RNA SPEC QL NAA+PROBE: NEGATIVE
FLUBV RNA SPEC QL NAA+PROBE: NEGATIVE
GFR SERPLBLD CREATININE-BSD FMLA CKD-EPI: 74 ML/MIN/1.73 M 2
GLOBULIN SER CALC-MCNC: 3.7 G/DL (ref 1.9–3.5)
GLUCOSE BLD STRIP.AUTO-MCNC: 216 MG/DL (ref 65–99)
GLUCOSE SERPL-MCNC: 247 MG/DL (ref 65–99)
HCO3 BLDA-SCNC: 23 MMOL/L (ref 21–28)
HCT VFR BLD AUTO: 38.6 % (ref 42–52)
HGB BLD-MCNC: 12.2 G/DL (ref 14–18)
HOLDING TUBE BB 8507: NORMAL
IMM GRANULOCYTES # BLD AUTO: 0.07 K/UL (ref 0–0.11)
IMM GRANULOCYTES NFR BLD AUTO: 0.5 % (ref 0–0.9)
LACTATE SERPL-SCNC: 1.9 MMOL/L (ref 0.5–2)
LYMPHOCYTES # BLD AUTO: 0.42 K/UL (ref 1–4.8)
LYMPHOCYTES NFR BLD: 3.1 % (ref 22–41)
MCH RBC QN AUTO: 28.7 PG (ref 27–33)
MCHC RBC AUTO-ENTMCNC: 31.6 G/DL (ref 32.3–36.5)
MCV RBC AUTO: 90.8 FL (ref 81.4–97.8)
MONOCYTES # BLD AUTO: 1.04 K/UL (ref 0–0.85)
MONOCYTES NFR BLD AUTO: 7.6 % (ref 0–13.4)
NEUTROPHILS # BLD AUTO: 12.13 K/UL (ref 1.82–7.42)
NEUTROPHILS NFR BLD: 88.1 % (ref 44–72)
NRBC # BLD AUTO: 0 K/UL
NRBC BLD-RTO: 0 /100 WBC (ref 0–0.2)
NT-PROBNP SERPL IA-MCNC: 3693 PG/ML (ref 0–125)
PCO2 BLDA: 38.4 MMHG (ref 32–48)
PCO2 TEMP ADJ BLDA: 39.1 MMHG (ref 32–48)
PH BLDA: 7.4 [PH] (ref 7.35–7.45)
PH TEMP ADJ BLDA: 7.39 [PH] (ref 7.35–7.45)
PLATELET # BLD AUTO: 216 K/UL (ref 164–446)
PMV BLD AUTO: 10.8 FL (ref 9–12.9)
PO2 BLDA: 100.8 MMHG (ref 83–108)
PO2 TEMP ADJ BLDA: 103.2 MMHG (ref 83–108)
POTASSIUM SERPL-SCNC: 4.2 MMOL/L (ref 3.6–5.5)
PROCALCITONIN SERPL-MCNC: 0.2 NG/ML
PROCALCITONIN SERPL-MCNC: 0.47 NG/ML
PROT SERPL-MCNC: 7.3 G/DL (ref 6–8.2)
RBC # BLD AUTO: 4.25 M/UL (ref 4.7–6.1)
RSV RNA SPEC QL NAA+PROBE: NEGATIVE
SAO2 % BLDA: 96.9 % (ref 93–99)
SARS-COV-2 RNA RESP QL NAA+PROBE: NOTDETECTED
SCCMEC + MECA PNL NOSE NAA+PROBE: NEGATIVE
SODIUM SERPL-SCNC: 135 MMOL/L (ref 135–145)
TROPONIN T SERPL-MCNC: 64 NG/L (ref 6–19)
TROPONIN T SERPL-MCNC: 69 NG/L (ref 6–19)
WBC # BLD AUTO: 13.8 K/UL (ref 4.8–10.8)

## 2025-05-26 PROCEDURE — 99285 EMERGENCY DEPT VISIT HI MDM: CPT

## 2025-05-26 PROCEDURE — 71275 CT ANGIOGRAPHY CHEST: CPT

## 2025-05-26 PROCEDURE — 770000 HCHG ROOM/CARE - INTERMEDIATE ICU *

## 2025-05-26 PROCEDURE — 93005 ELECTROCARDIOGRAM TRACING: CPT | Mod: TC | Performed by: EMERGENCY MEDICINE

## 2025-05-26 PROCEDURE — 93005 ELECTROCARDIOGRAM TRACING: CPT | Mod: TC | Performed by: INTERNAL MEDICINE

## 2025-05-26 PROCEDURE — 96376 TX/PRO/DX INJ SAME DRUG ADON: CPT

## 2025-05-26 PROCEDURE — 87641 MR-STAPH DNA AMP PROBE: CPT

## 2025-05-26 PROCEDURE — 700102 HCHG RX REV CODE 250 W/ 637 OVERRIDE(OP): Performed by: INTERNAL MEDICINE

## 2025-05-26 PROCEDURE — 99223 1ST HOSP IP/OBS HIGH 75: CPT | Performed by: INTERNAL MEDICINE

## 2025-05-26 PROCEDURE — 700105 HCHG RX REV CODE 258: Performed by: EMERGENCY MEDICINE

## 2025-05-26 PROCEDURE — 84484 ASSAY OF TROPONIN QUANT: CPT

## 2025-05-26 PROCEDURE — 82803 BLOOD GASES ANY COMBINATION: CPT

## 2025-05-26 PROCEDURE — 700101 HCHG RX REV CODE 250: Performed by: STUDENT IN AN ORGANIZED HEALTH CARE EDUCATION/TRAINING PROGRAM

## 2025-05-26 PROCEDURE — 700105 HCHG RX REV CODE 258: Performed by: STUDENT IN AN ORGANIZED HEALTH CARE EDUCATION/TRAINING PROGRAM

## 2025-05-26 PROCEDURE — 700105 HCHG RX REV CODE 258: Performed by: INTERNAL MEDICINE

## 2025-05-26 PROCEDURE — 84100 ASSAY OF PHOSPHORUS: CPT

## 2025-05-26 PROCEDURE — 96375 TX/PRO/DX INJ NEW DRUG ADDON: CPT

## 2025-05-26 PROCEDURE — 700111 HCHG RX REV CODE 636 W/ 250 OVERRIDE (IP): Mod: JZ | Performed by: EMERGENCY MEDICINE

## 2025-05-26 PROCEDURE — 83880 ASSAY OF NATRIURETIC PEPTIDE: CPT

## 2025-05-26 PROCEDURE — 700111 HCHG RX REV CODE 636 W/ 250 OVERRIDE (IP): Mod: JZ

## 2025-05-26 PROCEDURE — 83605 ASSAY OF LACTIC ACID: CPT

## 2025-05-26 PROCEDURE — 82962 GLUCOSE BLOOD TEST: CPT

## 2025-05-26 PROCEDURE — 96365 THER/PROPH/DIAG IV INF INIT: CPT

## 2025-05-26 PROCEDURE — 0241U HCHG SARS-COV-2 COVID-19 NFCT DS RESP RNA 4 TRGT ED POC: CPT

## 2025-05-26 PROCEDURE — 700117 HCHG RX CONTRAST REV CODE 255: Performed by: EMERGENCY MEDICINE

## 2025-05-26 PROCEDURE — 87040 BLOOD CULTURE FOR BACTERIA: CPT | Mod: 91

## 2025-05-26 PROCEDURE — 36415 COLL VENOUS BLD VENIPUNCTURE: CPT

## 2025-05-26 PROCEDURE — A9270 NON-COVERED ITEM OR SERVICE: HCPCS | Performed by: INTERNAL MEDICINE

## 2025-05-26 PROCEDURE — 71045 X-RAY EXAM CHEST 1 VIEW: CPT

## 2025-05-26 PROCEDURE — 80053 COMPREHEN METABOLIC PANEL: CPT

## 2025-05-26 PROCEDURE — 99221 1ST HOSP IP/OBS SF/LOW 40: CPT | Performed by: INTERNAL MEDICINE

## 2025-05-26 PROCEDURE — 700111 HCHG RX REV CODE 636 W/ 250 OVERRIDE (IP): Performed by: INTERNAL MEDICINE

## 2025-05-26 PROCEDURE — 84145 PROCALCITONIN (PCT): CPT

## 2025-05-26 PROCEDURE — 85025 COMPLETE CBC W/AUTO DIFF WBC: CPT

## 2025-05-26 PROCEDURE — 96367 TX/PROPH/DG ADDL SEQ IV INF: CPT

## 2025-05-26 RX ORDER — ONDANSETRON 4 MG/1
4 TABLET, ORALLY DISINTEGRATING ORAL EVERY 4 HOURS PRN
Status: DISCONTINUED | OUTPATIENT
Start: 2025-05-26 | End: 2025-05-31 | Stop reason: HOSPADM

## 2025-05-26 RX ORDER — LOSARTAN POTASSIUM 25 MG/1
25 TABLET ORAL DAILY
Status: DISCONTINUED | OUTPATIENT
Start: 2025-05-26 | End: 2025-05-31 | Stop reason: HOSPADM

## 2025-05-26 RX ORDER — NITROGLYCERIN 0.4 MG/1
0.4 TABLET SUBLINGUAL
Status: DISCONTINUED | OUTPATIENT
Start: 2025-05-26 | End: 2025-05-31 | Stop reason: HOSPADM

## 2025-05-26 RX ORDER — GUAIFENESIN/DEXTROMETHORPHAN 100-10MG/5
10 SYRUP ORAL EVERY 6 HOURS PRN
Status: DISCONTINUED | OUTPATIENT
Start: 2025-05-26 | End: 2025-05-31 | Stop reason: HOSPADM

## 2025-05-26 RX ORDER — GABAPENTIN 300 MG/1
600 CAPSULE ORAL 3 TIMES DAILY
Status: DISCONTINUED | OUTPATIENT
Start: 2025-05-26 | End: 2025-05-26

## 2025-05-26 RX ORDER — PANTOPRAZOLE SODIUM 40 MG/1
40 TABLET, DELAYED RELEASE ORAL DAILY
Status: DISCONTINUED | OUTPATIENT
Start: 2025-05-26 | End: 2025-05-26

## 2025-05-26 RX ORDER — NOREPINEPHRINE BITARTRATE 0.03 MG/ML
0-1 INJECTION, SOLUTION INTRAVENOUS CONTINUOUS
Status: DISCONTINUED | OUTPATIENT
Start: 2025-05-26 | End: 2025-05-31

## 2025-05-26 RX ORDER — GABAPENTIN 300 MG/1
600 CAPSULE ORAL 3 TIMES DAILY
Status: DISCONTINUED | OUTPATIENT
Start: 2025-05-26 | End: 2025-05-31 | Stop reason: HOSPADM

## 2025-05-26 RX ORDER — AZITHROMYCIN 250 MG/1
500 TABLET, FILM COATED ORAL ONCE
Status: DISCONTINUED | OUTPATIENT
Start: 2025-05-26 | End: 2025-05-26

## 2025-05-26 RX ORDER — OMEPRAZOLE 20 MG/1
20 CAPSULE, DELAYED RELEASE ORAL DAILY
Status: DISCONTINUED | OUTPATIENT
Start: 2025-05-26 | End: 2025-05-31 | Stop reason: HOSPADM

## 2025-05-26 RX ORDER — DOXYCYCLINE 100 MG/1
100 TABLET ORAL ONCE
Status: DISCONTINUED | OUTPATIENT
Start: 2025-05-26 | End: 2025-05-26

## 2025-05-26 RX ORDER — ASPIRIN 81 MG/1
81 TABLET ORAL DAILY
Status: DISCONTINUED | OUTPATIENT
Start: 2025-05-26 | End: 2025-05-31 | Stop reason: HOSPADM

## 2025-05-26 RX ORDER — ONDANSETRON 2 MG/ML
4 INJECTION INTRAMUSCULAR; INTRAVENOUS EVERY 4 HOURS PRN
Status: DISCONTINUED | OUTPATIENT
Start: 2025-05-26 | End: 2025-05-31 | Stop reason: HOSPADM

## 2025-05-26 RX ORDER — NICOTINE 21 MG/24HR
1 PATCH, TRANSDERMAL 24 HOURS TRANSDERMAL EVERY 24 HOURS
Status: DISCONTINUED | OUTPATIENT
Start: 2025-05-26 | End: 2025-05-31 | Stop reason: HOSPADM

## 2025-05-26 RX ORDER — DIGOXIN 125 MCG
125 TABLET ORAL DAILY
COMMUNITY

## 2025-05-26 RX ORDER — POTASSIUM CHLORIDE 1500 MG/1
40 TABLET, EXTENDED RELEASE ORAL 2 TIMES DAILY
Status: DISCONTINUED | OUTPATIENT
Start: 2025-05-26 | End: 2025-05-27

## 2025-05-26 RX ORDER — FUROSEMIDE 10 MG/ML
40 INJECTION INTRAMUSCULAR; INTRAVENOUS 2 TIMES DAILY
Status: DISCONTINUED | OUTPATIENT
Start: 2025-05-26 | End: 2025-05-30

## 2025-05-26 RX ORDER — POTASSIUM CHLORIDE 1500 MG/1
20 TABLET, EXTENDED RELEASE ORAL
COMMUNITY

## 2025-05-26 RX ORDER — ACETAMINOPHEN 325 MG/1
650 TABLET ORAL EVERY 6 HOURS PRN
Status: DISCONTINUED | OUTPATIENT
Start: 2025-05-26 | End: 2025-05-31 | Stop reason: HOSPADM

## 2025-05-26 RX ORDER — ACETAMINOPHEN 500 MG
1000 TABLET ORAL EVERY 6 HOURS PRN
COMMUNITY

## 2025-05-26 RX ORDER — LABETALOL HYDROCHLORIDE 5 MG/ML
10 INJECTION, SOLUTION INTRAVENOUS EVERY 4 HOURS PRN
Status: DISCONTINUED | OUTPATIENT
Start: 2025-05-26 | End: 2025-05-31 | Stop reason: HOSPADM

## 2025-05-26 RX ORDER — TAMSULOSIN HYDROCHLORIDE 0.4 MG/1
0.4 CAPSULE ORAL DAILY
Status: DISCONTINUED | OUTPATIENT
Start: 2025-05-26 | End: 2025-05-31 | Stop reason: HOSPADM

## 2025-05-26 RX ORDER — METOPROLOL SUCCINATE 50 MG/1
50 TABLET, EXTENDED RELEASE ORAL EVERY EVENING
Status: DISCONTINUED | OUTPATIENT
Start: 2025-05-26 | End: 2025-05-31 | Stop reason: HOSPADM

## 2025-05-26 RX ORDER — CLOPIDOGREL BISULFATE 75 MG/1
75 TABLET ORAL DAILY
Status: DISCONTINUED | OUTPATIENT
Start: 2025-05-26 | End: 2025-05-31 | Stop reason: HOSPADM

## 2025-05-26 RX ORDER — ONDANSETRON 2 MG/ML
4 INJECTION INTRAMUSCULAR; INTRAVENOUS ONCE
Status: COMPLETED | OUTPATIENT
Start: 2025-05-26 | End: 2025-05-26

## 2025-05-26 RX ORDER — CLOPIDOGREL BISULFATE 75 MG/1
75 TABLET ORAL DAILY
Status: DISCONTINUED | OUTPATIENT
Start: 2025-05-26 | End: 2025-05-26

## 2025-05-26 RX ORDER — ATORVASTATIN CALCIUM 40 MG/1
40 TABLET, FILM COATED ORAL EVERY EVENING
Status: DISCONTINUED | OUTPATIENT
Start: 2025-05-26 | End: 2025-05-31 | Stop reason: HOSPADM

## 2025-05-26 RX ADMIN — CLOPIDOGREL BISULFATE 75 MG: 75 TABLET, FILM COATED ORAL at 20:09

## 2025-05-26 RX ADMIN — ACETAMINOPHEN 650 MG: 325 TABLET ORAL at 20:20

## 2025-05-26 RX ADMIN — PIPERACILLIN AND TAZOBACTAM 4.5 G: 4; .5 INJECTION, POWDER, FOR SOLUTION INTRAVENOUS at 18:00

## 2025-05-26 RX ADMIN — NOREPINEPHRINE BITARTRATE 0.05 MCG/KG/MIN: 1 INJECTION, SOLUTION, CONCENTRATE INTRAVENOUS at 22:47

## 2025-05-26 RX ADMIN — FUROSEMIDE 40 MG: 10 INJECTION, SOLUTION INTRAVENOUS at 17:54

## 2025-05-26 RX ADMIN — ACETAMINOPHEN 650 MG: 325 TABLET ORAL at 13:34

## 2025-05-26 RX ADMIN — AMPICILLIN AND SULBACTAM 3 G: 1; 2 INJECTION, POWDER, FOR SOLUTION INTRAMUSCULAR; INTRAVENOUS at 11:35

## 2025-05-26 RX ADMIN — ATORVASTATIN CALCIUM 40 MG: 40 TABLET, FILM COATED ORAL at 20:09

## 2025-05-26 RX ADMIN — VANCOMYCIN HYDROCHLORIDE 2250 MG: 5 INJECTION, POWDER, LYOPHILIZED, FOR SOLUTION INTRAVENOUS at 14:13

## 2025-05-26 RX ADMIN — IOHEXOL 76 ML: 350 INJECTION, SOLUTION INTRAVENOUS at 10:15

## 2025-05-26 RX ADMIN — GABAPENTIN 600 MG: 300 CAPSULE ORAL at 20:09

## 2025-05-26 RX ADMIN — PIPERACILLIN AND TAZOBACTAM 4.5 G: 4; .5 INJECTION, POWDER, FOR SOLUTION INTRAVENOUS at 13:38

## 2025-05-26 RX ADMIN — ONDANSETRON 4 MG: 2 INJECTION INTRAMUSCULAR; INTRAVENOUS at 10:25

## 2025-05-26 RX ADMIN — OMEPRAZOLE 20 MG: 20 CAPSULE, DELAYED RELEASE ORAL at 20:09

## 2025-05-26 RX ADMIN — DOXYCYCLINE 100 MG: 100 INJECTION, POWDER, LYOPHILIZED, FOR SOLUTION INTRAVENOUS at 12:19

## 2025-05-26 ASSESSMENT — PAIN DESCRIPTION - PAIN TYPE
TYPE: ACUTE PAIN
TYPE: ACUTE PAIN
TYPE: CHRONIC PAIN
TYPE: ACUTE PAIN;CHRONIC PAIN;NEUROPATHIC PAIN
TYPE: ACUTE PAIN

## 2025-05-26 ASSESSMENT — FIBROSIS 4 INDEX: FIB4 SCORE: 2.74

## 2025-05-26 NOTE — ASSESSMENT & PLAN NOTE
5/31:  Chronically on 5L home O2  Due to pneumonia as well as some volume overload with edema as well as small bilateral pleural effusions  Titrate oxygen to keep SpO2 greater than 88%  Antibiotics being adjusted  RT per protocol  oral lasix

## 2025-05-26 NOTE — PROGRESS NOTES
Pt was brought from ED on arrival pt opening eyed to verbal,not communicating,follow commands like to perform hand  but very weak hand ,unable to lift legs or bend legs,mouth dry,left pupil was size 4 but reacting to light,pt kept npo as pt not able to swallow.pt on oxygen,o2 sat 92 %,BP 97/58.

## 2025-05-26 NOTE — ASSESSMENT & PLAN NOTE
5/31  trace effusions, no need for thoracentesis at this time  Continue Lasix oral and monitor I/O's, vitals, and labs

## 2025-05-26 NOTE — ASSESSMENT & PLAN NOTE
Patient recently had a cardiac catheterization which did show disease but nothing to be stented,  Continue goal-directed medical therapy  Patient transferred as a code STEMI however this was canceled, I do not feel there is ACS  Continue to trend troponin  Monitor patient on telemetry

## 2025-05-26 NOTE — ASSESSMENT & PLAN NOTE
5/31:   RT per protocol  Encouraged smoking cessation  Titration of oxygen to keep SpO2 greater than 88%  5/31 currently on 3-5 L nasal cannula.

## 2025-05-26 NOTE — PROGRESS NOTES
"Pharmacy Vancomycin Kinetics Note for 5/26/2025     79 y.o. male on Vancomycin day # 1     Vancomycin Indication (AUC Dosing): Sepsis    Provider specified end date: 06/02/25    Active Antibiotics (From admission, onward)      Ordered     Ordering Provider       Mon May 26, 2025  1:01 PM    05/26/25 1301  vancomycin (Vancocin) 1,000 mg in  mL IVPB  (vancomycin (VANCOCIN) IV (LD + Maintenance))  EVERY 12 HOURS         Nathaniel Rodriguez D.O.    05/26/25 1301  vancomycin (Vancocin) 2,250 mg in  mL IVPB  (vancomycin (VANCOCIN) IV (LD + Maintenance))  ONCE         Nathaniel Rodriguez D.O.       Mon May 26, 2025 12:35 PM    05/26/25 1235  MD Alert...Vancomycin per Pharmacy  (Piperacillin-Tazobactam and Vancomycin)  PHARMACY TO DOSE        Question:  Indication(s) for vancomycin?  Answer:  Pneumonia    Nathaniel Rodriguez D.O.    05/26/25 1235  piperacillin-tazobactam (Zosyn) 4.5 g in  mL IVPB  (Piperacillin-Tazobactam and Vancomycin)  ONCE        Placed in \"And\" Linked Group    Nathaniel Rodriguez D.O.    05/26/25 1235  piperacillin-tazobactam (Zosyn) 4.5 g in  mL IVPB  (Piperacillin-Tazobactam and Vancomycin)  EVERY 8 HOURS        Placed in \"And\" Linked Group    Nathaniel Rodriguez D.O.       Mon May 26, 2025 11:40 AM    05/26/25 1140  doxycycline (Vibramycin) 100 mg in  mL IVPB  ONCE         Bessy Blas M.D.       Mon May 26, 2025 10:45 AM    05/26/25 1045  ampicillin/sulbactam (Unasyn) 3 g in  mL IVPB  (CAP (Simple Sepsis) )  ONCE         Bessy Blas M.D.            Dosing Weight: 87.5 kg (192 lb 14.4 oz)      Admission History: Admitted on 5/26/2025 for Acute on chronic respiratory failure with hypoxia (HCC) [J96.21]  Pertinent history: 79y male originally transferred to [facility] for a STEMI, but was found to have right lobar PNA instead. Previously admitted in April 2025 for similar complaint and was taken to cath lab, but no interventions were made in cath lab. Soco/zosyn started for " "broad spectrum coverage due to recent hospitalization.    Allergies:     Patient has no known allergies.     Pertinent cultures to date:     Results       Procedure Component Value Units Date/Time    Culture Respiratory W/ GRM STN [[id number]]     Order Status: Sent Specimen: Respirate from Sputum     MRSA By PCR (Amp) [[id number]]     Order Status: Sent Specimen: Respirate from Nares     Blood Culture - Draw one from central line and one from peripheral site [[id number]] Collected: 25 1112    Order Status: Sent Specimen: Blood from Peripheral Updated: 25 1126    Blood Culture - Draw one from central line and one from peripheral site [[id number]] Collected: 25 1056    Order Status: Sent Specimen: Blood from Line Updated: 25 1117            Labs:     Estimated Creatinine Clearance: 62.8 mL/min (by C-G formula based on SCr of 1.04 mg/dL).  Recent Labs     25  1005   WBC 13.8*   NEUTSPOLYS 88.10*     Recent Labs     25  1005   BUN 12   CREATININE 1.04   ALBUMIN 3.6     No intake or output data in the 24 hours ending 25 1301   /57   Pulse (!) 114   Temp 37.1 °C (98.7 °F) (Temporal)   Resp (!) 27   Ht 1.702 m (5' 7\")   Wt 87.5 kg (193 lb)   SpO2 95%  Temp (24hrs), Av.3 °C (99.2 °F), Min:37.1 °C (98.7 °F), Max:37.6 °C (99.7 °F)      List concerns for Vancomycin clearance:     Age    Pharmacokinetics:     AUC kinetics:   Ke (hr ^-1): 0.0567 hr^-1  Half life: 12.22 hr  Clearance: 3.795  Estimated TDD: 1897.5  Estimated Dose: 1123  Estimated interval: 14.2    A/P:     -  Vancomycin dose: 2250mg IV x1 LD followed by 1000mg IV BID    -  Next vancomycin level(s): pending clinical course    -  Predicted vancomycin AUC from initial AUC test calculator: 527 mg·hr/L    -  Comments: Recommend obtaining peak and trough levels after 3rd maintenance dose. Pharmacy will continue to follow.     Lou Kirkland, PharmD    "

## 2025-05-26 NOTE — DISCHARGE PLANNING
STEMI    Pt arrived via [facility] from [facility]. Pt is Donte Agustin 1945. Pt wife Sherin Agustin is aware Pt being transferred. SW contacted Pt wife [phone]. She is unable to come to [place] as she does not have money for gas then would be concerned about lodging. SW updated ERP and ERP called wife to update on medical condition. SW will monitor and assist with any continued needs.    1043: Pt wife left  to notify [facility] that Pt is on 5L continuous oxygen, his oxygen company is [employer]. [facility] updated RN and ERP.

## 2025-05-26 NOTE — ED TRIAGE NOTES
Chief Complaint   Patient presents with    Chest Pain     Starting this AM with lethargy and respiratory distress.      Pt BIB [facility] from [facility]. Pt presented to prior facility for above complaints. Pt was admitted in April for STEMI for which he received thrombolytics and heparin, no stent placement. Pt lives at home with wife and this AM started having central CP and SOB.     STEMI activated based on pre-hospital EKG. Pt evaluated by Dr. Blas and Dr. Hayes, STEMI cancelled by cardiology. EKG on arrival shows no changes from EKG in April.     Pt transported to CT for STAT PE study.     Pt transported from CT to red 4, repot to [facility].

## 2025-05-26 NOTE — ED NOTES
Bedside report received from off going RN/tech: Seven, assumed care of patient.  POC discussed with patient. Call light within reach, all needs addressed at this time.       Fall risk interventions in place: Move the patient closer to the nurse's station, Patient's personal possessions are with in their safe reach, Place socks on patient, Place fall risk sign on patient's door, Give patient urinal if applicable, Keep floor surfaces clean and dry, and Bed Alarm in use (all applicable per [facility] Fall risk assessment)   Continuous monitoring: Cardiac Leads, Pulse Ox, or Blood Pressure  IVF/IV medications: Infusion per MAR (List Med(s)) DOXY @ 100  Oxygen: How many liters 8L  Bedside sitter: Not Applicable   Isolation: Not Applicable        
Bedside report to MARYBEL Wells, SMT. Pt to floor with chart. No personal items in ER  
Bedside report to Melissa NO. Pt on 8 L of oxygen. Pt on bed alarm. Bed in lowest and locked position. Call light within reach  
Called in regards to labs resulting, lab said the line was down and they have to manually run things. Will call back in 30 minutes for results    
Dr. Rodriguez at bedside.   
Linen change provided for pt as pt was saturated in urine from pts brief and pt had BM. Pt cleaned up. New gown provided as well. Pt unable to pass swallow eval. Notified ERP. ERP changed other abx to IV.   
Medication history reviewed with PT's spouse, Sherin ([phone]) over the phone    Med rec is complete per spouse reporting    Allergies reviewed.     Spouse denies any outpatient antibiotics in the last 30 days.     Patient is not taking anticoagulants.    Dispense history is available in [facility].    Preferred pharmacy for this encounter - [facility] on [address] ([phone])                
PASSED SWALLOW SCREENING  
Pt back to room via CT. Pt connected to monitor. Pt titrated from non rebreather to 10 oxymask. Pt can state the year, his name but not where he is or the event. Pt denies any chest pain at this time. Pt placed on bed alarm  
Second set of blood cultures collected via ED phlebotomist  
Viral swab obtained and running. One set of cultures collected and sent, along with lactic acid. Spoke to ED phlebotomist to come and draw second set of cultures      Per pts wife. Pt wears 5L of oxygen at baseline  
36.75

## 2025-05-26 NOTE — CONSULTS
Cardiology Initial Consult Note    Date of note:    5/26/2025      Consulting Physician: Nathaniel Rodriguez D.O.    Name:   Donte Agustin     YOB: 1945  Age:   79 y.o.  male   MRN:   [id number]    Assessment/Recommendations:  1.  Left bundle branch block which is old.  Patient's most recent cardiac catheterization in April 2025 showed no significant obstructive coronary disease.  Troponin here is 64.  BNP 3693 which is actually better compared to last admission of 6318.   - No cardiac catheterization recommended at this time   - We may note that he has an old left bundle branch block on problem list    2.  HFrEF, nonischemic, possible slight fluid overload but could all be pneumonia.   - Continue with his Farxiga, losartan, metoprolol succinate, clopidogrel   -agree with IV lasix at this time   - Patient did not show for follow-up appointment 4/22/2025 with cardiology    -can reschedule fu appt with cardiology  3.  Pneumonia per primary treating team    Disposition: Cardiology will sign off at this time, please call with questions or concerns    Reason for Consultation: Outside transfer for possible ST elevation MI    HPI:  Donte Agustin is a 79 y.o. male with history of left bundle branch block, was transferred from an outside hospital in April for possible STEMI with a left bundle branch block, status post TNK, cardiac catheterization showed nonobstructive coronary disease, EF 25%, smoker, hypertension, BPH, transferred from [facility] 5/26/2025 for reported chest pain and also left bundle branch block with elevated troponin at outside facility.  Upon arrival to [facility] emergency room, patient was not able to give a very good history.  Blood pressure stable, tachycardic, he was on OxyMask 10 at 100%.  STEMI activation was canceled.  Further evaluation was performed including a CT suggestive of possible dense perihilar pneumonia.    Patient cannot give a very good history.  Mentation slightly decreased.  He  "is reporting of left shoulder pain.  No chest pain.  He is not able to give very much other history.    WBC elevated at 13    Cardiac Imaging and Procedures Review (personally reviewed and notable for):    EKG dated 5/26/2025: Sinus tachycardia, left bundle branch block, compared to prior ECG of 4/7/2025, rate is faster    Echo dated 4/6/2025: Severely decreased LVEF, 25 to 30%.  Global hypokinesis.  Mild mitral regurgitation, mild aortic stenosis.     Cardiac Catheterization: 4/7/2025:  Left main coronary artery: Large bifurcating no CAD  Left anterior descending artery: Large-caliber wraparound apex mild nonobstructive CAD  Left circumflex coronary artery: Moderate caliber to large caliber mild nonobstructive CAD.  Right coronary artery: Small caliber widely patent previously placed stent.    Radiology test Review:  DX-CHEST-PORTABLE (1 VIEW)   Final Result      Airspace opacity throughout the right lung, concerning for pneumonia.      CT-CTA CHEST PULMONARY ARTERY W/ RECONS   Final Result         1. Dense right perihilar pneumonia type infiltrate. Mild left base atelectasis. Trace bilateral pleural effusions.      2. No pulmonary embolus evident. Prominent aortic valve calcification. Coronary artery calcification.      3. Mild gynecomastia.                            Physical Exam  Body mass index is 30.23 kg/m².  /58   Pulse (!) 113   Temp 37.1 °C (98.7 °F) (Temporal)   Resp (!) 29   Ht 1.702 m (5' 7\")   Wt 87.5 kg (193 lb)   SpO2 94%   Vitals:    05/26/25 1025 05/26/25 1102 05/26/25 1200 05/26/25 1222   BP: 118/57 104/59 103/58 104/58   Pulse: (!) 116 (!) 116 (!) 111 (!) 113   Resp: 20 18 18 (!) 29   Temp: 37.6 °C (99.7 °F)   37.1 °C (98.7 °F)   TempSrc: Oral   Temporal   SpO2: 100% 95% 94% 94%   Weight:       Height:         Oxygen Therapy:  Pulse Oximetry: 94 %, O2 (LPM): 8, O2 Delivery Device: Oxymask    Physical Exam  Constitutional:       Appearance: Normal appearance.   Eyes:      Extraocular " Movements: Extraocular movements intact.      Conjunctiva/sclera: Conjunctivae normal.   Neck:      Vascular: No carotid bruit or JVD.   Cardiovascular:      Rate and Rhythm: Regular rhythm. Tachycardia present.      Pulses:           Radial pulses are 1+ on the right side and 1+ on the left side.        Posterior tibial pulses are 1+ on the right side and 1+ on the left side.      Heart sounds: Heart sounds are distant. Murmur heard.      Systolic murmur is present with a grade of 1/6.      No friction rub. No gallop.   Pulmonary:      Effort: Pulmonary effort is normal. No respiratory distress.      Breath sounds: Normal breath sounds. No wheezing or rales.      Comments: Difficult anterior exam only  Abdominal:      General: Bowel sounds are normal.      Palpations: Abdomen is soft.   Musculoskeletal:      Cervical back: Neck supple.      Right lower leg: No edema.      Left lower leg: No edema.   Skin:     General: Skin is warm and dry.   Neurological:      Mental Status: He is lethargic.      Comments: He is orientated to place          Problem list:  Principal Problem:    Acute on chronic respiratory failure with hypoxia (HCC) (POA: Yes)  Active Problems:    Diabetes (HCC) (POA: Yes)    Heart failure with reduced ejection fraction (HCC) (LVEF 20-25% Echo 2/2025) (POA: Yes)    Hypertension (POA: Yes)    CAD (coronary artery disease) s/p Stent 10/2022 (POA: Yes)    Hyperlipidemia (POA: Yes)    GERD (gastroesophageal reflux disease) (POA: Yes)    BPH (benign prostatic hyperplasia) (POA: Yes)    COPD (chronic obstructive pulmonary disease) (HCC) (POA: Yes)    Pleural effusion (POA: Yes)  Resolved Problems:    * No resolved hospital problems. *      Past Medical History[1]    Past Surgical History[2]    Prescriptions Prior to Admission[3]  Current Medications[4]    Allergies[5]    No family history on file.    Social History     Socioeconomic History    Marital status:      Spouse name: Not on file    Number  of children: Not on file    Years of education: Not on file    Highest education level: Not on file   Occupational History    Not on file   Tobacco Use    Smoking status: Every Day     Current packs/day: 0.50     Average packs/day: 0.5 packs/day for 40.4 years (20.2 ttl pk-yrs)     Types: Cigarettes     Start date: 1985     Passive exposure: Current    Smokeless tobacco: Never   Substance and Sexual Activity    Alcohol use: Not on file    Drug use: Not on file    Sexual activity: Not on file   Other Topics Concern    Not on file   Social History Narrative    Not on file     Social Drivers of Health     Financial Resource Strain: Not on file   Food Insecurity: No Food Insecurity (4/7/2025)    Hunger Vital Sign     Worried About Running Out of Food in the Last Year: Never true     Ran Out of Food in the Last Year: Never true   Transportation Needs: No Transportation Needs (4/7/2025)    PRAPARE - Transportation     Lack of Transportation (Medical): No     Lack of Transportation (Non-Medical): No   Physical Activity: Not on file   Stress: Not on file   Social Connections: Not on file   Intimate Partner Violence: Not At Risk (4/7/2025)    Humiliation, Afraid, Rape, and Kick questionnaire     Fear of Current or Ex-Partner: No     Emotionally Abused: No     Physically Abused: No     Sexually Abused: No   Housing Stability: High Risk (4/7/2025)    Housing Stability Vital Sign     Unable to Pay for Housing in the Last Year: Yes     Number of Times Moved in the Last Year: 0     Homeless in the Last Year: No       No intake or output data in the 24 hours ending 05/26/25 1244      Review of Systems   Reason unable to perform ROS: Quite lethargic.        Labs (personally reviewed and notable for):   Lab Results   Component Value Date/Time    SODIUM 138 04/08/2025 04:25 AM    POTASSIUM 4.0 04/08/2025 04:25 AM    CHLORIDE 100 04/08/2025 04:25 AM    CO2 28 04/08/2025 04:25 AM    GLUCOSE 137 (H) 04/08/2025 04:25 AM    BUN 21  04/08/2025 04:25 AM    CREATININE 1.02 04/08/2025 04:25 AM      Lab Results   Component Value Date/Time    WBC 13.8 (H) 05/26/2025 10:05 AM    RBC 4.25 (L) 05/26/2025 10:05 AM    HEMOGLOBIN 12.2 (L) 05/26/2025 10:05 AM    HEMATOCRIT 38.6 (L) 05/26/2025 10:05 AM    MCV 90.8 05/26/2025 10:05 AM    MCH 28.7 05/26/2025 10:05 AM    MCHC 31.6 (L) 05/26/2025 10:05 AM    MPV 10.8 05/26/2025 10:05 AM    NEUTSPOLYS 88.10 (H) 05/26/2025 10:05 AM    LYMPHOCYTES 3.10 (L) 05/26/2025 10:05 AM    MONOCYTES 7.60 05/26/2025 10:05 AM    EOSINOPHILS 0.40 05/26/2025 10:05 AM    BASOPHILS 0.30 05/26/2025 10:05 AM    INR 1.11 04/06/2025 05:40 AM      Lab Results   Component Value Date/Time    CHOLSTRLTOT 122 04/08/2025 04:25 AM    LDL 53 04/08/2025 04:25 AM    HDL 44 04/08/2025 04:25 AM    TRIGLYCERIDE 127 04/08/2025 04:25 AM       Lab Results   Component Value Date/Time    TROPONINT 78 (H) 04/07/2025 0937     Lab Results   Component Value Date/Time    NTPROBNP 6318 (H) 04/06/2025 0540     Lab Results   Component Value Date/Time    HBA1C 6.5 (H) 04/07/2025 0213     Thank you for allowing me to participate in the care of this patient.  Please contact me with any questions.    Marcelina Hayes M.D.  Cardiologist, [facility] [place]     This note was generated using voice recognition software which has a small chance of producing errors of grammar and possibly content. I have made every reasonable attempt to find and correct any obvious errors, but expect that some may not be found prior to finalization of this note.          [1] No past medical history on file.  [2] No past surgical history on file.  [3] (Not in a hospital admission)  [4]   Current Facility-Administered Medications   Medication Dose Route Frequency Provider Last Rate Last Admin    doxycycline (Vibramycin) 100 mg in  mL IVPB  100 mg Intravenous Once Bessy Blas M.D. 100 mL/hr at 05/26/25 1219 100 mg at 05/26/25 1219    aspirin EC tablet 81 mg  81 mg  Oral DAILY Nathaniel Rodriguez D.O.        atorvastatin (Lipitor) tablet 40 mg  40 mg Oral Q EVENING Nathaniel Rodriguez D.O.        clopidogrel (Plavix) tablet 75 mg  75 mg Oral DAILY Nathaniel Rodriguez D.O.        gabapentin (Neurontin) tablet 600 mg  600 mg Oral TID Nathaniel Rodriguez D.O.        losartan (Cozaar) tablet 25 mg  25 mg Oral DAILY Nathaniel Rodriguez D.O.        metoprolol SR (Toprol XL) tablet 50 mg  50 mg Oral Q EVENING Nathaniel Rodriguez D.O.        nicotine (Nicoderm) 21 MG/24HR 21 mg  1 Patch Transdermal Q24HRS Nathaniel Rodriguez D.O.        nitroglycerin (Nitrostat) tablet 0.4 mg  0.4 mg Sublingual PRN Nathaniel Rodriguez D.O.        pantoprazole (Protonix) EC tablet 40 mg  40 mg Oral DAILY Nathaniel Rodriguez D.O.        tamsulosin (Flomax) capsule 0.4 mg  0.4 mg Oral DAILY Nathaniel Rodriguez D.O.        Respiratory Therapy Consult   Nebulization Continuous RT Nathaniel Rodriguez D.O.        Respiratory Therapy Consult   Nebulization Continuous RT Nathaniel Rodriguez D.O.        rivaroxaban (Xarelto) tablet 10 mg  10 mg Oral DAILY AT 1800 Nathaniel Rodriguez D.O.        acetaminophen (Tylenol) tablet 650 mg  650 mg Oral Q6HRS PRN Nathaniel Rodriguez D.O.        piperacillin-tazobactam (Zosyn) 4.5 g in  mL IVPB  4.5 g Intravenous Once Nathaniel Rodriguez D.O.        And    piperacillin-tazobactam (Zosyn) 4.5 g in  mL IVPB  4.5 g Intravenous Q8HRS Nathaniel Rodriguez D.O.        MD Alert...Vancomycin per Pharmacy  1 Each Other PHARMACY TO DOSE Nathaniel Rodriguez D.O.        guaiFENesin dextromethorphan (Robitussin DM) 100-10 MG/5ML syrup 10 mL  10 mL Oral Q6HRS PRN Nathaniel Rodriguez D.O.        labetalol (Normodyne/Trandate) injection 10 mg  10 mg Intravenous Q4HRS PRN Nathaniel Rodriguez D.O.        ondansetron (Zofran) syringe/vial injection 4 mg  4 mg Intravenous Q4HRS PRN Nathaniel Rodriguez D.O.        Or    ondansetron (Zofran ODT) dispertab 4 mg  4 mg Oral Q4HRS PRN Nathaniel Rodriguez D.O.        furosemide (Lasix) injection 40  mg  40 mg Intravenous BID Nathaniel Rodriguez D.O.        potassium chloride SA (Kdur) tablet 40 mEq  40 mEq Oral BID Nathaniel Rodriguez D.O.         Current Outpatient Medications   Medication Sig Dispense Refill    aspirin 81 MG EC tablet Take 1 Tablet by mouth every day. 100 Tablet 0    atorvastatin (LIPITOR) 40 MG Tab Take 1 Tablet by mouth every evening. 100 Tablet 3    dapagliflozin propanediol (FARXIGA) 10 MG Tab Take 1 Tablet by mouth every day. 100 Tablet 3    losartan (COZAAR) 25 MG Tab Take 1 Tablet by mouth every day. 100 Tablet 3    metoprolol SR (TOPROL XL) 50 MG TABLET SR 24 HR Take 1 Tablet by mouth every evening. 30 Tablet 3    nicotine (NICODERM) 21 MG/24HR PATCH 24 HR Place 1 Patch on the skin every 24 hours. 28 Patch 1    nitroglycerin (NITROSTAT) 0.4 MG SL Tab Place 1 Tablet under the tongue as needed for Chest Pain. 25 Tablet 0    furosemide (LASIX) 20 MG Tab Take 1 Tablet by mouth every day. 30 Tablet 3    acetaminophen (TYLENOL) 500 MG Tab Take 2 Tablets by mouth every 6 hours as needed for Mild Pain. 1,000 mg = 2 tabs 100 Tablet 0    acetaminophen-codeine #3 (TYLENOL #3) 300-30 MG Tab Take 1 Tablet by mouth 4 times a day.      pantoprazole (PROTONIX) 40 MG Tablet Delayed Response Take 40 mg by mouth every day.      clopidogrel (PLAVIX) 75 MG Tab Take 75 mg by mouth every day.      tamsulosin (FLOMAX) 0.4 MG capsule Take 0.4 mg by mouth every day.      gabapentin (NEURONTIN) 600 MG tablet Take 600 mg by mouth 3 times a day.     [5] No Known Allergies

## 2025-05-26 NOTE — ED PROVIDER NOTES
ED Provider Note    Scribed for Bessy Blas M.D. by Sharee Shaw. 5/26/2025, 10:09 AM.    Primary care provider: Pcp Pt States None  Means of arrival: EMS  History obtained from: EMS  History limited by: None    CHIEF COMPLAINT  Chief Complaint   Patient presents with    Chest Pain     Starting this AM with lethargy and respiratory distress.        HPI/ROS  Donte Agustin is a 79 y.o. male with a history of heart failure who presents to the Emergency Department as a STEMI transfer from [facility].  I spoke with wife who reports that for the past few days she herself and the patient have been sick with cough, runny nose and congestion.  This morning patient was sitting in smoking when he started to complain of shortness of breath and chest pain.  She administered nitroglycerin at home and called EMS.  At arrival at outside facility there was concern for STEMI, patient was started on heparin drip and given aspirin and sent here for higher level of care.  Per EMS patient was hypoxic en route to the hospital and seemed short of breath, was initially trialed on BiPAP but ultimately switched to nonrebreather mask.  Per the wife patient does wear 5 L of oxygen at baseline.      Wife confirms that patient is full code.    EXTERNAL RECORDS REVIEWED  Patient recently hospitalized on 4/6/2025.  He has a history of coronary artery disease, COPD, ischemic cardiomyopathy.  During that hospitalization he had left heart cath which demonstrated an intact RCA stent and mild nonobstructive coronary artery disease.  He also had an echocardiogram that demonstrated ejection fraction of 25%.  He was treated for volume overload.    LIMITATION TO HISTORY   Select: : None    OUTSIDE HISTORIAN(S):  EMS who provided the sequence of events and collateral information to the patient's history as seen above.      PAST MEDICAL HISTORY   Patient suffered a STEMI one month ago.     SURGICAL HISTORY  patient denies any surgical  "history    SOCIAL HISTORY  Social History[1]   Social History     Substance and Sexual Activity   Drug Use Not on file       FAMILY HISTORY  No pertinent family history.    CURRENT MEDICATIONS  Home Medications    **Home medications have not yet been reviewed for this encounter**       Audit from Redirected Encounters    **Home medications have not yet been reviewed for this encounter**       ALLERGIES  Allergies[2]    PHYSICAL EXAM  VITAL SIGNS: /57   Pulse (!) 116   Temp 37.6 °C (99.7 °F) (Oral)   Resp 20   Ht 1.702 m (5' 7\")   Wt 87.5 kg (193 lb)   SpO2 100%   BMI 30.23 kg/m²   Vitals reviewed by myself.  Nursing note and vitals reviewed.  Constitutional: Well-developed and well-nourished.  Patient is in moderate distress  HENT: Head is normocephalic and atraumatic.  Eyes: extra-ocular movements intact  Cardiovascular: Tachycardic rate and regular rhythm. No murmur heard.  Pulmonary/Chest: Diminished air movement throughout all lung fields with coarse Rales bibasilar lung fields  Abdominal: Soft and non-tender. No distention.  No palpable masses  Musculoskeletal: Edema to the bilateral lower extremities. Extremities exhibit normal range of motion without tenderness.   Neurological: Awake and alert  Skin: Skin is warm and dry. No rash.     DIAGNOSTIC STUDIES:  LABS  Labs Reviewed   CBC WITH DIFFERENTIAL - Abnormal; Notable for the following components:       Result Value    WBC 13.8 (*)     RBC 4.25 (*)     Hemoglobin 12.2 (*)     Hematocrit 38.6 (*)     MCHC 31.6 (*)     RDW 52.3 (*)     Neutrophils-Polys 88.10 (*)     Lymphocytes 3.10 (*)     Neutrophils (Absolute) 12.13 (*)     Lymphs (Absolute) 0.42 (*)     Monos (Absolute) 1.04 (*)     All other components within normal limits   COMP METABOLIC PANEL   TROPONIN   TROPONIN   PROBRAIN NATRIURETIC PEPTIDE, NT   PROCALCITONIN   LACTIC ACID   LACTIC ACID   LACTIC ACID   BLOOD CULTURE   BLOOD CULTURE   HOLD BLOOD BANK SPECIMEN (NOT TESTED)   POCT COV-2, " FLU A/B, RSV BY PCR     All labs reviewed and independently interpreted by myself    EKG Interpretation:    12 Lead EKG independently interpreted by myself to show:  EKG demonstrates normal sinus rhythm at a rate of 124, left axis deviation, intervals notable for prolonged QRS of 178 with associated left bundle branch block, , QTc prolonged at 510, negative Sgarbossa criteria, no acute ST-T segment changes, no dynamic changes when compared to prior EKG from 4/7/2025, no evidence of acute arrhythmia or ischemia per my independent interpretation    RADIOLOGY  Images independently interpreted by myself prior to radiologist review:   -Chest X-ray shows right sided pneumonia.   Final interpretation by radiology demonstrates:    DX-CHEST-PORTABLE (1 VIEW)   Final Result      Airspace opacity throughout the right lung, concerning for pneumonia.      CT-CTA CHEST PULMONARY ARTERY W/ RECONS   Final Result         1. Dense right perihilar pneumonia type infiltrate. Mild left base atelectasis. Trace bilateral pleural effusions.      2. No pulmonary embolus evident. Prominent aortic valve calcification. Coronary artery calcification.      3. Mild gynecomastia.                          The radiologist's interpretation of all radiological studies have been reviewed by me.      COURSE & MEDICAL DECISION MAKING    Sepsis: Infection was suspected 10:45 AM (Time). Sepsis pathway was initiated. No fluids will be administered to the patient due to concerns for fluid overload. Antibiotics were given per protocol.    INITIAL ASSESSMENT, ED COURSE AND PLAN    Patient is a 79-year-old male who presents for evaluation of shortness of breath and chest pain.  Differential diagnosis includes volume overload, viral syndrome, pulmonary embolism, ACS, arrhythmia.      Patient was initially brought in as a STEMI transfer, however initial EKG from outside facility as well as EKG here is consistent with known left bundle branch block, no  evidence of STEMI, negative Sgarbossa criteria.  Therefore heparin is held.  Patient is noted to be short of breath with tachycardia and therefore I have ordered a CT PE study.  CT PE study is negative for pulmonary embolism, he is noted to have right lobar pneumonia and therefore sepsis protocol is initiated, will hold on fluids as patient appears fluid overloaded and has history of heart failure.  Patient is empirically started on Unasyn and doxycycline for.  Labs returned are notable for a white count of 13.4.  Unfortunately there was a delay in labs and therefore patient will be hospitalized for ongoing management of hypoxemia with pneumonia.  Troponin and metabolic panel and BNP were pending.  Case discussed with Dr. Rodriguez, patient hospitalized in guarded condition.    Troponin did return slightly elevated at 64, this is decreased from his prior troponin.  He does appear fluid overload and BNP is 3693, troponin elevation likely related to fluid overload and acute illness.         DISPOSITION AND DISCUSSIONS  I have discussed management of the patient with the following physicians and WIL's:  Dr. Hayes (cardiology), Dr. Rodriguez    Discussion of management with other \[facility]\"" or appropriate source(s): None     Escalation of care considered, and ultimately not performed:see above    Barriers to care at this time, including but not limited to: Patient does not have established PCP.     Decision tools and prescription drugs considered including, but not limited to: see above.    DISPOSITION:  Patient will be hospitalized by Dr. Rodriguez in guarded condition.    FINAL IMPRESSION  1. Pneumonia due to infectious organism, unspecified laterality, unspecified part of lung    2. Hypoxemia        ISharee (Scribkriss), am scribing for, and in the presence of, Bessy Blas M.D..    Electronically signed by: Sharee Shaw (Yg), 5/26/2025    IBessy M.D. personally performed the services described in  this documentation, as scribed by Sharee Shaw in my presence, and it is both accurate and complete.    The note accurately reflects work and decisions made by me.  Bessy Blas M.D.  5/26/2025  1:36 PM         [1]   Social History  Tobacco Use    Smoking status: Every Day     Current packs/day: 0.50     Average packs/day: 0.5 packs/day for 40.4 years (20.2 ttl pk-yrs)     Types: Cigarettes     Start date: 1985     Passive exposure: Current    Smokeless tobacco: Never   [2] No Known Allergies

## 2025-05-26 NOTE — H&P
Hospital Medicine History & Physical Note    Date of Service  5/26/2025    Primary Care Physician  Pcp Pt States None    Consultants  None    Specialist Names: None    Code Status  Full Code    Chief Complaint  Chief Complaint   Patient presents with    Chest Pain     Starting this AM with lethargy and respiratory distress.        History of Presenting Illness  Donte Agustin is a 79 y.o. male who presented 5/26/2025 with shortness of breath.  Patient initially presented to [facility], transferred here as a code STEMI.  Upon arrival, ERP and cardiology evaluated the case, canceled code STEMI.  Patient is confused and somnolent, unable to get significant information from him, information obtained from the chart.  Patient does have a history of heart failure, also uses chronic oxygen at 5 L.  Patient was noted to be significantly hypoxic and he was placed on a nonrebreather.  He has an ejection fraction of 25%.  Patient recently was hospitalized for concern for STEMI, did have a cardiac catheterization, did note significant disease but no stent was placed.  Patient was on heparin in transit, this was stopped upon arrival here.  Patient does have a history of dementia, does become more confused with infection.  Due to significant hypoxia, CTA chest was obtained which did show a right sided pneumonia.  I did discuss the case including labs and imaging with the ER physician.    I discussed the plan of care with ERP.    Review of Systems  Review of Systems   Unable to perform ROS: Acuity of condition       Past Medical History   has no past medical history on file.    Surgical History   has no past surgical history on file.     Family History  family history is not on file.   Family history reviewed with patient. There is no family history that is pertinent to the chief complaint.     Social History   reports that he has been smoking cigarettes. He started smoking about 40 years ago. He has a 20.2 pack-year smoking  history. He has been exposed to tobacco smoke. He has never used smokeless tobacco.    Allergies  Allergies[1]    Medications  Prior to Admission Medications   Prescriptions Last Dose Informant Patient Reported? Taking?   acetaminophen (TYLENOL) 500 MG Tab   No No   Sig: Take 2 Tablets by mouth every 6 hours as needed for Mild Pain. 1,000 mg = 2 tabs   acetaminophen-codeine #3 (TYLENOL #3) 300-30 MG Tab  Significant Other Yes No   Sig: Take 1 Tablet by mouth 4 times a day.   aspirin 81 MG EC tablet   No No   Sig: Take 1 Tablet by mouth every day.   atorvastatin (LIPITOR) 40 MG Tab   No No   Sig: Take 1 Tablet by mouth every evening.   clopidogrel (PLAVIX) 75 MG Tab  Significant Other Yes No   Sig: Take 75 mg by mouth every day.   dapagliflozin propanediol (FARXIGA) 10 MG Tab   No No   Sig: Take 1 Tablet by mouth every day.   furosemide (LASIX) 20 MG Tab   No No   Sig: Take 1 Tablet by mouth every day.   gabapentin (NEURONTIN) 600 MG tablet  Significant Other Yes No   Sig: Take 600 mg by mouth 3 times a day.   losartan (COZAAR) 25 MG Tab   No No   Sig: Take 1 Tablet by mouth every day.   metoprolol SR (TOPROL XL) 50 MG TABLET SR 24 HR   No No   Sig: Take 1 Tablet by mouth every evening.   nicotine (NICODERM) 21 MG/24HR PATCH 24 HR   No No   Sig: Place 1 Patch on the skin every 24 hours.   nitroglycerin (NITROSTAT) 0.4 MG SL Tab   No No   Sig: Place 1 Tablet under the tongue as needed for Chest Pain.   pantoprazole (PROTONIX) 40 MG Tablet Delayed Response  Significant Other Yes No   Sig: Take 40 mg by mouth every day.   tamsulosin (FLOMAX) 0.4 MG capsule  Significant Other Yes No   Sig: Take 0.4 mg by mouth every day.      Facility-Administered Medications: None       Physical Exam  Temp:  [37.1 °C (98.7 °F)-37.6 °C (99.7 °F)] 37.1 °C (98.7 °F)  Pulse:  [111-116] 113  Resp:  [18-29] 29  BP: (103-118)/(57-59) 104/58  SpO2:  [94 %-100 %] 94 %  Blood Pressure : 104/58   Temperature: 37.1 °C (98.7 °F)   Pulse: (!) 113  "  Respiration: (!) 29   Pulse Oximetry: 94 %       Physical Exam  Vitals and nursing note reviewed.   Constitutional:       General: He is not in acute distress.     Appearance: He is well-developed. He is ill-appearing. He is not toxic-appearing or diaphoretic.   HENT:      Head: Normocephalic and atraumatic.      Right Ear: External ear normal.      Left Ear: External ear normal.      Nose: Nose normal. No congestion or rhinorrhea.      Mouth/Throat:      Mouth: Mucous membranes are moist.      Pharynx: No oropharyngeal exudate.   Eyes:      General:         Right eye: No discharge.         Left eye: No discharge.   Neck:      Trachea: No tracheal deviation.   Cardiovascular:      Rate and Rhythm: Normal rate and regular rhythm.      Heart sounds: No murmur heard.     No friction rub. No gallop.   Pulmonary:      Effort: Pulmonary effort is normal. No respiratory distress.      Breath sounds: No stridor. Rales present. No wheezing.   Abdominal:      General: Bowel sounds are normal. There is no distension.      Palpations: Abdomen is soft.   Musculoskeletal:         General: No tenderness. Normal range of motion.      Cervical back: Normal range of motion and neck supple. No edema or erythema.      Right lower leg: Edema present.      Left lower leg: Edema present.   Lymphadenopathy:      Cervical: No cervical adenopathy.   Skin:     General: Skin is warm and dry.      Findings: No erythema or rash.   Neurological:      Comments: Somnolent, mostly nonverbal    Psychiatric:         Speech: Speech is delayed and slurred.         Cognition and Memory: Cognition is impaired. Memory is impaired.         Laboratory:  Recent Labs     05/26/25  1005   WBC 13.8*   RBC 4.25*   HEMOGLOBIN 12.2*   HEMATOCRIT 38.6*   MCV 90.8   MCH 28.7   MCHC 31.6*   RDW 52.3*   PLATELETCT 216   MPV 10.8         No results for input(s): \"ALTSGPT\", \"ASTSGOT\", \"ALKPHOSPHAT\", \"TBILIRUBIN\", \"DBILIRUBIN\", \"GAMMAGT\", \"AMYLASE\", \"LIPASE\", \"ALB\", " "\"PREALBUMIN\", \"GLUCOSE\" in the last 72 hours.      No results for input(s): \"NTPROBNP\" in the last 72 hours.      No results for input(s): \"TROPONINT\" in the last 72 hours.    Imaging:  DX-CHEST-PORTABLE (1 VIEW)   Final Result      Airspace opacity throughout the right lung, concerning for pneumonia.      CT-CTA CHEST PULMONARY ARTERY W/ RECONS   Final Result         1. Dense right perihilar pneumonia type infiltrate. Mild left base atelectasis. Trace bilateral pleural effusions.      2. No pulmonary embolus evident. Prominent aortic valve calcification. Coronary artery calcification.      3. Mild gynecomastia.                            X-Ray:  I have personally reviewed the images and compared with prior images.    Assessment/Plan:  Justification for Admission Status  I anticipate this patient will require at least two midnights for appropriate medical management, necessitating inpatient admission because acute on chronic respiratory failure secondary to bacterial pneumonia    Patient will need a Telemetry bed on CARDIOLOGY service .  The need is secondary to acute on chronic respiratory failure secondary to bacterial pneumonia.    * Acute on chronic respiratory failure with hypoxia (HCC)- (present on admission)  Assessment & Plan  Due to pneumonia as well as some volume overload with edema as well as small bilateral pleural effusions  Oxygen is being weaned, currently requiring 10 L  He is certainly at high risk of worsening will place on telemetry in case high flow nasal cannula is required  Start antibiotics for potential hospital-acquired pneumonia considering he was here last month  Start Zosyn and vancomycin  Await culture results  Start IV Lasix  Patient remains full code, would benefit from CODE STATUS discussion however he is too altered at this time and there is no family at bedside    Pleural effusion- (present on admission)  Assessment & Plan  Trace effusions, no need for thoracentesis at this " time  Start IV Lasix    COPD (chronic obstructive pulmonary disease) ([facility])- (present on admission)  Assessment & Plan  No acute exacerbation  He is however at high risk of developing an exacerbation considering his pneumonia and volume overload  No steroids at this time however he does have acute worsening will reevaluate    BPH (benign prostatic hyperplasia)- (present on admission)  Assessment & Plan  Continue home Flomax    GERD (gastroesophageal reflux disease)- (present on admission)  Assessment & Plan  Continue home PPI    Hyperlipidemia- (present on admission)  Assessment & Plan  Continue home statin    CAD (coronary artery disease) s/p Stent 10/2022- (present on admission)  Assessment & Plan  Patient recently had a cardiac catheterization which did show disease but nothing to be stented,  Continue goal-directed medical therapy  Patient transferred as a code STEMI however this was canceled, I do not feel there is ACS  Continue to trend troponin  Monitor patient on telemetry    Hypertension- (present on admission)  Assessment & Plan  Continue home losartan and metoprolol  Start as needed labetalol  Adjust as needed    Heart failure with reduced ejection fraction ([facility]) (LVEF 20-25% Echo 2/2025)- (present on admission)  Assessment & Plan  Echocardiogram last month that showed an ejection fraction of 25%  Was transferred as a code STEMI but this was canceled  At this point in time, I do not feel he has ACS, symptoms secondary to pneumonia  I will transition from oral Lasix to IV Lasix  Otherwise, continue goal-directed medical therapy  No need for repeat echocardiogram or official cardiology consultation at this time  Monitor on telemetry    Diabetes ([facility])- (present on admission)  Assessment & Plan  Currently labs are pending  If there is significant hyperglycemia, will start insulin sliding scale        VTE prophylaxis: SCDs/TEDs and Xarelto 10 mg daily as prophylaxis       [1] No Known Allergies

## 2025-05-26 NOTE — ASSESSMENT & PLAN NOTE
5/31  Off pressors.    Remains on midodrine.  Holding home losartan and metoprolol due to being on midodrine  as needed labetalol  Monitor vitals

## 2025-05-26 NOTE — ASSESSMENT & PLAN NOTE
5/31  echocardiogram last month ejection fraction of 25%  IV Lasix changing to oral Lasix on 5/30  losartan and Coreg currently being held as he is currently on midodrine and trying to wean off pressors  Otherwise, continue goal-directed medical therapy  No need for repeat echocardiogram or official cardiology consultation at this time  Monitor on telemetry

## 2025-05-27 PROBLEM — I95.9 HYPOTENSION: Status: ACTIVE | Noted: 2025-05-27

## 2025-05-27 LAB
ANION GAP SERPL CALC-SCNC: 9 MMOL/L (ref 7–16)
BUN SERPL-MCNC: 13 MG/DL (ref 8–22)
CALCIUM SERPL-MCNC: 7.6 MG/DL (ref 8.5–10.5)
CHLORIDE SERPL-SCNC: 110 MMOL/L (ref 96–112)
CO2 SERPL-SCNC: 22 MMOL/L (ref 20–33)
CREAT SERPL-MCNC: 0.94 MG/DL (ref 0.5–1.4)
EKG IMPRESSION: NORMAL
ERYTHROCYTE [DISTWIDTH] IN BLOOD BY AUTOMATED COUNT: 55.9 FL (ref 35.9–50)
GFR SERPLBLD CREATININE-BSD FMLA CKD-EPI: 83 ML/MIN/1.73 M 2
GLUCOSE SERPL-MCNC: 121 MG/DL (ref 65–99)
HCT VFR BLD AUTO: 35.7 % (ref 42–52)
HGB BLD-MCNC: 10.8 G/DL (ref 14–18)
MAGNESIUM SERPL-MCNC: 1.5 MG/DL (ref 1.5–2.5)
MCH RBC QN AUTO: 28.7 PG (ref 27–33)
MCHC RBC AUTO-ENTMCNC: 30.3 G/DL (ref 32.3–36.5)
MCV RBC AUTO: 94.9 FL (ref 81.4–97.8)
NT-PROBNP SERPL IA-MCNC: 7215 PG/ML (ref 0–125)
PHOSPHATE SERPL-MCNC: 3 MG/DL (ref 2.5–4.5)
PLATELET # BLD AUTO: 177 K/UL (ref 164–446)
PMV BLD AUTO: 11 FL (ref 9–12.9)
POTASSIUM SERPL-SCNC: 3.3 MMOL/L (ref 3.6–5.5)
RBC # BLD AUTO: 3.76 M/UL (ref 4.7–6.1)
SODIUM SERPL-SCNC: 141 MMOL/L (ref 135–145)
WBC # BLD AUTO: 11.5 K/UL (ref 4.8–10.8)

## 2025-05-27 PROCEDURE — 700102 HCHG RX REV CODE 250 W/ 637 OVERRIDE(OP): Performed by: HOSPITALIST

## 2025-05-27 PROCEDURE — A9270 NON-COVERED ITEM OR SERVICE: HCPCS | Performed by: INTERNAL MEDICINE

## 2025-05-27 PROCEDURE — 700102 HCHG RX REV CODE 250 W/ 637 OVERRIDE(OP): Performed by: INTERNAL MEDICINE

## 2025-05-27 PROCEDURE — 770000 HCHG ROOM/CARE - INTERMEDIATE ICU *

## 2025-05-27 PROCEDURE — 92612 ENDOSCOPY SWALLOW (FEES) VID: CPT

## 2025-05-27 PROCEDURE — 700111 HCHG RX REV CODE 636 W/ 250 OVERRIDE (IP): Performed by: INTERNAL MEDICINE

## 2025-05-27 PROCEDURE — 700105 HCHG RX REV CODE 258: Performed by: INTERNAL MEDICINE

## 2025-05-27 PROCEDURE — 83735 ASSAY OF MAGNESIUM: CPT

## 2025-05-27 PROCEDURE — 80048 BASIC METABOLIC PNL TOTAL CA: CPT

## 2025-05-27 PROCEDURE — 83880 ASSAY OF NATRIURETIC PEPTIDE: CPT

## 2025-05-27 PROCEDURE — 93010 ELECTROCARDIOGRAM REPORT: CPT | Performed by: INTERNAL MEDICINE

## 2025-05-27 PROCEDURE — 92610 EVALUATE SWALLOWING FUNCTION: CPT

## 2025-05-27 PROCEDURE — 99233 SBSQ HOSP IP/OBS HIGH 50: CPT | Performed by: HOSPITALIST

## 2025-05-27 PROCEDURE — 85027 COMPLETE CBC AUTOMATED: CPT

## 2025-05-27 PROCEDURE — A9270 NON-COVERED ITEM OR SERVICE: HCPCS | Performed by: HOSPITALIST

## 2025-05-27 PROCEDURE — 700111 HCHG RX REV CODE 636 W/ 250 OVERRIDE (IP): Performed by: HOSPITALIST

## 2025-05-27 RX ORDER — POTASSIUM CHLORIDE 1500 MG/1
40 TABLET, EXTENDED RELEASE ORAL ONCE
Status: COMPLETED | OUTPATIENT
Start: 2025-05-27 | End: 2025-05-27

## 2025-05-27 RX ORDER — MAGNESIUM SULFATE HEPTAHYDRATE 40 MG/ML
2 INJECTION, SOLUTION INTRAVENOUS ONCE
Status: COMPLETED | OUTPATIENT
Start: 2025-05-27 | End: 2025-05-27

## 2025-05-27 RX ORDER — POTASSIUM CHLORIDE 1500 MG/1
40 TABLET, EXTENDED RELEASE ORAL 2 TIMES DAILY
Status: DISCONTINUED | OUTPATIENT
Start: 2025-05-27 | End: 2025-05-30

## 2025-05-27 RX ADMIN — VANCOMYCIN HYDROCHLORIDE 1000 MG: 5 INJECTION, POWDER, LYOPHILIZED, FOR SOLUTION INTRAVENOUS at 04:30

## 2025-05-27 RX ADMIN — CLOPIDOGREL BISULFATE 75 MG: 75 TABLET, FILM COATED ORAL at 15:13

## 2025-05-27 RX ADMIN — ASPIRIN 81 MG: 81 TABLET, COATED ORAL at 15:13

## 2025-05-27 RX ADMIN — PIPERACILLIN AND TAZOBACTAM 4.5 G: 4; .5 INJECTION, POWDER, FOR SOLUTION INTRAVENOUS at 17:04

## 2025-05-27 RX ADMIN — MAGNESIUM SULFATE HEPTAHYDRATE 2 G: 2 INJECTION, SOLUTION INTRAVENOUS at 17:05

## 2025-05-27 RX ADMIN — RIVAROXABAN 10 MG: 10 TABLET, FILM COATED ORAL at 17:13

## 2025-05-27 RX ADMIN — FUROSEMIDE 40 MG: 10 INJECTION, SOLUTION INTRAVENOUS at 17:13

## 2025-05-27 RX ADMIN — GABAPENTIN 600 MG: 300 CAPSULE ORAL at 21:57

## 2025-05-27 RX ADMIN — FUROSEMIDE 40 MG: 10 INJECTION, SOLUTION INTRAVENOUS at 05:35

## 2025-05-27 RX ADMIN — ATORVASTATIN CALCIUM 40 MG: 40 TABLET, FILM COATED ORAL at 17:13

## 2025-05-27 RX ADMIN — POTASSIUM CHLORIDE 40 MEQ: 1500 TABLET, EXTENDED RELEASE ORAL at 21:57

## 2025-05-27 RX ADMIN — GABAPENTIN 600 MG: 300 CAPSULE ORAL at 15:13

## 2025-05-27 RX ADMIN — POTASSIUM CHLORIDE 40 MEQ: 1500 TABLET, EXTENDED RELEASE ORAL at 17:01

## 2025-05-27 RX ADMIN — PIPERACILLIN AND TAZOBACTAM 4.5 G: 4; .5 INJECTION, POWDER, FOR SOLUTION INTRAVENOUS at 01:18

## 2025-05-27 RX ADMIN — ACETAMINOPHEN 650 MG: 325 TABLET ORAL at 15:13

## 2025-05-27 RX ADMIN — PIPERACILLIN AND TAZOBACTAM 4.5 G: 4; .5 INJECTION, POWDER, FOR SOLUTION INTRAVENOUS at 09:40

## 2025-05-27 ASSESSMENT — ENCOUNTER SYMPTOMS
PALPITATIONS: 0
SPEECH CHANGE: 0
HEADACHES: 0
SORE THROAT: 0
DIZZINESS: 0
ABDOMINAL PAIN: 0
NAUSEA: 0
COUGH: 0
MYALGIAS: 1
SHORTNESS OF BREATH: 1
NERVOUS/ANXIOUS: 0

## 2025-05-27 ASSESSMENT — PAIN DESCRIPTION - PAIN TYPE
TYPE: ACUTE PAIN
TYPE: ACUTE PAIN
TYPE: CHRONIC PAIN
TYPE: ACUTE PAIN
TYPE: ACUTE PAIN

## 2025-05-27 ASSESSMENT — LIFESTYLE VARIABLES: EVER_SMOKED: YES

## 2025-05-27 ASSESSMENT — COPD QUESTIONNAIRES
HAVE YOU SMOKED AT LEAST 100 CIGARETTES IN YOUR ENTIRE LIFE: YES
DO YOU EVER COUGH UP ANY MUCUS OR PHLEGM?: YES, A FEW DAYS A WEEK OR MONTH
DURING THE PAST 4 WEEKS HOW MUCH DID YOU FEEL SHORT OF BREATH: SOME OF THE TIME
COPD SCREENING SCORE: 7

## 2025-05-27 ASSESSMENT — FIBROSIS 4 INDEX: FIB4 SCORE: 2.52

## 2025-05-27 NOTE — PROGRESS NOTES
Rapid Response Summary     Rapid response called at 1647 for: Altered mental status     VS: Hypoxic , soft BP (See Vitals Flowsheet)  Additional info: Pt just admitted to unit from ED, bedside RN went in to see pt and he was obtunded. Rapid team already on unit and went to see pt. Pt BP soft, unable to arouse pt, breathing appeared laboured. Pt sats dropped to 88% and changed pt from NC to oxymask at 8L. MD consulted.   MD Paged: Dr. Rodriguez  Interventions:    Imaging/Tests: Chest X-ray and EKG    Labs: ABG  and Blood Glucose    Medications:    Other: PIV started   Disposition: Improved with rapid response team interventions. Primary RN updated on plan of care. Patient transferred to [facility].

## 2025-05-27 NOTE — PROGRESS NOTES
Unable to do swallow eval,all po meds held,MD updated.   [Anxiety] : anxiety [Negative] : Musculoskeletal [Fever] : no fever [Night Sweats] : no night sweats [FreeTextEntry7] : intermittent symptoms of IBS

## 2025-05-27 NOTE — PROGRESS NOTES
4 Eyes Skin Assessment Completed by MARYBEL Shields and MARYBEL Leyva.    Skin assessment is primarily focused on high risk bony prominences. Pay special attention to skin beneath and around medical devices, high risk bony prominences, skin to skin areas and areas where the patient lacks sensation to feel pain and areas where the patient previously had breakdown.     Head (Occipital):  Pink and Blanching   Ears (Under Medical Devices): Red and Blanching   Nose (Under Medical Devices): Red and Blanching   Mouth:  Dry lips   Neck: Scab   Breast/Chest:  Red and Blanching; from electrode stickers   Shoulder Blades:  Red and Non-Blanching: per pt it is from a bug bite   Spine:   WDL   (R) Arm/Elbow/Hand: Red, Blanching, and Bruising   (L) Arm/Elbow/Hand: Red, Blanching, and Bruising   Abdomen: Red and Blanching; from electrode stickers   Pannus/Groin:  Red and Moisture fissure   Sacrum/Coccyx:   Red and Blanching slow to asif   (R) Ischial Tuberosity (Sit Bones):  Red and Blanching   (L) Ischial Tuberosity (Sit Bones):  Red and Blanching   (R) Leg:  Scar   (L) Leg:  Scar   (R) Heel:  Red and Boggy, slow to asif   (R) Foot/Toe: Red, Purple/maroon, and Blanching   (L) Heel: Red and Boggy, slow to asif   (L) Foot/Toe:  Red and Blanching       DEVICES IN USE:   Respiratory Devices:  Oxygen mask  Feeding Devices:  N/A   Lines & BP Monitoring Devices:  Peripheral IV, BP cuff, and Pulse ox    Orthopedic Devices:  N/A  Miscellaneous Devices:  Telemetry monitor, Condom cath, and SCDs    PROTOCOL INTERVENTIONS:   ICU Low Airloss Bed:  Already in place and Ordered via RN Wound Protocol  Offloading Dressing - Heel:  Already in place, Reinforced/reapplied, and Ordered via RN Wound Protocol  Float Heels with Pillows:  Already in place and Ordered via RN Wound Protocol  Q2 Turns with Pillows:  Already in place and Ordered via RN Wound Protocol  Glide Sheet:  Already in place, Changed, and Ordered via RN Wound Protocol  Dri-Zan/Micro  Climate Pads:  Already in place, Changed, and Ordered via RN Wound Protocol  Barrier Paste:  Already in place, Reinforced/reapplied, and Ordered via RN Wound Protocol  Interdry:  Applied this assessment and Ordered via RN Wound Protocol  Oxygen Mask with Square Gray Foams:  Applied this assessment and Ordered via RN Wound Protocol  Z-Zan Pillow:  Already in place and Ordered via RN Wound Protocol    WOUND PHOTOS:   To be completed     WOUND CONSULT:   Consult ordered for the following areas right toe

## 2025-05-27 NOTE — THERAPY
"Speech Language Pathology   Clinical Swallow Evaluation     Patient Name: Donte Agustin  AGE:  79 y.o., SEX:  male  Medical Record #: [id number]  Date of Service: 2025      History of Present Illness  78 y/o male presented to OSH  with SOB, transferred to [facility] for code STEMI, which was canceled by cardiology. Found to be hypoxic with right lobe PNA.     CMHx: Acute on chronic respiratory failure, PNA, HTN, HLD, COPD, pleural effusion    PMHx: Heart failure, chronic respiratory failure (5L baseline), dementia, diabetes, GERD, CKD, prostate cancer, tobacco use, debility, prostate cancer    Last seen by SLP in 2023. Recommendations made for RG7/TN0 with MBSS to assess globus. Patient declined MBSS at that time.     CXR :  \"1.  Interval improvement in right-sided pulmonary opacities.  2.  Interstitial edema has essentially resolved.  3.  Stable cardiomegaly.\"      General Information:  Vitals  Pulse Oximetry: 97 %  O2 (LPM): 6  O2 Delivery Device: Oxymask  Vitals Comments: 5-7L baseline  Level of Consciousness: Awake  Patient Behaviors: Anxious  Orientation: Self, , General place, Current month, Current year  Follows Directives: Yes    Prior Living Situation & Level of Function:  Comments: reportedly lives with wife, who manages his IADLs. He reports that his mobility has declined and that he uses a wheelchair at home.  Communication: Hx dementia per EMR. Pt reports his wife manages IADLs  Swallowing: Reports that he eats regular food at home with inconsistent dysphagia symptoms related to the \"pocket\" in his throat. He is unsure if this pocket was found by an SLP, an ENT, or GI. He describes having had a swallow study previously, after which he was told \"It wasn't a big deal\"     Oral Mechanism Evaluation:  Dentition: Edentulous   Facial Symmetry: Equal  Facial Sensation: Equal     Labial Observations: WFL   Lingual Observations: Midline  Motor Speech: WFL    Laryngeal Function:  Secretion Management: " "Adequate  Voice Quality: Wet  Cough: Perceptually WNL      Subjective  Pt agreeable and cooperative with SLP evaluation tasks. Poor learning evidence to connect dysphagia symptoms to possible acute on chronic respiratory failure despite education. Repeatedly expressed frustration with NPO status - \"You bring someone to the hospital and don't give them water, they're going to get sicker.\"      Assessment  Current Method of Nutrition: NPO until cleared by speech pathology  Positioning: Siddiqui's (60-90 degrees)  Bolus Administration: SLP  O2 (LPM): 6 O2 Delivery Device: Oxymask  Factor(s) Affecting Performance: None  Tracheostomy : No    Swallowing Trials:  Ice: Impaired  Thin Liquid (TN0): Impaired      Comments:   Immediate cough response following ice chips and sips of TN0. Patient that he does not have difficulty with PO that frequently at home, indicating possible exacerbation of suspected chronic dysphagia. Discussed recommendations for management, outlined below. Patient agreeable but likely to need reinforcement.       Clinical Impressions  Pt presents with clinical indicators of and is at elevated risk for oropharyngeal dysphagia given report of chronic dysphagia symptoms, acute on chronic respiratory failure, and right lobe PNA. Pt would benefit from further evaluation of swallow using FEES prior to meaningful initiation of PO. Pt may have ice chips after oral care with assistance from trained staff to mitigate the impacts of xerostomia and disuse atrophy as well as to provide comfort and aid with secretion management.        Recommendations  NPO / ice chips  Instrumentation: FEES  Medication: Non Oral (otherwise defer to D.O.)  Oral Care: Q4h       SLP Treatment Plan  Treatment Plan: Dysphagia Treatment, Patient/Family/Caregiver Training  SLP Frequency: 4x Per Week  Estimated Duration: Until Therapy Goals Met    Anticipated Discharge Needs  Discharge Recommendations: Recommend post-acute placement for " "additional speech therapy services prior to discharge home (changes pending FEES)   Therapy Recommendations Upon DC: Dysphagia Training, Patient / Family / Caregiver Education, Community Re-Integration      Patient / Family Goals  Patient / Family Goal #1: \"I've had a swallowing test before\"  Short Term Goals  Short Term Goal # 1: Pt will complete FEES w SLP to further evaluate swallow function and inform POC.    Patricia Celestin SLP   "

## 2025-05-27 NOTE — CARE PLAN
The patient is Watcher - Medium risk of patient condition declining or worsening    Shift Goals  Clinical Goals: Wean O2, monitor mental status, manage pain  Patient Goals: feel better, sleep  Family Goals: LASHELL    Progress made toward(s) clinical / shift goals:      Problem: Pain - Standard  Goal: Alleviation of pain or a reduction in pain to the patient’s comfort goal  Description: Target End Date:  Prior to discharge or change in level of careDocument on Vitals flowsheet1.  Document pain using the appropriate pain scale per order or unit policy2.  Educate and implement non-pharmacologic comfort measures (i.e. relaxation, distraction, massage, cold/heat therapy, etc.)3.  Pain management medications as ordered4.  Reassess pain after pain med administration per policy5.  If opiods administered assess patient's response to pain medication is appropriate per POSS sedation scale6.  Follow pain management plan developed in collaboration with patient and interdisciplinary team (including palliative care or pain specialists if applicable)  Outcome: Progressing     Problem: Respiratory  Goal: Patient will achieve/maintain optimum respiratory ventilation and gas exchange  Description: Target End Date:  Prior to discharge or change in level of careDocument on Assessment flowsheet1.  Assess and monitor rate, rhythm, depth and effort of respiration2.  Breath sounds assessed qshift and/or as needed3.  Assess O2 saturation, administer/titrate oxygen as ordered4.  Position patient for maximum ventilatory efficiency5.  Turn, cough, and deep breath with splinting to improve effectiveness6.  Collaborate with RT to administer medication/treatments per order7.  Encourage use of incentive spirometer and encourage patient to cough after use and utilize splinting techniques if applicable8.  Airway suctioning9.  Monitor sputum production for changes in color, consistency and [id number]. Perform frequent oral [id number]. Alternate physical  activity with rest periods  Outcome: Progressing     Problem: Hemodynamics - Pneumonia  Goal: Patient's hemodynamics, fluid balance and neurologic status will be stable or improve  Description: Target End Date:  Prior to discharge or change in level of careDocument on Assessment and I/O flowsheet templates1.  Monitor vital signs, pulse oximetry and cardiac monitor per provider order and/or policy2.  Manage IV fluids and IV infusions3.  Monitor intake and output4.  Daily weights per unit policy or provider order5.  Assess peripheral pulses and capillary refill6.  Monitor body temperature and assist with comfort measures to reduce fever and chills7.  Position patient for maximum circulation/cardiac output8.  Assess for peripheral, sacral, periorbital and abdominal edema9. Assess for signs of deterioration - tachycardia, tachypnea, dyspnea, hypertension, hypoxemia, pallor, changes in consciousness or restlessness  Outcome: Progressing     Problem: Knowledge Deficit - Standard  Goal: Patient and family/care givers will demonstrate understanding of plan of care, disease process/condition, diagnostic tests and medications  Description: Target End Date:  1-3 days or as soon as patient condition allowsDocument in Patient Education1.  Patient and family/caregiver oriented to unit, equipment, visitation policy and means for communicating concern2.  Complete/review Learning Assessment3.  Assess knowledge level of disease process/condition, treatment plan, diagnostic tests and medications4.  Explain disease process/condition, treatment plan, diagnostic tests and medications  Outcome: Progressing     Problem: Skin Integrity  Goal: Skin integrity is maintained or improved  Description: Target End Date:  Prior to discharge or change in level of careDocument interventions on Skin Risk/ flowsheet groups and corresponding LDA1.  Assess and monitor skin integrity, appearance and/or temperature2.  Assess risk factors for impaired  skin integrity and/or pressures ulcers3.  Implement precautions to protect skin integrity in collaboration with interdisciplinary team4.  Implement pressure ulcer prevention protocol if at risk for skin breakdown5.  Confirm wound care consult if at risk for skin breakdown6.  Ensure patient use of pressure relieving devices  (Low air loss bed, waffle overlay, heel protectors, ROHO cushion, etc)  Outcome: Progressing     Problem: Fall Risk  Goal: Patient will remain free from falls  Description: Target End Date:  Prior to discharge or change in level of careDocument interventions on the [facility] Fall Risk Assessment1.  Assess for fall risk factors2.  Implement fall precautions  Outcome: Progressing       Patient is not progressing towards the following goals:      Problem: Risk for Aspiration  Goal: Patient's risk for aspiration will be absent or decrease  Description: Target End Date:  Prior to discharge or change in level of care1.   Complete dysphagia screening on admission2.   NPO until dysphagia screening complete or medically cleared3.   Collaborate with Speech Therapy, Clinical Dietitian and interdisciplinary team4.   Implement aspiration precautions5.   Assist patient up to chair for meals6.   Elevate head of bed 90 degrees if patient is unable to get out of bed7.   Encourage small bites8.   Ensure foods/liquids are of appropriate consistency9.   Assess for any signs/symptoms of [id number]. Assess breath sounds and vital signs after oral intake  Outcome: Not Progressing     Problem: Self Care  Goal: Patient will have the ability to perform ADLs independently or with assistance (bathe, groom, dress, toilet and feed)  Description: Target End Date:  Prior to discharge or change in level of careDocument on ADL flowsheet1.  Assess the capability and level of deficiency to perform ADLs2.  Encourage family/care giver involvement3.  Provide assistive devices4.  Consider PT/OT evaluations5.  Maintain support,  give positive feedback, encourage self-care allowing extra time and verbal cuing as needed6.  Avoid doing something for patients they can do themselves, but provide assistance as needed7.  Assist in anticipating/planning individual needs8.  Collaborate with Case Management and  to meet discharge needs  Outcome: Not Progressing

## 2025-05-27 NOTE — RESPIRATORY CARE
"COPD EDUCATION by COPD CLINICAL EDUCATOR  5/27/2025 at 9:59 AM by Rebecca Palencia, RRT     Patient interviewed by COPD education team. Patient refused COPD program and smoking cessation at this time.  Patient from [facility], no PFT on file and does not see a pulmonologist.              COPD Screen  COPD Risk Screening  Do you have a history of COPD?: Yes  Do you have a Pulmonologist?: No  COPD Population Screener  During the past 4 weeks, how much did you feel short of breath?: Some of the time  Do you ever cough up any mucus or phlegm?: Yes, a few days a week or month  In the past 12 months, you do less than you used to because of your breathing problems: Agree  Have you smoked at least 100 cigarettes in your entire life?: Yes  How old are you?: 60+  COPD Screening Score: 7  COPD Coordinator Recommended: Yes    COPD Assessment  COPD Clinical Specialists ONLY  COPD Education Initiated: Yes--Short Intervention (patient A&Ox4 today.  hx COPD, but does not recall ever having PFT, from [facility].  5L home O2, no meds for COPD.  Declines smoking cessation, knows he needs to quit and what to do, but doesn't want to at this time.)  Is this a COPD exacerbation patient?: No  DME Equipment Type: 5L O2, [facility]  Pulmonary Rehab: No  Smoking Cessation: Declined  Hospice: No  Home Health Care: No  Mobile Urgent Care Services: No  Geriatric Specialty Group: No  Private In-Home Care Agency: No  Interdisciplinary Rounds: Attendance at Rounds (30 Min)    PFT Results    No results found for: \"PFT\"    "

## 2025-05-27 NOTE — CARE PLAN
The patient is Watcher - Medium risk of patient condition declining or worsening    Shift Goals  Clinical Goals: wean O2, monitor vital signs, and pain management  Patient Goals: feel better  Family Goals: denae    Progress made toward(s) clinical / shift goals:    Problem: Pain - Standard  Goal: Alleviation of pain or a reduction in pain to the patient’s comfort goal  Outcome: Progressing     Problem: Care Map:  Admission Optimal Outcome for the Heart Failure Patient  Goal: Admission:  Optimal Care of the heart failure patient  Outcome: Progressing     Problem: Care Map:  Day 1 Optimal Outcome for the Heart Failure Patient  Goal: Day 1:  Optimal Care of the heart failure patient  Outcome: Progressing     Problem: Care Map:  Day 2 Optimal Outcome for the Heart Failure Patient  Goal: Day 2:  Optimal Care of the heart failure patient  Outcome: Progressing     Problem: Care Map:  Day 3 Optimal Outcome for the Heart Failure Patient  Goal: Day 3:  Optimal Care of the heart failure patient  Outcome: Progressing     Problem: Care Map:  Day Before Discharge Optimal Outcome for the Heart Failure Patient  Goal: Day Before Discharge:  Optimal Care of the heart failure patient  Outcome: Progressing     Problem: Care Map:  Day of Discharge Optimal Outcome for the Heart Failure Patient  Goal: Day of Discharge:  Optimal Care of the heart failure patient  Outcome: Progressing     Problem: Care Map:  Optimal Outcome for the Pneumonia Patient  Goal: Collection and monitoring of appropriate tests and labs  Outcome: Progressing     Problem: Respiratory  Goal: Patient will achieve/maintain optimum respiratory ventilation and gas exchange  Outcome: Progressing     Problem: Risk for Aspiration  Goal: Patient's risk for aspiration will be absent or decrease  Outcome: Progressing     Problem: Hemodynamics - Pneumonia  Goal: Patient's hemodynamics, fluid balance and neurologic status will be stable or improve  Outcome: Progressing     Problem:  Self Care  Goal: Patient will have the ability to perform ADLs independently or with assistance (bathe, groom, dress, toilet and feed)  Outcome: Progressing     Problem: Discharge Planning - Pneumonia  Goal: Patient will verbalize understanding of lifestyle changes and therapeutic needs post discharge  Outcome: Progressing     Problem: Knowledge Deficit - Standard  Goal: Patient and family/care givers will demonstrate understanding of plan of care, disease process/condition, diagnostic tests and medications  Outcome: Progressing     Problem: Skin Integrity  Goal: Skin integrity is maintained or improved  Outcome: Progressing     Problem: Fall Risk  Goal: Patient will remain free from falls  Outcome: Progressing       Patient is not progressing towards the following goals:

## 2025-05-27 NOTE — CONSULTS
RCC    I have been contacted regarding appropriate disposition of Donte Agustin. I have reviewed the patient's case including chart review, review of labs, imaging, vital signs and conversation with Dr Rodriguez including discussion of assessment and plan. This patient is deemed safe to be transferred to the [facility] for care and remains under the care of [facility] team (who all questions and concerns should be relayed to).  While a critical care consultation has not been requested, we are immediately available if the patient requires critical care in the future.    Current recommendations:    Transfer to [facility]   RT protocols, add HFNC if needed   Does not appear to need BiPAP at this time   Serial chest x-ray and ABG as clinically prudent   Forced diuresis   Empiric antibiotics for HCAP initiated   Cardiology consultation pending-code STEMI canceled   DuoNeb PRN, hold on steroids IV   Fine-tune goal-directed medical therapy for CHF as clinically prudent

## 2025-05-27 NOTE — PROGRESS NOTES
Hospital Medicine Daily Progress Note    Date of Service  5/27/2025    Chief Complaint  Chest pain and SOB    Hospital Course  Donte Agustin is a 79 y.o. male with a history of dementia, coronary artery disease, chronic respiratory failure on 5 L O2 w/ transfer from [facility] and admitted 5/26/2025 with code STEMI.  Cardiology evaluated the patient once here in the ER and canceled code STEMI.  Patient was somnolent and confused.  There is concern of heart failure and patient being chronically on 5 L O2.  The patient was hypoxic requiring nonrebreather.  History of an EF 25%.  A CT chest showed right-sided pneumonia.    Interval Problem Update  5/27: BNP: 7215, awake.  Pleasant.  On 4-6 L O2.  Remains on levophed drip.      I have discussed this patient's plan of care and discharge plan at IDT rounds today with Case Management, Nursing, Nursing leadership, and other members of the IDT team.    Consultants/Specialty  cardiology    Code Status  Full Code    Disposition  The patient is not medically cleared for discharge to home or a post-acute facility.      I have placed the appropriate orders for post-discharge needs.    Review of Systems  Review of Systems   Constitutional:  Positive for malaise/fatigue.   HENT:  Negative for sore throat.    Respiratory:  Positive for shortness of breath. Negative for cough.    Cardiovascular:  Negative for chest pain, palpitations and leg swelling.   Gastrointestinal:  Negative for abdominal pain and nausea.   Musculoskeletal:  Positive for myalgias.   Neurological:  Negative for dizziness, speech change and headaches.   Psychiatric/Behavioral:  The patient is not nervous/anxious.         Physical Exam  Temp:  [36.1 °C (96.9 °F)-37.6 °C (99.7 °F)] 36.2 °C (97.1 °F)  Pulse:  [] 92  Resp:  [12-29] 28  BP: ()/(35-59) 97/55  SpO2:  [89 %-100 %] 89 %    Physical Exam  Vitals reviewed.   Constitutional:       Appearance: Normal appearance. He is obese.   HENT:       Head: Normocephalic and atraumatic.      Nose: Nose normal.      Mouth/Throat:      Mouth: Mucous membranes are moist.   Eyes:      General:         Right eye: No discharge.         Left eye: No discharge.      Conjunctiva/sclera: Conjunctivae normal.   Cardiovascular:      Rate and Rhythm: Normal rate and regular rhythm.      Pulses: Normal pulses.      Heart sounds: No murmur heard.  Pulmonary:      Effort: Pulmonary effort is normal. No respiratory distress.      Breath sounds: Normal breath sounds. No wheezing.   Abdominal:      General: Bowel sounds are normal. There is no distension.      Palpations: Abdomen is soft.      Tenderness: There is no abdominal tenderness.   Musculoskeletal:      Cervical back: Neck supple.      Right lower leg: Edema present.      Left lower leg: Edema present.   Lymphadenopathy:      Cervical: No cervical adenopathy.   Skin:     General: Skin is warm.   Neurological:      General: No focal deficit present.      Mental Status: He is alert.   Psychiatric:         Mood and Affect: Mood normal.         Fluids    Intake/Output Summary (Last 24 hours) at 5/27/2025 0724  Last data filed at 5/27/2025 0600  Gross per 24 hour   Intake --   Output 1000 ml   Net -1000 ml        Laboratory  Recent Labs     05/26/25  1005 05/27/25  0345   WBC 13.8* 11.5*   RBC 4.25* 3.76*   HEMOGLOBIN 12.2* 10.8*   HEMATOCRIT 38.6* 35.7*   MCV 90.8 94.9   MCH 28.7 28.7   MCHC 31.6* 30.3*   RDW 52.3* 55.9*   PLATELETCT 216 177   MPV 10.8 11.0     Recent Labs     05/26/25  1005 05/27/25  0400   SODIUM 135 141   POTASSIUM 4.2 3.3*   CHLORIDE 103 110   CO2 20 22   GLUCOSE 247* 121*   BUN 12 13   CREATININE 1.04 0.94   CALCIUM 9.2 7.6*                   Imaging  DX-CHEST-PORTABLE (1 VIEW)   Final Result      1.  Interval improvement in right-sided pulmonary opacities.   2.  Interstitial edema has essentially resolved.   3.  Stable cardiomegaly.         DX-CHEST-PORTABLE (1 VIEW)   Final Result      Airspace  opacity throughout the right lung, concerning for pneumonia.      CT-CTA CHEST PULMONARY ARTERY W/ RECONS   Final Result         1. Dense right perihilar pneumonia type infiltrate. Mild left base atelectasis. Trace bilateral pleural effusions.      2. No pulmonary embolus evident. Prominent aortic valve calcification. Coronary artery calcification.      3. Mild gynecomastia.                             Assessment/Plan  * Acute on chronic respiratory failure with hypoxia (HCC)- (present on admission)  Assessment & Plan  5/27:  Due to pneumonia as well as some volume overload with edema as well as small bilateral pleural effusions  Titrate oxygen to keep SpO2 greater than 88%  Antibiotics being adjusted  RT per protocol  Lasix 40 mg IV twice daily    Hypotension  Assessment & Plan  Currently on Levophed to keep MAP greater than 60 and systolic blood pressure greater 90  Titrate Levophed.      Pleural effusion- (present on admission)  Assessment & Plan  5/27   trace effusions, no need for thoracentesis at this time  Continue IV Lasix with near future weaning to oral    COPD (chronic obstructive pulmonary disease) (HCC)- (present on admission)  Assessment & Plan  5/27:   RT per protocol  Encouraged smoking cessation  Titration of oxygen to keep SpO2 greater than 88%    BPH (benign prostatic hyperplasia)- (present on admission)  Assessment & Plan  5/27   Monitor for urinary retention   continue home Flomax    GERD (gastroesophageal reflux disease)- (present on admission)  Assessment & Plan  Continue home PPI    Hyperlipidemia- (present on admission)  Assessment & Plan  Continue home statin    Hypertension- (present on admission)  Assessment & Plan  5/27   continue home losartan and metoprolol with parameters  as needed labetalol  Monitor vitals    Heart failure with reduced ejection fraction (HCC) (LVEF 20-25% Echo 2/2025)- (present on admission)  Assessment & Plan  5/27   echocardiogram last month ejection fraction of  25%  IV Lasix with near future weaning to oral Lasix  Continue losartan and Coreg as blood pressure allows  Otherwise, continue goal-directed medical therapy  No need for repeat echocardiogram or official cardiology consultation at this time  Monitor on telemetry    Diabetes ([facility])- (present on admission)  Assessment & Plan  5/27   A1c 6.5 in the past month   Diabetic diet  Monitor glucose         VTE prophylaxis:   SCDs/TEDs   Xarelto 10mg daily as prophylaxis      I have performed a physical exam and reviewed and updated ROS and Plan today (5/27/2025). In review of yesterday's note (5/26/2025), there are no changes except as documented above.

## 2025-05-27 NOTE — THERAPY
Speech Language Pathology   Flexible Endoscopic Evaluation of Swallowing (FEES)        Patient Name: Donte Agustin  AGE:  79 y.o., SEX:  male  Medical Record #: [id number]  Date of Service: 5/27/2025      History of Present Illness  78 y/o male presented to OSH 5/26 with SOB, transferred to [facility] for code STEMI, which was canceled by cardiology. Found to be hypoxic with right lobe PNA.     CMHx: Acute on chronic respiratory failure, PNA, HTN, HLD, COPD, pleural effusion  PMHx: Heart failure, chronic respiratory failure (5L baseline), dementia, diabetes, GERD, CKD, prostate cancer, tobacco use, debility, prostate cancer      Pertinent Information  Current Method of Nutrition: NPO until cleared by speech pathology  Patient Behaviors: Confused, Verbose, Distractable  Dentition: Edentulous   Feeding Tube: None   Tracheostomy: No   Factor(s) Affecting Performance: Impaired command following, Impaired mental status     Discussed the risks, benefits, and alternatives of the FEES procedure. Patient/family acknowledged and agreed to proceed.      Assessment  Flexible Endoscopic Evaluation of Swallowing (FEES) completed at bedside today. The endoscope was passed transnasally via Right nare to evaluate the anatomy and physiology of swallowing. Pt tolerated the procedure with no apparent distress.    Anatomic Findings: Diffuse mild edema of structures, slight cobblestoning of BOT, slight atrophy of posterior commissure   - These findings can be consistent with LPR  Vocal Fold Motion: Bilateral movement  Secretion Management: Adequate  PO Trials: Ice Chips, Thin Liquid, Mildly Thick Liquid, Liquidised, Pudding, Mixed      Consistency PAS Score Timing Residue Comments   Thin Liquid 6 Pre Swallow, During swallow, Post swallow Vallecular Residue: Mild (5%-25%)  Pyriform Sinus Residue: Moderate (25%-50%) Melted ice - PAS 1, PAS 3 after  Tsp - PAS 6 before, during, and after  Cup - PAS 1, PAS 4; PAS 5 after x2  Straw - PAS 1 x4   Mildly  Thick 3 Post Swallow Vallecular Residue: Mild (5%-25%)  Pyriform Sinus Residue: Mild (5%-25%) Cup - PAS 1 x2; PAS 2, PAS 3 after  Straw - PAS 1 x2   Liquidised 6 Post Swallow Vallecular Residue: Mild (5%-25%)  Pyriform Sinus Residue: Moderate (25%-50%) PAS 1 during x4; PAS 6 after x1   Pudding 3 Post Swallow Vallecular Residue: Mild (5%-25%)  Pyriform Sinus Residue: Mild (5%-25%) PAS 1 during x2; PAS 3 after x1   Mixed 6 Pre Swallow, During Swallow, Post Swallow Vallecular Residue: Severe (>50%)  Pyriform Sinus Residue: Severe (>50%) SB6 peaches - PAS 3 after; severe stasis  TN0 juice - PAS 6 before; PAS 5 after; mild stasis     Penetration-Aspiration Scale (PAS)  1     No contrast enters airway  2     Contrast enters the airway, remains above the vocal folds, and is ejected from the airway (not seen in the airway at the end of the swallow).  3     Contrast enters the airway, remains above the vocal folds, and is not ejected from the airway (is seen in the airway after the swallow).  4     Contrast enters the airway, contacts the vocal folds, and is ejected from the airway.  5     Contrast enters the airway, contacts the vocal folds, and is not ejected from the airway  6     Contrast enters the airway, crosses the plane of the vocal folds, and is ejected from the airway.  7     Contrast enters the airway, crosses the plane of the vocal folds, and is not ejected from the airway despite effort.  8     Contrast enters the airway, crosses the plane of the vocal folds, is not ejected from the airway and there is no response to aspiration.      Oral phase:  Pt demonstrated posterior loss of bolus control with TN0 and thin juice from mixed; this frequently resulted in airway invasion before the swallow. Pt did not demonstrate mastication of SB6 (mixed), resulting in whole pieces of peach being swallowed. Pt was increasingly restless and distracted throughout study. Do query if impaired pharyngeal timing was influenced by  patient talking/phonating throughout entire study.       Pharyngeal phase:  Pharyngeal phase characterized by impairments in BOT retraction, laryngeal vestibule closure (LVC), laryngeal vestibule closure mistiming, pharyngeal shortening, and pharyngeal constriction, which resulted in the following:  - Flash aspiration of TN0 and thin juice from mixed before, during (inferred), and after the swallow and in once instance with LQ3 after the swallow. Aspirate did clear from the airway using spontaneous cough  - Deep penetration (to the vocal folds) with TN0 and thin juice from mixed after the swallow. Penetrate did clear from the airway using spontaneous cough  - Penetration with SB6, PU4, and MT2 after the swallow. Penetrate did clear from the airway using cleansing swallows    - Mild to moderate vallecular and pyriform sinus residue with TN0, MT2, LQ3, and PU4 that inconsistently cleared spontaneously  - Severe vallecular and pyriform sinus residue with SB6 (mixed) that did clear spontaneously  - Mild PPW residue with LQ3 that did clear spontaneously    Of note - patient coughed in the ABSENCE of visualized airway invasion multiple times during study. This may not be a reliable bedside indicator of swallow safety.     Of note - patient had foamy secretions present mixed with PO present in the pyriform sinuses multiple times during study. This, as well as anatomical findings described above, may be concerning for LPR.       Compensatory Strategies:  Cough (spontaneous) - EFFECTIVE to clear airway invasion  Cleansing swallow (cued) - EFFECTIVE to reduce pharyngeal stasis    Pt with limited capacity to utilize strategies      Severity Rating:  VANNESSA: Moderate      Clinical Impressions  The pt presents with a moderate oropharyngeal dysphagia, likely acute on chronic related to acute on chronic respiratory failure and debility. Swallow safety and efficiency are both impaired. Deficits are best managed with diet modification  "and compensatory strategies at this time. Recommend Puree solids (PU4)/Thin liquids (TN0) diet at this time pending further evaluation with MBSS, which is indicated due to concern for component of esophogeal dysphagia exacerbating pharyngeal deficits.     Patient is a fair candidate for behavioral and exercise-based swallow rehabilitation. Consider training the following evidence-based swallowing exercises based on patient-specific pathophysiology: effortful swallow, chin tuck against resistance (CTAR), and tongue pull back. Outcomes for pulmonary hygiene should be maximized with use of mobility as pt is able and frequent, thorough oral care.       Recommendations  Puree solids / thin liquids  Medication: Crush with applesauce, as appropriate, Crush with pudding/puree, as appropriate  Supervision: Assist with meal tray set up, Close supervision - patient may be left alone for less than 5 minutes at a time  Positioning: Fully upright and midline during oral intake, Remain upright for 30-45 minutes after oral intake  Strategies: Small bites/sips, Alternate bites and sips, No straws  Oral Care: Pre- and post-meals  Additional Instrumentation: VFSS (MBSS)       SLP Treatment Plan  Treatment Plan: Dysphagia Treatment, Patient/Family/Caregiver Training  SLP Frequency: 4x Per Week  Estimated Duration: Until Therapy Goals Met    Anticipated Discharge Needs  Discharge Recommendations: Recommend home health for continued speech therapy services (changes pending MBSS)   Therapy Recommendations Upon DC: Dysphagia Training, Community Re-Integration, Patient / Family / Caregiver Education     Patient / Family Goals  Patient / Family Goal #1: \"I've had a swallowing test before\"  Goal #1 Outcome: Progressing as expected  Short Term Goal # 1: Pt will complete FEES w SLP to further evaluate swallow function and inform POC.  Goal Outcome # 1: Goal met, new goal added  Short Term Goal # 1 B : Pt will complete MBSS to further evaluate " swallow function and inform POC.  Short Term Goal # 2: Pt will consume PU4/TN0 with no worsening of respiratory status.  Short Term Goal # 3: Pt will complete exercises targeting BOT retraction and  pharyngeal shortening x50 in a session given min cues from SLP.    Patricia Celestin, SLP

## 2025-05-28 ENCOUNTER — APPOINTMENT (OUTPATIENT)
Dept: RADIOLOGY | Facility: MEDICAL CENTER | Age: 80
DRG: 193 | End: 2025-05-28
Attending: HOSPITALIST
Payer: MEDICARE

## 2025-05-28 LAB
ANION GAP SERPL CALC-SCNC: 11 MMOL/L (ref 7–16)
BUN SERPL-MCNC: 15 MG/DL (ref 8–22)
CALCIUM SERPL-MCNC: 9 MG/DL (ref 8.5–10.5)
CHLORIDE SERPL-SCNC: 102 MMOL/L (ref 96–112)
CO2 SERPL-SCNC: 25 MMOL/L (ref 20–33)
CORTIS SERPL-MCNC: 8.4 UG/DL (ref 0–23)
CREAT SERPL-MCNC: 1.05 MG/DL (ref 0.5–1.4)
ERYTHROCYTE [DISTWIDTH] IN BLOOD BY AUTOMATED COUNT: 52.7 FL (ref 35.9–50)
GFR SERPLBLD CREATININE-BSD FMLA CKD-EPI: 73 ML/MIN/1.73 M 2
GLUCOSE BLD STRIP.AUTO-MCNC: 183 MG/DL (ref 65–99)
GLUCOSE SERPL-MCNC: 157 MG/DL (ref 65–99)
HCT VFR BLD AUTO: 35.2 % (ref 42–52)
HGB BLD-MCNC: 11 G/DL (ref 14–18)
MAGNESIUM SERPL-MCNC: 1.8 MG/DL (ref 1.5–2.5)
MCH RBC QN AUTO: 28.8 PG (ref 27–33)
MCHC RBC AUTO-ENTMCNC: 31.3 G/DL (ref 32.3–36.5)
MCV RBC AUTO: 92.1 FL (ref 81.4–97.8)
PLATELET # BLD AUTO: 200 K/UL (ref 164–446)
PMV BLD AUTO: 11.2 FL (ref 9–12.9)
POTASSIUM SERPL-SCNC: 4.2 MMOL/L (ref 3.6–5.5)
PROCALCITONIN SERPL-MCNC: 0.39 NG/ML
RBC # BLD AUTO: 3.82 M/UL (ref 4.7–6.1)
SODIUM SERPL-SCNC: 138 MMOL/L (ref 135–145)
TSH SERPL DL<=0.005 MIU/L-ACNC: 1.07 UIU/ML (ref 0.38–5.33)
WBC # BLD AUTO: 10.4 K/UL (ref 4.8–10.8)

## 2025-05-28 PROCEDURE — 83735 ASSAY OF MAGNESIUM: CPT

## 2025-05-28 PROCEDURE — 76937 US GUIDE VASCULAR ACCESS: CPT

## 2025-05-28 PROCEDURE — 700111 HCHG RX REV CODE 636 W/ 250 OVERRIDE (IP): Mod: JZ | Performed by: INTERNAL MEDICINE

## 2025-05-28 PROCEDURE — 770000 HCHG ROOM/CARE - INTERMEDIATE ICU *

## 2025-05-28 PROCEDURE — 99233 SBSQ HOSP IP/OBS HIGH 50: CPT | Performed by: HOSPITALIST

## 2025-05-28 PROCEDURE — A9270 NON-COVERED ITEM OR SERVICE: HCPCS | Performed by: HOSPITALIST

## 2025-05-28 PROCEDURE — 700111 HCHG RX REV CODE 636 W/ 250 OVERRIDE (IP): Performed by: HOSPITALIST

## 2025-05-28 PROCEDURE — 84145 PROCALCITONIN (PCT): CPT

## 2025-05-28 PROCEDURE — 700101 HCHG RX REV CODE 250: Performed by: STUDENT IN AN ORGANIZED HEALTH CARE EDUCATION/TRAINING PROGRAM

## 2025-05-28 PROCEDURE — 82962 GLUCOSE BLOOD TEST: CPT

## 2025-05-28 PROCEDURE — 82533 TOTAL CORTISOL: CPT

## 2025-05-28 PROCEDURE — A9270 NON-COVERED ITEM OR SERVICE: HCPCS | Performed by: INTERNAL MEDICINE

## 2025-05-28 PROCEDURE — 700105 HCHG RX REV CODE 258: Performed by: STUDENT IN AN ORGANIZED HEALTH CARE EDUCATION/TRAINING PROGRAM

## 2025-05-28 PROCEDURE — 84443 ASSAY THYROID STIM HORMONE: CPT

## 2025-05-28 PROCEDURE — 700105 HCHG RX REV CODE 258: Performed by: INTERNAL MEDICINE

## 2025-05-28 PROCEDURE — 700102 HCHG RX REV CODE 250 W/ 637 OVERRIDE(OP): Performed by: HOSPITALIST

## 2025-05-28 PROCEDURE — 05HD33Z INSERTION OF INFUSION DEVICE INTO RIGHT CEPHALIC VEIN, PERCUTANEOUS APPROACH: ICD-10-PCS | Performed by: HOSPITALIST

## 2025-05-28 PROCEDURE — 85027 COMPLETE CBC AUTOMATED: CPT

## 2025-05-28 PROCEDURE — 700102 HCHG RX REV CODE 250 W/ 637 OVERRIDE(OP): Performed by: INTERNAL MEDICINE

## 2025-05-28 PROCEDURE — 80048 BASIC METABOLIC PNL TOTAL CA: CPT

## 2025-05-28 RX ORDER — MAGNESIUM SULFATE HEPTAHYDRATE 40 MG/ML
2 INJECTION, SOLUTION INTRAVENOUS ONCE
Status: COMPLETED | OUTPATIENT
Start: 2025-05-28 | End: 2025-05-28

## 2025-05-28 RX ORDER — MIDODRINE HYDROCHLORIDE 5 MG/1
5 TABLET ORAL
Status: DISCONTINUED | OUTPATIENT
Start: 2025-05-28 | End: 2025-05-31 | Stop reason: HOSPADM

## 2025-05-28 RX ADMIN — FUROSEMIDE 40 MG: 10 INJECTION, SOLUTION INTRAVENOUS at 05:55

## 2025-05-28 RX ADMIN — GABAPENTIN 600 MG: 300 CAPSULE ORAL at 17:35

## 2025-05-28 RX ADMIN — PIPERACILLIN AND TAZOBACTAM 4.5 G: 4; .5 INJECTION, POWDER, FOR SOLUTION INTRAVENOUS at 17:34

## 2025-05-28 RX ADMIN — GABAPENTIN 600 MG: 300 CAPSULE ORAL at 11:24

## 2025-05-28 RX ADMIN — ASPIRIN 81 MG: 81 TABLET, COATED ORAL at 05:54

## 2025-05-28 RX ADMIN — ATORVASTATIN CALCIUM 40 MG: 40 TABLET, FILM COATED ORAL at 17:36

## 2025-05-28 RX ADMIN — OMEPRAZOLE 20 MG: 20 CAPSULE, DELAYED RELEASE ORAL at 05:54

## 2025-05-28 RX ADMIN — MIDODRINE HYDROCHLORIDE 5 MG: 5 TABLET ORAL at 17:35

## 2025-05-28 RX ADMIN — MIDODRINE HYDROCHLORIDE 5 MG: 5 TABLET ORAL at 11:24

## 2025-05-28 RX ADMIN — LOSARTAN POTASSIUM 25 MG: 25 TABLET, FILM COATED ORAL at 05:54

## 2025-05-28 RX ADMIN — POTASSIUM CHLORIDE 40 MEQ: 1500 TABLET, EXTENDED RELEASE ORAL at 05:55

## 2025-05-28 RX ADMIN — MAGNESIUM SULFATE HEPTAHYDRATE 2 G: 2 INJECTION, SOLUTION INTRAVENOUS at 07:48

## 2025-05-28 RX ADMIN — NICOTINE TRANSDERMAL SYSTEM 21 MG: 21 PATCH, EXTENDED RELEASE TRANSDERMAL at 05:55

## 2025-05-28 RX ADMIN — CLOPIDOGREL BISULFATE 75 MG: 75 TABLET, FILM COATED ORAL at 05:54

## 2025-05-28 RX ADMIN — PIPERACILLIN AND TAZOBACTAM 4.5 G: 4; .5 INJECTION, POWDER, FOR SOLUTION INTRAVENOUS at 01:35

## 2025-05-28 RX ADMIN — MIDODRINE HYDROCHLORIDE 5 MG: 5 TABLET ORAL at 08:38

## 2025-05-28 RX ADMIN — NOREPINEPHRINE BITARTRATE 0.05 MCG/KG/MIN: 1 INJECTION, SOLUTION, CONCENTRATE INTRAVENOUS at 22:08

## 2025-05-28 RX ADMIN — PIPERACILLIN AND TAZOBACTAM 4.5 G: 4; .5 INJECTION, POWDER, FOR SOLUTION INTRAVENOUS at 07:51

## 2025-05-28 RX ADMIN — TAMSULOSIN HYDROCHLORIDE 0.4 MG: 0.4 CAPSULE ORAL at 05:54

## 2025-05-28 RX ADMIN — FUROSEMIDE 40 MG: 10 INJECTION, SOLUTION INTRAVENOUS at 17:36

## 2025-05-28 RX ADMIN — RIVAROXABAN 10 MG: 10 TABLET, FILM COATED ORAL at 17:35

## 2025-05-28 RX ADMIN — GABAPENTIN 600 MG: 300 CAPSULE ORAL at 05:54

## 2025-05-28 RX ADMIN — POTASSIUM CHLORIDE 40 MEQ: 1500 TABLET, EXTENDED RELEASE ORAL at 17:35

## 2025-05-28 ASSESSMENT — ENCOUNTER SYMPTOMS
SORE THROAT: 0
SPEECH CHANGE: 0
SHORTNESS OF BREATH: 1
DIZZINESS: 0
NAUSEA: 0
MYALGIAS: 1
HEADACHES: 0
COUGH: 0
ABDOMINAL PAIN: 0
NERVOUS/ANXIOUS: 0
PALPITATIONS: 0

## 2025-05-28 ASSESSMENT — FIBROSIS 4 INDEX: FIB4 SCORE: 3.08

## 2025-05-28 ASSESSMENT — PAIN DESCRIPTION - PAIN TYPE
TYPE: ACUTE PAIN

## 2025-05-28 NOTE — PROCEDURES
Vascular Access Team    Date of Insertion: 5/28/25  Arm Circumference: n/a  Line Length: 10  Line Size: 20  Vein Occupancy %: 29  Reason for Midline: access  Labs: WBC 10.4, PLT 3.82, BUN 15, Cr 1.05, GFR 73, INR na    Orders confirmed, vessel patency confirmed with ultrasound. Risks and benefits of procedure explained to patient and education regarding line associated bloodstream infections provided. Questions answered.     PowerGlide Midline placed in RUE per licensed provider order with ultrasound guidance. 20g, 10 cm line placed in cephalic vein after 1 attempt(s).  Catheter inserted with brisk blood return. Secured with 0cm external from insertion site.  Line flushed without resistance with 10 mL 0.9% normal saline.  Midline secured with Biopatch and Tegaderm.     Midline placement is confirmed by nurse using ultrasound and ability to flush and draw blood. Midline is appropriate for use at this time.  No X-ray is needed for placement confirmation. Pt tolerated procedure well.  Patient condition relayed to unit RN or ordering physician via this post procedure note in the EMR.    Ultrasound images uploaded to PACS and viewable in the EMR - yes  Ultrasound imaged printed and placed in paper chart - no      BARD PowerGlide Midline ref # [id number], Lot # [id number], Expiration Date 3/31/26

## 2025-05-28 NOTE — CARE PLAN
The patient is Watcher - Medium risk of patient condition declining or worsening    Shift Goals  Clinical Goals: titrate levo gtts, wean o2, monitor vitals signs  Patient Goals: sleep and eat  Family Goals: denae    Progress made toward(s) clinical / shift goals:    Problem: Pain - Standard  Goal: Alleviation of pain or a reduction in pain to the patient’s comfort goal  Outcome: Progressing     Problem: Care Map:  Admission Optimal Outcome for the Heart Failure Patient  Goal: Admission:  Optimal Care of the heart failure patient  Outcome: Progressing     Problem: Care Map:  Day 1 Optimal Outcome for the Heart Failure Patient  Goal: Day 1:  Optimal Care of the heart failure patient  Outcome: Progressing     Problem: Care Map:  Day 2 Optimal Outcome for the Heart Failure Patient  Goal: Day 2:  Optimal Care of the heart failure patient  Outcome: Progressing     Problem: Care Map:  Day 3 Optimal Outcome for the Heart Failure Patient  Goal: Day 3:  Optimal Care of the heart failure patient  Outcome: Progressing     Problem: Care Map:  Day Before Discharge Optimal Outcome for the Heart Failure Patient  Goal: Day Before Discharge:  Optimal Care of the heart failure patient  Outcome: Progressing     Problem: Care Map:  Day of Discharge Optimal Outcome for the Heart Failure Patient  Goal: Day of Discharge:  Optimal Care of the heart failure patient  Outcome: Progressing     Problem: Care Map:  Optimal Outcome for the Pneumonia Patient  Goal: Collection and monitoring of appropriate tests and labs  Outcome: Progressing     Problem: Respiratory  Goal: Patient will achieve/maintain optimum respiratory ventilation and gas exchange  Outcome: Progressing     Problem: Risk for Aspiration  Goal: Patient's risk for aspiration will be absent or decrease  Outcome: Progressing     Problem: Hemodynamics - Pneumonia  Goal: Patient's hemodynamics, fluid balance and neurologic status will be stable or improve  Outcome: Progressing      Problem: Self Care  Goal: Patient will have the ability to perform ADLs independently or with assistance (bathe, groom, dress, toilet and feed)  Outcome: Progressing     Problem: Discharge Planning - Pneumonia  Goal: Patient will verbalize understanding of lifestyle changes and therapeutic needs post discharge  Outcome: Progressing     Problem: Knowledge Deficit - Standard  Goal: Patient and family/care givers will demonstrate understanding of plan of care, disease process/condition, diagnostic tests and medications  Outcome: Progressing     Problem: Skin Integrity  Goal: Skin integrity is maintained or improved  Outcome: Progressing     Problem: Fall Risk  Goal: Patient will remain free from falls  Outcome: Progressing       Patient is not progressing towards the following goals:

## 2025-05-28 NOTE — PROGRESS NOTES
Hospital Medicine Daily Progress Note    Date of Service  5/28/2025    Chief Complaint  Chest pain and SOB    Hospital Course  Donte Agustin is a 79 y.o. male with a history of dementia, coronary artery disease, chronic respiratory failure on 5 L O2 w/ transfer from [facility] and admitted 5/26/2025 with code STEMI.  Cardiology evaluated the patient once here in the ER and canceled code STEMI.  Patient was somnolent and confused.  There is concern of heart failure and patient being chronically on 5 L O2.  The patient was hypoxic requiring nonrebreather.  History of an EF 25%.  A CT chest showed right-sided pneumonia.    Interval Problem Update  5/28: On pressor support with Levophed.  Low Mg and giving 2g IV MgSO4. Awake and conversant.  On pressors overnight but lost IV access this am.  Starting midodrine and continue levophed once IV access back.  Down to 3-4L via oxymask.    I have discussed this patient's plan of care and discharge plan at IDT rounds today with Case Management, Nursing, Nursing leadership, and other members of the IDT team.    Consultants/Specialty  cardiology    Code Status  Full Code    Disposition  The patient is not medically cleared for discharge to home or a post-acute facility.      I have placed the appropriate orders for post-discharge needs.    Review of Systems  Review of Systems   Constitutional:  Positive for malaise/fatigue.   HENT:  Negative for sore throat.    Respiratory:  Positive for shortness of breath. Negative for cough.    Cardiovascular:  Negative for chest pain, palpitations and leg swelling.   Gastrointestinal:  Negative for abdominal pain and nausea.   Musculoskeletal:  Positive for myalgias.   Neurological:  Negative for dizziness, speech change and headaches.   Psychiatric/Behavioral:  The patient is not nervous/anxious.         Physical Exam  Temp:  [36.1 °C (96.9 °F)-36.6 °C (97.9 °F)] 36.1 °C (97 °F)  Pulse:  [80-98] 84  Resp:  [14-51] 22  BP:  ()/(44-75) 91/53  SpO2:  [91 %-99 %] 98 %    Physical Exam  Vitals reviewed.   Constitutional:       Appearance: Normal appearance. He is obese.   HENT:      Head: Normocephalic and atraumatic.      Nose: Nose normal.      Mouth/Throat:      Mouth: Mucous membranes are moist.   Eyes:      General:         Right eye: No discharge.         Left eye: No discharge.      Conjunctiva/sclera: Conjunctivae normal.   Cardiovascular:      Rate and Rhythm: Normal rate and regular rhythm.      Pulses: Normal pulses.      Heart sounds: No murmur heard.  Pulmonary:      Effort: Pulmonary effort is normal. No respiratory distress.      Breath sounds: Normal breath sounds. No wheezing.   Abdominal:      General: Bowel sounds are normal. There is no distension.      Palpations: Abdomen is soft.      Tenderness: There is no abdominal tenderness.   Musculoskeletal:      Cervical back: Neck supple.      Right lower leg: Edema present.      Left lower leg: Edema present.   Lymphadenopathy:      Cervical: No cervical adenopathy.   Skin:     General: Skin is warm.   Neurological:      General: No focal deficit present.      Mental Status: He is alert.   Psychiatric:         Mood and Affect: Mood normal.         Fluids    Intake/Output Summary (Last 24 hours) at 5/28/2025 0653  Last data filed at 5/28/2025 0600  Gross per 24 hour   Intake --   Output 3400 ml   Net -3400 ml        Laboratory  Recent Labs     05/26/25  1005 05/27/25  0345 05/28/25  0432   WBC 13.8* 11.5* 10.4   RBC 4.25* 3.76* 3.82*   HEMOGLOBIN 12.2* 10.8* 11.0*   HEMATOCRIT 38.6* 35.7* 35.2*   MCV 90.8 94.9 92.1   MCH 28.7 28.7 28.8   MCHC 31.6* 30.3* 31.3*   RDW 52.3* 55.9* 52.7*   PLATELETCT 216 177 200   MPV 10.8 11.0 11.2     Recent Labs     05/26/25  1005 05/27/25  0400 05/28/25  0432   SODIUM 135 141 138   POTASSIUM 4.2 3.3* 4.2   CHLORIDE 103 110 102   CO2 20 22 25   GLUCOSE 247* 121* 157*   BUN 12 13 15   CREATININE 1.04 0.94 1.05   CALCIUM 9.2 7.6* 9.0                    Imaging  DX-CHEST-PORTABLE (1 VIEW)   Final Result      1.  Interval improvement in right-sided pulmonary opacities.   2.  Interstitial edema has essentially resolved.   3.  Stable cardiomegaly.         DX-CHEST-PORTABLE (1 VIEW)   Final Result      Airspace opacity throughout the right lung, concerning for pneumonia.      CT-CTA CHEST PULMONARY ARTERY W/ RECONS   Final Result         1. Dense right perihilar pneumonia type infiltrate. Mild left base atelectasis. Trace bilateral pleural effusions.      2. No pulmonary embolus evident. Prominent aortic valve calcification. Coronary artery calcification.      3. Mild gynecomastia.                        DX-ESOPHAGUS - [id number]    (Results Pending)        Assessment/Plan  * Acute on chronic respiratory failure with hypoxia (HCC)- (present on admission)  Assessment & Plan  5/28:  Due to pneumonia as well as some volume overload with edema as well as small bilateral pleural effusions  Titrate oxygen to keep SpO2 greater than 88%  Antibiotics being adjusted  RT per protocol  Lasix 40 mg IV twice daily, switch to oral lasix    Hypotension  Assessment & Plan  5/28:  Currently on Levophed to keep MAP greater than 60 and systolic blood pressure greater 90  Titrate Levophed once regain IV access.  Initiated midodrine as appears to have improving heart failure symptoms.      Pleural effusion- (present on admission)  Assessment & Plan  5/28   trace effusions, no need for thoracentesis at this time  Continue IV Lasix with near future weaning to oral  Monitor SpO2 and titrating oxygen    COPD (chronic obstructive pulmonary disease) (HCC)- (present on admission)  Assessment & Plan  5/28:   RT per protocol  Encouraged smoking cessation  Titration of oxygen to keep SpO2 greater than 88%    BPH (benign prostatic hyperplasia)- (present on admission)  Assessment & Plan  5/28  Monitor for urinary retention   continue home Flomax    GERD (gastroesophageal reflux disease)-  (present on admission)  Assessment & Plan  Continue home PPI    Hyperlipidemia- (present on admission)  Assessment & Plan  Continue home statin    Hypertension- (present on admission)  Assessment & Plan  5/28  Currently on pressors.  Started midodrine.  continue home losartan and metoprolol with parameters  as needed labetalol  Monitor vitals    Heart failure with reduced ejection fraction (HCC) (LVEF 20-25% Echo 2/2025)- (present on admission)  Assessment & Plan  5/28   echocardiogram last month ejection fraction of 25%  IV Lasix with near future weaning to oral Lasix  Continue losartan and Coreg as blood pressure allows  Otherwise, continue goal-directed medical therapy  No need for repeat echocardiogram or official cardiology consultation at this time  Monitor on telemetry    Diabetes (HCC)- (present on admission)  Assessment & Plan  5/28   A1c 6.5 in the past month   Diabetic diet  Monitor glucose         VTE prophylaxis: VTE Selection    I have performed a physical exam and reviewed and updated ROS and Plan today (5/28/2025). In review of yesterday's note (5/27/2025), there are no changes except as documented above.

## 2025-05-28 NOTE — CARE PLAN
The patient is Watcher - Medium risk of patient condition declining or worsening    Shift Goals  Clinical Goals: Wean o2, eat, wean levo, MAP>65  Patient Goals: Sleep, eat  Family Goals: LASHELL    Progress made toward(s) clinical / shift goals:      Problem: Pain - Standard  Goal: Alleviation of pain or a reduction in pain to the patient’s comfort goal  Description: Target End Date:  Prior to discharge or change in level of careDocument on Vitals flowsheet1.  Document pain using the appropriate pain scale per order or unit policy2.  Educate and implement non-pharmacologic comfort measures (i.e. relaxation, distraction, massage, cold/heat therapy, etc.)3.  Pain management medications as ordered4.  Reassess pain after pain med administration per policy5.  If opiods administered assess patient's response to pain medication is appropriate per POSS sedation scale6.  Follow pain management plan developed in collaboration with patient and interdisciplinary team (including palliative care or pain specialists if applicable)  Outcome: Progressing     Problem: Respiratory  Goal: Patient will achieve/maintain optimum respiratory ventilation and gas exchange  Description: Target End Date:  Prior to discharge or change in level of careDocument on Assessment flowsheet1.  Assess and monitor rate, rhythm, depth and effort of respiration2.  Breath sounds assessed qshift and/or as needed3.  Assess O2 saturation, administer/titrate oxygen as ordered4.  Position patient for maximum ventilatory efficiency5.  Turn, cough, and deep breath with splinting to improve effectiveness6.  Collaborate with RT to administer medication/treatments per order7.  Encourage use of incentive spirometer and encourage patient to cough after use and utilize splinting techniques if applicable8.  Airway suctioning9.  Monitor sputum production for changes in color, consistency and [id number]. Perform frequent oral [id number]. Alternate physical activity with rest  periods  Outcome: Progressing     Problem: Risk for Aspiration  Goal: Patient's risk for aspiration will be absent or decrease  Description: Target End Date:  Prior to discharge or change in level of care1.   Complete dysphagia screening on admission2.   NPO until dysphagia screening complete or medically cleared3.   Collaborate with Speech Therapy, Clinical Dietitian and interdisciplinary team4.   Implement aspiration precautions5.   Assist patient up to chair for meals6.   Elevate head of bed 90 degrees if patient is unable to get out of bed7.   Encourage small bites8.   Ensure foods/liquids are of appropriate consistency9.   Assess for any signs/symptoms of [id number]. Assess breath sounds and vital signs after oral intake  Outcome: Progressing     Problem: Hemodynamics - Pneumonia  Goal: Patient's hemodynamics, fluid balance and neurologic status will be stable or improve  Description: Target End Date:  Prior to discharge or change in level of careDocument on Assessment and I/O flowsheet templates1.  Monitor vital signs, pulse oximetry and cardiac monitor per provider order and/or policy2.  Manage IV fluids and IV infusions3.  Monitor intake and output4.  Daily weights per unit policy or provider order5.  Assess peripheral pulses and capillary refill6.  Monitor body temperature and assist with comfort measures to reduce fever and chills7.  Position patient for maximum circulation/cardiac output8.  Assess for peripheral, sacral, periorbital and abdominal edema9. Assess for signs of deterioration - tachycardia, tachypnea, dyspnea, hypertension, hypoxemia, pallor, changes in consciousness or restlessness  Outcome: Progressing     Problem: Self Care  Goal: Patient will have the ability to perform ADLs independently or with assistance (bathe, groom, dress, toilet and feed)  Description: Target End Date:  Prior to discharge or change in level of careDocument on ADL flowsheet1.  Assess the capability and level of  deficiency to perform ADLs2.  Encourage family/care giver involvement3.  Provide assistive devices4.  Consider PT/OT evaluations5.  Maintain support, give positive feedback, encourage self-care allowing extra time and verbal cuing as needed6.  Avoid doing something for patients they can do themselves, but provide assistance as needed7.  Assist in anticipating/planning individual needs8.  Collaborate with Case Management and  to meet discharge needs  Outcome: Progressing     Problem: Knowledge Deficit - Standard  Goal: Patient and family/care givers will demonstrate understanding of plan of care, disease process/condition, diagnostic tests and medications  Description: Target End Date:  1-3 days or as soon as patient condition allowsDocument in Patient Education1.  Patient and family/caregiver oriented to unit, equipment, visitation policy and means for communicating concern2.  Complete/review Learning Assessment3.  Assess knowledge level of disease process/condition, treatment plan, diagnostic tests and medications4.  Explain disease process/condition, treatment plan, diagnostic tests and medications  Outcome: Progressing     Problem: Skin Integrity  Goal: Skin integrity is maintained or improved  Description: Target End Date:  Prior to discharge or change in level of careDocument interventions on Skin Risk/ flowsheet groups and corresponding LDA1.  Assess and monitor skin integrity, appearance and/or temperature2.  Assess risk factors for impaired skin integrity and/or pressures ulcers3.  Implement precautions to protect skin integrity in collaboration with interdisciplinary team4.  Implement pressure ulcer prevention protocol if at risk for skin breakdown5.  Confirm wound care consult if at risk for skin breakdown6.  Ensure patient use of pressure relieving devices  (Low air loss bed, waffle overlay, heel protectors, ROHO cushion, etc)  Outcome: Progressing     Problem: Fall Risk  Goal:  Patient will remain free from falls  Description: Target End Date:  Prior to discharge or change in level of careDocument interventions on the Carty Abraham Fall Risk Assessment1.  Assess for fall risk factors2.  Implement fall precautions  Outcome: Progressing       Patient is not progressing towards the following goals:

## 2025-05-29 ENCOUNTER — APPOINTMENT (OUTPATIENT)
Dept: RADIOLOGY | Facility: MEDICAL CENTER | Age: 80
DRG: 193 | End: 2025-05-29
Attending: HOSPITALIST
Payer: MEDICARE

## 2025-05-29 LAB
ANION GAP SERPL CALC-SCNC: 10 MMOL/L (ref 7–16)
BUN SERPL-MCNC: 18 MG/DL (ref 8–22)
CALCIUM SERPL-MCNC: 9.3 MG/DL (ref 8.5–10.5)
CHLORIDE SERPL-SCNC: 98 MMOL/L (ref 96–112)
CO2 SERPL-SCNC: 26 MMOL/L (ref 20–33)
CREAT SERPL-MCNC: 1.18 MG/DL (ref 0.5–1.4)
ERYTHROCYTE [DISTWIDTH] IN BLOOD BY AUTOMATED COUNT: 49.6 FL (ref 35.9–50)
GFR SERPLBLD CREATININE-BSD FMLA CKD-EPI: 63 ML/MIN/1.73 M 2
GLUCOSE SERPL-MCNC: 189 MG/DL (ref 65–99)
HCT VFR BLD AUTO: 36 % (ref 42–52)
HGB BLD-MCNC: 11.6 G/DL (ref 14–18)
MCH RBC QN AUTO: 28.7 PG (ref 27–33)
MCHC RBC AUTO-ENTMCNC: 32.2 G/DL (ref 32.3–36.5)
MCV RBC AUTO: 89.1 FL (ref 81.4–97.8)
PLATELET # BLD AUTO: 221 K/UL (ref 164–446)
PMV BLD AUTO: 10.9 FL (ref 9–12.9)
POTASSIUM SERPL-SCNC: 4.4 MMOL/L (ref 3.6–5.5)
RBC # BLD AUTO: 4.04 M/UL (ref 4.7–6.1)
SODIUM SERPL-SCNC: 134 MMOL/L (ref 135–145)
WBC # BLD AUTO: 8.8 K/UL (ref 4.8–10.8)

## 2025-05-29 PROCEDURE — 700105 HCHG RX REV CODE 258: Performed by: INTERNAL MEDICINE

## 2025-05-29 PROCEDURE — 99233 SBSQ HOSP IP/OBS HIGH 50: CPT | Performed by: HOSPITALIST

## 2025-05-29 PROCEDURE — 92526 ORAL FUNCTION THERAPY: CPT

## 2025-05-29 PROCEDURE — A9270 NON-COVERED ITEM OR SERVICE: HCPCS | Performed by: HOSPITALIST

## 2025-05-29 PROCEDURE — 700102 HCHG RX REV CODE 250 W/ 637 OVERRIDE(OP): Performed by: INTERNAL MEDICINE

## 2025-05-29 PROCEDURE — 74230 X-RAY XM SWLNG FUNCJ C+: CPT

## 2025-05-29 PROCEDURE — 770000 HCHG ROOM/CARE - INTERMEDIATE ICU *

## 2025-05-29 PROCEDURE — 80048 BASIC METABOLIC PNL TOTAL CA: CPT

## 2025-05-29 PROCEDURE — 700111 HCHG RX REV CODE 636 W/ 250 OVERRIDE (IP): Mod: JZ | Performed by: INTERNAL MEDICINE

## 2025-05-29 PROCEDURE — 92611 MOTION FLUOROSCOPY/SWALLOW: CPT

## 2025-05-29 PROCEDURE — 700102 HCHG RX REV CODE 250 W/ 637 OVERRIDE(OP): Performed by: HOSPITALIST

## 2025-05-29 PROCEDURE — 85027 COMPLETE CBC AUTOMATED: CPT

## 2025-05-29 PROCEDURE — A9270 NON-COVERED ITEM OR SERVICE: HCPCS | Performed by: INTERNAL MEDICINE

## 2025-05-29 RX ADMIN — ACETAMINOPHEN 650 MG: 325 TABLET ORAL at 06:14

## 2025-05-29 RX ADMIN — TAMSULOSIN HYDROCHLORIDE 0.4 MG: 0.4 CAPSULE ORAL at 05:23

## 2025-05-29 RX ADMIN — GABAPENTIN 600 MG: 300 CAPSULE ORAL at 17:05

## 2025-05-29 RX ADMIN — MIDODRINE HYDROCHLORIDE 5 MG: 5 TABLET ORAL at 17:06

## 2025-05-29 RX ADMIN — CLOPIDOGREL BISULFATE 75 MG: 75 TABLET, FILM COATED ORAL at 05:23

## 2025-05-29 RX ADMIN — PIPERACILLIN AND TAZOBACTAM 4.5 G: 4; .5 INJECTION, POWDER, FOR SOLUTION INTRAVENOUS at 01:18

## 2025-05-29 RX ADMIN — FUROSEMIDE 40 MG: 10 INJECTION, SOLUTION INTRAVENOUS at 05:25

## 2025-05-29 RX ADMIN — FUROSEMIDE 40 MG: 10 INJECTION, SOLUTION INTRAVENOUS at 17:06

## 2025-05-29 RX ADMIN — PIPERACILLIN AND TAZOBACTAM 4.5 G: 4; .5 INJECTION, POWDER, FOR SOLUTION INTRAVENOUS at 07:59

## 2025-05-29 RX ADMIN — MIDODRINE HYDROCHLORIDE 5 MG: 5 TABLET ORAL at 11:14

## 2025-05-29 RX ADMIN — MIDODRINE HYDROCHLORIDE 5 MG: 5 TABLET ORAL at 07:59

## 2025-05-29 RX ADMIN — RIVAROXABAN 10 MG: 10 TABLET, FILM COATED ORAL at 17:06

## 2025-05-29 RX ADMIN — GABAPENTIN 600 MG: 300 CAPSULE ORAL at 11:13

## 2025-05-29 RX ADMIN — ATORVASTATIN CALCIUM 40 MG: 40 TABLET, FILM COATED ORAL at 17:06

## 2025-05-29 RX ADMIN — OMEPRAZOLE 20 MG: 20 CAPSULE, DELAYED RELEASE ORAL at 05:23

## 2025-05-29 RX ADMIN — LOSARTAN POTASSIUM 25 MG: 25 TABLET, FILM COATED ORAL at 05:23

## 2025-05-29 RX ADMIN — ASPIRIN 81 MG: 81 TABLET, COATED ORAL at 05:23

## 2025-05-29 RX ADMIN — POTASSIUM CHLORIDE 40 MEQ: 1500 TABLET, EXTENDED RELEASE ORAL at 17:05

## 2025-05-29 RX ADMIN — GABAPENTIN 600 MG: 300 CAPSULE ORAL at 05:23

## 2025-05-29 RX ADMIN — PIPERACILLIN AND TAZOBACTAM 4.5 G: 4; .5 INJECTION, POWDER, FOR SOLUTION INTRAVENOUS at 17:08

## 2025-05-29 RX ADMIN — POTASSIUM CHLORIDE 40 MEQ: 1500 TABLET, EXTENDED RELEASE ORAL at 05:23

## 2025-05-29 ASSESSMENT — PAIN DESCRIPTION - PAIN TYPE
TYPE: ACUTE PAIN

## 2025-05-29 ASSESSMENT — ENCOUNTER SYMPTOMS
SPUTUM PRODUCTION: 0
SHORTNESS OF BREATH: 1
HEADACHES: 0
DIZZINESS: 0
COUGH: 1
FEVER: 0
ABDOMINAL PAIN: 0
SPEECH CHANGE: 0
BACK PAIN: 0
SORE THROAT: 0
NERVOUS/ANXIOUS: 0
PALPITATIONS: 0
NAUSEA: 0

## 2025-05-29 ASSESSMENT — COGNITIVE AND FUNCTIONAL STATUS - GENERAL
CLIMB 3 TO 5 STEPS WITH RAILING: TOTAL
WALKING IN HOSPITAL ROOM: TOTAL
STANDING UP FROM CHAIR USING ARMS: A LOT
EATING MEALS: A LITTLE
MOVING FROM LYING ON BACK TO SITTING ON SIDE OF FLAT BED: A LOT
SUGGESTED CMS G CODE MODIFIER MOBILITY: CL
HELP NEEDED FOR BATHING: A LOT
MOVING TO AND FROM BED TO CHAIR: A LOT
PERSONAL GROOMING: A LITTLE
TURNING FROM BACK TO SIDE WHILE IN FLAT BAD: A LITTLE
MOBILITY SCORE: 11
DRESSING REGULAR LOWER BODY CLOTHING: TOTAL
TOILETING: TOTAL
SUGGESTED CMS G CODE MODIFIER DAILY ACTIVITY: CL
DRESSING REGULAR UPPER BODY CLOTHING: A LITTLE
DAILY ACTIVITIY SCORE: 13

## 2025-05-29 ASSESSMENT — FIBROSIS 4 INDEX: FIB4 SCORE: 2.72

## 2025-05-29 NOTE — THERAPY
"   Speech Language Pathology   Videofluoroscopic Swallow Study Evaluation     Patient Name: Donte Agustin  AGE:  79 y.o., SEX:  male  Medical Record #: [id number]  Date of Service: 5/29/2025      History of Present Illness  80 y/o male presented to OSH 5/26 with SOB, transferred to [facility] for code STEMI, which was canceled by cardiology. Found to be hypoxic with right lobe PNA.     CMHx: Acute on chronic respiratory failure, PNA, HTN, HLD, COPD, pleural effusion    PMHx: Heart failure, chronic respiratory failure (5L baseline), dementia, diabetes, GERD, CKD, prostate cancer, tobacco use, debility, prostate cancer    Last seen by SLP in 6/2023. Recommendations made for RG7/TN0 with MBSS to assess globus. Patient declined MBSS at that time.     CXR 5/26:  \"1.  Interval improvement in right-sided pulmonary opacities.  2.  Interstitial edema has essentially resolved.  3.  Stable cardiomegaly.\"      Pertinent Information  Current Method of Nutrition: Oral diet (puree/thins)  Patient Behaviors: Confused, Non Compliant, Irritable  Dentition: Edentulous  Secretion Management: Drooling  Feeding Tube: None  Factor(s) Affecting Performance: Impaired command following      Discussed the risks, benefits, and alternatives of the VFSS procedure. Patient/family acknowledged and agreed to proceed.      Assessment  Videofluoroscopic Swallow Study was conducted in the Lateral, A-P projection(s) to evaluate oropharyngeal swallow function. A radiology tech was present to assist with the procedure.      Positioning: Seated upright in fluoroscopy chair, Seated (AP view)  Anatomic View: WFL  Bolus Administration: SLP, Patient  PO Barium Contrast Trials: Varibar thin, Varibar nectar (mildly thick), Liquidised mixed with Varibar powder, Varibar pudding, Soft & Bite sized coated with Varibar powder, Regular solid coated with Varibar pudding, Mixed consistency with Varibar powder      Consistency PAS Score Timing Residue Comments   Thin Liquid 8 " Pre Swallow, During swallow Vallecular Residue: Severe (>50%)  Pyriform Sinus Residue: Severe (>50%)    Mildly Thick 5 During Swallow Vallecular Residue: Severe (>50%)  Pyriform Sinus Residue: Severe (>50%)    Liquidised 3 During Swallow Vallecular Residue: Trace (1%-5%)  Pyriform Sinus Residue: None (0%)    Pudding 2 During Swallow Vallecular Residue: None (0%)  Pyriform Sinus Residue: Moderate (25%-50%)    Soft & Bite Sized 3 Pre Swallow Vallecular Residue: Trace (1%-5%)  Pyriform Sinus Residue: Trace (1%-5%)    Regular Solid 2 Pre Swallow Vallecular Residue: None (0%)  Pyriform Sinus Residue: None (0%)    Mixed 8 Pre Swallow Vallecular Residue: Mild (5%-25%)  Pyriform Sinus Residue: Mild (5%-25%)      Penetration-Aspiration Scale (PAS)  1     No contrast enters airway  2     Contrast enters the airway, remains above the vocal folds, and is ejected from the airway (not seen in the airway at the end of the swallow).  3     Contrast enters the airway, remains above the vocal folds, and is not ejected from the airway (is seen in the airway after the swallow).  4     Contrast enters the airway, contacts the vocal folds, and is ejected from the airway.  5     Contrast enters the airway, contacts the vocal folds, and is not ejected from the airway  6     Contrast enters the airway, crosses the plane of the vocal folds, and is ejected from the airway.  7     Contrast enters the airway, crosses the plane of the vocal folds, and is not ejected from the airway despite effort.  8     Contrast enters the airway, crosses the plane of the vocal folds, is not ejected from the airway and there is no response to aspiration.       Oral phase:  Appropriate acceptance of all PO. Anterior loss of secretions x1. Poor bolus control resulted in penetration/aspiration before the swallow with thin liquids, soft and bite size, mixed, and solids. A three second prep was not successful for reducing/eliminating penetration before the swallow.  Pharyngeal swallow triggering at the pyriforms with thin liquids and mixed consistency and at the vallecular space with other textures. Impaired/incomplete mastication of soft solids resulted in pt swallowing pieces of fruit whole.    Pharyngeal phase:  Incomplete laryngeal vestibule closure, incomplete laryngeal elevation, laryngeal mistiming, reduced base of tongue retraction, reduced pharyngeal shortening, incomplete epiglottic inversion, reduce anterior hyoid excursion, weak pharyngeal squeeze, and impaired sensation resulted in:  Silent aspiration during the swallow with thin liquids x1. Inconsistent penetration noted during the swallow which remained in the laryngeal vestibule at the end of the swallow. A cued cough was successful for ejecting. Multiple swallows 2-3 was successful for improving or eliminating residue but required cues as pt was not sensate to residue.   Deep penetration to the level of the vocal folds with MTL which remained in the laryngeal vestibule at the end of the swallow. Again, pt not sensate to this event. Severe residue noted after the swallow which required cued 3 swallows to significantly improve or eliminate.  Penetration during the swallow with applesauce which remained in the laryngeal vestibule at the end of the swallow. A cued cough was successful for clearing from the laryngeal vestibule x1 however pt unable to follow when requested for the second trial. Retrograde flow noted which worsened pyriform sinus residue (see pictures below).  Before --> After retrograde flow      In and out penetration with bite of puree. No vallecular but moderate pyriform residue.  Silent aspiration noted before the swallow with mixed consistency and suspect that a cued cough was successful for ejection from the trachea however given postioning unable to r/o completely.   There was penetration before the swallow with soft solids and was noted to be a complete piece of fruit. The fruit was ejected  "from the airway but trace juice residue noted after the swallow (see picture below).    In and out penetration with meltable solid.      Attempted to see swallow during AP view with both MTL and applesauce however unremarkable. Pt would benefit from a GI referral given concerns for ascending retrograde flow and possible aspiration.    Compensatory Strategies:  Reflexive/Cued third swallow - Successful for improving or eliminating vallecular/pyriform residue with majority of textures.  Cued cough - Successful for ejecting penetration/aspiration from the laryngeal vestibule with Thin liquids, applesauce      Severity Rating:   Severity Rating: VANNESSA  VANNESSA: Moderate-Severe      Clinical Impressions  Patient presents with a moderate-severe pharyngeal dysphagia, likely acute on chronic. Swallow safety and efficiency are both impaired. Primary deficits include incomplete laryngeal vestibule closure, reduced base of tongue retraction, impaired pharyngeal shortening, impaired pharyngeal constriction, incomplete epiglottic inversion, and loss of oral bolus control. Deficits are best managed with diet modification and compensatory strategies. Recommend soft and bite size per MD and thin liquids with implementation of strategies. Patient is a guarded candidate for behavioral and exercise-based swallow rehabilitation. A repeat swallowing diagnostic is likely indicated prior to diet advancement related to silent nature of aspiration events. Consider training the following evidence-based swallowing exercises in therapy based on patient-specific pathophysiology: effortful swallow.    Pt with very poor insight into deficits and need for implementation of strategies. He perseverated on distaste of pureed foods and how he doesn't want to eat them. Multiple attempts to provide education regarding aspiration/choking concerns with consumption of solids and pt requested to \"sign the papers to get out of here!\" Re-direction provided by RN " and this clinician and he was transferred back to the room without further difficulty.     Pt is at high risk of aspiration given impaired swallow safety and efficiency and if he does not follow strict strategies for thin liquids he is at very high risk for aspiration. Recommend training and implementation of the following strategy with thins: take a sip of water, hold in mouth and count down 3-2-1, swallow, swallow again just the saliva in his mouth, cough, then swallow again. During this study, swallowing three times was found to be the most successful for greatly reducing pharyngeal residue. Per discussion with MD, okay to start on a soft and bite size per pt's request. Would recommend purees given dentition.    Swallow outcomes can be further maximized with frequent oral care and mobility as able. Please contact SLP with signs of distress during oral intake; make NPO per clinical judgement.        Recommendations  Diet Consistency: soft and bite size per MD, thin liquids with strict implementation of strategies. SLP recommend purees, GI referral    Medication: Crush with pudding/puree, as appropriate  Supervision: 1:1 feeding with constant supervision, Assist with meal tray set up (needs supervision for strategies)  Positioning: Fully upright and midline during oral intake  Strategies: Small bites/sips, Slow rate of intake (swallow - swallow - cough - swallow)  Oral Care: Pre- and post-meals  Additional Instrumentation: Repeat diagnostic study when clinically appropriate         SLP Treatment Plan  Treatment Plan: Dysphagia Treatment  SLP Frequency: 4x Per Week  Estimated Duration: Until Therapy Goals Met      Anticipated Discharge Needs  Discharge Recommendations: Recommend post-acute placement for additional speech therapy services prior to discharge home   Therapy Recommendations Upon DC: Dysphagia Training, Community Re-Integration, Patient / Family / Caregiver Education        Patient / Family  "Goals  Patient / Family Goal #1: \"I've had a swallowing test before\"  Goal #1 Outcome: Goal met  Short Term Goal # 1: Pt will complete FEES w SLP to further evaluate swallow function and inform POC.  Goal Outcome # 1: Goal met, new goal added  Short Term Goal # 1 B : Pt will complete MBSS to further evaluate swallow function and inform POC.  Goal Outcome  # 1 B: Goal met  Short Term Goal # 2: Pt will consume PU4/TN0 with no worsening of respiratory status.  Goal Outcome # 2 : Progressing slower than expected  Short Term Goal # 3: Pt will complete exercises targeting BOT retraction and  pharyngeal shortening x50 in a session given min cues from SLP.  Goal Outcome  # 3: Other (see comments)      Laura Portillo, SLP   "

## 2025-05-29 NOTE — THERAPY
"Speech Language Pathology   Daily Treatment     Patient Name: Donte Agustin  AGE:  79 y.o., SEX:  male  Medical Record #: [id number]  Date of Service: 5/29/2025      Precautions:  Precautions: Swallow Precautions         Subjective  Pt was seen for follow up of strategies s/p MBSS. Pt required re-education of MBSS and re-introduction of this clinician.      Assessment  Discussed with pt findings of impaired swallow safety and insuffiencey with findings on MBSS and recommendation for a puree/thin liquid diet with strict implementation of swallowing strategies when consuming thin liquids: (take a sip of water, hold in mouth and count down 3-2-1, swallow, swallow again just the saliva in his mouth, cough, then swallow again). Pt endorsed preference for more solid foods and re-educated pt of discussion with MD about advancing to a soft and bite size diet. Discussed that during the study, a poorly/unchewed piece of fruit entered his airway and if he is unable to chew adequately he is at very high choking risk and he verbalized understanding.    Pt required max cues for the implementation of strategies and with fading cues, pt implemented independently x2. Pt continues to state that when he goes home, he will eat and drink what and how he pleases. Provided education that recommendations provided by this clinician are provided for safety purposes and if pt does not agree with this clinician's recommendations the pt is able to have a discussion with the MD regarding preferences for care. He verbalized understanding, shook this clinician's hand, and said \"thank you\".        Clinical Impressions  Pt continues to present with a moderate-severe dysphagia as evidenced on the MBSS that was completed today. Recommend continuation of a soft and bite size diet per MD and thin liquids with strict implementation of strategies for thin liquids (take a sip of water, hold in mouth and count down 3-2-1, swallow, swallow again just the saliva " "in his mouth, cough, then swallow again.)      Recommendations  Treatment Completed: Dysphagia Treatment       Dysphagia Treatment  Diet Consistency: soft and bite size per MD, thin liquids with strict implementation of strategies  Instrumentation: Instrumental swallow study pending clinical progress  Medication: Crush with pudding/puree, as appropriate  Supervision: 1:1 feeding with constant supervision (needs supervision for strategies)  Positioning: Fully upright and midline during oral intake  Oral Care: Pre- and post-meals                     SLP Treatment Plan  Treatment Plan: Dysphagia Treatment  SLP Frequency: 4x Per Week  Estimated Duration: Until Therapy Goals Met      Anticipated Discharge Needs  Discharge Recommendations: Recommend post-acute placement for additional speech therapy services prior to discharge home  Therapy Recommendations Upon DC: Dysphagia Training, Community Re-Integration, Patient / Family / Caregiver Education      Patient / Family Goals  Patient / Family Goal #1: \"I've had a swallowing test before\"  Goal #1 Outcome: Goal met  Short Term Goals  Short Term Goal # 1: Pt will complete FEES w SLP to further evaluate swallow function and inform POC.  Goal Outcome # 1: Goal met, new goal added  Short Term Goal # 1 B : Pt will complete MBSS to further evaluate swallow function and inform POC.  Goal Outcome  # 1 B: Goal met  Short Term Goal # 2: Pt will consume PU4/TN0 with no worsening of respiratory status.  Goal Outcome # 2 : Progressing slower than expected  Short Term Goal # 3: Pt will complete exercises targeting BOT retraction and  pharyngeal shortening x50 in a session given min cues from SLP.  Goal Outcome  # 3: Other (see comments)      EFRAIN Randhawa  "

## 2025-05-29 NOTE — PROGRESS NOTES
MD aware patient is refusing crushed meds and will only take them whole. MD is to transition as many meds IV as possible and continue to follow speech's recs as close as patient will allow.

## 2025-05-29 NOTE — PROGRESS NOTES
Hospital Medicine Daily Progress Note    Date of Service  5/29/2025    Chief Complaint  Chest pain and SOB    Hospital Course  Donte Agustin is a 79 y.o. male with a history of dementia, coronary artery disease, chronic respiratory failure on 5 L O2 w/ transfer from [facility] and admitted 5/26/2025 with code STEMI.  Cardiology evaluated the patient once here in the ER and canceled code STEMI.  Patient was somnolent and confused.  There is concern of heart failure and patient being chronically on 5 L O2.  The patient was hypoxic requiring nonrebreather.  History of an EF 25%.  A CT chest showed right-sided pneumonia.    Interval Problem Update  5/29: Awake and oriented.  On 4 L oxygen.  Refusing crushed medications per RN.  I have discussed with him the importance of taking medications.  He does not like to medications even if they are mixed in applesauce or pudding.  Speech working on swallow techniques    I have discussed this patient's plan of care and discharge plan at IDT rounds today with Case Management, Nursing, Nursing leadership, and other members of the IDT team.    Consultants/Specialty  cardiology    Code Status  Full Code    Disposition  The patient is not medically cleared for discharge to home or a post-acute facility.      I have placed the appropriate orders for post-discharge needs.    Review of Systems  Review of Systems   Constitutional:  Positive for malaise/fatigue. Negative for fever.   HENT:  Negative for sore throat.    Respiratory:  Positive for cough and shortness of breath. Negative for sputum production.    Cardiovascular:  Negative for chest pain, palpitations and leg swelling.   Gastrointestinal:  Negative for abdominal pain and nausea.   Musculoskeletal:  Negative for back pain.   Neurological:  Negative for dizziness, speech change and headaches.   Psychiatric/Behavioral:  The patient is not nervous/anxious.         Physical Exam  Temp:  [36.1 °C (97 °F)-36.3 °C (97.3  °F)] 36.1 °C (97 °F)  Pulse:  [76-90] 76  Resp:  [16-36] 33  BP: ()/(38-68) 98/62  SpO2:  [89 %-98 %] 97 %    Physical Exam  Vitals reviewed.   Constitutional:       Appearance: Normal appearance. He is obese.   HENT:      Head: Normocephalic and atraumatic.      Nose: Nose normal.   Eyes:      General:         Right eye: No discharge.         Left eye: No discharge.      Conjunctiva/sclera: Conjunctivae normal.   Cardiovascular:      Rate and Rhythm: Normal rate and regular rhythm.      Pulses: Normal pulses.      Heart sounds: No murmur heard.  Pulmonary:      Effort: Pulmonary effort is normal. No respiratory distress.      Breath sounds: Normal breath sounds. No wheezing.   Abdominal:      General: Bowel sounds are normal. There is no distension.      Palpations: Abdomen is soft.      Tenderness: There is no abdominal tenderness.   Musculoskeletal:      Cervical back: Neck supple.      Right lower leg: Edema present.      Left lower leg: Edema present.   Skin:     General: Skin is warm.   Neurological:      General: No focal deficit present.      Mental Status: He is alert and oriented to person, place, and time.   Psychiatric:         Mood and Affect: Mood normal.         Fluids    Intake/Output Summary (Last 24 hours) at 5/29/2025 0714  Last data filed at 5/29/2025 0400  Gross per 24 hour   Intake --   Output 2400 ml   Net -2400 ml        Laboratory  Recent Labs     05/27/25  0345 05/28/25  0432 05/29/25  0350   WBC 11.5* 10.4 8.8   RBC 3.76* 3.82* 4.04*   HEMOGLOBIN 10.8* 11.0* 11.6*   HEMATOCRIT 35.7* 35.2* 36.0*   MCV 94.9 92.1 89.1   MCH 28.7 28.8 28.7   MCHC 30.3* 31.3* 32.2*   RDW 55.9* 52.7* 49.6   PLATELETCT 177 200 221   MPV 11.0 11.2 10.9     Recent Labs     05/27/25  0400 05/28/25  0432 05/29/25  0350   SODIUM 141 138 134*   POTASSIUM 3.3* 4.2 4.4   CHLORIDE 110 102 98   CO2 22 25 26   GLUCOSE 121* 157* 189*   BUN 13 15 18   CREATININE 0.94 1.05 1.18   CALCIUM 7.6* 9.0 9.3                    Imaging  IR-MIDLINE CATHETER INSERTION WO GUIDANCE > AGE 3   Final Result                  Ultrasound-guided midline placement performed by qualified nursing staff    as above.          DX-CHEST-PORTABLE (1 VIEW)   Final Result      1.  Interval improvement in right-sided pulmonary opacities.   2.  Interstitial edema has essentially resolved.   3.  Stable cardiomegaly.         DX-CHEST-PORTABLE (1 VIEW)   Final Result      Airspace opacity throughout the right lung, concerning for pneumonia.      CT-CTA CHEST PULMONARY ARTERY W/ RECONS   Final Result         1. Dense right perihilar pneumonia type infiltrate. Mild left base atelectasis. Trace bilateral pleural effusions.      2. No pulmonary embolus evident. Prominent aortic valve calcification. Coronary artery calcification.      3. Mild gynecomastia.                        DX-ESOPHAGUS - [id number]    (Results Pending)   IR-US GUIDED PIV    (Results Pending)        Assessment/Plan  * Acute on chronic respiratory failure with hypoxia (HCC)- (present on admission)  Assessment & Plan  5/29:  Due to pneumonia as well as some volume overload with edema as well as small bilateral pleural effusions  Titrate oxygen to keep SpO2 greater than 88%  Antibiotics being adjusted  RT per protocol  Lasix 40 mg IV twice daily, switch to oral lasix once he is taking medications crushed    Hypotension  Assessment & Plan  5/29:  Currently on Levophed to keep MAP greater than 60 and systolic blood pressure greater 90  Titrate Levophed once regain IV access.  Initiated midodrine as appears to have improving heart failure symptoms.      Pleural effusion- (present on admission)  Assessment & Plan  5/29  trace effusions, no need for thoracentesis at this time  Continue IV Lasix with near future weaning to oral  Monitor SpO2 and titrating oxygen    COPD (chronic obstructive pulmonary disease) (HCC)- (present on admission)  Assessment & Plan  5/29:   RT per protocol  Encouraged  smoking cessation  Titration of oxygen to keep SpO2 greater than 88%  5/29 currently on 4 L nasal cannula.    BPH (benign prostatic hyperplasia)- (present on admission)  Assessment & Plan  5/28  Monitor for urinary retention   continue home Flomax    GERD (gastroesophageal reflux disease)- (present on admission)  Assessment & Plan  Continue home PPI    Hyperlipidemia- (present on admission)  Assessment & Plan  Continue home statin    Hypertension- (present on admission)  Assessment & Plan  5/29  Currently on pressors.  Started midodrine.  Holding home losartan and metoprolol with parameters  as needed labetalol  Monitor vitals    Heart failure with reduced ejection fraction (HCC) (LVEF 20-25% Echo 2/2025)- (present on admission)  Assessment & Plan  5/29  echocardiogram last month ejection fraction of 25%  IV Lasix with near future weaning to oral Lasix  losartan and Coreg currently being held as he is currently on midodrine and trying to wean off pressors  Otherwise, continue goal-directed medical therapy  No need for repeat echocardiogram or official cardiology consultation at this time  Monitor on telemetry    Diabetes (HCC)- (present on admission)  Assessment & Plan  5/29   A1c 6.5 in the past month   Diabetic diet  Monitor glucose         VTE prophylaxis:   SCDs/TEDs   Xarelto 10mg daily as prophylaxis      I have performed a physical exam and reviewed and updated ROS and Plan today (5/29/2025). In review of yesterday's note (5/28/2025), there are no changes except as documented above.

## 2025-05-29 NOTE — CARE PLAN
The patient is Watcher - Medium risk of patient condition declining or worsening    Shift Goals  Clinical Goals: monitor vital signs  Patient Goals: eat normal food  Family Goals: denae    Progress made toward(s) clinical / shift goals:    Problem: Pain - Standard  Goal: Alleviation of pain or a reduction in pain to the patient’s comfort goal  Outcome: Progressing     Problem: Care Map:  Admission Optimal Outcome for the Heart Failure Patient  Goal: Admission:  Optimal Care of the heart failure patient  Outcome: Progressing     Problem: Care Map:  Day 1 Optimal Outcome for the Heart Failure Patient  Goal: Day 1:  Optimal Care of the heart failure patient  Outcome: Progressing     Problem: Care Map:  Day 2 Optimal Outcome for the Heart Failure Patient  Goal: Day 2:  Optimal Care of the heart failure patient  Outcome: Progressing     Problem: Care Map:  Day 3 Optimal Outcome for the Heart Failure Patient  Goal: Day 3:  Optimal Care of the heart failure patient  Outcome: Progressing     Problem: Care Map:  Day Before Discharge Optimal Outcome for the Heart Failure Patient  Goal: Day Before Discharge:  Optimal Care of the heart failure patient  Outcome: Progressing     Problem: Care Map:  Day of Discharge Optimal Outcome for the Heart Failure Patient  Goal: Day of Discharge:  Optimal Care of the heart failure patient  Outcome: Progressing     Problem: Care Map:  Optimal Outcome for the Pneumonia Patient  Goal: Collection and monitoring of appropriate tests and labs  Outcome: Progressing     Problem: Respiratory  Goal: Patient will achieve/maintain optimum respiratory ventilation and gas exchange  Outcome: Progressing     Problem: Risk for Aspiration  Goal: Patient's risk for aspiration will be absent or decrease  Outcome: Progressing     Problem: Hemodynamics - Pneumonia  Goal: Patient's hemodynamics, fluid balance and neurologic status will be stable or improve  Outcome: Progressing     Problem: Self Care  Goal: Patient  will have the ability to perform ADLs independently or with assistance (bathe, groom, dress, toilet and feed)  Outcome: Progressing     Problem: Discharge Planning - Pneumonia  Goal: Patient will verbalize understanding of lifestyle changes and therapeutic needs post discharge  Outcome: Progressing     Problem: Knowledge Deficit - Standard  Goal: Patient and family/care givers will demonstrate understanding of plan of care, disease process/condition, diagnostic tests and medications  Outcome: Progressing     Problem: Skin Integrity  Goal: Skin integrity is maintained or improved  Outcome: Progressing     Problem: Fall Risk  Goal: Patient will remain free from falls  Outcome: Progressing       Patient is not progressing towards the following goals:

## 2025-05-29 NOTE — CARE PLAN
The patient is Watcher - Medium risk of patient condition declining or worsening    Shift Goals  Clinical Goals: Stay off levo gtt, hemodynamic stability, wean o2  Patient Goals: Sleep, eat non-puree food  Family Goals: LASHELL    Progress made toward(s) clinical / shift goals:      Problem: Pain - Standard  Goal: Alleviation of pain or a reduction in pain to the patient’s comfort goal  Description: Target End Date:  Prior to discharge or change in level of careDocument on Vitals flowsheet1.  Document pain using the appropriate pain scale per order or unit policy2.  Educate and implement non-pharmacologic comfort measures (i.e. relaxation, distraction, massage, cold/heat therapy, etc.)3.  Pain management medications as ordered4.  Reassess pain after pain med administration per policy5.  If opiods administered assess patient's response to pain medication is appropriate per POSS sedation scale6.  Follow pain management plan developed in collaboration with patient and interdisciplinary team (including palliative care or pain specialists if applicable)  Outcome: Progressing     Problem: Respiratory  Goal: Patient will achieve/maintain optimum respiratory ventilation and gas exchange  Description: Target End Date:  Prior to discharge or change in level of careDocument on Assessment flowsheet1.  Assess and monitor rate, rhythm, depth and effort of respiration2.  Breath sounds assessed qshift and/or as needed3.  Assess O2 saturation, administer/titrate oxygen as ordered4.  Position patient for maximum ventilatory efficiency5.  Turn, cough, and deep breath with splinting to improve effectiveness6.  Collaborate with RT to administer medication/treatments per order7.  Encourage use of incentive spirometer and encourage patient to cough after use and utilize splinting techniques if applicable8.  Airway suctioning9.  Monitor sputum production for changes in color, consistency and [id number]. Perform frequent oral [id number].  Alternate physical activity with rest periods  Outcome: Progressing     Problem: Hemodynamics - Pneumonia  Goal: Patient's hemodynamics, fluid balance and neurologic status will be stable or improve  Description: Target End Date:  Prior to discharge or change in level of careDocument on Assessment and I/O flowsheet templates1.  Monitor vital signs, pulse oximetry and cardiac monitor per provider order and/or policy2.  Manage IV fluids and IV infusions3.  Monitor intake and output4.  Daily weights per unit policy or provider order5.  Assess peripheral pulses and capillary refill6.  Monitor body temperature and assist with comfort measures to reduce fever and chills7.  Position patient for maximum circulation/cardiac output8.  Assess for peripheral, sacral, periorbital and abdominal edema9. Assess for signs of deterioration - tachycardia, tachypnea, dyspnea, hypertension, hypoxemia, pallor, changes in consciousness or restlessness  Outcome: Progressing     Problem: Self Care  Goal: Patient will have the ability to perform ADLs independently or with assistance (bathe, groom, dress, toilet and feed)  Description: Target End Date:  Prior to discharge or change in level of careDocument on ADL flowsheet1.  Assess the capability and level of deficiency to perform ADLs2.  Encourage family/care giver involvement3.  Provide assistive devices4.  Consider PT/OT evaluations5.  Maintain support, give positive feedback, encourage self-care allowing extra time and verbal cuing as needed6.  Avoid doing something for patients they can do themselves, but provide assistance as needed7.  Assist in anticipating/planning individual needs8.  Collaborate with Case Management and  to meet discharge needs  Outcome: Progressing     Problem: Knowledge Deficit - Standard  Goal: Patient and family/care givers will demonstrate understanding of plan of care, disease process/condition, diagnostic tests and medications  Description:  Target End Date:  1-3 days or as soon as patient condition allowsDocument in Patient Education1.  Patient and family/caregiver oriented to unit, equipment, visitation policy and means for communicating concern2.  Complete/review Learning Assessment3.  Assess knowledge level of disease process/condition, treatment plan, diagnostic tests and medications4.  Explain disease process/condition, treatment plan, diagnostic tests and medications  Outcome: Progressing     Problem: Skin Integrity  Goal: Skin integrity is maintained or improved  Description: Target End Date:  Prior to discharge or change in level of careDocument interventions on Skin Risk/ flowsheet groups and corresponding LDA1.  Assess and monitor skin integrity, appearance and/or temperature2.  Assess risk factors for impaired skin integrity and/or pressures ulcers3.  Implement precautions to protect skin integrity in collaboration with interdisciplinary team4.  Implement pressure ulcer prevention protocol if at risk for skin breakdown5.  Confirm wound care consult if at risk for skin breakdown6.  Ensure patient use of pressure relieving devices  (Low air loss bed, waffle overlay, heel protectors, ROHO cushion, etc)  Outcome: Progressing     Problem: Fall Risk  Goal: Patient will remain free from falls  Description: Target End Date:  Prior to discharge or change in level of careDocument interventions on the Machelle Rosario Fall Risk Assessment1.  Assess for fall risk factors2.  Implement fall precautions  Outcome: Progressing       Patient is not progressing towards the following goals:

## 2025-05-30 LAB
ANION GAP SERPL CALC-SCNC: 11 MMOL/L (ref 7–16)
BUN SERPL-MCNC: 20 MG/DL (ref 8–22)
CALCIUM SERPL-MCNC: 9.4 MG/DL (ref 8.5–10.5)
CHLORIDE SERPL-SCNC: 97 MMOL/L (ref 96–112)
CO2 SERPL-SCNC: 28 MMOL/L (ref 20–33)
CREAT SERPL-MCNC: 1.3 MG/DL (ref 0.5–1.4)
ERYTHROCYTE [DISTWIDTH] IN BLOOD BY AUTOMATED COUNT: 49.7 FL (ref 35.9–50)
GFR SERPLBLD CREATININE-BSD FMLA CKD-EPI: 56 ML/MIN/1.73 M 2
GLUCOSE SERPL-MCNC: 196 MG/DL (ref 65–99)
HCT VFR BLD AUTO: 36 % (ref 42–52)
HGB BLD-MCNC: 11.8 G/DL (ref 14–18)
MCH RBC QN AUTO: 29.1 PG (ref 27–33)
MCHC RBC AUTO-ENTMCNC: 32.8 G/DL (ref 32.3–36.5)
MCV RBC AUTO: 88.9 FL (ref 81.4–97.8)
PLATELET # BLD AUTO: 240 K/UL (ref 164–446)
PMV BLD AUTO: 11.2 FL (ref 9–12.9)
POTASSIUM SERPL-SCNC: 4.9 MMOL/L (ref 3.6–5.5)
PROCALCITONIN SERPL-MCNC: 0.23 NG/ML
RBC # BLD AUTO: 4.05 M/UL (ref 4.7–6.1)
SODIUM SERPL-SCNC: 136 MMOL/L (ref 135–145)
WBC # BLD AUTO: 10.1 K/UL (ref 4.8–10.8)

## 2025-05-30 PROCEDURE — A9270 NON-COVERED ITEM OR SERVICE: HCPCS | Performed by: INTERNAL MEDICINE

## 2025-05-30 PROCEDURE — 700111 HCHG RX REV CODE 636 W/ 250 OVERRIDE (IP): Mod: JZ | Performed by: HOSPITALIST

## 2025-05-30 PROCEDURE — 700102 HCHG RX REV CODE 250 W/ 637 OVERRIDE(OP): Performed by: INTERNAL MEDICINE

## 2025-05-30 PROCEDURE — 770000 HCHG ROOM/CARE - INTERMEDIATE ICU *

## 2025-05-30 PROCEDURE — 700105 HCHG RX REV CODE 258: Performed by: INTERNAL MEDICINE

## 2025-05-30 PROCEDURE — 700111 HCHG RX REV CODE 636 W/ 250 OVERRIDE (IP): Mod: JZ | Performed by: INTERNAL MEDICINE

## 2025-05-30 PROCEDURE — 99233 SBSQ HOSP IP/OBS HIGH 50: CPT | Performed by: HOSPITALIST

## 2025-05-30 PROCEDURE — A9270 NON-COVERED ITEM OR SERVICE: HCPCS | Performed by: HOSPITALIST

## 2025-05-30 PROCEDURE — 85027 COMPLETE CBC AUTOMATED: CPT

## 2025-05-30 PROCEDURE — 700102 HCHG RX REV CODE 250 W/ 637 OVERRIDE(OP): Performed by: HOSPITALIST

## 2025-05-30 PROCEDURE — 80048 BASIC METABOLIC PNL TOTAL CA: CPT

## 2025-05-30 PROCEDURE — 84145 PROCALCITONIN (PCT): CPT

## 2025-05-30 RX ORDER — ENOXAPARIN SODIUM 100 MG/ML
40 INJECTION SUBCUTANEOUS DAILY
Status: DISCONTINUED | OUTPATIENT
Start: 2025-05-30 | End: 2025-05-31 | Stop reason: HOSPADM

## 2025-05-30 RX ORDER — FUROSEMIDE 20 MG/1
20 TABLET ORAL
Status: DISCONTINUED | OUTPATIENT
Start: 2025-05-30 | End: 2025-05-31 | Stop reason: HOSPADM

## 2025-05-30 RX ADMIN — FUROSEMIDE 20 MG: 20 TABLET ORAL at 14:58

## 2025-05-30 RX ADMIN — OMEPRAZOLE 20 MG: 20 CAPSULE, DELAYED RELEASE ORAL at 05:17

## 2025-05-30 RX ADMIN — PIPERACILLIN AND TAZOBACTAM 4.5 G: 4; .5 INJECTION, POWDER, FOR SOLUTION INTRAVENOUS at 08:49

## 2025-05-30 RX ADMIN — ASPIRIN 81 MG: 81 TABLET, COATED ORAL at 05:12

## 2025-05-30 RX ADMIN — CLOPIDOGREL BISULFATE 75 MG: 75 TABLET, FILM COATED ORAL at 05:13

## 2025-05-30 RX ADMIN — ATORVASTATIN CALCIUM 40 MG: 40 TABLET, FILM COATED ORAL at 17:17

## 2025-05-30 RX ADMIN — MIDODRINE HYDROCHLORIDE 5 MG: 5 TABLET ORAL at 17:17

## 2025-05-30 RX ADMIN — FUROSEMIDE 40 MG: 10 INJECTION, SOLUTION INTRAVENOUS at 05:22

## 2025-05-30 RX ADMIN — GABAPENTIN 600 MG: 300 CAPSULE ORAL at 05:18

## 2025-05-30 RX ADMIN — TAMSULOSIN HYDROCHLORIDE 0.4 MG: 0.4 CAPSULE ORAL at 05:16

## 2025-05-30 RX ADMIN — MIDODRINE HYDROCHLORIDE 5 MG: 5 TABLET ORAL at 08:37

## 2025-05-30 RX ADMIN — POTASSIUM CHLORIDE 40 MEQ: 1500 TABLET, EXTENDED RELEASE ORAL at 05:14

## 2025-05-30 RX ADMIN — GABAPENTIN 600 MG: 300 CAPSULE ORAL at 17:16

## 2025-05-30 RX ADMIN — AMOXICILLIN AND CLAVULANATE POTASSIUM 1 TABLET: 875; 125 TABLET, FILM COATED ORAL at 17:17

## 2025-05-30 RX ADMIN — GABAPENTIN 600 MG: 300 CAPSULE ORAL at 11:57

## 2025-05-30 RX ADMIN — ENOXAPARIN SODIUM 40 MG: 100 INJECTION SUBCUTANEOUS at 17:16

## 2025-05-30 RX ADMIN — PIPERACILLIN AND TAZOBACTAM 4.5 G: 4; .5 INJECTION, POWDER, FOR SOLUTION INTRAVENOUS at 00:56

## 2025-05-30 RX ADMIN — MIDODRINE HYDROCHLORIDE 5 MG: 5 TABLET ORAL at 11:57

## 2025-05-30 ASSESSMENT — SOCIAL DETERMINANTS OF HEALTH (SDOH)
WITHIN THE LAST YEAR, HAVE YOU BEEN HUMILIATED OR EMOTIONALLY ABUSED IN OTHER WAYS BY YOUR PARTNER OR EX-PARTNER?: NO
IN THE PAST 12 MONTHS, HAS THE ELECTRIC, GAS, OIL, OR WATER COMPANY THREATENED TO SHUT OFF SERVICE IN YOUR HOME?: NO
WITHIN THE PAST 12 MONTHS, THE FOOD YOU BOUGHT JUST DIDN'T LAST AND YOU DIDN'T HAVE MONEY TO GET MORE: NEVER TRUE
WITHIN THE LAST YEAR, HAVE YOU BEEN AFRAID OF YOUR PARTNER OR EX-PARTNER?: NO
WITHIN THE LAST YEAR, HAVE TO BEEN RAPED OR FORCED TO HAVE ANY KIND OF SEXUAL ACTIVITY BY YOUR PARTNER OR EX-PARTNER?: NO
WITHIN THE LAST YEAR, HAVE YOU BEEN KICKED, HIT, SLAPPED, OR OTHERWISE PHYSICALLY HURT BY YOUR PARTNER OR EX-PARTNER?: NO
WITHIN THE PAST 12 MONTHS, YOU WORRIED THAT YOUR FOOD WOULD RUN OUT BEFORE YOU GOT THE MONEY TO BUY MORE: NEVER TRUE

## 2025-05-30 ASSESSMENT — ENCOUNTER SYMPTOMS
SPEECH CHANGE: 0
SORE THROAT: 0
NECK PAIN: 0
MYALGIAS: 0
PALPITATIONS: 0
BACK PAIN: 0
FEVER: 0
COUGH: 1
NERVOUS/ANXIOUS: 0
SHORTNESS OF BREATH: 1
ABDOMINAL PAIN: 0
NAUSEA: 0
HEADACHES: 0
WEAKNESS: 1
SPUTUM PRODUCTION: 0
DIARRHEA: 0

## 2025-05-30 ASSESSMENT — PAIN DESCRIPTION - PAIN TYPE
TYPE: ACUTE PAIN
TYPE: ACUTE PAIN;CHRONIC PAIN
TYPE: ACUTE PAIN
TYPE: ACUTE PAIN

## 2025-05-30 ASSESSMENT — FIBROSIS 4 INDEX
FIB4 SCORE: 2.27
FIB4 SCORE: 2.46

## 2025-05-30 ASSESSMENT — LIFESTYLE VARIABLES
ALCOHOL_USE: NO
HAVE YOU EVER FELT YOU SHOULD CUT DOWN ON YOUR DRINKING: NO
ON A TYPICAL DAY WHEN YOU DRINK ALCOHOL HOW MANY DRINKS DO YOU HAVE: 0
DOES PATIENT WANT TO STOP DRINKING: NO
CONSUMPTION TOTAL: NEGATIVE
TOTAL SCORE: 0
HAVE PEOPLE ANNOYED YOU BY CRITICIZING YOUR DRINKING: NO
AVERAGE NUMBER OF DAYS PER WEEK YOU HAVE A DRINK CONTAINING ALCOHOL: 0
TOTAL SCORE: 0
EVER FELT BAD OR GUILTY ABOUT YOUR DRINKING: NO
HOW MANY TIMES IN THE PAST YEAR HAVE YOU HAD 5 OR MORE DRINKS IN A DAY: 0
TOTAL SCORE: 0
EVER HAD A DRINK FIRST THING IN THE MORNING TO STEADY YOUR NERVES TO GET RID OF A HANGOVER: NO

## 2025-05-30 NOTE — CARE PLAN
The patient is Watcher - Medium risk of patient condition declining or worsening    Shift Goals  Clinical Goals: hemodynamic stability, respiratory stability. MAP >65  Patient Goals: rest  Family Goals: not present    Progress made toward(s) clinical / shift goals:    Problem: Pain - Standard  Goal: Alleviation of pain or a reduction in pain to the patient’s comfort goal  Outcome: Progressing     Problem: Care Map:  Optimal Outcome for the Pneumonia Patient  Goal: Collection and monitoring of appropriate tests and labs  Outcome: Progressing     Problem: Respiratory  Goal: Patient will achieve/maintain optimum respiratory ventilation and gas exchange  Outcome: Progressing     Problem: Knowledge Deficit - Standard  Goal: Patient and family/care givers will demonstrate understanding of plan of care, disease process/condition, diagnostic tests and medications  Outcome: Progressing     Problem: Skin Integrity  Goal: Skin integrity is maintained or improved  Outcome: Progressing  Q2 turns      Problem: Hemodynamics  Goal: Patient's hemodynamics, fluid balance and neurologic status will be stable or improve  Outcome: Progressing  On levophed gtt for MAP > 65          Patient is not progressing towards the following goals:      Problem: Risk for Aspiration  Goal: Patient's risk for aspiration will be absent or decrease  Outcome: Not Progressing  Patient refused pureed foods and would rather eat normal food. Patient attempts to drink water while laying down.

## 2025-05-30 NOTE — PROGRESS NOTES
Hospital Medicine Daily Progress Note    Date of Service  5/30/2025    Chief Complaint  Chest pain and SOB    Hospital Course  Donte Agustin is a 79 y.o. male with a history of dementia, coronary artery disease, chronic respiratory failure on 5 L O2 w/ transfer from [facility] and admitted 5/26/2025 with code STEMI.  Cardiology evaluated the patient once here in the ER and canceled code STEMI.  Patient was somnolent and confused.  There is concern of heart failure and patient being chronically on 5 L O2.  The patient was hypoxic requiring nonrebreather.  History of an EF 25%.  A CT chest showed right-sided pneumonia.    Interval Problem Update  5/30: Awake.  States he has been in a wheelchair and not walking for the past 6 months since a prior hospitalization.  Alerted him that he would need outpatient MRI for further follow-up.  Current SpO2 needs 3 to 5 L via nasal cannula.    I have discussed this patient's plan of care and discharge plan at IDT rounds today with Case Management, Nursing, Nursing leadership, and other members of the IDT team.    Consultants/Specialty  cardiology    Code Status  Full Code    Disposition  The patient is not medically cleared for discharge to home or a post-acute facility.      I have placed the appropriate orders for post-discharge needs.    Review of Systems  Review of Systems   Constitutional:  Positive for malaise/fatigue. Negative for fever.   HENT:  Negative for sore throat.    Respiratory:  Positive for cough and shortness of breath. Negative for sputum production.    Cardiovascular:  Negative for palpitations and leg swelling.   Gastrointestinal:  Negative for abdominal pain, diarrhea and nausea.   Musculoskeletal:  Negative for back pain, myalgias and neck pain.   Neurological:  Positive for weakness. Negative for speech change and headaches.   Psychiatric/Behavioral:  The patient is not nervous/anxious.         Physical Exam  Temp:  [36 °C (96.8 °F)-36.3 °C  (97.4 °F)] 36.3 °C (97.4 °F)  Pulse:  [72-93] 77  Resp:  [13-39] 18  BP: ()/(40-65) 101/58  SpO2:  [89 %-98 %] 90 %    Physical Exam  Vitals reviewed.   Constitutional:       Appearance: Normal appearance. He is obese.   HENT:      Head: Normocephalic and atraumatic.      Nose: Nose normal.   Eyes:      General:         Right eye: No discharge.         Left eye: No discharge.      Conjunctiva/sclera: Conjunctivae normal.   Cardiovascular:      Rate and Rhythm: Normal rate and regular rhythm.      Pulses: Normal pulses.      Heart sounds: No murmur heard.  Pulmonary:      Effort: Pulmonary effort is normal. No respiratory distress.      Breath sounds: Normal breath sounds. No wheezing.   Abdominal:      General: Bowel sounds are normal. There is no distension.      Palpations: Abdomen is soft.      Tenderness: There is no abdominal tenderness.   Musculoskeletal:      Cervical back: Neck supple.      Right lower leg: Edema present.      Left lower leg: Edema present.   Skin:     General: Skin is warm.   Neurological:      General: No focal deficit present.      Mental Status: He is alert and oriented to person, place, and time.   Psychiatric:         Mood and Affect: Mood normal.         Fluids    Intake/Output Summary (Last 24 hours) at 5/30/2025 0720  Last data filed at 5/30/2025 0500  Gross per 24 hour   Intake --   Output 1550 ml   Net -1550 ml        Laboratory  Recent Labs     05/28/25  0432 05/29/25  0350 05/30/25  0200   WBC 10.4 8.8 10.1   RBC 3.82* 4.04* 4.05*   HEMOGLOBIN 11.0* 11.6* 11.8*   HEMATOCRIT 35.2* 36.0* 36.0*   MCV 92.1 89.1 88.9   MCH 28.8 28.7 29.1   MCHC 31.3* 32.2* 32.8   RDW 52.7* 49.6 49.7   PLATELETCT 200 221 240   MPV 11.2 10.9 11.2     Recent Labs     05/28/25  0432 05/29/25  0350 05/30/25  0200   SODIUM 138 134* 136   POTASSIUM 4.2 4.4 4.9   CHLORIDE 102 98 97   CO2 25 26 28   GLUCOSE 157* 189* 196*   BUN 15 18 20   CREATININE 1.05 1.18 1.30   CALCIUM 9.0 9.3 9.4                    Imaging  DX-ESOPHAGUS - PFFR-ELAGU-FK   Final Result      IR-MIDLINE CATHETER INSERTION WO GUIDANCE > AGE 3   Final Result                  Ultrasound-guided midline placement performed by qualified nursing staff    as above.          DX-CHEST-PORTABLE (1 VIEW)   Final Result      1.  Interval improvement in right-sided pulmonary opacities.   2.  Interstitial edema has essentially resolved.   3.  Stable cardiomegaly.         DX-CHEST-PORTABLE (1 VIEW)   Final Result      Airspace opacity throughout the right lung, concerning for pneumonia.      CT-CTA CHEST PULMONARY ARTERY W/ RECONS   Final Result         1. Dense right perihilar pneumonia type infiltrate. Mild left base atelectasis. Trace bilateral pleural effusions.      2. No pulmonary embolus evident. Prominent aortic valve calcification. Coronary artery calcification.      3. Mild gynecomastia.                        IR-US GUIDED PIV    (Results Pending)        Assessment/Plan  * Acute on chronic respiratory failure with hypoxia (HCC)- (present on admission)  Assessment & Plan  5/30:  Due to pneumonia as well as some volume overload with edema as well as small bilateral pleural effusions  Titrate oxygen to keep SpO2 greater than 88%  Antibiotics being adjusted  RT per protocol  Lasix 40 mg IV twice daily, switch to oral lasix     Hypotension  Assessment & Plan  5/30:  Currently on Levophed to keep MAP greater than 60 and systolic blood pressure greater 90  Titrate Levophed once regain IV access.  Initiated midodrine as appears to have improving heart failure symptoms.      Pleural effusion- (present on admission)  Assessment & Plan  5/30  trace effusions, no need for thoracentesis at this time  Continue Lasix but will switch to oral monitor SpO2 and titrating oxygen    COPD (chronic obstructive pulmonary disease) (HCC)- (present on admission)  Assessment & Plan  5/30:   RT per protocol  Encouraged smoking cessation  Titration of oxygen to keep SpO2 greater  than 88%  5/30 currently on 3-5 L nasal cannula.    BPH (benign prostatic hyperplasia)- (present on admission)  Assessment & Plan  5/30  Monitor for urinary retention   continue home Flomax    GERD (gastroesophageal reflux disease)- (present on admission)  Assessment & Plan  Continue home PPI    Hyperlipidemia- (present on admission)  Assessment & Plan  Continue home statin    Hypertension- (present on admission)  Assessment & Plan  5/30  Titrating pressors.  Started midodrine.  Holding home losartan and metoprolol with parameters  as needed labetalol  Monitor vitals    Heart failure with reduced ejection fraction (HCC) (LVEF 20-25% Echo 2/2025)- (present on admission)  Assessment & Plan  5/30  echocardiogram last month ejection fraction of 25%  IV Lasix changing to oral Lasix 5/30  losartan and Coreg currently being held as he is currently on midodrine and trying to wean off pressors  Otherwise, continue goal-directed medical therapy  No need for repeat echocardiogram or official cardiology consultation at this time  Monitor on telemetry    Diabetes (HCC)- (present on admission)  Assessment & Plan  5/30   A1c 6.5 in the past month   Diabetic diet  Monitor glucose         VTE prophylaxis:    enoxaparin ppx      I have performed a physical exam and reviewed and updated ROS and Plan today (5/30/2025). In review of yesterday's note (5/29/2025), there are no changes except as documented above.

## 2025-05-30 NOTE — CARE PLAN
The patient is Watcher - Medium risk of patient condition declining or worsening    Shift Goals  Clinical Goals: hemodynamic stability, monitor respiratory status, MAP>65, SBP>100  Patient Goals: rest  Family Goals: not present    Progress made toward(s) clinical / shift goals:       Problem: Pain - Standard  Goal: Alleviation of pain or a reduction in pain to the patient’s comfort goal  Outcome: Progressing     Problem: Respiratory  Goal: Patient will achieve/maintain optimum respiratory ventilation and gas exchange  Outcome: Progressing     Problem: Skin Integrity  Goal: Skin integrity is maintained or improved  Outcome: Progressing     Problem: Fall Risk  Goal: Patient will remain free from falls  Outcome: Progressing     Problem: Hemodynamics  Goal: Patient's hemodynamics, fluid balance and neurologic status will be stable or improve  Outcome: Progressing

## 2025-05-30 NOTE — DISCHARGE PLANNING
Care Transition Team Assessment    RNCM went to meet with patient who was sleeping. RNCM placed call to spouse, Sherin to obtain information for assessment. Patient and spouse live in a motel in [place]. They have lived there for 6 years. They live with their son as well. Sherin voiced frustration over the size of the room and that there is not enough room for patient to move around in his wheelchair. Sherin states she would be agreeable with HH or SNF/rehab for the patient but it would ultimately be up to him to decide. RNCM stated that patient is pending PT/OT evaluation for DC recs and we would discuss DCP once recs are in. Sherin verbalized understanding and stated patient would likely not want to go to a facility. Sherin requested housing resources. HCM will continue to follow and assist with any discharge planning needs and send referrals when appropriate.     Information Source  Orientation Level: Oriented X4  Information Given By: Spouse  Who is responsible for making decisions for patient? : Patient    Readmission Evaluation  Is this a readmission?: No    Elopement Risk  Legal Hold: No  Ambulatory or Self Mobile in Wheelchair: No-Not an Elopement Risk  Elopement Risk: Not at Risk for Elopement    Interdisciplinary Discharge Planning  Patient or legal guardian wants to designate a caregiver: No    Discharge Preparedness  What is your plan after discharge?: Home with help, Skilled nursing facility, Home health care  What are your discharge supports?: Spouse, Child  Prior Functional Level: Needs Assist with Activities of Daily Living, Needs Assist with Medication Management, Total Assist, Uses Wheelchair, Wheelchair Dependent  Difficulity with ADLs: Walking  Difficulity with IADLs: Driving    Functional Assesment  Prior Functional Level: Needs Assist with Activities of Daily Living, Needs Assist with Medication Management, Total Assist, Uses Wheelchair, Wheelchair Dependent    Finances  Financial Barriers  to Discharge: No  Prescription Coverage: Yes    Vision / Hearing Impairment  Vision Impairment :  (dneae)  Hearing Impairment :  (denae)         Advance Directive  Advance Directive?: None    Domestic Abuse  Have you ever been the victim of abuse or violence?: No    Psychological Assessment  History of Substance Abuse: None  History of Psychiatric Problems: No  Non-compliant with Treatment: No  Newly Diagnosed Illness: No    Discharge Risks or Barriers  Discharge risks or barriers?: Transportation, Post-acute placement / services, Complex medical needs  Patient risk factors: Complex medical needs, Lack of outside supports, Vulnerable adult    Anticipated Discharge Information  Discharge Disposition: D/T to SNF with Medicare cert in anticipation of skilled care (03)

## 2025-05-31 VITALS
DIASTOLIC BLOOD PRESSURE: 74 MMHG | HEART RATE: 104 BPM | BODY MASS INDEX: 29.58 KG/M2 | WEIGHT: 188.49 LBS | RESPIRATION RATE: 18 BRPM | SYSTOLIC BLOOD PRESSURE: 123 MMHG | OXYGEN SATURATION: 93 % | HEIGHT: 67 IN | TEMPERATURE: 97.3 F

## 2025-05-31 PROBLEM — I50.23 ACUTE ON CHRONIC SYSTOLIC CONGESTIVE HEART FAILURE (HCC): Status: ACTIVE | Noted: 2025-05-31

## 2025-05-31 LAB
ANION GAP SERPL CALC-SCNC: 10 MMOL/L (ref 7–16)
BACTERIA BLD CULT: NORMAL
BACTERIA BLD CULT: NORMAL
BUN SERPL-MCNC: 23 MG/DL (ref 8–22)
CALCIUM SERPL-MCNC: 9.5 MG/DL (ref 8.5–10.5)
CHLORIDE SERPL-SCNC: 94 MMOL/L (ref 96–112)
CO2 SERPL-SCNC: 29 MMOL/L (ref 20–33)
CREAT SERPL-MCNC: 1.28 MG/DL (ref 0.5–1.4)
ERYTHROCYTE [DISTWIDTH] IN BLOOD BY AUTOMATED COUNT: 49 FL (ref 35.9–50)
GFR SERPLBLD CREATININE-BSD FMLA CKD-EPI: 57 ML/MIN/1.73 M 2
GLUCOSE SERPL-MCNC: 199 MG/DL (ref 65–99)
HCT VFR BLD AUTO: 37.4 % (ref 42–52)
HGB BLD-MCNC: 11.7 G/DL (ref 14–18)
MCH RBC QN AUTO: 27.9 PG (ref 27–33)
MCHC RBC AUTO-ENTMCNC: 31.3 G/DL (ref 32.3–36.5)
MCV RBC AUTO: 89.3 FL (ref 81.4–97.8)
PLATELET # BLD AUTO: 232 K/UL (ref 164–446)
PMV BLD AUTO: 11.4 FL (ref 9–12.9)
POTASSIUM SERPL-SCNC: 4.7 MMOL/L (ref 3.6–5.5)
RBC # BLD AUTO: 4.19 M/UL (ref 4.7–6.1)
SIGNIFICANT IND 70042: NORMAL
SIGNIFICANT IND 70042: NORMAL
SITE SITE: NORMAL
SITE SITE: NORMAL
SODIUM SERPL-SCNC: 133 MMOL/L (ref 135–145)
SOURCE SOURCE: NORMAL
SOURCE SOURCE: NORMAL
WBC # BLD AUTO: 9.5 K/UL (ref 4.8–10.8)

## 2025-05-31 PROCEDURE — A9270 NON-COVERED ITEM OR SERVICE: HCPCS | Performed by: INTERNAL MEDICINE

## 2025-05-31 PROCEDURE — 80048 BASIC METABOLIC PNL TOTAL CA: CPT

## 2025-05-31 PROCEDURE — 99232 SBSQ HOSP IP/OBS MODERATE 35: CPT | Performed by: HOSPITALIST

## 2025-05-31 PROCEDURE — 700102 HCHG RX REV CODE 250 W/ 637 OVERRIDE(OP): Performed by: HOSPITALIST

## 2025-05-31 PROCEDURE — 85027 COMPLETE CBC AUTOMATED: CPT

## 2025-05-31 PROCEDURE — 700102 HCHG RX REV CODE 250 W/ 637 OVERRIDE(OP): Performed by: INTERNAL MEDICINE

## 2025-05-31 PROCEDURE — A9270 NON-COVERED ITEM OR SERVICE: HCPCS | Performed by: HOSPITALIST

## 2025-05-31 RX ADMIN — GABAPENTIN 600 MG: 300 CAPSULE ORAL at 05:10

## 2025-05-31 RX ADMIN — FUROSEMIDE 20 MG: 20 TABLET ORAL at 05:10

## 2025-05-31 RX ADMIN — MIDODRINE HYDROCHLORIDE 5 MG: 5 TABLET ORAL at 07:30

## 2025-05-31 RX ADMIN — ACETAMINOPHEN 650 MG: 325 TABLET ORAL at 08:06

## 2025-05-31 RX ADMIN — MIDODRINE HYDROCHLORIDE 5 MG: 5 TABLET ORAL at 11:30

## 2025-05-31 RX ADMIN — CLOPIDOGREL BISULFATE 75 MG: 75 TABLET, FILM COATED ORAL at 05:10

## 2025-05-31 RX ADMIN — AMOXICILLIN AND CLAVULANATE POTASSIUM 1 TABLET: 875; 125 TABLET, FILM COATED ORAL at 05:10

## 2025-05-31 RX ADMIN — GABAPENTIN 600 MG: 300 CAPSULE ORAL at 12:00

## 2025-05-31 RX ADMIN — OMEPRAZOLE 20 MG: 20 CAPSULE, DELAYED RELEASE ORAL at 05:10

## 2025-05-31 RX ADMIN — ASPIRIN 81 MG: 81 TABLET, COATED ORAL at 05:10

## 2025-05-31 RX ADMIN — TAMSULOSIN HYDROCHLORIDE 0.4 MG: 0.4 CAPSULE ORAL at 05:10

## 2025-05-31 ASSESSMENT — ENCOUNTER SYMPTOMS
FEVER: 0
SPUTUM PRODUCTION: 0
NERVOUS/ANXIOUS: 0
WEAKNESS: 1
COUGH: 1
SHORTNESS OF BREATH: 1
MYALGIAS: 0
NECK PAIN: 0
HEADACHES: 0
DIARRHEA: 0
NAUSEA: 0
SORE THROAT: 0
SPEECH CHANGE: 0
ABDOMINAL PAIN: 0
PALPITATIONS: 0
BACK PAIN: 0

## 2025-05-31 ASSESSMENT — PAIN DESCRIPTION - PAIN TYPE
TYPE: ACUTE PAIN;CHRONIC PAIN
TYPE: ACUTE PAIN

## 2025-05-31 ASSESSMENT — FIBROSIS 4 INDEX: FIB4 SCORE: 2.35

## 2025-05-31 NOTE — PROGRESS NOTES
"[id number]: Collected blood at this time from the pt, pt appears more confused in conversation than previously, very guarded, and believes that I am hiding information from him. Pt remains oriented to person, place, time and situation. This RN educated the pt on the plan and purpose of the blood draw and the pt appeared upset that 'they let you nurses do that up here, it should be the lab or the doctor'. Provided explanation, to which the pt replied 'oh, I dont know, I can feel it you are up to something'. I apologized for making him feel that way and asked if there was anything I can do to help. Pt said no that's it and to not lie to him.     0230-0250: Pts monitor was beeping shortly after previous encounter d/t movement so I went in the room to silence it and at the time he said \"you can't just walk in here and do things\". At this time I explained what I was doing and the reasoning. Pt started rambling on about when people were poisoning men with insulin and it was probably nurses like us, and that \"you are probably from [place] so you would know\". Provided active listening and reassurance. Pt continues to be guarded and wants to be left alone at this time.   "

## 2025-05-31 NOTE — CARE PLAN
Problem: Pain - Standard  Goal: Alleviation of pain or a reduction in pain to the patient’s comfort goal  5/31/2025 1124 by Shital Gtz RSRIDHAR.  Outcome: Progressing  5/31/2025 0825 by Shital Gtz RSRIDHAR.  Outcome: Progressing     Problem: Care Map:  Admission Optimal Outcome for the Heart Failure Patient  Goal: Admission:  Optimal Care of the heart failure patient  5/31/2025 1124 by Shital Gtz R.N.  Outcome: Progressing  5/31/2025 0825 by MARY JO Karimi.LANCE.  Outcome: Progressing     Problem: Care Map:  Day 1 Optimal Outcome for the Heart Failure Patient  Goal: Day 1:  Optimal Care of the heart failure patient  5/31/2025 1124 by Shital Gtz R.N.  Outcome: Progressing  5/31/2025 0825 by MARITO KarimiN.  Outcome: Progressing     Problem: Care Map:  Day 2 Optimal Outcome for the Heart Failure Patient  Goal: Day 2:  Optimal Care of the heart failure patient  5/31/2025 1124 by Shital Gtz R.LANCE.  Outcome: Progressing  5/31/2025 0825 by Shital Gtz R.N.  Outcome: Progressing     Problem: Care Map:  Day 3 Optimal Outcome for the Heart Failure Patient  Goal: Day 3:  Optimal Care of the heart failure patient  5/31/2025 1124 by Shital Gtz R.LANCE.  Outcome: Progressing  5/31/2025 0825 by Shital Gtz R.N.  Outcome: Progressing     Problem: Care Map:  Day Before Discharge Optimal Outcome for the Heart Failure Patient  Goal: Day Before Discharge:  Optimal Care of the heart failure patient  5/31/2025 1124 by Shital Gtz RSiaN.  Outcome: Progressing  5/31/2025 0825 by Shital Gtz R.N.  Outcome: Progressing     Problem: Care Map:  Day of Discharge Optimal Outcome for the Heart Failure Patient  Goal: Day of Discharge:  Optimal Care of the heart failure patient  5/31/2025 1124 by Shital Gtz R.N.  Outcome: Progressing  5/31/2025 0825 by Shital Gtz R.N.  Outcome: Progressing     Problem: Care Map:  Optimal Outcome for the Pneumonia Patient  Goal: Collection and monitoring of  appropriate tests and labs  5/31/2025 1124 by Shital Gtz R.N.  Outcome: Progressing  5/31/2025 0825 by Shital Gtz R.N.  Outcome: Progressing     Problem: Respiratory  Goal: Patient will achieve/maintain optimum respiratory ventilation and gas exchange  5/31/2025 1124 by Shital Gtz R.N.  Outcome: Progressing  5/31/2025 0825 by Shital Gtz R.N.  Outcome: Progressing     Problem: Risk for Aspiration  Goal: Patient's risk for aspiration will be absent or decrease  5/31/2025 1124 by Shital Gtz R.N.  Outcome: Progressing  5/31/2025 0825 by Shital Gtz R.N.  Outcome: Progressing     Problem: Hemodynamics - Pneumonia  Goal: Patient's hemodynamics, fluid balance and neurologic status will be stable or improve  5/31/2025 1124 by Sihtal Gtz R.N.  Outcome: Progressing  5/31/2025 0825 by Shital Gtz R.N.  Outcome: Progressing     Problem: Self Care  Goal: Patient will have the ability to perform ADLs independently or with assistance (bathe, groom, dress, toilet and feed)  5/31/2025 1124 by Shital Gtz R.N.  Outcome: Progressing  5/31/2025 0825 by Shital Gtz R.N.  Outcome: Progressing     Problem: Discharge Planning - Pneumonia  Goal: Patient will verbalize understanding of lifestyle changes and therapeutic needs post discharge  5/31/2025 1124 by hSital Gtz R.N.  Outcome: Progressing  5/31/2025 0825 by Shital Gtz R.N.  Outcome: Progressing     Problem: Knowledge Deficit - Standard  Goal: Patient and family/care givers will demonstrate understanding of plan of care, disease process/condition, diagnostic tests and medications  5/31/2025 1124 by Shital Gtz R.N.  Outcome: Progressing  5/31/2025 0825 by Shital Gtz R.N.  Outcome: Progressing     Problem: Skin Integrity  Goal: Skin integrity is maintained or improved  5/31/2025 1124 by Shital Gtz R.N.  Outcome: Progressing  5/31/2025 0825 by Shital Gtz R.N.  Outcome: Progressing     Problem: Fall  Risk  Goal: Patient will remain free from falls  5/31/2025 1124 by Shital Gtz RSiaN.  Outcome: Progressing  5/31/2025 0825 by Shital Gtz RSiaN.  Outcome: Progressing     Problem: Hemodynamics  Goal: Patient's hemodynamics, fluid balance and neurologic status will be stable or improve  5/31/2025 1124 by Shital Gtz RSiaN.  Outcome: Progressing  5/31/2025 0825 by Shital Gtz RSiaN.  Outcome: Progressing   The patient is Stable - Low risk of patient condition declining or worsening    Shift Goals  Clinical Goals: hemodynamically stable, stable respiratory status  Patient Goals: rest  Family Goals: denae- no family present    Progress made toward(s) clinical / shift goals:  yes    Patient is not progressing towards the following goals:

## 2025-05-31 NOTE — PROGRESS NOTES
4 Eyes Skin Assessment Completed by MARYBEL De Paz and MARYBEL Torres.    Head WDL  Ears WDL  Nose WDL  Mouth WDL  Neck WDL  Breast/Chest WDL  Shoulder Blades WDL  Spine WDL  (R) Arm/Elbow/Hand midline   (L) Arm/Elbow/Hand WDL  Abdomen WDL  Groin WDL  Scrotum/Coccyx/Buttocks Redness and Blanching  (R) Leg WDL  (L) Leg WDL  (R) Heel/Foot/Toe Redness, Blanching, and Boggy  (L) Heel/Foot/Toe Redness, Blanching, and Boggy          Devices In Places Tele Box, Pulse Ox, and Nasal Cannula      Interventions In Place Gray Ear Foams, NC W/Ear Foams, Heel Mepilex, Pillows, Heels Loaded W/Pillows, and Pressure Redistribution Mattress    Possible Skin Injury No    Pictures Uploaded Into Epic Yes  Wound Consult Placed N/A  RN Wound Prevention Protocol Ordered No '

## 2025-05-31 NOTE — PROGRESS NOTES
Hospital Medicine Daily Progress Note    Date of Service  5/31/2025    Chief Complaint  Chest pain and SOB    Hospital Course  Donte Agustni is a 79 y.o. male with a history of dementia, coronary artery disease, chronic respiratory failure on 5 L O2 w/ transfer from [facility] and admitted 5/26/2025 with code STEMI.  Cardiology evaluated the patient once here in the ER and canceled code STEMI.  Patient was somnolent and confused.  There is concern of heart failure and patient being chronically on 5 L O2.  The patient was hypoxic requiring nonrebreather.  History of an EF 25%.  A CT chest showed right-sided pneumonia.    Interval Problem Update  5/31: On 5L NC.  States he is wheelchair bound at home.  Case management state patient lives in a motel with son and wife for past 6 yrs.  Transfer to tele floor.    I have discussed this patient's plan of care and discharge plan at IDT rounds today with Case Management, Nursing, Nursing leadership, and other members of the IDT team.    Consultants/Specialty  cardiology    Code Status  Full Code    Disposition  The patient is not medically cleared for discharge to home or a post-acute facility.      I have placed the appropriate orders for post-discharge needs.    Review of Systems  Review of Systems   Constitutional:  Positive for malaise/fatigue. Negative for fever.   HENT:  Negative for sore throat.    Respiratory:  Positive for cough and shortness of breath. Negative for sputum production.    Cardiovascular:  Negative for palpitations and leg swelling.   Gastrointestinal:  Negative for abdominal pain, diarrhea and nausea.   Musculoskeletal:  Negative for back pain, myalgias and neck pain.   Neurological:  Positive for weakness. Negative for speech change and headaches.   Psychiatric/Behavioral:  The patient is not nervous/anxious.         Physical Exam  Temp:  [36 °C (96.8 °F)-37 °C (98.6 °F)] 36 °C (96.8 °F)  Pulse:  [] 90  Resp:  [15-45] 17  BP:  ()/(44-65) 91/57  SpO2:  [83 %-98 %] 94 %    Physical Exam  Vitals reviewed.   Constitutional:       Appearance: Normal appearance. He is obese.   HENT:      Head: Normocephalic and atraumatic.      Nose: Nose normal.   Eyes:      General:         Right eye: No discharge.         Left eye: No discharge.      Conjunctiva/sclera: Conjunctivae normal.   Cardiovascular:      Rate and Rhythm: Normal rate and regular rhythm.      Pulses: Normal pulses.      Heart sounds: No murmur heard.  Pulmonary:      Effort: Pulmonary effort is normal. No respiratory distress.      Breath sounds: Normal breath sounds. No wheezing.   Abdominal:      General: Bowel sounds are normal. There is no distension.      Palpations: Abdomen is soft.      Tenderness: There is no abdominal tenderness.   Musculoskeletal:      Cervical back: Neck supple.      Right lower leg: Edema present.      Left lower leg: Edema present.   Skin:     General: Skin is warm.   Neurological:      General: No focal deficit present.      Mental Status: He is alert and oriented to person, place, and time.   Psychiatric:         Mood and Affect: Mood normal.         Fluids    Intake/Output Summary (Last 24 hours) at 5/31/2025 0720  Last data filed at 5/31/2025 0600  Gross per 24 hour   Intake 1118 ml   Output 2550 ml   Net -1432 ml        Laboratory  Recent Labs     05/29/25  0350 05/30/25  0200 05/31/25  0215   WBC 8.8 10.1 9.5   RBC 4.04* 4.05* 4.19*   HEMOGLOBIN 11.6* 11.8* 11.7*   HEMATOCRIT 36.0* 36.0* 37.4*   MCV 89.1 88.9 89.3   MCH 28.7 29.1 27.9   MCHC 32.2* 32.8 31.3*   RDW 49.6 49.7 49.0   PLATELETCT 221 240 232   MPV 10.9 11.2 11.4     Recent Labs     05/29/25  0350 05/30/25  0200 05/31/25  0215   SODIUM 134* 136 133*   POTASSIUM 4.4 4.9 4.7   CHLORIDE 98 97 94*   CO2 26 28 29   GLUCOSE 189* 196* 199*   BUN 18 20 23*   CREATININE 1.18 1.30 1.28   CALCIUM 9.3 9.4 9.5                   Imaging  DX-ESOPHAGUS - [id number]   Final Result      IR-MIDLINE  CATHETER INSERTION WO GUIDANCE > AGE 3   Final Result                  Ultrasound-guided midline placement performed by qualified nursing staff    as above.          DX-CHEST-PORTABLE (1 VIEW)   Final Result      1.  Interval improvement in right-sided pulmonary opacities.   2.  Interstitial edema has essentially resolved.   3.  Stable cardiomegaly.         DX-CHEST-PORTABLE (1 VIEW)   Final Result      Airspace opacity throughout the right lung, concerning for pneumonia.      CT-CTA CHEST PULMONARY ARTERY W/ RECONS   Final Result         1. Dense right perihilar pneumonia type infiltrate. Mild left base atelectasis. Trace bilateral pleural effusions.      2. No pulmonary embolus evident. Prominent aortic valve calcification. Coronary artery calcification.      3. Mild gynecomastia.                        IR-US GUIDED PIV    (Results Pending)        Assessment/Plan  * Acute on chronic respiratory failure with hypoxia (HCC)- (present on admission)  Assessment & Plan  5/31:  Chronically on 5L home O2  Due to pneumonia as well as some volume overload with edema as well as small bilateral pleural effusions  Titrate oxygen to keep SpO2 greater than 88%  Antibiotics being adjusted  RT per protocol  oral lasix     Hypotension  Assessment & Plan  5/31:  midodrine   Off Levophed  Holding antihypertensives.      Pleural effusion- (present on admission)  Assessment & Plan  5/31  trace effusions, no need for thoracentesis at this time  Continue Lasix oral and monitor I/O's, vitals, and labs    COPD (chronic obstructive pulmonary disease) (HCC)- (present on admission)  Assessment & Plan  5/31:   RT per protocol  Encouraged smoking cessation  Titration of oxygen to keep SpO2 greater than 88%  5/31 currently on 3-5 L nasal cannula.    BPH (benign prostatic hyperplasia)- (present on admission)  Assessment & Plan  5/31  Monitor for urinary retention   continue home Flomax    GERD (gastroesophageal reflux disease)- (present on  admission)  Assessment & Plan  Continue home PPI    Hyperlipidemia- (present on admission)  Assessment & Plan  Continue home statin    Hypertension- (present on admission)  Assessment & Plan  5/31  Off pressors.    Remains on midodrine.  Holding home losartan and metoprolol due to being on midodrine  as needed labetalol  Monitor vitals    Heart failure with reduced ejection fraction (HCC) (LVEF 20-25% Echo 2/2025)- (present on admission)  Assessment & Plan  5/31  echocardiogram last month ejection fraction of 25%  IV Lasix changing to oral Lasix on 5/30  losartan and Coreg currently being held as he is currently on midodrine and trying to wean off pressors  Otherwise, continue goal-directed medical therapy  No need for repeat echocardiogram or official cardiology consultation at this time  Monitor on telemetry    Diabetes (HCC)- (present on admission)  Assessment & Plan  5/31  A1c 6.5 in the past month   Diabetic diet  Monitor glucose         VTE prophylaxis: VTE Selection    I have performed a physical exam and reviewed and updated ROS and Plan today (5/31/2025). In review of yesterday's note (5/30/2025), there are no changes except as documented above.

## 2025-05-31 NOTE — CARE PLAN
The patient is Watcher - Medium risk of patient condition declining or worsening    Shift Goals  Clinical Goals: hemodynamically stable, stable respiratory status  Patient Goals: rest  Family Goals: denae- no family present    Progress made toward(s) clinical / shift goals:    Problem: Pain - Standard  Goal: Alleviation of pain or a reduction in pain to the patient’s comfort goal  Description: Target End Date:  Prior to discharge or change in level of careDocument on Vitals flowsheet1.  Document pain using the appropriate pain scale per order or unit policy2.  Educate and implement non-pharmacologic comfort measures (i.e. relaxation, distraction, massage, cold/heat therapy, etc.)3.  Pain management medications as ordered4.  Reassess pain after pain med administration per policy5.  If opiods administered assess patient's response to pain medication is appropriate per POSS sedation scale6.  Follow pain management plan developed in collaboration with patient and interdisciplinary team (including palliative care or pain specialists if applicable)  Outcome: Progressing     Problem: Respiratory  Goal: Patient will achieve/maintain optimum respiratory ventilation and gas exchange  Description: Target End Date:  Prior to discharge or change in level of careDocument on Assessment flowsheet1.  Assess and monitor rate, rhythm, depth and effort of respiration2.  Breath sounds assessed qshift and/or as needed3.  Assess O2 saturation, administer/titrate oxygen as ordered4.  Position patient for maximum ventilatory efficiency5.  Turn, cough, and deep breath with splinting to improve effectiveness6.  Collaborate with RT to administer medication/treatments per order7.  Encourage use of incentive spirometer and encourage patient to cough after use and utilize splinting techniques if applicable8.  Airway suctioning9.  Monitor sputum production for changes in color, consistency and [id number]. Perform frequent oral [id number]. Alternate  physical activity with rest periods  Outcome: Progressing     Problem: Risk for Aspiration  Goal: Patient's risk for aspiration will be absent or decrease  Description: Target End Date:  Prior to discharge or change in level of care1.   Complete dysphagia screening on admission2.   NPO until dysphagia screening complete or medically cleared3.   Collaborate with Speech Therapy, Clinical Dietitian and interdisciplinary team4.   Implement aspiration precautions5.   Assist patient up to chair for meals6.   Elevate head of bed 90 degrees if patient is unable to get out of bed7.   Encourage small bites8.   Ensure foods/liquids are of appropriate consistency9.   Assess for any signs/symptoms of [id number]. Assess breath sounds and vital signs after oral intake  Outcome: Progressing     Problem: Self Care  Goal: Patient will have the ability to perform ADLs independently or with assistance (bathe, groom, dress, toilet and feed)  Description: Target End Date:  Prior to discharge or change in level of careDocument on ADL flowsheet1.  Assess the capability and level of deficiency to perform ADLs2.  Encourage family/care giver involvement3.  Provide assistive devices4.  Consider PT/OT evaluations5.  Maintain support, give positive feedback, encourage self-care allowing extra time and verbal cuing as needed6.  Avoid doing something for patients they can do themselves, but provide assistance as needed7.  Assist in anticipating/planning individual needs8.  Collaborate with Case Management and  to meet discharge needs  Outcome: Progressing     Problem: Skin Integrity  Goal: Skin integrity is maintained or improved  Description: Target End Date:  Prior to discharge or change in level of careDocument interventions on Skin Risk/ flowsheet groups and corresponding LDA1.  Assess and monitor skin integrity, appearance and/or temperature2.  Assess risk factors for impaired skin integrity and/or pressures ulcers3.   Implement precautions to protect skin integrity in collaboration with interdisciplinary team4.  Implement pressure ulcer prevention protocol if at risk for skin breakdown5.  Confirm wound care consult if at risk for skin breakdown6.  Ensure patient use of pressure relieving devices  (Low air loss bed, waffle overlay, heel protectors, ROHO cushion, etc)  Outcome: Progressing     Problem: Hemodynamics  Goal: Patient's hemodynamics, fluid balance and neurologic status will be stable or improve  Description: Target End Date:  Prior to discharge or change in level of careDocument on Assessment and I/O flowsheet templates1.  Monitor vital signs, pulse oximetry and cardiac monitor per provider order and/or policy2.  Maintain blood pressure per provider order3.  Hemodynamic monitoring per provider order4.  Manage IV fluids and IV infusions5.  Monitor intake and output6.  Daily weights per unit policy or provider order7.  Assess peripheral pulses and capillary refill8.  Assess color and body temperature9.  Position patient for maximum circulation/cardiac [id number]. Monitor for signs/symptoms of excessive [id number]. Assess mental status, restlessness and changes in level of [id number]. Monitor temperature and report fever or hypothermia to provider immediately. Consideration of targeted temperature management.  Outcome: Progressing       Patient is not progressing towards the following goals:

## 2025-05-31 NOTE — CARE PLAN
Problem: Pain - Standard  Goal: Alleviation of pain or a reduction in pain to the patient’s comfort goal  Outcome: Progressing     Problem: Care Map:  Admission Optimal Outcome for the Heart Failure Patient  Goal: Admission:  Optimal Care of the heart failure patient  Outcome: Progressing     Problem: Care Map:  Day 1 Optimal Outcome for the Heart Failure Patient  Goal: Day 1:  Optimal Care of the heart failure patient  Outcome: Progressing     Problem: Care Map:  Day 2 Optimal Outcome for the Heart Failure Patient  Goal: Day 2:  Optimal Care of the heart failure patient  Outcome: Progressing     Problem: Care Map:  Day 3 Optimal Outcome for the Heart Failure Patient  Goal: Day 3:  Optimal Care of the heart failure patient  Outcome: Progressing     Problem: Care Map:  Day Before Discharge Optimal Outcome for the Heart Failure Patient  Goal: Day Before Discharge:  Optimal Care of the heart failure patient  Outcome: Progressing     Problem: Care Map:  Day of Discharge Optimal Outcome for the Heart Failure Patient  Goal: Day of Discharge:  Optimal Care of the heart failure patient  Outcome: Progressing     Problem: Care Map:  Optimal Outcome for the Pneumonia Patient  Goal: Collection and monitoring of appropriate tests and labs  Outcome: Progressing     Problem: Respiratory  Goal: Patient will achieve/maintain optimum respiratory ventilation and gas exchange  Outcome: Progressing     Problem: Risk for Aspiration  Goal: Patient's risk for aspiration will be absent or decrease  Outcome: Progressing     Problem: Hemodynamics - Pneumonia  Goal: Patient's hemodynamics, fluid balance and neurologic status will be stable or improve  Outcome: Progressing     Problem: Self Care  Goal: Patient will have the ability to perform ADLs independently or with assistance (bathe, groom, dress, toilet and feed)  Outcome: Progressing     Problem: Discharge Planning - Pneumonia  Goal: Patient will verbalize understanding of lifestyle  changes and therapeutic needs post discharge  Outcome: Progressing     Problem: Knowledge Deficit - Standard  Goal: Patient and family/care givers will demonstrate understanding of plan of care, disease process/condition, diagnostic tests and medications  Outcome: Progressing     Problem: Skin Integrity  Goal: Skin integrity is maintained or improved  Outcome: Progressing     Problem: Fall Risk  Goal: Patient will remain free from falls  Outcome: Progressing     Problem: Hemodynamics  Goal: Patient's hemodynamics, fluid balance and neurologic status will be stable or improve  Outcome: Progressing   The patient is Stable - Low risk of patient condition declining or worsening    Shift Goals  Clinical Goals: hemodynamically stable, stable respiratory status  Patient Goals: rest  Family Goals: denae- no family present    Progress made toward(s) clinical / shift goals:  yes    Patient is not progressing towards the following goals:

## 2025-05-31 NOTE — PROGRESS NOTES
Power glide midline was found kinked, likely from dressing change. VAT RN assessed and was able to fix line.

## 2025-06-01 NOTE — PROGRESS NOTES
"Patient states he wants to leave AMA immediately. I texted Dr. Griffiths who states if the patient is Alert and Oriented he may leave. Patient is able to answer orientation questions. Patient's midline removed. Patient has contracted a ride to come and get him from [place]. Patient refuses to wait for ride in room and wants to be taken down to the lobby to wait.     I sat with the patient and his wife and explained the risks of leaving up to and including death. Patient understands he is assuming all liability for his actions.     I informed the charge nurse Erika of patient's intent to leave. Erika also talked with patient. Patient was verbally aggressive and stated he was being treated this way \"because he is white\".     Patient taken to lobby to await his ride.   "

## 2025-06-01 NOTE — DISCHARGE SUMMARY
Discharge Summary    CHIEF COMPLAINT ON ADMISSION  Chief Complaint   Patient presents with    Chest Pain     Starting this AM with lethargy and respiratory distress.        Reason for Admission  STEMI     Admission Date  5/26/2025    CODE STATUS  FULL    HPI & HOSPITAL COURSE  Donte Agustin is a 79 y.o. male with a history of questionable dementia, coronary artery disease, chronic respiratory failure on 5 L O2 w/ transfer from [facility] and admitted 5/26/2025 with code STEMI. Cardiology evaluated the patient once here in the ER and canceled code STEMI. Patient was somnolent and confused. There is concern of heart failure and patient being chronically on 5 L O2. The patient was hypoxic requiring nonrebreather. History of an EF 25%. A CT chest showed right-sided pneumonia.  The patient's initial procalcitonin 5/26 was within normal limits however on 5/26 later his second 1 was minimally elevated follow-ups were downtrending thereafter.  Patient had resolve right-sided pulmonary opacifications on chest x-rays.  Patient was able to be weaned from high flow nasal cannula down to 3 to 5 L during his hospitalization.  Patient remained fairly weak and we are working on ongoing physical therapy and potential placement options.  Patient was refusing rehab per physical therapy family and patient lives in a [facility] in [place].  Patient states that for the past 6 months he has been in a wheelchair due to a prior hospitalization.  The patient remained off his current blood pressure medications and had been placed on midodrine to help of his lower blood pressures.  We are working on adjusting his blood pressure meds and his heart failure medications.  We are working on obtaining a safe discharge in the near future for the patient however on 5/31/2025 the patient decided to leave AGAINST MEDICAL ADVICE impulsively.  Was paged by nursing but unfortunately was unable to promptly get up to see the patient.  Per  nursing the patient was alert oriented and was aware of consequences of leaving without supplemental oxygen.  Wife was here in the hospital also was made aware nursing to help get him down please see nursing note.      Therefore, he is left AGAINST MEDICAL ADVICE with his wife condition to home with close outpatient follow-up.    The patient met 2-midnight criteria for an inpatient stay at the time of discharge.    Discharge Date  5/31/2025    FOLLOW UP ITEMS POST DISCHARGE  None    DISCHARGE DIAGNOSES  Principal Problem:    Acute on chronic respiratory failure with hypoxia (HCC) (POA: Yes)  Active Problems:    Diabetes (HCC) (POA: Yes)    Heart failure with reduced ejection fraction (HCC) (LVEF 20-25% Echo 2/2025) (POA: Yes)    Hypertension (POA: Yes)    Hyperlipidemia (POA: Yes)    GERD (gastroesophageal reflux disease) (POA: Yes)    BPH (benign prostatic hyperplasia) (POA: Yes)    COPD (chronic obstructive pulmonary disease) (HCC) (POA: Yes)    Pleural effusion (POA: Yes)    LBBB (left bundle branch block) (POA: Unknown)    Hypotension (POA: Unknown)    Acute on chronic systolic congestive heart failure (HCC) (POA: Yes)  Resolved Problems:    CAD (coronary artery disease) s/p Stent 10/2022 (POA: Yes)      FOLLOW UP  Future Appointments   Date Time Provider Department Center   6/6/2025  1:30 PM COLIN Krishnan None     Pcp Pt States None            MEDICATIONS ON DISCHARGE     Medication List        ASK your doctor about these medications        Instructions   acetaminophen 500 MG Tabs  Commonly known as: Tylenol  Ask about: Which instructions should I use?   Take 1,000 mg by mouth every 6 hours as needed for Moderate Pain.  Dose: 1,000 mg     acetaminophen-codeine #3 300-30 MG Tabs  Commonly known as: Tylenol #3   Take 530 Tablets by mouth every 6 hours as needed for Moderate Pain.  Dose: 530 Tablet     Aspirin Low Dose 81 MG EC tablet  Generic drug: aspirin   Take 1 Tablet by mouth every day.  Dose:  81 mg     atorvastatin 40 MG Tabs  Commonly known as: Lipitor   Take 1 Tablet by mouth every evening.  Dose: 40 mg     clopidogrel 75 MG Tabs  Commonly known as: Plavix   Take 75 mg by mouth every day.  Dose: 75 mg     digoxin 125 MCG Tabs  Commonly known as: Lanoxin   Take 125 mcg by mouth every day.  Dose: 125 mcg     Farxiga 10 MG Tabs  Generic drug: dapagliflozin propanediol   Take 1 Tablet by mouth every day.  Dose: 10 mg     furosemide 20 MG Tabs  Commonly known as: Lasix   Take 1 Tablet by mouth every day.  Dose: 20 mg     gabapentin 600 MG tablet  Commonly known as: Neurontin   Take 600 mg by mouth 3 times a day.  Dose: 600 mg     losartan 25 MG Tabs  Commonly known as: Cozaar   Take 1 Tablet by mouth every day.  Dose: 25 mg     metoprolol SR 50 MG Tb24  Commonly known as: Toprol XL   Take 1 Tablet by mouth every evening.  Dose: 50 mg     nicotine 21 MG/24HR Pt24  Commonly known as: Nicoderm   Place 1 Patch on the skin every 24 hours.  Dose: 1 Patch     nitroglycerin 0.4 MG Subl  Commonly known as: Nitrostat   Place 1 Tablet under the tongue as needed for Chest Pain.  Dose: 0.4 mg     pantoprazole 40 MG Tbec  Commonly known as: Protonix   Take 40 mg by mouth every day.  Dose: 40 mg     potassium chloride SA 20 MEQ Tbcr  Commonly known as: Kdur   Take 20 mEq by mouth every 48 hours. Take with Lasix  Dose: 20 mEq     tamsulosin 0.4 MG capsule  Commonly known as: Flomax   Take 0.4 mg by mouth every day.  Dose: 0.4 mg              Allergies  Allergies[1]    DIET  No orders of the defined types were placed in this encounter.      ACTIVITY  As tolerated.  Weight bearing as tolerated    CONSULTATIONS  Cardiology    PROCEDURES  None    LABORATORY  Lab Results   Component Value Date    SODIUM 133 (L) 05/31/2025    POTASSIUM 4.7 05/31/2025    CHLORIDE 94 (L) 05/31/2025    CO2 29 05/31/2025    GLUCOSE 199 (H) 05/31/2025    BUN 23 (H) 05/31/2025    CREATININE 1.28 05/31/2025        Lab Results   Component Value Date     WBC 9.5 05/31/2025    HEMOGLOBIN 11.7 (L) 05/31/2025    HEMATOCRIT 37.4 (L) 05/31/2025    PLATELETCT 232 05/31/2025      DX-ESOPHAGUS - [id number]   Final Result      IR-MIDLINE CATHETER INSERTION WO GUIDANCE > AGE 3   Final Result                  Ultrasound-guided midline placement performed by qualified nursing staff    as above.          DX-CHEST-PORTABLE (1 VIEW)   Final Result      1.  Interval improvement in right-sided pulmonary opacities.   2.  Interstitial edema has essentially resolved.   3.  Stable cardiomegaly.         DX-CHEST-PORTABLE (1 VIEW)   Final Result      Airspace opacity throughout the right lung, concerning for pneumonia.      CT-CTA CHEST PULMONARY ARTERY W/ RECONS   Final Result         1. Dense right perihilar pneumonia type infiltrate. Mild left base atelectasis. Trace bilateral pleural effusions.      2. No pulmonary embolus evident. Prominent aortic valve calcification. Coronary artery calcification.      3. Mild gynecomastia.                        IR-US GUIDED PIV    (Results Pending)                [1] No Known Allergies

## 2025-06-05 ENCOUNTER — APPOINTMENT (OUTPATIENT)
Dept: CARDIOLOGY | Facility: MEDICAL CENTER | Age: 80
End: 2025-06-05
Attending: NURSE PRACTITIONER
Payer: MEDICARE

## 2025-06-06 ENCOUNTER — APPOINTMENT (OUTPATIENT)
Dept: CARDIOLOGY | Facility: MEDICAL CENTER | Age: 80
End: 2025-06-06
Attending: NURSE PRACTITIONER
Payer: MEDICARE

## 2025-07-04 ENCOUNTER — HOSPITAL ENCOUNTER (INPATIENT)
Facility: MEDICAL CENTER | Age: 80
LOS: 4 days | DRG: 871 | End: 2025-07-08
Attending: EMERGENCY MEDICINE | Admitting: INTERNAL MEDICINE
Payer: MEDICARE

## 2025-07-04 ENCOUNTER — APPOINTMENT (OUTPATIENT)
Dept: RADIOLOGY | Facility: MEDICAL CENTER | Age: 80
DRG: 871 | End: 2025-07-04
Attending: INTERNAL MEDICINE
Payer: MEDICARE

## 2025-07-04 ENCOUNTER — APPOINTMENT (OUTPATIENT)
Dept: RADIOLOGY | Facility: MEDICAL CENTER | Age: 80
DRG: 871 | End: 2025-07-04
Attending: EMERGENCY MEDICINE
Payer: MEDICARE

## 2025-07-04 ENCOUNTER — APPOINTMENT (OUTPATIENT)
Dept: CARDIOLOGY | Facility: MEDICAL CENTER | Age: 80
DRG: 871 | End: 2025-07-04
Attending: INTERNAL MEDICINE
Payer: MEDICARE

## 2025-07-04 DIAGNOSIS — I50.23 ACUTE ON CHRONIC SYSTOLIC CONGESTIVE HEART FAILURE (HCC): ICD-10-CM

## 2025-07-04 DIAGNOSIS — J18.9 HOSPITAL-ACQUIRED PNEUMONIA: Primary | ICD-10-CM

## 2025-07-04 DIAGNOSIS — R79.89 ELEVATED TROPONIN: ICD-10-CM
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
## 2025-07-04 DIAGNOSIS — I50.20 HEART FAILURE WITH REDUCED EJECTION FRACTION (HCC): ICD-10-CM

## 2025-07-04 DIAGNOSIS — I95.9 SEPSIS ASSOCIATED HYPOTENSION (HCC): ICD-10-CM

## 2025-07-04 DIAGNOSIS — Z72.0 TOBACCO ABUSE: ICD-10-CM

## 2025-07-04 DIAGNOSIS — I50.9 OTHER CONGESTIVE HEART FAILURE (HCC): ICD-10-CM

## 2025-07-04 DIAGNOSIS — J90 PLEURAL EFFUSION: ICD-10-CM

## 2025-07-04 DIAGNOSIS — J44.9 CHRONIC OBSTRUCTIVE PULMONARY DISEASE, UNSPECIFIED COPD TYPE (HCC): ICD-10-CM

## 2025-07-04 DIAGNOSIS — Z95.5 HISTORY OF CORONARY ANGIOPLASTY WITH INSERTION OF STENT: ICD-10-CM

## 2025-07-04 DIAGNOSIS — I27.20 PULMONARY HYPERTENSION (HCC): ICD-10-CM

## 2025-07-04 DIAGNOSIS — J96.21 ACUTE ON CHRONIC RESPIRATORY FAILURE WITH HYPOXIA (HCC): ICD-10-CM

## 2025-07-04 DIAGNOSIS — I44.7 LBBB (LEFT BUNDLE BRANCH BLOCK): ICD-10-CM

## 2025-07-04 DIAGNOSIS — J18.9 PNEUMONIA OF LEFT LOWER LOBE DUE TO INFECTIOUS ORGANISM: ICD-10-CM

## 2025-07-04 DIAGNOSIS — A41.9 SEPSIS ASSOCIATED HYPOTENSION (HCC): ICD-10-CM

## 2025-07-04 DIAGNOSIS — N40.0 BENIGN PROSTATIC HYPERPLASIA WITHOUT LOWER URINARY TRACT SYMPTOMS: ICD-10-CM

## 2025-07-04 DIAGNOSIS — N17.9 AKI (ACUTE KIDNEY INJURY) (HCC): ICD-10-CM

## 2025-07-04 DIAGNOSIS — E11.42 DIABETIC POLYNEUROPATHY ASSOCIATED WITH TYPE 2 DIABETES MELLITUS (HCC): ICD-10-CM

## 2025-07-04 DIAGNOSIS — R65.21 SEPTIC SHOCK (HCC): ICD-10-CM

## 2025-07-04 DIAGNOSIS — A41.9 SEPTIC SHOCK (HCC): ICD-10-CM

## 2025-07-04 LAB
ACTION RANGE TRIGGERED IACRT: NO
ALBUMIN SERPL BCP-MCNC: 3.5 G/DL (ref 3.2–4.9)
ALBUMIN/GLOB SERPL: 1 G/DL
ALP SERPL-CCNC: 63 U/L (ref 30–99)
ALT SERPL-CCNC: <5 U/L (ref 2–50)
ANION GAP SERPL CALC-SCNC: 16 MMOL/L (ref 7–16)
APPEARANCE UR: ABNORMAL
AST SERPL-CCNC: 11 U/L (ref 12–45)
B PARAP IS1001 DNA NPH QL NAA+NON-PROBE: NOT DETECTED
B PERT.PT PRMT NPH QL NAA+NON-PROBE: NOT DETECTED
BACTERIA #/AREA URNS HPF: ABNORMAL /HPF
BASE EXCESS BLDA CALC-SCNC: 1 MMOL/L (ref -4–3)
BASE EXCESS BLDA CALC-SCNC: 1 MMOL/L (ref -4–3)
BASE EXCESS BLDV CALC-SCNC: 3 MMOL/L (ref -2–3)
BASOPHILS # BLD AUTO: 0 % (ref 0–1.8)
BASOPHILS # BLD: 0 K/UL (ref 0–0.12)
BILIRUB SERPL-MCNC: 0.3 MG/DL (ref 0.1–1.5)
BILIRUB UR QL STRIP.AUTO: NEGATIVE
BODY TEMPERATURE: 36.6 CENTIGRADE
BODY TEMPERATURE: ABNORMAL DEGREES
BREATHS SETTING VENT: 18
BREATHS SETTING VENT: 18
BUN SERPL-MCNC: 44 MG/DL (ref 8–22)
C PNEUM DNA NPH QL NAA+NON-PROBE: NOT DETECTED
CALCIUM ALBUM COR SERPL-MCNC: 9.2 MG/DL (ref 8.5–10.5)
CALCIUM SERPL-MCNC: 8.8 MG/DL (ref 8.5–10.5)
CASTS URNS QL MICRO: ABNORMAL /LPF (ref 0–2)
CHLORIDE SERPL-SCNC: 97 MMOL/L (ref 96–112)
CO2 BLDA-SCNC: 26 MMOL/L (ref 20–33)
CO2 BLDA-SCNC: 26 MMOL/L (ref 32–48)
CO2 SERPL-SCNC: 24 MMOL/L (ref 20–33)
COLOR UR: ABNORMAL
CORTIS SERPL-MCNC: 19.6 UG/DL (ref 0–23)
CREAT SERPL-MCNC: 1.71 MG/DL (ref 0.5–1.4)
DELSYS IDSYS: ABNORMAL
DELSYS IDSYS: NORMAL
DIGOXIN SERPL-MCNC: 1 NG/ML (ref 0.8–2)
EKG IMPRESSION: NORMAL
EOSINOPHIL # BLD AUTO: 0 K/UL (ref 0–0.51)
EOSINOPHIL NFR BLD: 0 % (ref 0–6.9)
EPITHELIAL CELLS 1715: ABNORMAL /HPF (ref 0–5)
ERYTHROCYTE [DISTWIDTH] IN BLOOD BY AUTOMATED COUNT: 47.8 FL (ref 35.9–50)
FLUAV RNA NPH QL NAA+NON-PROBE: NOT DETECTED
FLUAV RNA SPEC QL NAA+PROBE: NEGATIVE
FLUBV RNA NPH QL NAA+NON-PROBE: NOT DETECTED
FLUBV RNA SPEC QL NAA+PROBE: NEGATIVE
GFR SERPLBLD CREATININE-BSD FMLA CKD-EPI: 41 ML/MIN/1.73 M 2
GLOBULIN SER CALC-MCNC: 3.5 G/DL (ref 1.9–3.5)
GLUCOSE BLD STRIP.AUTO-MCNC: 269 MG/DL (ref 65–99)
GLUCOSE BLD STRIP.AUTO-MCNC: 84 MG/DL (ref 65–99)
GLUCOSE SERPL-MCNC: 187 MG/DL (ref 65–99)
GLUCOSE UR STRIP.AUTO-MCNC: 500 MG/DL
GRAM STN SPEC: NORMAL
HADV DNA NPH QL NAA+NON-PROBE: NOT DETECTED
HCO3 BLDA-SCNC: 24.7 MMOL/L (ref 21–28)
HCO3 BLDA-SCNC: 25 MMOL/L (ref 21–28)
HCO3 BLDV-SCNC: 29 MMOL/L (ref 22–29)
HCOV 229E RNA NPH QL NAA+NON-PROBE: NOT DETECTED
HCOV HKU1 RNA NPH QL NAA+NON-PROBE: NOT DETECTED
HCOV NL63 RNA NPH QL NAA+NON-PROBE: NOT DETECTED
HCOV OC43 RNA NPH QL NAA+NON-PROBE: NOT DETECTED
HCT VFR BLD AUTO: 39.5 % (ref 42–52)
HGB BLD-MCNC: 12.2 G/DL (ref 14–18)
HMPV RNA NPH QL NAA+NON-PROBE: NOT DETECTED
HOLDING TUBE BB 8507: NORMAL
HOLDING TUBE BB 8507: NORMAL
HOROWITZ INDEX BLDA+IHG-RTO: 160 MM[HG]
HPIV1 RNA NPH QL NAA+NON-PROBE: NOT DETECTED
HPIV2 RNA NPH QL NAA+NON-PROBE: NOT DETECTED
HPIV3 RNA NPH QL NAA+NON-PROBE: NOT DETECTED
HPIV4 RNA NPH QL NAA+NON-PROBE: NOT DETECTED
INR PPP: 1.18 (ref 0.87–1.13)
INST. QUALIFIED PATIENT IIQPT: YES
KETONES UR STRIP.AUTO-MCNC: NEGATIVE MG/DL
LACTATE BLD-SCNC: 0.9 MMOL/L (ref 0.5–2)
LACTATE BLD-SCNC: 0.92 MMOL/L (ref 0.5–2)
LACTATE SERPL-SCNC: 1.2 MMOL/L (ref 0.5–2)
LACTATE SERPL-SCNC: 1.2 MMOL/L (ref 0.5–2)
LACTATE SERPL-SCNC: 1.5 MMOL/L (ref 0.5–2)
LACTATE SERPL-SCNC: 2.2 MMOL/L (ref 0.5–2)
LEUKOCYTE ESTERASE UR QL STRIP.AUTO: NEGATIVE
LV EJECT FRACT MOD 2C 99903: 35.83
LV EJECT FRACT MOD 4C 99902: 33.72
LV EJECT FRACT MOD BP 99901: 26.77
LYMPHOCYTES # BLD AUTO: 1.06 K/UL (ref 1–4.8)
LYMPHOCYTES NFR BLD: 3.3 % (ref 22–41)
Lab: NORMAL
M PNEUMO DNA NPH QL NAA+NON-PROBE: NOT DETECTED
MAGNESIUM SERPL-MCNC: 1.7 MG/DL (ref 1.5–2.5)
MANUAL DIFF BLD: NORMAL
MCH RBC QN AUTO: 27.2 PG (ref 27–33)
MCHC RBC AUTO-ENTMCNC: 30.9 G/DL (ref 32.3–36.5)
MCV RBC AUTO: 88.2 FL (ref 81.4–97.8)
MICRO URNS: ABNORMAL
MODE IMODE: ABNORMAL
MODE IMODE: NORMAL
MONOCYTES # BLD AUTO: 1.6 K/UL (ref 0–0.85)
MONOCYTES NFR BLD AUTO: 5 % (ref 0–13.4)
MORPHOLOGY BLD-IMP: NORMAL
NEUTROPHILS # BLD AUTO: 29.34 K/UL (ref 1.82–7.42)
NEUTROPHILS NFR BLD: 91.7 % (ref 44–72)
NITRITE UR QL STRIP.AUTO: NEGATIVE
NRBC # BLD AUTO: 0 K/UL
NRBC BLD-RTO: 0 /100 WBC (ref 0–0.2)
NT-PROBNP SERPL IA-MCNC: ABNORMAL PG/ML (ref 0–125)
O2/TOTAL GAS SETTING VFR VENT: 60 %
OVALOCYTES BLD QL SMEAR: NORMAL
PCO2 BLDA: 37 MMHG (ref 32–48)
PCO2 BLDA: 37.3 MMHG (ref 32–48)
PCO2 BLDV: 52.4 MMHG (ref 38–54)
PCO2 TEMP ADJ BLDA: 35.5 MMHG (ref 32–48)
PCO2 TEMP ADJ BLDA: 36 MMHG (ref 32–48)
PCO2 TEMP ADJ BLDV: 51.5 MMHG (ref 38–54)
PEEP END EXPIRATORY PRESSURE IPEEP: 10
PEEP END EXPIRATORY PRESSURE IPEEP: 10 CMH20
PH BLDA: 7.43 [PH] (ref 7.35–7.45)
PH BLDA: 7.43 [PH] (ref 7.35–7.45)
PH BLDV: 7.35 [PH] (ref 7.31–7.45)
PH TEMP ADJ BLDA: 7.44 [PH] (ref 7.35–7.45)
PH TEMP ADJ BLDA: 7.45 [PH] (ref 7.35–7.45)
PH TEMP ADJ BLDV: 7.36 [PH] (ref 7.31–7.45)
PH UR STRIP.AUTO: 5 [PH] (ref 5–8)
PLATELET # BLD AUTO: 446 K/UL (ref 164–446)
PLATELET BLD QL SMEAR: NORMAL
PMV BLD AUTO: 11.7 FL (ref 9–12.9)
PO2 BLDA: 96 MMHG (ref 83–108)
PO2 BLDA: 96 MMHG (ref 83–108)
PO2 BLDV: 38 MMHG (ref 23–48)
PO2 TEMP ADJ BLDA: 90 MMHG (ref 83–108)
PO2 TEMP ADJ BLDA: 90 MMHG (ref 83–108)
PO2 TEMP ADJ BLDV: 37 MMHG (ref 23–48)
POIKILOCYTOSIS BLD QL SMEAR: NORMAL
POTASSIUM SERPL-SCNC: 3.5 MMOL/L (ref 3.6–5.5)
PROCALCITONIN SERPL-MCNC: 0.22 NG/ML
PROT SERPL-MCNC: 7 G/DL (ref 6–8.2)
PROT UR QL STRIP: 100 MG/DL
PROTHROMBIN TIME: 15.1 SEC (ref 12–14.6)
RBC # BLD AUTO: 4.48 M/UL (ref 4.7–6.1)
RBC # URNS HPF: ABNORMAL /HPF (ref 0–2)
RBC BLD AUTO: PRESENT
RBC UR QL AUTO: ABNORMAL
RSV RNA NPH QL NAA+NON-PROBE: NOT DETECTED
RSV RNA SPEC QL NAA+PROBE: NEGATIVE
RV+EV RNA NPH QL NAA+NON-PROBE: NOT DETECTED
SAO2 % BLDA: 98 % (ref 93–99)
SAO2 % BLDA: 98 % (ref 93–99)
SAO2 % BLDV: 51 % (ref 60–85)
SARS-COV-2 RNA NPH QL NAA+NON-PROBE: NOTDETECTED
SARS-COV-2 RNA RESP QL NAA+PROBE: NEGATIVE
SCCMEC + MECA PNL NOSE NAA+PROBE: NEGATIVE
SIGNIFICANT IND 70042: NORMAL
SITE SITE: NORMAL
SODIUM SERPL-SCNC: 137 MMOL/L (ref 135–145)
SOURCE SOURCE: NORMAL
SP GR UR STRIP.AUTO: 1.01
SPECIMEN DRAWN FROM PATIENT: ABNORMAL
SPECIMEN DRAWN FROM PATIENT: NORMAL
TIDAL VOLUME IVT: 400
TIDAL VOLUME IVT: 400 ML
TROPONIN T SERPL-MCNC: 82 NG/L (ref 6–19)
UROBILINOGEN UR STRIP.AUTO-MCNC: 0.2 EU/DL
WBC # BLD AUTO: 32 K/UL (ref 4.8–10.8)
WBC #/AREA URNS HPF: ABNORMAL /HPF

## 2025-07-04 PROCEDURE — 93306 TTE W/DOPPLER COMPLETE: CPT | Mod: 26 | Performed by: INTERNAL MEDICINE

## 2025-07-04 PROCEDURE — 31500 INSERT EMERGENCY AIRWAY: CPT | Performed by: INTERNAL MEDICINE

## 2025-07-04 PROCEDURE — 80053 COMPREHEN METABOLIC PANEL: CPT

## 2025-07-04 PROCEDURE — 99291 CRITICAL CARE FIRST HOUR: CPT

## 2025-07-04 PROCEDURE — 94002 VENT MGMT INPAT INIT DAY: CPT

## 2025-07-04 PROCEDURE — 0BH17EZ INSERTION OF ENDOTRACHEAL AIRWAY INTO TRACHEA, VIA NATURAL OR ARTIFICIAL OPENING: ICD-10-PCS | Performed by: INTERNAL MEDICINE

## 2025-07-04 PROCEDURE — 93306 TTE W/DOPPLER COMPLETE: CPT

## 2025-07-04 PROCEDURE — 36556 INSERT NON-TUNNEL CV CATH: CPT | Performed by: INTERNAL MEDICINE

## 2025-07-04 PROCEDURE — 71045 X-RAY EXAM CHEST 1 VIEW: CPT

## 2025-07-04 PROCEDURE — 93005 ELECTROCARDIOGRAM TRACING: CPT | Mod: TC

## 2025-07-04 PROCEDURE — 36415 COLL VENOUS BLD VENIPUNCTURE: CPT

## 2025-07-04 PROCEDURE — A9270 NON-COVERED ITEM OR SERVICE: HCPCS | Performed by: INTERNAL MEDICINE

## 2025-07-04 PROCEDURE — 83735 ASSAY OF MAGNESIUM: CPT

## 2025-07-04 PROCEDURE — 0241U POC COV-2, FLU A/B, RSV BY PCR: CPT | Performed by: EMERGENCY MEDICINE

## 2025-07-04 PROCEDURE — 99292 CRITICAL CARE ADDL 30 MIN: CPT | Mod: 25 | Performed by: INTERNAL MEDICINE

## 2025-07-04 PROCEDURE — 700111 HCHG RX REV CODE 636 W/ 250 OVERRIDE (IP): Mod: JZ | Performed by: INTERNAL MEDICINE

## 2025-07-04 PROCEDURE — 85007 BL SMEAR W/DIFF WBC COUNT: CPT

## 2025-07-04 PROCEDURE — 700102 HCHG RX REV CODE 250 W/ 637 OVERRIDE(OP): Performed by: INTERNAL MEDICINE

## 2025-07-04 PROCEDURE — 84145 PROCALCITONIN (PCT): CPT

## 2025-07-04 PROCEDURE — 770022 HCHG ROOM/CARE - ICU (200)

## 2025-07-04 PROCEDURE — 700105 HCHG RX REV CODE 258: Performed by: INTERNAL MEDICINE

## 2025-07-04 PROCEDURE — 302977 HCHG BRONCHOSCOPY PROC-THERAPEUTIC

## 2025-07-04 PROCEDURE — 700117 HCHG RX CONTRAST REV CODE 255: Performed by: INTERNAL MEDICINE

## 2025-07-04 PROCEDURE — 85027 COMPLETE CBC AUTOMATED: CPT

## 2025-07-04 PROCEDURE — 02HV33Z INSERTION OF INFUSION DEVICE INTO SUPERIOR VENA CAVA, PERCUTANEOUS APPROACH: ICD-10-PCS | Performed by: INTERNAL MEDICINE

## 2025-07-04 PROCEDURE — 0202U NFCT DS 22 TRGT SARS-COV-2: CPT

## 2025-07-04 PROCEDURE — 83605 ASSAY OF LACTIC ACID: CPT | Mod: 91

## 2025-07-04 PROCEDURE — 81001 URINALYSIS AUTO W/SCOPE: CPT

## 2025-07-04 PROCEDURE — 84484 ASSAY OF TROPONIN QUANT: CPT

## 2025-07-04 PROCEDURE — 0B9F8ZZ DRAINAGE OF RIGHT LOWER LUNG LOBE, VIA NATURAL OR ARTIFICIAL OPENING ENDOSCOPIC: ICD-10-PCS | Performed by: INTERNAL MEDICINE

## 2025-07-04 PROCEDURE — 83605 ASSAY OF LACTIC ACID: CPT | Performed by: STUDENT IN AN ORGANIZED HEALTH CARE EDUCATION/TRAINING PROGRAM

## 2025-07-04 PROCEDURE — 99291 CRITICAL CARE FIRST HOUR: CPT | Mod: 25 | Performed by: INTERNAL MEDICINE

## 2025-07-04 PROCEDURE — 87086 URINE CULTURE/COLONY COUNT: CPT

## 2025-07-04 PROCEDURE — 96361 HYDRATE IV INFUSION ADD-ON: CPT

## 2025-07-04 PROCEDURE — 31645 BRNCHSC W/THER ASPIR 1ST: CPT | Performed by: INTERNAL MEDICINE

## 2025-07-04 PROCEDURE — 82533 TOTAL CORTISOL: CPT

## 2025-07-04 PROCEDURE — 31624 DX BRONCHOSCOPE/LAVAGE: CPT | Performed by: INTERNAL MEDICINE

## 2025-07-04 PROCEDURE — 87040 BLOOD CULTURE FOR BACTERIA: CPT | Mod: 91

## 2025-07-04 PROCEDURE — 87449 NOS EACH ORGANISM AG IA: CPT

## 2025-07-04 PROCEDURE — 82803 BLOOD GASES ANY COMBINATION: CPT | Performed by: STUDENT IN AN ORGANIZED HEALTH CARE EDUCATION/TRAINING PROGRAM

## 2025-07-04 PROCEDURE — 87205 SMEAR GRAM STAIN: CPT

## 2025-07-04 PROCEDURE — 87899 AGENT NOS ASSAY W/OPTIC: CPT

## 2025-07-04 PROCEDURE — 51798 US URINE CAPACITY MEASURE: CPT

## 2025-07-04 PROCEDURE — 85610 PROTHROMBIN TIME: CPT

## 2025-07-04 PROCEDURE — 700111 HCHG RX REV CODE 636 W/ 250 OVERRIDE (IP): Mod: JZ | Performed by: EMERGENCY MEDICINE

## 2025-07-04 PROCEDURE — 700101 HCHG RX REV CODE 250: Performed by: INTERNAL MEDICINE

## 2025-07-04 PROCEDURE — 83880 ASSAY OF NATRIURETIC PEPTIDE: CPT

## 2025-07-04 PROCEDURE — 96366 THER/PROPH/DIAG IV INF ADDON: CPT

## 2025-07-04 PROCEDURE — 82803 BLOOD GASES ANY COMBINATION: CPT | Mod: 91

## 2025-07-04 PROCEDURE — 36600 WITHDRAWAL OF ARTERIAL BLOOD: CPT

## 2025-07-04 PROCEDURE — 87070 CULTURE OTHR SPECIMN AEROBIC: CPT

## 2025-07-04 PROCEDURE — 71250 CT THORAX DX C-: CPT

## 2025-07-04 PROCEDURE — 87641 MR-STAPH DNA AMP PROBE: CPT

## 2025-07-04 PROCEDURE — 96367 TX/PROPH/DG ADDL SEQ IV INF: CPT

## 2025-07-04 PROCEDURE — 82962 GLUCOSE BLOOD TEST: CPT | Performed by: INTERNAL MEDICINE

## 2025-07-04 PROCEDURE — 700105 HCHG RX REV CODE 258: Performed by: EMERGENCY MEDICINE

## 2025-07-04 PROCEDURE — 700101 HCHG RX REV CODE 250: Performed by: EMERGENCY MEDICINE

## 2025-07-04 PROCEDURE — 96365 THER/PROPH/DIAG IV INF INIT: CPT

## 2025-07-04 PROCEDURE — 0B9F8ZX DRAINAGE OF RIGHT LOWER LUNG LOBE, VIA NATURAL OR ARTIFICIAL OPENING ENDOSCOPIC, DIAGNOSTIC: ICD-10-PCS | Performed by: INTERNAL MEDICINE

## 2025-07-04 PROCEDURE — 80162 ASSAY OF DIGOXIN TOTAL: CPT

## 2025-07-04 RX ORDER — PREDNISONE 20 MG/1
20-40 TABLET ORAL DAILY
Status: ON HOLD | COMMUNITY
Start: 2025-07-03 | End: 2025-07-08

## 2025-07-04 RX ORDER — SODIUM CHLORIDE, SODIUM LACTATE, POTASSIUM CHLORIDE, AND CALCIUM CHLORIDE .6; .31; .03; .02 G/100ML; G/100ML; G/100ML; G/100ML
1000 INJECTION, SOLUTION INTRAVENOUS ONCE
Status: COMPLETED | OUTPATIENT
Start: 2025-07-04 | End: 2025-07-04

## 2025-07-04 RX ORDER — HYDROMORPHONE HYDROCHLORIDE 1 MG/ML
1 INJECTION, SOLUTION INTRAMUSCULAR; INTRAVENOUS; SUBCUTANEOUS
Status: DISCONTINUED | OUTPATIENT
Start: 2025-07-04 | End: 2025-07-08 | Stop reason: HOSPADM

## 2025-07-04 RX ORDER — POLYETHYLENE GLYCOL 3350 17 G/17G
1 POWDER, FOR SOLUTION ORAL
Status: DISCONTINUED | OUTPATIENT
Start: 2025-07-04 | End: 2025-07-04

## 2025-07-04 RX ORDER — ONDANSETRON 2 MG/ML
4 INJECTION INTRAMUSCULAR; INTRAVENOUS EVERY 4 HOURS PRN
Status: DISCONTINUED | OUTPATIENT
Start: 2025-07-04 | End: 2025-07-05

## 2025-07-04 RX ORDER — UMECLIDINIUM BROMIDE AND VILANTEROL TRIFENATATE 62.5; 25 UG/1; UG/1
1 POWDER RESPIRATORY (INHALATION)
Status: DISCONTINUED | OUTPATIENT
Start: 2025-07-04 | End: 2025-07-05

## 2025-07-04 RX ORDER — FUROSEMIDE 40 MG/1
40 TABLET ORAL 2 TIMES DAILY
COMMUNITY

## 2025-07-04 RX ORDER — ASPIRIN 81 MG/1
81 TABLET ORAL DAILY
Status: DISCONTINUED | OUTPATIENT
Start: 2025-07-04 | End: 2025-07-05

## 2025-07-04 RX ORDER — GABAPENTIN 300 MG/1
300 CAPSULE ORAL 3 TIMES DAILY
Status: DISCONTINUED | OUTPATIENT
Start: 2025-07-04 | End: 2025-07-05

## 2025-07-04 RX ORDER — HYDROMORPHONE HYDROCHLORIDE 1 MG/ML
0.5 INJECTION, SOLUTION INTRAMUSCULAR; INTRAVENOUS; SUBCUTANEOUS
Status: DISCONTINUED | OUTPATIENT
Start: 2025-07-04 | End: 2025-07-08 | Stop reason: HOSPADM

## 2025-07-04 RX ORDER — POLYETHYLENE GLYCOL 3350 17 G/17G
1 POWDER, FOR SOLUTION ORAL
Status: DISCONTINUED | OUTPATIENT
Start: 2025-07-04 | End: 2025-07-06

## 2025-07-04 RX ORDER — CEFDINIR 300 MG/1
300 CAPSULE ORAL EVERY 12 HOURS
Status: ON HOLD | COMMUNITY
Start: 2025-07-03 | End: 2025-07-08

## 2025-07-04 RX ORDER — METOPROLOL SUCCINATE 25 MG/1
25 TABLET, EXTENDED RELEASE ORAL DAILY
COMMUNITY

## 2025-07-04 RX ORDER — HYDROCORTISONE SODIUM SUCCINATE 100 MG/2ML
50 INJECTION INTRAMUSCULAR; INTRAVENOUS EVERY 6 HOURS
Status: DISCONTINUED | OUTPATIENT
Start: 2025-07-04 | End: 2025-07-06

## 2025-07-04 RX ORDER — ATORVASTATIN CALCIUM 40 MG/1
40 TABLET, FILM COATED ORAL EVERY EVENING
Status: DISCONTINUED | OUTPATIENT
Start: 2025-07-04 | End: 2025-07-06

## 2025-07-04 RX ORDER — SODIUM CHLORIDE, SODIUM LACTATE, POTASSIUM CHLORIDE, AND CALCIUM CHLORIDE .6; .31; .03; .02 G/100ML; G/100ML; G/100ML; G/100ML
500 INJECTION, SOLUTION INTRAVENOUS
Status: DISCONTINUED | OUTPATIENT
Start: 2025-07-04 | End: 2025-07-05

## 2025-07-04 RX ORDER — ALBUTEROL SULFATE 90 UG/1
1-2 INHALANT RESPIRATORY (INHALATION) EVERY 4 HOURS PRN
COMMUNITY
Start: 2025-07-03

## 2025-07-04 RX ORDER — DEXTROSE MONOHYDRATE 25 G/50ML
25 INJECTION, SOLUTION INTRAVENOUS
Status: DISCONTINUED | OUTPATIENT
Start: 2025-07-04 | End: 2025-07-08 | Stop reason: HOSPADM

## 2025-07-04 RX ORDER — EMPAGLIFLOZIN 10 MG/1
10 TABLET, FILM COATED ORAL DAILY
COMMUNITY
Start: 2025-07-03

## 2025-07-04 RX ORDER — ACETAMINOPHEN 325 MG/1
650 TABLET ORAL EVERY 6 HOURS PRN
Status: DISCONTINUED | OUTPATIENT
Start: 2025-07-04 | End: 2025-07-05

## 2025-07-04 RX ORDER — SPIRONOLACTONE 25 MG/1
25 TABLET ORAL DAILY
COMMUNITY
Start: 2025-07-03

## 2025-07-04 RX ORDER — DIGOXIN 250 MCG
125 TABLET ORAL DAILY
Status: DISCONTINUED | OUTPATIENT
Start: 2025-07-04 | End: 2025-07-05

## 2025-07-04 RX ORDER — DIAZEPAM 10 MG/2ML
5 INJECTION, SOLUTION INTRAMUSCULAR; INTRAVENOUS ONCE
Status: COMPLETED | OUTPATIENT
Start: 2025-07-04 | End: 2025-07-04

## 2025-07-04 RX ORDER — NOREPINEPHRINE BITARTRATE 0.03 MG/ML
0-1 INJECTION, SOLUTION INTRAVENOUS CONTINUOUS
Status: DISCONTINUED | OUTPATIENT
Start: 2025-07-04 | End: 2025-07-06

## 2025-07-04 RX ORDER — ETOMIDATE 2 MG/ML
20 INJECTION INTRAVENOUS ONCE
Status: COMPLETED | OUTPATIENT
Start: 2025-07-04 | End: 2025-07-04

## 2025-07-04 RX ORDER — BISACODYL 10 MG
10 SUPPOSITORY, RECTAL RECTAL
Status: DISCONTINUED | OUTPATIENT
Start: 2025-07-04 | End: 2025-07-06

## 2025-07-04 RX ORDER — LISINOPRIL 5 MG/1
5 TABLET ORAL DAILY
COMMUNITY
Start: 2025-07-03

## 2025-07-04 RX ORDER — AMOXICILLIN 250 MG
2 CAPSULE ORAL 2 TIMES DAILY
Status: DISCONTINUED | OUTPATIENT
Start: 2025-07-04 | End: 2025-07-06

## 2025-07-04 RX ORDER — OMEPRAZOLE 20 MG/1
40 CAPSULE, DELAYED RELEASE ORAL DAILY
Status: DISCONTINUED | OUTPATIENT
Start: 2025-07-05 | End: 2025-07-05

## 2025-07-04 RX ORDER — AMOXICILLIN 250 MG
2 CAPSULE ORAL EVERY EVENING
Status: DISCONTINUED | OUTPATIENT
Start: 2025-07-04 | End: 2025-07-04

## 2025-07-04 RX ORDER — UMECLIDINIUM BROMIDE AND VILANTEROL TRIFENATATE 62.5; 25 UG/1; UG/1
1 POWDER RESPIRATORY (INHALATION) DAILY
COMMUNITY
Start: 2025-07-03

## 2025-07-04 RX ORDER — NOREPINEPHRINE BITARTRATE 0.03 MG/ML
0-1 INJECTION, SOLUTION INTRAVENOUS CONTINUOUS
Status: DISCONTINUED | OUTPATIENT
Start: 2025-07-04 | End: 2025-07-04

## 2025-07-04 RX ORDER — ROCURONIUM BROMIDE 10 MG/ML
100 INJECTION, SOLUTION INTRAVENOUS ONCE
Status: COMPLETED | OUTPATIENT
Start: 2025-07-04 | End: 2025-07-04

## 2025-07-04 RX ORDER — ONDANSETRON 4 MG/1
4 TABLET, ORALLY DISINTEGRATING ORAL EVERY 4 HOURS PRN
Status: DISCONTINUED | OUTPATIENT
Start: 2025-07-04 | End: 2025-07-05

## 2025-07-04 RX ORDER — SODIUM CHLORIDE, SODIUM LACTATE, POTASSIUM CHLORIDE, AND CALCIUM CHLORIDE .6; .31; .03; .02 G/100ML; G/100ML; G/100ML; G/100ML
500 INJECTION, SOLUTION INTRAVENOUS ONCE
Status: COMPLETED | OUTPATIENT
Start: 2025-07-04 | End: 2025-07-04

## 2025-07-04 RX ORDER — HEPARIN SODIUM 5000 [USP'U]/ML
5000 INJECTION, SOLUTION INTRAVENOUS; SUBCUTANEOUS EVERY 8 HOURS
Status: DISCONTINUED | OUTPATIENT
Start: 2025-07-04 | End: 2025-07-08 | Stop reason: HOSPADM

## 2025-07-04 RX ORDER — CLOPIDOGREL BISULFATE 75 MG/1
75 TABLET ORAL DAILY
Status: DISCONTINUED | OUTPATIENT
Start: 2025-07-04 | End: 2025-07-06

## 2025-07-04 RX ORDER — HYDROMORPHONE HYDROCHLORIDE 1 MG/ML
0.5 INJECTION, SOLUTION INTRAMUSCULAR; INTRAVENOUS; SUBCUTANEOUS ONCE
Status: ACTIVE | OUTPATIENT
Start: 2025-07-04 | End: 2025-07-05

## 2025-07-04 RX ORDER — INSULIN LISPRO 100 [IU]/ML
2-9 INJECTION, SOLUTION INTRAVENOUS; SUBCUTANEOUS EVERY 6 HOURS
Status: DISCONTINUED | OUTPATIENT
Start: 2025-07-04 | End: 2025-07-08 | Stop reason: HOSPADM

## 2025-07-04 RX ORDER — DEXMEDETOMIDINE HYDROCHLORIDE 4 UG/ML
0-1.5 INJECTION, SOLUTION INTRAVENOUS CONTINUOUS
Status: DISCONTINUED | OUTPATIENT
Start: 2025-07-04 | End: 2025-07-05

## 2025-07-04 RX ORDER — FLUDROCORTISONE ACETATE 0.1 MG/1
0.05 TABLET ORAL EVERY MORNING
Status: DISCONTINUED | OUTPATIENT
Start: 2025-07-04 | End: 2025-07-06

## 2025-07-04 RX ORDER — FAMOTIDINE 20 MG/1
20 TABLET, FILM COATED ORAL EVERY 24 HOURS
Status: DISCONTINUED | OUTPATIENT
Start: 2025-07-04 | End: 2025-07-05

## 2025-07-04 RX ADMIN — NOREPINEPHRINE BITARTRATE 0.4 MCG/KG/MIN: 1 INJECTION, SOLUTION, CONCENTRATE INTRAVENOUS at 16:00

## 2025-07-04 RX ADMIN — DIAZEPAM 5 MG: 10 INJECTION, SOLUTION INTRAMUSCULAR; INTRAVENOUS at 22:18

## 2025-07-04 RX ADMIN — FAMOTIDINE 20 MG: 20 TABLET, FILM COATED ORAL at 22:28

## 2025-07-04 RX ADMIN — VANCOMYCIN HYDROCHLORIDE 2250 MG: 5 INJECTION, POWDER, LYOPHILIZED, FOR SOLUTION INTRAVENOUS at 12:08

## 2025-07-04 RX ADMIN — PIPERACILLIN AND TAZOBACTAM 4.5 G: 4; .5 INJECTION, POWDER, FOR SOLUTION INTRAVENOUS at 11:28

## 2025-07-04 RX ADMIN — HYDROCORTISONE SODIUM SUCCINATE 50 MG: 100 INJECTION, POWDER, FOR SOLUTION INTRAMUSCULAR; INTRAVENOUS at 16:56

## 2025-07-04 RX ADMIN — HEPARIN SODIUM 5000 UNITS: 5000 INJECTION, SOLUTION INTRAVENOUS; SUBCUTANEOUS at 16:55

## 2025-07-04 RX ADMIN — DOCUSATE SODIUM 50 MG AND SENNOSIDES 8.6 MG 2 TABLET: 8.6; 5 TABLET, FILM COATED ORAL at 22:28

## 2025-07-04 RX ADMIN — HYDROCORTISONE SODIUM SUCCINATE 50 MG: 100 INJECTION, POWDER, FOR SOLUTION INTRAMUSCULAR; INTRAVENOUS at 19:32

## 2025-07-04 RX ADMIN — ROCURONIUM BROMIDE 100 MG: 10 INJECTION INTRAVENOUS at 21:28

## 2025-07-04 RX ADMIN — ETOMIDATE INJECTION 20 MG: 2 SOLUTION INTRAVENOUS at 21:28

## 2025-07-04 RX ADMIN — HUMAN ALBUMIN MICROSPHERES AND PERFLUTREN 3 ML: 10; .22 INJECTION, SOLUTION INTRAVENOUS at 17:30

## 2025-07-04 RX ADMIN — INSULIN LISPRO 5 UNITS: 100 INJECTION, SOLUTION INTRAVENOUS; SUBCUTANEOUS at 18:52

## 2025-07-04 RX ADMIN — HEPARIN SODIUM 5000 UNITS: 5000 INJECTION, SOLUTION INTRAVENOUS; SUBCUTANEOUS at 22:21

## 2025-07-04 RX ADMIN — SODIUM CHLORIDE, POTASSIUM CHLORIDE, SODIUM LACTATE AND CALCIUM CHLORIDE 500 ML: 600; 310; 30; 20 INJECTION, SOLUTION INTRAVENOUS at 16:51

## 2025-07-04 RX ADMIN — DEXMEDETOMIDINE 0.2 MCG/KG/HR: 100 INJECTION, SOLUTION INTRAVENOUS at 22:27

## 2025-07-04 RX ADMIN — PIPERACILLIN AND TAZOBACTAM 4.5 G: 4; .5 INJECTION, POWDER, FOR SOLUTION INTRAVENOUS at 17:05

## 2025-07-04 RX ADMIN — NOREPINEPHRINE BITARTRATE 0.7 MCG/KG/MIN: 1 INJECTION, SOLUTION, CONCENTRATE INTRAVENOUS at 11:17

## 2025-07-04 RX ADMIN — SODIUM CHLORIDE, POTASSIUM CHLORIDE, SODIUM LACTATE AND CALCIUM CHLORIDE 1000 ML: 600; 310; 30; 20 INJECTION, SOLUTION INTRAVENOUS at 11:19

## 2025-07-04 ASSESSMENT — PAIN DESCRIPTION - PAIN TYPE
TYPE: ACUTE PAIN

## 2025-07-04 ASSESSMENT — FIBROSIS 4 INDEX
FIB4 SCORE: 0.9
FIB4 SCORE: 2.35

## 2025-07-04 NOTE — ED TRIAGE NOTES
".  Chief Complaint   Patient presents with    Shortness of Breath     BIBA from home      Pt discharged home yesterday after being treated for one week for sepsis due to pneumonia. Family called EMS this AM for pt being confused. Pt found to be hypoxic, in the 80s on RA and hypotensive, BP 60s/40. Pt placed on 15 Liters nonrebreathing and started on  30 mcg/mL Levophed @ 0.35 mcg/kg/min    Pt medicated in route:  10 mcg Epi   1 L NS    Pt currently A&O, responds to questions appropriately. Pt remains on NRB    ./53   Pulse 69   Temp 36.6 °C (97.8 °F) (Temporal)   Resp 14   Ht 1.702 m (5' 7\")   Wt 85.5 kg (188 lb 7.9 oz)   SpO2 98%   BMI 29.52 kg/m²     "

## 2025-07-04 NOTE — ED PROVIDER NOTES
ED Provider Note    CHIEF COMPLAINT  Chief Complaint   Patient presents with    Shortness of Breath     BIBA from home        EXTERNAL RECORDS REVIEWED  Reviewed records from previous hospitalizations at this facility including laboratory studies physician notes recent echocardiogram    HPI/ROS  LIMITATION TO HISTORY   Patient is a poor historian  OUTSIDE HISTORIAN(S):  EMS provided additional history    Donte Agustin is a 79 y.o. male who presents for evaluation of increasing shortness of breath hypotension and lethargy.  The patient has a complex medical history.  He lives up in [facility].  By the patient's report he was hospitalized in [facility] for several days for reported pneumonia.  He is discharged yesterday.  EMS was activated as the patient was at home and was pale and dyspneic.  Care flight noted the patient was hypotensive with a blood pressure of 60/40 hypoxic and he was placed on a nonrebreather.  They bypass local [facility] hospitals and flew him here.  They started him on Harman-Synephrine through a peripheral line.  The patient reports lethargy and cough.  No report of any neck stiffness.    PAST MEDICAL HISTORY   Chronic heart failure dyslipidemia atrial fibrillation    SURGICAL HISTORY  patient denies any surgical history    FAMILY HISTORY  History reviewed. No pertinent family history.    SOCIAL HISTORY  Social History     Tobacco Use    Smoking status: Every Day     Current packs/day: 0.50     Average packs/day: 0.5 packs/day for 40.5 years (20.3 ttl pk-yrs)     Types: Cigarettes     Start date: 1985     Passive exposure: Current    Smokeless tobacco: Never   Vaping Use    Vaping status: Never Used   Substance and Sexual Activity    Alcohol use: Not Currently    Drug use: Never    Sexual activity: Not on file   Lives in [facility]    CURRENT MEDICATIONS  Home Medications       Reviewed by Josephine Adan R.N. (Registered Nurse) on 07/04/25 at 1104   "Med List Status: Partial     Medication Last Dose Status   acetaminophen (TYLENOL) 500 MG Tab  Active   acetaminophen-codeine #3 (TYLENOL #3) 300-30 MG Tab  Active   aspirin 81 MG EC tablet  Active   atorvastatin (LIPITOR) 40 MG Tab  Active   clopidogrel (PLAVIX) 75 MG Tab  Active   dapagliflozin propanediol (FARXIGA) 10 MG Tab  Active   digoxin (LANOXIN) 125 MCG Tab  Active   furosemide (LASIX) 20 MG Tab  Active   gabapentin (NEURONTIN) 600 MG tablet  Active   losartan (COZAAR) 25 MG Tab  Active   metoprolol SR (TOPROL XL) 50 MG TABLET SR 24 HR  Active   nicotine (NICODERM) 21 MG/24HR PATCH 24 HR  Active   nitroglycerin (NITROSTAT) 0.4 MG SL Tab  Active   pantoprazole (PROTONIX) 40 MG Tablet Delayed Response  Active   potassium chloride SA (KDUR) 20 MEQ Tab CR  Active   tamsulosin (FLOMAX) 0.4 MG capsule  Active                  Audit from Redirected Encounters    **Home medications have not yet been reviewed for this encounter**         ALLERGIES  Allergies[1]    PHYSICAL EXAM  VITAL SIGNS: /53   Pulse 69   Temp 36.6 °C (97.8 °F) (Temporal)   Resp 14   Ht 1.702 m (5' 7\")   Wt 85.5 kg (188 lb 7.9 oz)   SpO2 98%   BMI 29.52 kg/m²    Pulse ox interpretation: Hypoxic on room air  Constitutional: Pale appears critically ill hypoxic on room air patient is on 15 L nonrebreather moderate respiratory distress rhonchi right radial  HEENT: Atraumatic normocephalic, pupils are equal round reactive to light extraocular movements are intact. The nares is clear, external ears are normal, mouth shows moist mucous membranes normal dentition for age  Neck: Supple, no JVD no tracheal deviation  Cardiovascular: Irregularly irregular no murmur rub or gallop 2+ pulses peripherally x4  Thorax & Lungs: Moderate respiratory distress rhonchi right greater than left  GI: Soft nontender nondistended positive bowel sounds, no peritoneal signs no rebound or guarding  Skin: Warm dry no acute rash or lesion  Musculoskeletal: Moving " all extremities with full range and 5 of 5 strength no acute  deformity 2+ pitting edema  Neurologic: Cranial nerves III through XII are grossly intact no sensory deficit no cerebellar dysfunction   Psychiatric sluggish but arousable following commands        EKG/LABS  Results for orders placed or performed during the hospital encounter of 25   EKG    Collection Time: 25 11:03 AM   Result Value Ref Range    Report       [facility] Emergency Dept.    Test Date:  2025  Pt Name:    LEIGH ANN MOORE                Department: ER  MRN:        [id number]                      Room:       [facility]  Gender:     Male                         Technician: [id number]  :        1945                   Requested By:PAU OSUNA  Order #:    [id number]                    Reading MD:    Measurements  Intervals                                Axis  Rate:       82                           P:          0  WA:         0                            QRS:        196  QRSD:       166                          T:          0  QT:         439  QTc:        513    Interpretive Statements  Atrial fibrillation  Nonspecific intraventricular conduction delay  Minimal ST depression, inferior leads  Compared to ECG 2025 17:00:54  ST (T wave) deviation now present  Sinus rhythm no longer present  First degree AV block no longer present     DIGOXIN    Collection Time: 25 11:08 AM   Result Value Ref Range    Digoxin 1.0 0.8 - 2.0 ng/mL   HOLD BLOOD BANK SPECIMEN (NOT TESTED)    Collection Time: 25 11:08 AM   Result Value Ref Range    Holding Tube - Bb DONE    Lactic Acid    Collection Time: 25 11:10 AM   Result Value Ref Range    Lactic Acid 2.2 (H) 0.5 - 2.0 mmol/L   CBC with Differential    Collection Time: 25 11:10 AM   Result Value Ref Range    WBC 32.0 (H) 4.8 - 10.8 K/uL    RBC 4.48 (L) 4.70 - 6.10 M/uL    Hemoglobin 12.2 (L) 14.0 - 18.0 g/dL    Hematocrit 39.5 (L) 42.0 - 52.0 %    MCV 88.2  81.4 - 97.8 fL    MCH 27.2 27.0 - 33.0 pg    MCHC 30.9 (L) 32.3 - 36.5 g/dL    RDW 47.8 35.9 - 50.0 fL    Platelet Count 446 164 - 446 K/uL    MPV 11.7 9.0 - 12.9 fL    Neutrophils-Polys 91.70 (H) 44.00 - 72.00 %    Lymphocytes 3.30 (L) 22.00 - 41.00 %    Monocytes 5.00 0.00 - 13.40 %    Eosinophils 0.00 0.00 - 6.90 %    Basophils 0.00 0.00 - 1.80 %    Nucleated RBC 0.00 0.00 - 0.20 /100 WBC    Neutrophils (Absolute) 29.34 (H) 1.82 - 7.42 K/uL    Lymphs (Absolute) 1.06 1.00 - 4.80 K/uL    Monos (Absolute) 1.60 (H) 0.00 - 0.85 K/uL    Eos (Absolute) 0.00 0.00 - 0.51 K/uL    Baso (Absolute) 0.00 0.00 - 0.12 K/uL    NRBC (Absolute) 0.00 K/uL   Complete Metabolic Panel    Collection Time: 07/04/25 11:10 AM   Result Value Ref Range    Sodium 137 135 - 145 mmol/L    Potassium 3.5 (L) 3.6 - 5.5 mmol/L    Chloride 97 96 - 112 mmol/L    Co2 24 20 - 33 mmol/L    Anion Gap 16.0 7.0 - 16.0    Glucose 187 (H) 65 - 99 mg/dL    Bun 44 (H) 8 - 22 mg/dL    Creatinine 1.71 (H) 0.50 - 1.40 mg/dL    Calcium 8.8 8.5 - 10.5 mg/dL    Correct Calcium 9.2 8.5 - 10.5 mg/dL    AST(SGOT) 11 (L) 12 - 45 U/L    ALT(SGPT) <5 2 - 50 U/L    Alkaline Phosphatase 63 30 - 99 U/L    Total Bilirubin 0.3 0.1 - 1.5 mg/dL    Albumin 3.5 3.2 - 4.9 g/dL    Total Protein 7.0 6.0 - 8.2 g/dL    Globulin 3.5 1.9 - 3.5 g/dL    A-G Ratio 1.0 g/dL   TROPONIN    Collection Time: 07/04/25 11:10 AM   Result Value Ref Range    Troponin T 82 (H) 6 - 19 ng/L   VENOUS BLOOD GAS    Collection Time: 07/04/25 11:10 AM   Result Value Ref Range    Venous Bg Ph 7.35 7.31 - 7.45    Venous Bg Ph Temp Corrected 7.36 7.31 - 7.45    Venous Bg Pco2 52.4 38.0 - 54.0 mmHg    Venous Bg Pco2 Temp Corrected 51.5 38.0 - 54.0 mmHg    Venous Bg Po2 38.0 23.0 - 48.0 mmHg    Venous Bg Po2 Temp Corrected 37.0 23.0 - 48.0 mmHg    Venous Bg O2 Saturation 51.0 (L) 60.0 - 85.0 %    Venous Bg Hco3 29 22 - 29 mmol/L    Venous Bg Base Excess 3 -2 - 3 mmol/L    Body Temp 36.6 Centigrade   PROCALCITONIN     Collection Time: 07/04/25 11:10 AM   Result Value Ref Range    Procalcitonin 0.22 <0.25 ng/mL   proBrain Natriuretic Peptide, NT    Collection Time: 07/04/25 11:10 AM   Result Value Ref Range    NT-proBNP 43150 (H) 0 - 125 pg/mL   ESTIMATED GFR    Collection Time: 07/04/25 11:10 AM   Result Value Ref Range    GFR (CKD-EPI) 41 (A) >60 mL/min/1.73 m 2   DIFFERENTIAL MANUAL    Collection Time: 07/04/25 11:10 AM   Result Value Ref Range    Manual Diff Status PERFORMED    PERIPHERAL SMEAR REVIEW    Collection Time: 07/04/25 11:10 AM   Result Value Ref Range    Peripheral Smear Review see below    PLATELET ESTIMATE    Collection Time: 07/04/25 11:10 AM   Result Value Ref Range    Plt Estimation Normal    MORPHOLOGY    Collection Time: 07/04/25 11:10 AM   Result Value Ref Range    RBC Morphology Present     Poikilocytosis 1+     Ovalocytes 1+    HOLD BLOOD BANK SPECIMEN (NOT TESTED)    Collection Time: 07/04/25 11:24 AM   Result Value Ref Range    Holding Tube - Bb DONE    POC CoV-2, FLU A/B, RSV by PCR    Collection Time: 07/04/25 11:45 AM   Result Value Ref Range    POC Influenza A RNA, PCR NEGATIVE NEGATIVE    POC Influenza B RNA, PCR NEGATIVE NEGATIVE    POC RSV, by PCR NEGATIVE NEGATIVE    POC SARS-CoV-2, PCR NEGATIVE NEGATIVE      I have independently interpreted this EKG    RADIOLOGY/PROCEDURES   I have independently interpreted the diagnostic imaging associated with this visit and am waiting the final reading from the radiologist.   My preliminary interpretation is as follows: Bilateral pneumonia    Radiologist interpretation:  DX-CHEST-PORTABLE (1 VIEW)   Final Result      Airspace opacities in the bilateral lower lobes, left more than right, concerning for pneumonia.          COURSE & MEDICAL DECISION MAKING    ASSESSMENT, COURSE AND PLAN  Care Narrative:     This is a critically ill 79-year-old gentleman flown here directly from the field.  He apparently was recently hospitalized at a small [facility]  hospital for pneumonia.  EMS already started the patient on IV Levophed.  We attempted to wean this but were unable to.  Septic protocols initiated.  He has profound leukocytosis with left shift, viral studies are negative and chest x-ray suggest pneumonia.  His BNP is profoundly elevated and he has an underlying history of chronic heart failure with an ejection fraction of 25%.  He already received a liter fluid bolus prior to arrival.  I ordered another liter fluid bolus as well as IV vancomycin and Zosyn in conjunction with the ER pharmacy.  His procalcitonin is reassuring and his lactic acid is not greater than 4.  Due to his underlying heart failure he could not tolerate a full 30 cc/kg sepsis bolus.  Consultation with the intensivist Dr. Garcia was obtained.  She agrees with the plan of care which will be to admit the patient to the intensive care unit.  When the patient arrived I did consider intubation but after he improved his blood pressure and hemodynamics his mental status improved.  He would prefer not to be intubated which I think is reasonable but the patient could decompensate and therefore will be closely resuscitated in the intensive care unit.      Hydration: Based on the patient's presentation of Hypotension the patient was given IV fluids. IV Hydration was used because oral hydration was not adequate alone. Upon recheck following hydration, the patient was improving.     Due to the patient's poor ejection fraction of 25% and history of heart failure full 30 cc/kg sepsis bolus was not clinically indicated    ADDITIONAL PROBLEMS MANAGED      DISPOSITION AND DISCUSSIONS  I have discussed management of the patient with the following physicians and WIL's: Discussed plan of care with intensive care physician    Discussion of management with other QHP or appropriate source(s): Discussed antibiotic therapy with ER pharmacist    Escalation of care considered, and ultimately not performed: Considered  intubation    Barriers to care at this time, including but not limited to: None.     Decision tools and prescription drugs considered including, but not limited to: None.    1. Hospital-acquired pneumonia        2. Other congestive heart failure (HCC)        3. Sepsis associated hypotension (HCC)          CRITICAL CARE  The very real possibilty of a deterioration of this patient's condition required the highest level of my preparedness for sudden, emergent intervention.  I provided critical care services, which included medication orders, frequent reevaluations of the patient's condition and response to treatment, ordering and reviewing test results, and discussing the case with various consultants.  The critical care time associated with the care of the patient was 40 minutes. Review chart for interventions. This time is exclusive of any other billable procedures.        Electronically signed by: Ean Gilliam M.D., 7/4/2025 11:43 AM           [1] No Known Allergies

## 2025-07-04 NOTE — THERAPY
Speech Language Therapy Contact Note    Patient Name: Donte Agustin  Age:  79 y.o., Sex:  male  Medical Record #: [id number]  Today's Date: 7/4/2025 07/04/25 1551   Treatment Variance   Reason For Missed Therapy Medical - Other (Please Comment)   Initial Contact Note    Initial Contact Note  Order Received and Verified, Speech Therapy Evaluation in Progress with Full Report to Follow.   Interdisciplinary Plan of Care Collaboration   IDT Collaboration with  Other (See Comments)   Collaboration Comments Pt is currently ARTIE. SLP to follow for a clinical swallow evaluation when medically appropriate.

## 2025-07-04 NOTE — ASSESSMENT & PLAN NOTE
BG goals 120-180s  Medium ISS  Monitor for need for long acting insulin while on steroids  Steroids now tapering, glucose levels better

## 2025-07-04 NOTE — PROCEDURES
Central Line Insertion    Date/Time: 7/4/2025 4:45 PM    Performed by: Luisito Amaro M.D.  Authorized by: Luisito Amaro M.D.    Consent:     Consent obtained:  Verbal    Consent given by:  Patient    Risks discussed:  Arterial puncture, bleeding, infection and pneumothorax    Alternatives discussed:  No treatment  Pre-procedure details:     Hand hygiene: Hand hygiene performed prior to insertion      Sterile barrier technique: All elements of maximal sterile technique followed      Skin preparation:  ChloraPrep  Sedation:     Sedation type:  None  Anesthesia:     Anesthesia method:  Local infiltration    Local anesthetic:  Lidocaine 1% w/o epi  Procedure details:     Location:  R internal jugular    Patient position:  Reverse Trendelenburg    Procedural supplies:  Triple lumen    Catheter size:  7 Fr    Landmarks identified: yes      Ultrasound guidance: yes      Sterile ultrasound techniques: Sterile gel and sterile probe covers were used      Number of attempts:  1    Successful placement: yes    Post-procedure details:     Post-procedure:  Dressing applied    Guidewire: guidewire removal confirmed      Assessment:  Blood return through all ports, no pneumothorax on x-ray and free fluid flow    Patient tolerance of procedure:  Tolerated well, no immediate complications  Comments:      Dr Garcia requested I place central line for septic shock. Placed without difficulty.

## 2025-07-04 NOTE — ASSESSMENT & PLAN NOTE
Due to pneumonia  Intubated 7/4/2025, extubated 7/6  RT protocols  I-S/PEP  Mobilize but patient refusing  Patient on home O2

## 2025-07-04 NOTE — PROGRESS NOTES
Central line placement    Time out - 1621  Wire in at 1623  Wire out at 1624    Dressing applied, Chest X-ray ordered

## 2025-07-04 NOTE — ASSESSMENT & PLAN NOTE
This is Septic shock Present on admission    Cont broad spectrum abx:  Zosyn-transition to Unasyn  Vanco Dced, MRSA nares negative   MAP goals > 65  Off of levophed and epinepherine today   Decrease stress dose steroids to w83-xacgw off

## 2025-07-04 NOTE — PROGRESS NOTES
"Pharmacy Vancomycin Kinetics Note for 7/4/2025     79 y.o. male on Vancomycin day # 1     Vancomycin Indication (AUC Dosing): Sepsis    Provider specified end date: 07/11/25    Active Antibiotics (From admission, onward)      Ordered     Ordering Provider       Fri Jul 4, 2025  2:49 PM    07/04/25 1449  vancomycin (Vancocin) 1,000 mg in  mL IVPB  (vancomycin (VANCOCIN) IV (LD + Maintenance))  EVERY 24 HOURS         Jazmine Garcia M.D.       Fri Jul 4, 2025  1:34 PM    07/04/25 1334  MD Alert...Vancomycin per Pharmacy  (HAP/VAP)  PHARMACY TO DOSE        Question:  Indication(s) for vancomycin?  Answer:  Pneumonia    Jazmine Garcia M.D.    07/04/25 1334  piperacillin-tazobactam (Zosyn) 4.5 g in  mL IVPB  (HAP/VAP)  EVERY 8 HOURS        Placed in \"And\" Linked Group    Jazmine Garcia M.D.       Fri Jul 4, 2025 11:19 AM    07/04/25 1119  vancomycin (Vancocin) 2,250 mg in  mL IVPB  (vancomycin (VANCOCIN) IV (LD + Maintenance))  ONCE         Ean Gilliam M.D.            Dosing Weight: 85.5 kg (188 lb 7.9 oz)      Admission History: Admitted on 7/4/2025 for Septic shock (HCC) [A41.9, R65.21]  Pertinent history: 78 yo male admitted for ALOC secondary to hypoxia. Recently admitted to OSH for PNA and was discharged on 7/3. Was found to be hypoxic in the 80s and hypotensive by EMS. Started on norepinephrine and placed on non-rebreather at 15L. EMS also gave 2g rocephin. Empiric abx expanded to vanc/zosyn for possible HAP.    Allergies:     Patient has no known allergies.     Pertinent cultures to date:     Results       Procedure Component Value Units Date/Time    Urinalysis [[id number]]  (Abnormal) Collected: 07/04/25 1253    Order Status: Completed Specimen: Urine Updated: 07/04/25 1447     Color Pink     Character Hazy     Specific Gravity 1.010     Ph 5.0     Glucose 500 mg/dL      Ketones Negative mg/dL      Protein 100 mg/dL      Bilirubin Negative     Urobilinogen, Urine 0.2 EU/dL      " Nitrite Negative     Leukocyte Esterase Negative     Occult Blood Moderate     Micro Urine Req Microscopic    Respiratory Panel by PCR (Inpatient ONLY) [[id number]]     Order Status: No result Specimen: Respirate from Nasopharyngeal     Blood Culture - Draw one from central line and one from peripheral site [[id number]] Collected: 07/04/25 1116    Order Status: Completed Specimen: Blood from Peripheral Updated: 07/04/25 1408     Significant Indicator NEG     Source BLD     Site PERIPHERAL     Culture Result No Growth  Note: Blood cultures are incubated for 5 days and  are monitored continuously.Positive blood cultures  are called to the RN and reported as soon as  they are identified.      Blood Culture - Draw one from central line and one from peripheral site [[id number]] Collected: 07/04/25 1108    Order Status: Completed Specimen: Blood from Line Updated: 07/04/25 1408     Significant Indicator NEG     Source BLD     Site Peripheral     Culture Result No Growth  Note: Blood cultures are incubated for 5 days and  are monitored continuously.Positive blood cultures  are called to the RN and reported as soon as  they are identified.      Culture Respiratory W/ GRM STN [[id number]]     Order Status: Sent Specimen: Respirate from Sputum     Urine Culture [[id number]]     Order Status: Sent Specimen: Urine, Jensen Cath     MRSA By PCR (Amp) [[id number]]     Order Status: Sent Specimen: Respirate from Nares     Urine Culture (New) [[id number]] Collected: 07/04/25 1253    Order Status: Sent Specimen: Urine Updated: 07/04/25 1300            Labs:     Estimated Creatinine Clearance: 37.8 mL/min (A) (by C-G formula based on SCr of 1.71 mg/dL (H)).  Recent Labs     07/04/25  1110   WBC 32.0*   NEUTSPOLYS 91.70*     Recent Labs     07/04/25  1110   BUN 44*   CREATININE 1.71*   ALBUMIN 3.5     No intake or output data in the 24 hours ending 07/04/25 1450   /56   Pulse 75   Temp 36.6 °C (97.8 °F) (Temporal)   Resp 18   Ht  "1.702 m (5' 7\")   Wt 85.5 kg (188 lb 7.9 oz)   SpO2 97%  Temp (24hrs), Av.6 °C (97.8 °F), Min:36.6 °C (97.8 °F), Max:36.6 °C (97.8 °F)      List concerns for Vancomycin clearance:     Age;BUN/Scr ratio greater than 20:1;Pressors/Hypotension;TIM    Pharmacokinetics:     AUC kinetics:   Ke (hr ^-1): 0.0359 hr^-1  Half life: 19.31 hr  Clearance: 1.995  Estimated TDD: 997.5  Estimated Dose: 885  Estimated interval: 21.3    A/P:     -  Vancomycin dose: 2250mg IV LD followed by 1000mg IV QD MD    -  Next vancomycin level(s): pending clinical course    -  Predicted vancomycin AUC from initial AUC test calculator: 501 mg·hr/L    -  Comments: Patient currently experiencing TIM and is on vasopressors. Also has hx of CHF with LVEF of 25%. Unsure if renal recovery will occur quickly. Pharmacy will continue to monitor for vancomycin adjustment.     Lou Kirkland, PharmD    "

## 2025-07-04 NOTE — ASSESSMENT & PLAN NOTE
Hx of, not in acute exacerbation at this time  RT/O2 protocols  Resume Anoro-Home medication  DuoNeb PRN

## 2025-07-04 NOTE — ASSESSMENT & PLAN NOTE
Holding home BP meds coming out of shock  Blood pressure normal/soft, not quite ready for complete resumption of GDMT

## 2025-07-04 NOTE — ASSESSMENT & PLAN NOTE
Hospital acquired vs aspiration-all cultures negative/clinically improved  Transition Zosyn which has been on for 4 days to Unasyn x 3 days and reassess daily  COVID/influenza/RSV was negative  Urine antigen for Legionella and strep pneumo both negative  RVP negative  SLP evaluation after extubation, suspect that she is aspirating-ongoing per speech therapist

## 2025-07-04 NOTE — PROGRESS NOTES
4 Eyes Skin Assessment Completed by MARYBEL Watson and MARYBEL Badillo.    Skin assessment is primarily focused on high risk bony prominences. Pay special attention to skin beneath and around medical devices, high risk bony prominences, skin to skin areas and areas where the patient lacks sensation to feel pain and areas where the patient previously had breakdown.     Head (Occipital):  WDL   Ears (Under Medical Devices): Red/Blanching   Nose (Under Medical Devices): WDL   Mouth:  WDL   Neck: Red/blanching   Breast/Chest:  WDL   Shoulder Blades:  WDL   Spine:   WDL   (R) Arm/Elbow/Hand: Red, Blanching, and Bruising   (L) Arm/Elbow/Hand: Red, Blanching, and Bruising   Abdomen: WDL   Pannus/Groin:  WDL   Sacrum/Coccyx:   Red, Slow to asif   (R) Ischial Tuberosity (Sit Bones):  WDL   (L) Ischial Tuberosity (Sit Bones):  WDL   (R) Leg:  WDL   (L) Leg:  WDL   (R) Heel:  WDL, Dry/Flaky   (R) Foot/Toe: WDL   (L) Heel: WDL, Dry/Flaky   (L) Foot/Toe:  WDL       DEVICES IN USE:   Respiratory Devices:  Non-rebreather  Feeding Devices:  N/A   Lines & BP Monitoring Devices:  Peripheral IV, BP cuff, and Pulse ox    Orthopedic Devices:  N/A  Miscellaneous Devices:  Telemetry monitor, Jensen, and SCDs    PROTOCOL INTERVENTIONS:   Offloading Dressing - Sacrum:  Already in place  Offloading Dressing - Heel:  Already in place  Float Heels with Pillows:  Already in place  Q2 Turns with Pillows:  Already in place  Q2 Turns with Wedges:  Already in place  Glide Sheet:  Already in place  Barrier Paste:  Already in place  Jensen:  Already in place    WOUND PHOTOS:   N/A no wounds identified    WOUND CONSULT:   N/A, no advanced wound care needs identified

## 2025-07-04 NOTE — ASSESSMENT & PLAN NOTE
Judicious fluid resuscitation with EF of 25%  Maintain euvolemia  Holding GDMT while in shock-begin resuming home regimen in the coming day or 2 while on telemetry

## 2025-07-04 NOTE — ASSESSMENT & PLAN NOTE
ECHO with RVSP of 55 mmHg  Likely group II/III  Status post judicious fluid resuscitation   Avoid hypoxia/acidemia  Diurese when able, was on home diuretics, probably start that first before his BP meds

## 2025-07-04 NOTE — H&P
Critical Care History & Physical Note    Date of Service  7/4/2025    Primary Care Physician  Pcp Pt States None    Consultants  critical care      Code Status  Full Code    Chief Complaint  Chief Complaint   Patient presents with    Shortness of Breath     BIBA from home        History of Presenting Illness  Most history was obtained from Donte via extensive chart review as well as the patient answering a few questions due to his critical illness    Donte Agustin is a 79 y.o. male with the past medical history significant for COPD, chronic hypoxic respiratory failure on 2-5 lpm O2, ischemic CM with an EF of 25% (last ECHO in April 2025), pulmonary hypertension with an RVSP of 55mmHg, CAD s/p PCI and stents, tobacco abuse, and prostate cancer who was recently hospitalized at a facility in [place], [place] for pneumonia and released for home on 7/2/2025.  EMS was summoned to his house today due to his wife feeling he was more lethargy and not breathing well.  EMS found the patient to be hypoxic on his home O2 as well as tachycardic and hypotensive with SBPs in the 60s.  EMS decided to bypass [place], [place] local ER and bring the patient to [facility].  EN route, the patient received 1 liters of IVF and was started on peripheral levophed.  In the ER, the patient was started on 15 lpm oxymask as well given another 1 liter of IVF.  He was started of broad spectrum antibiotics for a chest x-ray concerning for a left sided pneumonia.  He was be admitted to the ICU for management of septic shock with acute hypoxic respiratory failure and TIM due to a hospital acquired pneumonia.    The patient endorses being a full code.    I discussed the plan of care with patient, bedside RN, charge RN, pharmacy, and RT.    Review of Systems  Review of Systems   Unable to perform ROS: Critical illness       Past Medical History  ?dementia, coronary artery disease s/p PCI and stents in the past, COPD, chronic hypoxic respiratory failure on 2-5 lpm O2,  tobacco abuse, ischemic CM with last EF of 25%, prostate cancer    Surgical History   has no past surgical history on file.     Family History  History reviewed. No pertinent family history.     Family history reviewed with patient. There is no family history that is pertinent to the chief complaint.     Social History   reports that he has been smoking cigarettes. He started smoking about 40 years ago. He has a 20.3 pack-year smoking history. He has been exposed to tobacco smoke. He has never used smokeless tobacco. He reports that he does not currently use alcohol. He reports that he does not use drugs.    Allergies  Allergies[1]    Medications  Prior to Admission Medications   Prescriptions Last Dose Informant Patient Reported? Taking?   ANORO ELLIPTA 62.5-25 MCG/ACT AEROSOL POWDER, BREATH ACTIVATED inhaler New Rx Patient's Home Pharmacy Yes Yes   Sig: Inhale 1 Puff every day.   JARDIANCE 10 MG Tab tablet New Rx Patient's Home Pharmacy Yes Yes   Sig: Take 10 mg by mouth every day.   acetaminophen-codeine #3 (TYLENOL #3) 300-30 MG Tab 7/4/2025 at  6:00 AM Significant Other, Patient's Home Pharmacy Yes Yes   Sig: Take 1 Tablet by mouth every 6 hours as needed for Moderate Pain.   albuterol 108 (90 Base) MCG/ACT Aero Soln inhalation aerosol New Rx Patient's Home Pharmacy Yes Yes   Sig: Inhale 1-2 Puffs every four hours as needed for Shortness of Breath.   aspirin 81 MG EC tablet Unknown Patient's Home Pharmacy No No   Sig: Take 1 Tablet by mouth every day.   atorvastatin (LIPITOR) 40 MG Tab Unknown Patient's Home Pharmacy No No   Sig: Take 1 Tablet by mouth every evening.   cefdinir (OMNICEF) 300 MG Cap New Rx Patient's Home Pharmacy Yes Yes   Sig: Take 300 mg by mouth every 12 hours. 3 day course prescribed 7/3/2025.   clopidogrel (PLAVIX) 75 MG Tab Unknown Patient's Home Pharmacy Yes No   Sig: Take 75 mg by mouth every day.   digoxin (LANOXIN) 125 MCG Tab Unknown Patient's Home Pharmacy Yes No   Sig: Take 125 mcg  by mouth every day.   furosemide (LASIX) 40 MG Tab New Rx Patient's Home Pharmacy Yes Yes   Sig: Take 40 mg by mouth 2 times a day.   gabapentin (NEURONTIN) 600 MG tablet 7/4/2025 at  6:00 AM Significant Other, Patient's Home Pharmacy Yes Yes   Sig: Take 600 mg by mouth 3 times a day.   lisinopril (PRINIVIL) 5 MG Tab New Rx Patient's Home Pharmacy Yes Yes   Sig: Take 5 mg by mouth every day.   metFORMIN (GLUCOPHAGE) 500 MG Tab New Rx Patient's Home Pharmacy Yes Yes   Sig: Take 1,000 mg by mouth 2 times a day with meals. 2 tablets = 1,000 mg.   metoprolol SR (TOPROL XL) 25 MG TABLET SR 24 HR New Rx Patient's Home Pharmacy Yes Yes   Sig: Take 25 mg by mouth every day.   nitroglycerin (NITROSTAT) 0.4 MG SL Tab Unknown Patient's Home Pharmacy No No   Sig: Place 1 Tablet under the tongue as needed for Chest Pain.   pantoprazole (PROTONIX) 40 MG Tablet Delayed Response Unknown Patient's Home Pharmacy Yes No   Sig: Take 40 mg by mouth every day.   potassium chloride SA (KDUR) 20 MEQ Tab CR Unknown Patient's Home Pharmacy Yes No   Sig: Take 20 mEq by mouth every day.   predniSONE (DELTASONE) 20 MG Tab New Rx Patient's Home Pharmacy Yes Yes   Sig: Take 20-40 mg by mouth every day. 10 day course prescribed 7/3/2025. Take 2 tablets (40 mg) every day for 5 days, then 1 tablet (20 mg) every day for 5 days.   spironolactone (ALDACTONE) 25 MG Tab New Rx Patient's Home Pharmacy Yes Yes   Sig: Take 25 mg by mouth every day.   tamsulosin (FLOMAX) 0.4 MG capsule Unknown Patient's Home Pharmacy Yes No   Sig: Take 0.4 mg by mouth every 8 hours.      Facility-Administered Medications: None       Physical Exam  Temp:  [36.6 °C (97.8 °F)] 36.6 °C (97.8 °F)  Pulse:  [64-84] 70  Resp:  [14-22] 20  BP: ()/(40-91) 118/62  SpO2:  [89 %-100 %] 98 %  Blood Pressure : 118/62   Temperature: 36.6 °C (97.8 °F)   Pulse: 70   Respiration: 20   Pulse Oximetry: 98 %       Physical Exam  Vitals and nursing note reviewed.   Constitutional:        Appearance: He is obese. He is ill-appearing. He is not toxic-appearing.      Comments: Patient appears older than stated age and chronically ill    HENT:      Head: Normocephalic and atraumatic.      Right Ear: External ear normal.      Left Ear: External ear normal.      Nose: Nose normal. No rhinorrhea.      Mouth/Throat:      Mouth: Mucous membranes are dry.      Pharynx: Oropharynx is clear. No oropharyngeal exudate.   Eyes:      General: No scleral icterus.     Conjunctiva/sclera: Conjunctivae normal.      Pupils: Pupils are equal, round, and reactive to light.   Cardiovascular:      Rate and Rhythm: Normal rate and regular rhythm.      Pulses:           Dorsalis pedis pulses are 1+ on the right side and 1+ on the left side.      Heart sounds: No murmur heard.  Pulmonary:      Breath sounds: No wheezing.      Comments: Coarse breath sounds throughout L>R, no wheezing, diminished left base  Chest:      Chest wall: No tenderness.   Abdominal:      Palpations: Abdomen is soft.      Tenderness: There is no abdominal tenderness. There is no guarding or rebound.   Musculoskeletal:         General: Normal range of motion.      Cervical back: Normal range of motion and neck supple.      Right lower le+ Edema present.      Left lower le+ Edema present.   Lymphadenopathy:      Cervical: No cervical adenopathy.   Skin:     General: Skin is warm and dry.      Capillary Refill: Capillary refill takes 2 to 3 seconds.      Findings: No rash.   Neurological:      Mental Status: He is alert and oriented to person, place, and time.      Cranial Nerves: No cranial nerve deficit.      Sensory: No sensory deficit.      Motor: No weakness.         Laboratory:  Recent Labs     25  1110   WBC 32.0*   RBC 4.48*   HEMOGLOBIN 12.2*   HEMATOCRIT 39.5*   MCV 88.2   MCH 27.2   MCHC 30.9*   RDW 47.8   PLATELETCT 446   MPV 11.7     Recent Labs     25  1110   SODIUM 137   POTASSIUM 3.5*   CHLORIDE 97   CO2 24   GLUCOSE  187*   BUN 44*   CREATININE 1.71*   CALCIUM 8.8     Recent Labs     07/04/25  1110   ALTSGPT <5   ASTSGOT 11*   ALKPHOSPHAT 63   TBILIRUBIN 0.3   GLUCOSE 187*         Recent Labs     07/04/25  1110   NTPROBNP 40471*         Recent Labs     07/04/25  1110   TROPONINT 82*       Imaging:  DX-CHEST-PORTABLE (1 VIEW)   Final Result      Airspace opacities in the bilateral lower lobes, left more than right, concerning for pneumonia.          Assessment/Plan:  Justification for Admission Status  I anticipate this patient will require at least two midnights for appropriate medical management, necessitating inpatient admission because septic shock from pneumonia    Patient will need a ICU (Adult and Pediatrics) bed on MEDICAL service .  The need is secondary to septic shock requiring titration of levophed infusion.    * Septic shock (HCC)- (present on admission)  Assessment & Plan  This is Septic shock Present on admission  SIRS criteria identified on my evaluation include: Tachycardia, with heart rate greater than 90 BPM, Tachypnea, with respirations greater than 20 per minute, and Leukocytosis, with WBC greater than 12,000  Clinical indicators of end organ dysfunction include Hypotension with systolic blood pressure less than 90 or MAP less than 65, Lactic Acid greater than 2, Toxic Metabolic Encephalopathy, and Acute Respiratory Failure - (mechanical ventilation or BiPap or PaO2/FiO2 ratio < 300)  Indicators of septic shock include: Sepsis present and persistent hypotension despite fluid resuscitation   Sources is: suspect pulmonary/pneumonia  Sepsis protocol initiated  Crystalloid Fluid Administration: Resuscitation volume of 2 liters ordered. Reason that resuscitation volume of less than 30ml/kg was ordered concern for causing harm given CHF  IV antibiotics as appropriate for source of sepsis  Reassessment: I have reassessed the patient's hemodynamic status    Trending lactic acid  Cont broad spectrum abx:  Zosyn and  Vanco  Follow up on cultures and MRSA  MAP goals > 65  I am actively titrating levophed for MAP goals  Check cortisol-->stress dose steroids started    Left lower lobe pneumonia- (present on admission)  Assessment & Plan  Hospital acquired vs aspiration  Cont zosyn and vancomycin  Checking Strep and Legionella urine Ag  COVID/influenza/RSV was negative  Check RVP  SLP  CTA chest pending    TIM (acute kidney injury) ([facility])- (present on admission)  Assessment & Plan  ?due to hypotension  S/p 2 liters IVF  Monitor UOP/creat  Avoid nephrotoxins     Acute on chronic respiratory failure with hypoxia ([facility])- (present on admission)  Assessment & Plan  Due to pneumonia  Cont RT/O2 protocols  Active titration of FiO2 for O2 sat goals > 92%  IS/mobility/aggressive pulmonary toilet  Low threshold to intubate and perform bronchoscopy  ECHO pending  CTA chest pending    COPD (chronic obstructive pulmonary disease) ([facility])- (present on admission)  Assessment & Plan  Hx of, not in acute exacerbation  RT/O2 protocols  Cont home Anoro inhaler  Albuterol as needed    Pulmonary hypertension ([facility])- (present on admission)  Assessment & Plan  ECHO with RVSP of 55 mmHg  Judicious fluid resuscitation   Avoid hypoxia/acidemia  Diurese when able     Hyperlipidemia- (present on admission)  Assessment & Plan  Cont home statin    Hypertension- (present on admission)  Assessment & Plan  Holding home BP meds while in shock    Prostate cancer ([facility])- (present on admission)  Assessment & Plan  Hx of     History of coronary angioplasty with insertion of stent- (present on admission)  Assessment & Plan  Cont ASA and statin therapy    Heart failure with reduced ejection fraction (HCC) (LVEF 20-25% Echo 2/2025)- (present on admission)  Assessment & Plan  Judicious fluid resuscitation with EF of 25%  Maintain euvolemia  Holding GDMT while in shock    Diabetes ([facility])- (present on admission)  Assessment & Plan  BG goals 120-180s  Medium ISS  Monitor for need for  long acting insulin while on steroids         VTE prophylaxis: SCDs/TEDs and heparin ppx    The patient is critically ill at this time requiring active titration of levophed infusion for septic shock due to pneumonia along with acute on chronic hypoxic respiratory failure on oxymask.  I have assessed and reassessed the patient's respiratory status, hemodynamics, cardiovascular status, and neurological status.  The patient is at high risk of clinical deterioration, worsening vital organ dysfunction, and death without the above critical care interventions.    Critical care time = 108 minutes.       [1] No Known Allergies

## 2025-07-05 ENCOUNTER — APPOINTMENT (OUTPATIENT)
Dept: RADIOLOGY | Facility: MEDICAL CENTER | Age: 80
DRG: 871 | End: 2025-07-05
Attending: INTERNAL MEDICINE
Payer: MEDICARE

## 2025-07-05 PROBLEM — R00.1 BRADYCARDIA: Status: ACTIVE | Noted: 2025-07-05

## 2025-07-05 LAB
ACTION RANGE TRIGGERED IACRT: NO
ALBUMIN SERPL BCP-MCNC: 3.2 G/DL (ref 3.2–4.9)
ALBUMIN/GLOB SERPL: 0.9 G/DL
ALP SERPL-CCNC: 60 U/L (ref 30–99)
ALT SERPL-CCNC: <5 U/L (ref 2–50)
ANION GAP SERPL CALC-SCNC: 11 MMOL/L (ref 7–16)
AST SERPL-CCNC: 9 U/L (ref 12–45)
BASE EXCESS BLDA CALC-SCNC: 0 MMOL/L (ref -4–3)
BASE EXCESS BLDA CALC-SCNC: 0 MMOL/L (ref -4–3)
BASOPHILS # BLD AUTO: 0.1 % (ref 0–1.8)
BASOPHILS # BLD: 0.02 K/UL (ref 0–0.12)
BILIRUB SERPL-MCNC: 0.4 MG/DL (ref 0.1–1.5)
BODY TEMPERATURE: ABNORMAL DEGREES
BREATHS SETTING VENT: 16
BREATHS SETTING VENT: 16
BUN SERPL-MCNC: 32 MG/DL (ref 8–22)
CALCIUM ALBUM COR SERPL-MCNC: 9.4 MG/DL (ref 8.5–10.5)
CALCIUM SERPL-MCNC: 8.8 MG/DL (ref 8.5–10.5)
CHLORIDE SERPL-SCNC: 105 MMOL/L (ref 96–112)
CO2 BLDA-SCNC: 25 MMOL/L (ref 20–33)
CO2 BLDA-SCNC: 25 MMOL/L (ref 32–48)
CO2 SERPL-SCNC: 25 MMOL/L (ref 20–33)
CREAT SERPL-MCNC: 1.17 MG/DL (ref 0.5–1.4)
DELSYS IDSYS: ABNORMAL
DELSYS IDSYS: ABNORMAL
EKG IMPRESSION: NORMAL
EOSINOPHIL # BLD AUTO: 0.03 K/UL (ref 0–0.51)
EOSINOPHIL NFR BLD: 0.1 % (ref 0–6.9)
ERYTHROCYTE [DISTWIDTH] IN BLOOD BY AUTOMATED COUNT: 48.1 FL (ref 35.9–50)
GFR SERPLBLD CREATININE-BSD FMLA CKD-EPI: 64 ML/MIN/1.73 M 2
GLOBULIN SER CALC-MCNC: 3.4 G/DL (ref 1.9–3.5)
GLUCOSE BLD STRIP.AUTO-MCNC: 174 MG/DL (ref 65–99)
GLUCOSE BLD STRIP.AUTO-MCNC: 179 MG/DL (ref 65–99)
GLUCOSE BLD STRIP.AUTO-MCNC: 189 MG/DL (ref 65–99)
GLUCOSE BLD STRIP.AUTO-MCNC: 211 MG/DL (ref 65–99)
GLUCOSE SERPL-MCNC: 194 MG/DL (ref 65–99)
HCO3 BLDA-SCNC: 24 MMOL/L (ref 21–28)
HCO3 BLDA-SCNC: 24.2 MMOL/L (ref 21–28)
HCT VFR BLD AUTO: 37 % (ref 42–52)
HGB BLD-MCNC: 11.7 G/DL (ref 14–18)
HOROWITZ INDEX BLDA+IHG-RTO: 146 MM[HG]
IMM GRANULOCYTES # BLD AUTO: 0.13 K/UL (ref 0–0.11)
IMM GRANULOCYTES NFR BLD AUTO: 0.6 % (ref 0–0.9)
INST. QUALIFIED PATIENT IIQPT: YES
LACTATE BLD-SCNC: 0.86 MMOL/L (ref 0.5–2)
LACTATE BLD-SCNC: 0.9 MMOL/L (ref 0.5–2)
LYMPHOCYTES # BLD AUTO: 0.94 K/UL (ref 1–4.8)
LYMPHOCYTES NFR BLD: 4.7 % (ref 22–41)
Lab: ABNORMAL
MAGNESIUM SERPL-MCNC: 1.9 MG/DL (ref 1.5–2.5)
MCH RBC QN AUTO: 27.7 PG (ref 27–33)
MCHC RBC AUTO-ENTMCNC: 31.6 G/DL (ref 32.3–36.5)
MCV RBC AUTO: 87.5 FL (ref 81.4–97.8)
MODE IMODE: ABNORMAL
MODE IMODE: ABNORMAL
MONOCYTES # BLD AUTO: 1.03 K/UL (ref 0–0.85)
MONOCYTES NFR BLD AUTO: 5.1 % (ref 0–13.4)
NEUTROPHILS # BLD AUTO: 17.89 K/UL (ref 1.82–7.42)
NEUTROPHILS NFR BLD: 89.4 % (ref 44–72)
NRBC # BLD AUTO: 0 K/UL
NRBC BLD-RTO: 0 /100 WBC (ref 0–0.2)
O2/TOTAL GAS SETTING VFR VENT: 50 %
PCO2 BLDA: 38.6 MMHG (ref 32–48)
PCO2 BLDA: 39 MMHG (ref 32–48)
PCO2 TEMP ADJ BLDA: 36 MMHG (ref 32–48)
PCO2 TEMP ADJ BLDA: 36.4 MMHG (ref 32–48)
PEEP END EXPIRATORY PRESSURE IPEEP: 10
PEEP END EXPIRATORY PRESSURE IPEEP: 10 CMH20
PH BLDA: 7.41 [PH] (ref 7.35–7.45)
PH BLDA: 7.41 [PH] (ref 7.35–7.45)
PH TEMP ADJ BLDA: 7.42 [PH] (ref 7.35–7.45)
PH TEMP ADJ BLDA: 7.42 [PH] (ref 7.35–7.45)
PHOSPHATE SERPL-MCNC: 2.5 MG/DL (ref 2.5–4.5)
PLATELET # BLD AUTO: 355 K/UL (ref 164–446)
PMV BLD AUTO: 11.1 FL (ref 9–12.9)
PO2 BLDA: 73 MMHG (ref 83–108)
PO2 BLDA: 73 MMHG (ref 83–108)
PO2 TEMP ADJ BLDA: 67 MMHG (ref 83–108)
PO2 TEMP ADJ BLDA: 67 MMHG (ref 83–108)
POTASSIUM SERPL-SCNC: 5.2 MMOL/L (ref 3.6–5.5)
PROT SERPL-MCNC: 6.6 G/DL (ref 6–8.2)
RBC # BLD AUTO: 4.23 M/UL (ref 4.7–6.1)
SAO2 % BLDA: 95 % (ref 93–99)
SAO2 % BLDA: 95 % (ref 93–99)
SODIUM SERPL-SCNC: 141 MMOL/L (ref 135–145)
SPECIMEN DRAWN FROM PATIENT: ABNORMAL
SPECIMEN DRAWN FROM PATIENT: ABNORMAL
TIDAL VOLUME IVT: 400
TIDAL VOLUME IVT: 400 ML
TRIGL SERPL-MCNC: 151 MG/DL (ref 0–149)
WBC # BLD AUTO: 20 K/UL (ref 4.8–10.8)

## 2025-07-05 PROCEDURE — 82803 BLOOD GASES ANY COMBINATION: CPT | Performed by: STUDENT IN AN ORGANIZED HEALTH CARE EDUCATION/TRAINING PROGRAM

## 2025-07-05 PROCEDURE — 700102 HCHG RX REV CODE 250 W/ 637 OVERRIDE(OP): Performed by: INTERNAL MEDICINE

## 2025-07-05 PROCEDURE — 700111 HCHG RX REV CODE 636 W/ 250 OVERRIDE (IP): Mod: JZ | Performed by: INTERNAL MEDICINE

## 2025-07-05 PROCEDURE — 36600 WITHDRAWAL OF ARTERIAL BLOOD: CPT

## 2025-07-05 PROCEDURE — A9270 NON-COVERED ITEM OR SERVICE: HCPCS | Performed by: INTERNAL MEDICINE

## 2025-07-05 PROCEDURE — 80053 COMPREHEN METABOLIC PANEL: CPT

## 2025-07-05 PROCEDURE — 302140 GU CART: Performed by: EMERGENCY MEDICINE

## 2025-07-05 PROCEDURE — 82803 BLOOD GASES ANY COMBINATION: CPT

## 2025-07-05 PROCEDURE — 99291 CRITICAL CARE FIRST HOUR: CPT | Performed by: INTERNAL MEDICINE

## 2025-07-05 PROCEDURE — 71045 X-RAY EXAM CHEST 1 VIEW: CPT

## 2025-07-05 PROCEDURE — 82962 GLUCOSE BLOOD TEST: CPT | Performed by: INTERNAL MEDICINE

## 2025-07-05 PROCEDURE — 36556 INSERT NON-TUNNEL CV CATH: CPT

## 2025-07-05 PROCEDURE — 84100 ASSAY OF PHOSPHORUS: CPT

## 2025-07-05 PROCEDURE — 31500 INSERT EMERGENCY AIRWAY: CPT

## 2025-07-05 PROCEDURE — 700105 HCHG RX REV CODE 258: Performed by: INTERNAL MEDICINE

## 2025-07-05 PROCEDURE — 84478 ASSAY OF TRIGLYCERIDES: CPT

## 2025-07-05 PROCEDURE — 700101 HCHG RX REV CODE 250: Performed by: INTERNAL MEDICINE

## 2025-07-05 PROCEDURE — 93005 ELECTROCARDIOGRAM TRACING: CPT | Mod: TC

## 2025-07-05 PROCEDURE — 85025 COMPLETE CBC W/AUTO DIFF WBC: CPT

## 2025-07-05 PROCEDURE — 94003 VENT MGMT INPAT SUBQ DAY: CPT

## 2025-07-05 PROCEDURE — 83605 ASSAY OF LACTIC ACID: CPT | Performed by: STUDENT IN AN ORGANIZED HEALTH CARE EDUCATION/TRAINING PROGRAM

## 2025-07-05 PROCEDURE — 83735 ASSAY OF MAGNESIUM: CPT

## 2025-07-05 PROCEDURE — 700111 HCHG RX REV CODE 636 W/ 250 OVERRIDE (IP): Performed by: INTERNAL MEDICINE

## 2025-07-05 PROCEDURE — 770022 HCHG ROOM/CARE - ICU (200)

## 2025-07-05 PROCEDURE — 700111 HCHG RX REV CODE 636 W/ 250 OVERRIDE (IP)

## 2025-07-05 PROCEDURE — 99152 MOD SED SAME PHYS/QHP 5/>YRS: CPT

## 2025-07-05 PROCEDURE — 83605 ASSAY OF LACTIC ACID: CPT

## 2025-07-05 PROCEDURE — C1751 CATH, INF, PER/CENT/MIDLINE: HCPCS

## 2025-07-05 RX ORDER — GABAPENTIN 300 MG/1
300 CAPSULE ORAL 3 TIMES DAILY
Status: DISCONTINUED | OUTPATIENT
Start: 2025-07-05 | End: 2025-07-06

## 2025-07-05 RX ORDER — POTASSIUM CHLORIDE 14.9 MG/ML
20 INJECTION INTRAVENOUS ONCE
Status: COMPLETED | OUTPATIENT
Start: 2025-07-05 | End: 2025-07-05

## 2025-07-05 RX ORDER — LANSOPRAZOLE 30 MG/1
30 TABLET, ORALLY DISINTEGRATING, DELAYED RELEASE ORAL DAILY
Status: DISCONTINUED | OUTPATIENT
Start: 2025-07-05 | End: 2025-07-06

## 2025-07-05 RX ORDER — ONDANSETRON 4 MG/1
4 TABLET, ORALLY DISINTEGRATING ORAL EVERY 4 HOURS PRN
Status: DISCONTINUED | OUTPATIENT
Start: 2025-07-05 | End: 2025-07-06

## 2025-07-05 RX ORDER — ONDANSETRON 2 MG/ML
4 INJECTION INTRAMUSCULAR; INTRAVENOUS EVERY 4 HOURS PRN
Status: DISCONTINUED | OUTPATIENT
Start: 2025-07-05 | End: 2025-07-06

## 2025-07-05 RX ORDER — ASPIRIN 81 MG/1
81 TABLET, CHEWABLE ORAL DAILY
Status: DISCONTINUED | OUTPATIENT
Start: 2025-07-05 | End: 2025-07-06

## 2025-07-05 RX ORDER — ACETAMINOPHEN 325 MG/1
650 TABLET ORAL EVERY 6 HOURS PRN
Status: DISCONTINUED | OUTPATIENT
Start: 2025-07-05 | End: 2025-07-06

## 2025-07-05 RX ORDER — DIGOXIN 250 MCG
125 TABLET ORAL DAILY
Status: DISCONTINUED | OUTPATIENT
Start: 2025-07-05 | End: 2025-07-05

## 2025-07-05 RX ORDER — EPINEPHRINE HCL IN 0.9 % NACL 4MG/250ML
0-.5 PLASTIC BAG, INJECTION (ML) INTRAVENOUS CONTINUOUS
Status: DISCONTINUED | OUTPATIENT
Start: 2025-07-05 | End: 2025-07-06

## 2025-07-05 RX ADMIN — NOREPINEPHRINE BITARTRATE 0.08 MCG/KG/MIN: 1 INJECTION, SOLUTION, CONCENTRATE INTRAVENOUS at 04:17

## 2025-07-05 RX ADMIN — HEPARIN SODIUM 5000 UNITS: 5000 INJECTION, SOLUTION INTRAVENOUS; SUBCUTANEOUS at 22:27

## 2025-07-05 RX ADMIN — PIPERACILLIN AND TAZOBACTAM 4.5 G: 4; .5 INJECTION, POWDER, FOR SOLUTION INTRAVENOUS at 08:54

## 2025-07-05 RX ADMIN — ASPIRIN 81 MG: 81 TABLET, CHEWABLE ORAL at 06:15

## 2025-07-05 RX ADMIN — HYDROCORTISONE SODIUM SUCCINATE 50 MG: 100 INJECTION, POWDER, FOR SOLUTION INTRAMUSCULAR; INTRAVENOUS at 05:45

## 2025-07-05 RX ADMIN — INSULIN LISPRO 3 UNITS: 100 INJECTION, SOLUTION INTRAVENOUS; SUBCUTANEOUS at 01:45

## 2025-07-05 RX ADMIN — INSULIN LISPRO 2 UNITS: 100 INJECTION, SOLUTION INTRAVENOUS; SUBCUTANEOUS at 11:36

## 2025-07-05 RX ADMIN — FLUDROCORTISONE ACETATE 0.05 MG: 0.1 TABLET ORAL at 11:36

## 2025-07-05 RX ADMIN — HYDROMORPHONE HYDROCHLORIDE 1 MG: 1 INJECTION, SOLUTION INTRAMUSCULAR; INTRAVENOUS; SUBCUTANEOUS at 16:37

## 2025-07-05 RX ADMIN — GABAPENTIN 300 MG: 300 CAPSULE ORAL at 17:12

## 2025-07-05 RX ADMIN — ATORVASTATIN CALCIUM 40 MG: 40 TABLET, FILM COATED ORAL at 17:12

## 2025-07-05 RX ADMIN — INSULIN LISPRO 2 UNITS: 100 INJECTION, SOLUTION INTRAVENOUS; SUBCUTANEOUS at 06:36

## 2025-07-05 RX ADMIN — PIPERACILLIN AND TAZOBACTAM 4.5 G: 4; .5 INJECTION, POWDER, FOR SOLUTION INTRAVENOUS at 16:14

## 2025-07-05 RX ADMIN — HEPARIN SODIUM 5000 UNITS: 5000 INJECTION, SOLUTION INTRAVENOUS; SUBCUTANEOUS at 05:45

## 2025-07-05 RX ADMIN — GABAPENTIN 300 MG: 300 CAPSULE ORAL at 05:53

## 2025-07-05 RX ADMIN — PROPOFOL 25 MCG/KG/MIN: 10 INJECTION, EMULSION INTRAVENOUS at 21:21

## 2025-07-05 RX ADMIN — INSULIN LISPRO 2 UNITS: 100 INJECTION, SOLUTION INTRAVENOUS; SUBCUTANEOUS at 17:12

## 2025-07-05 RX ADMIN — DOCUSATE SODIUM 50 MG AND SENNOSIDES 8.6 MG 2 TABLET: 8.6; 5 TABLET, FILM COATED ORAL at 17:12

## 2025-07-05 RX ADMIN — PIPERACILLIN AND TAZOBACTAM 4.5 G: 4; .5 INJECTION, POWDER, FOR SOLUTION INTRAVENOUS at 01:14

## 2025-07-05 RX ADMIN — POTASSIUM CHLORIDE 20 MEQ: 14.9 INJECTION, SOLUTION INTRAVENOUS at 02:55

## 2025-07-05 RX ADMIN — HYDROMORPHONE HYDROCHLORIDE 0.5 MG: 1 INJECTION, SOLUTION INTRAMUSCULAR; INTRAVENOUS; SUBCUTANEOUS at 05:17

## 2025-07-05 RX ADMIN — GABAPENTIN 300 MG: 300 CAPSULE ORAL at 11:36

## 2025-07-05 RX ADMIN — HYDROCORTISONE SODIUM SUCCINATE 50 MG: 100 INJECTION, POWDER, FOR SOLUTION INTRAMUSCULAR; INTRAVENOUS at 11:36

## 2025-07-05 RX ADMIN — DOCUSATE SODIUM 50 MG AND SENNOSIDES 8.6 MG 2 TABLET: 8.6; 5 TABLET, FILM COATED ORAL at 05:52

## 2025-07-05 RX ADMIN — DIGOXIN 125 MCG: 0.25 TABLET ORAL at 05:52

## 2025-07-05 RX ADMIN — HYDROCORTISONE SODIUM SUCCINATE 50 MG: 100 INJECTION, POWDER, FOR SOLUTION INTRAMUSCULAR; INTRAVENOUS at 01:04

## 2025-07-05 RX ADMIN — PROPOFOL 5 MCG/KG/MIN: 10 INJECTION, EMULSION INTRAVENOUS at 08:47

## 2025-07-05 RX ADMIN — LANSOPRAZOLE 30 MG: 30 TABLET, ORALLY DISINTEGRATING ORAL at 05:52

## 2025-07-05 RX ADMIN — HEPARIN SODIUM 5000 UNITS: 5000 INJECTION, SOLUTION INTRAVENOUS; SUBCUTANEOUS at 13:21

## 2025-07-05 RX ADMIN — CLOPIDOGREL BISULFATE 75 MG: 75 TABLET, FILM COATED ORAL at 05:52

## 2025-07-05 RX ADMIN — NOREPINEPHRINE BITARTRATE 0.05 MCG/KG/MIN: 1 INJECTION, SOLUTION, CONCENTRATE INTRAVENOUS at 09:54

## 2025-07-05 RX ADMIN — HYDROMORPHONE HYDROCHLORIDE 1 MG: 1 INJECTION, SOLUTION INTRAMUSCULAR; INTRAVENOUS; SUBCUTANEOUS at 11:32

## 2025-07-05 RX ADMIN — HYDROCORTISONE SODIUM SUCCINATE 50 MG: 100 INJECTION, POWDER, FOR SOLUTION INTRAMUSCULAR; INTRAVENOUS at 17:11

## 2025-07-05 RX ADMIN — EPINEPHRINE 0.01 MCG/KG/MIN: 1 INJECTION INTRAMUSCULAR; INTRAVENOUS; SUBCUTANEOUS at 09:17

## 2025-07-05 ASSESSMENT — PAIN DESCRIPTION - PAIN TYPE
TYPE: ACUTE PAIN

## 2025-07-05 ASSESSMENT — FIBROSIS 4 INDEX: FIB4 SCORE: 0.92

## 2025-07-05 NOTE — PROGRESS NOTES
Critical Care Progress Note    Date of admission  2025    History of Presenting Illness  Most history was obtained from Donte via extensive chart review as well as the patient answering a few questions due to his critical illness     Donte Agustin is a 79 y.o. male with the past medical history significant for COPD, chronic hypoxic respiratory failure on 2-5 lpm O2, ischemic CM with an EF of 25% (last ECHO in 2025), pulmonary hypertension with an RVSP of 55mmHg, CAD s/p PCI and stents, tobacco abuse, and prostate cancer who was recently hospitalized at a facility in [place], [place] for pneumonia and released for home on 2025.  EMS was summoned to his house today due to his wife feeling he was more lethargy and not breathing well.  EMS found the patient to be hypoxic on his home O2 as well as tachycardic and hypotensive with SBPs in the 60s.  EMS decided to bypass [place], [place] local ER and bring the patient to [facility].  EN route, the patient received 1 liters of IVF and was started on peripheral levophed.  In the ER, the patient was started on 15 lpm oxymask as well given another 1 liter of IVF.  He was started of broad spectrum antibiotics for a chest x-ray concerning for a left sided pneumonia.  He was be admitted to the ICU for management of septic shock with acute hypoxic respiratory failure and TIM due to a hospital acquired pneumonia.    Hospital Course   - intubated overnight, central line and A line placed    Interval Problem Update  Chart review from the past 24 hours includes imaging, laboratory studies, vital signs and notes available.  Pertinent data for today's visit includes    Neuro: intact  Cardiac:   -Bradycardic  -Levophed 0.08 mcg/kg/min  Pulm:   -Vent: 16/400/10/50%  -AB.40/38.6/73  Heme:   -Hg stable  /renal:   - Cr improving   GI: No acute events  Endo: 10 units ISS   ID:    - RVP negative   - WBC downtrending    - on Zosyn   I/O: +2588/-500=+2088  Imaging:   - CT chest: Signs of  aspiration on the right    Vital Signs for last 24 hours   Temp:  [35.8 °C (96.5 °F)-36.6 °C (97.9 °F)] 36.6 °C (97.9 °F)  Pulse:  [] 48  Resp:  [0-90] 67  BP: ()/(40-94) 99/50  SpO2:  [89 %-100 %] 98 %    Hemodynamic parameters for last 24 hours       Respiratory Information for the last 24 hours  Vent Mode: APVCMV  Rate (breaths/min): 16  Vt Target (mL): 400  PEEP/CPAP: 10  MAP: 13  Control VTE (exp VT): 396  Physical Exam  Vitals and nursing note reviewed.   Constitutional:       Appearance: He is obese. He is ill-appearing. He is not toxic-appearing.      Interventions: He is intubated.      Comments: Patient appears older than stated age and chronically ill    HENT:      Head: Normocephalic and atraumatic.      Right Ear: External ear normal.      Left Ear: External ear normal.      Nose: Nose normal. No rhinorrhea.      Mouth/Throat:      Mouth: Mucous membranes are dry.      Pharynx: Oropharynx is clear. No oropharyngeal exudate.   Eyes:      General: No scleral icterus.     Pupils: Pupils are equal, round, and reactive to light.   Cardiovascular:      Rate and Rhythm: Normal rate and regular rhythm.      Pulses:           Dorsalis pedis pulses are 1+ on the right side and 1+ on the left side.      Heart sounds: No murmur heard.  Pulmonary:      Effort: He is intubated.      Breath sounds: No wheezing.      Comments: Coarse breath sounds throughout L>R, no wheezing, diminished left base  Chest:      Chest wall: No tenderness.   Abdominal:      Palpations: Abdomen is soft.      Tenderness: There is no abdominal tenderness. There is no guarding or rebound.   Musculoskeletal:         General: Normal range of motion.      Cervical back: Normal range of motion and neck supple.      Right lower le+ Edema present.      Left lower le+ Edema present.   Lymphadenopathy:      Cervical: No cervical adenopathy.   Skin:     General: Skin is warm and dry.      Capillary Refill: Capillary refill takes  less than 2 seconds.      Findings: No rash.   Neurological:      Mental Status: He is alert and oriented to person, place, and time.      Cranial Nerves: No cranial nerve deficit.      Sensory: No sensory deficit.      Motor: No weakness.         Medications  Current Medications[1]    Fluids    Intake/Output Summary (Last 24 hours) at 7/5/2025 0740  Last data filed at 7/5/2025 0646  Gross per 24 hour   Intake 2588.16 ml   Output 500 ml   Net 2088.16 ml       Laboratory  Recent Labs     07/04/25  2250 07/05/25  0240   ISTATAPH 7.429 7.406   ISTATAPCO2 37.3 38.6   ISTATAPO2 96 73*   ISTATATCO2 26* 25*   [id number] 98 95   ISTATARTHCO3 24.7 24.2   ISTATARTBE 1 0   ISTATTEMP 96.6 F 96.2 F   ISTATFIO2 60 50   ISTATSPEC Arterial Arterial   ISTATAPHTC 7.445 7.425   [id number] 90 67*         Recent Labs     07/04/25  1110 07/05/25  0340   SODIUM 137 141   POTASSIUM 3.5* 5.2   CHLORIDE 97 105   CO2 24 25   BUN 44* 32*   CREATININE 1.71* 1.17   MAGNESIUM 1.7 1.9   PHOSPHORUS  --  2.5   CALCIUM 8.8 8.8     Recent Labs     07/04/25  1110 07/05/25  0340   ALTSGPT <5 <5   ASTSGOT 11* 9*   ALKPHOSPHAT 63 60   TBILIRUBIN 0.3 0.4   GLUCOSE 187* 194*     Recent Labs     07/04/25  1110 07/05/25  0340   WBC 32.0* 20.0*   NEUTSPOLYS 91.70* 89.40*   LYMPHOCYTES 3.30* 4.70*   MONOCYTES 5.00 5.10   EOSINOPHILS 0.00 0.10   BASOPHILS 0.00 0.10   ASTSGOT 11* 9*   ALTSGPT <5 <5   ALKPHOSPHAT 63 60   TBILIRUBIN 0.3 0.4     Recent Labs     07/04/25  1110 07/05/25  0340   RBC 4.48* 4.23*   HEMOGLOBIN 12.2* 11.7*   HEMATOCRIT 39.5* 37.0*   PLATELETCT 446 355   PROTHROMBTM 15.1*  --    INR 1.18*  --        Imaging  CT:    Reviewed    Assessment/Plan  * Septic shock (HCC)- (present on admission)  Assessment & Plan  This is Septic shock Present on admission    Trending lactic acid  Cont broad spectrum abx:  Zosyn  Vanco Dced, MRSA nares negative   MAP goals > 65  I am actively titrating levophed for MAP goals  Continue stress dose  steroids    Bradycardia  Assessment & Plan  Due to Precedex and likely from digoxin  Hold Precedex  Hold digoxin  Start epinephrine to increase heart rate    Left lower lobe pneumonia- (present on admission)  Assessment & Plan  Hospital acquired vs aspiration  Cont zosyn  Checking Strep and Legionella urine Ag  COVID/influenza/RSV was negative  RVP negative  SLP evaluation after extubation, suspect that she is aspirating    TIM (acute kidney injury) ([facility])- (present on admission)  Assessment & Plan  ?due to hypotension - Improving    Monitor UOP/creat  Avoid nephrotoxins     Acute on chronic respiratory failure with hypoxia ([facility])- (present on admission)  Assessment & Plan  Due to pneumonia  Intubated 7/4/2025  - Daily SAT/SBT per protocol  - PRN ABG  - Elevate HOB to 30 degrees  - Chlorhexidine rinse to decrease VAP incidence  - Maintain O2 saturation > 90%   - Tube placement verified  - Pepcid for GI prophylaxis  - Sedation with Propofol for RASS +1 to -1 - severe bradycardia with precedex   - Monitor triglycerides periodically   - Analgesia with Fentanyl for CPOT < 2  - Restraints to prevent self extubation  - Utilizing lung protective ventilation with tidal volume 6ml/kg  - Monitoring peak and plateau pressures  ECHO pending    COPD (chronic obstructive pulmonary disease) ([facility])- (present on admission)  Assessment & Plan  Hx of, not in acute exacerbation  RT/O2 protocols  Hold home Anoro  Albuterol as needed    Pulmonary hypertension ([facility])- (present on admission)  Assessment & Plan  ECHO with RVSP of 55 mmHg  Status post judicious fluid resuscitation   Avoid hypoxia/acidemia  Diurese when able     Hyperlipidemia- (present on admission)  Assessment & Plan  Cont home statin    Hypertension- (present on admission)  Assessment & Plan  Holding home BP meds while in shock    Prostate cancer ([facility])- (present on admission)  Assessment & Plan  Hx of     History of coronary angioplasty with insertion of stent- (present on  admission)  Assessment & Plan  Cont ASA and statin therapy    Heart failure with reduced ejection fraction (HCC) (LVEF 20-25% Echo 2/2025)- (present on admission)  Assessment & Plan  Judicious fluid resuscitation with EF of 25%  Maintain euvolemia  Holding GDMT while in shock    Diabetes (HCC)- (present on admission)  Assessment & Plan  BG goals 120-180s  Medium ISS  Monitor for need for long acting insulin while on steroids     Lines: CVC, A line  Tubes: boone, ETT, NGT   Diet: NPO   DVT ppx: heparin  GI ppx: lansoprazole   Bowel regiment: yes   Ventilator ppx: Cholorohexadine, HOB> 30, oral care    I have performed a physical exam and reviewed and updated ROS and Plan today (7/5/2025). In review of yesterday's note (7/4/2025), there are no changes except as documented above.     Discussed patient condition and risk of morbidity and/or mortality with RN, RT, Therapies, Pharmacy, and Patient    The patient remains critically ill.  Critical care time = 60 minutes in directly providing and coordinating critical care and extensive data review.  No time overlap and excludes procedures.    __________  This note was generated using voice recognition software which has a chance of producing errors of grammar and content.  I have made every reasonable attempt to find and correct any errors, but it should be expected that some may not be found prior to finalization of this note.    Yulia Grey, DO   Pulmonary and Critical Care       [1]   Current Facility-Administered Medications   Medication Dose Route Frequency Provider Last Rate Last Admin    aspirin (Asa) chewable tab 81 mg  81 mg Enteral Tube DAILY Jazmine Garcia M.D.   81 mg at 07/05/25 0615    lansoprazole (Prevacid) solutab 30 mg  30 mg Enteral Tube DAILY Jazmine Garcia M.D.   30 mg at 07/05/25 0552    acetaminophen (Tylenol) tablet 650 mg  650 mg Enteral Tube Q6HRS PRN Jazmine Garcia M.D.        ondansetron (Zofran) syringe/vial injection 4 mg  4 mg  Intravenous Q4HRS PRN Jazmine Garcia M.D.        Or    ondansetron (Zofran ODT) dispertab 4 mg  4 mg Enteral Tube Q4HRS PRN Jazmine Garcia M.D.        digoxin (Lanoxin) tablet 125 mcg  125 mcg Enteral Tube DAILY Jazmine Garcia M.D.   125 mcg at 07/05/25 0552    gabapentin (Neurontin) capsule 300 mg  300 mg Enteral Tube TID Jazmine Garcia M.D.   300 mg at 07/05/25 0553    Respiratory Therapy Consult   Nebulization Continuous RT Jazmine Garcia M.D.        heparin injection 5,000 Units  5,000 Units Subcutaneous Q8HRS Jazmine Garcia M.D.   5,000 Units at 07/05/25 0545    insulin lispro (HumaLOG,AdmeLOG) subcutaneous injection  2-9 Units Subcutaneous Q6HRS Jazmine Garcia M.D.   2 Units at 07/05/25 0636    And    dextrose 50 % (D50W) injection 25 g  25 g Intravenous Q15 MIN PRN Jazmine Garcia M.D.        LR (Bolus) infusion 500 mL  500 mL Intravenous Once PRN Jazmine Garcia M.D.        piperacillin-tazobactam (Zosyn) 4.5 g in  mL IVPB  4.5 g Intravenous Q8HRS Jazmine Garcia M.D.   Stopped at 07/05/25 0514    MD Alert...Vancomycin per Pharmacy  1 Each Other PHARMACY TO DOSE Jazmine Garcia M.D.   1 Each at 07/04/25 1400    norepinephrine (Levophed) 8 mg in 250 mL NS infusion (premix)  0-1 mcg/kg/min (Ideal) Intravenous Continuous Jazmine Garcia M.D. 9.9 mL/hr at 07/05/25 0417 0.08 mcg/kg/min at 07/05/25 0417    hydrocortisone sodium succinate PF (Solu-CORTEF) injection 50 mg  50 mg Intravenous Q6HRS Jazmine Garcia M.D.   50 mg at 07/05/25 0545    And    fludrocortisone (Florinef) tablet 0.05 mg  0.05 mg Enteral Tube QAM Jazmine Garcia M.D.        vasopressin (Vasostrict) 20 Units in  mL Infusion  0.03 Units/min Intravenous Continuous Jazmine Garcia M.D.        Respiratory Therapy Consult   Nebulization Continuous RT Jazmine Garcia M.D.        atorvastatin (Lipitor) tablet 40 mg  40 mg Enteral Tube Q EVENING Jazmine Garcia M.D.        umeclidinium-vilanterol  (Anoro Ellipta) inhaler 1 Puff  1 Puff Inhalation QDAILY (RT) Jazmine Garcia M.D.        clopidogrel (Plavix) tablet 75 mg  75 mg Enteral Tube DAILY Jazmine Garcia M.D.   75 mg at 07/05/25 0552    vancomycin (Vancocin) 1,000 mg in  mL IVPB  1,000 mg Intravenous Q24HR Jazmine Garcia M.D.        HYDROmorphone (Dilaudid) injection 0.5 mg  0.5 mg Intravenous Once Glenn Paige M.D.        Respiratory Therapy Consult   Nebulization Continuous RT Luisito Amaro M.D.        senna-docusate (Pericolace Or Senokot S) 8.6-50 MG per tablet 2 Tablet  2 Tablet Enteral Tube BID Luisito Amaro M.D.   2 Tablet at 07/05/25 0552    And    polyethylene glycol/lytes (Miralax) Packet 1 Packet  1 Packet Enteral Tube QDAY PRN Luisito Amaro M.D.        And    magnesium hydroxide (Milk Of Magnesia) suspension 30 mL  30 mL Enteral Tube QDAY PRN Luisito Amaro M.D.        And    bisacodyl (Dulcolax) suppository 10 mg  10 mg Rectal QDAY PRN Luisito Amaro M.D.        MD Alert...ICU Electrolyte Replacement per Pharmacy   Other PHARMACY TO DOSE Luisito Amaro M.D.        lidocaine (Xylocaine) 1 % injection 2 mL  2 mL Tracheal Tube Q30 MIN PRN Luisito Amaro M.D.        dexmedetomidine (Precedex) 400 MCG/100ML infusion  0-1.5 mcg/kg/hr (Ideal) Intravenous Continuous Luisito Amaro M.D. 3.3 mL/hr at 07/05/25 0535 0.2 mcg/kg/hr at 07/05/25 0535    HYDROmorphone (Dilaudid) injection 0.5 mg  0.5 mg Intravenous Q HOUR PRN Luisito Amaro M.D.   0.5 mg at 07/05/25 0517    Or    HYDROmorphone (Dilaudid) injection 1 mg  1 mg Intravenous Q HOUR PRN Luisito Amaro M.D.

## 2025-07-05 NOTE — PROGRESS NOTES
Patient continue to gurgling and aspirating on NRB mask. Tried to call his wife no answer. Intubation will worsen will likely cause worsening dysfunction post procedure from ET tube.     Plan to intubated and bronch patient    Later I was able to discuss with wife she left the the medical decision up to me. He was intubated easily and bronchoscopy was horrific amounts of secretions removed. BAL sent.     Luisito Amaro MD  Critical Care Medicine

## 2025-07-05 NOTE — PROGRESS NOTES
Procedure:  Intubation and Bronch     Timeout @ 2126     Procedure start @ 2129     Team Members: Lakeshia Monahan, RN, Rogelio, RN, Ruperto, RT    Medications given: 20mg of Etomodate @ 2127 and 100mg of Rocuronium @ 2128

## 2025-07-05 NOTE — PROCEDURES
Date: 7/4/2025    Procedure: Bronchoscopy with Therapeutic suctioning and BAL    Indication: Infiltrate/atlectasis ongoing aspiration    Physician:  Luisito Amaro M.D.    Consent:  Wife on phone.     Procedure:  A time out occurred with the patient name, medical record number, allergies, and consent with right procedure and indication with bedside nurse and if able the patient. The patient was connected to a monitor and had continuous pulse oximeter. The patient was sedated with etomidate and rocuronium. The bronchoscope was insert down the left and right bronchi to the terminal bronchioles. Therapeutic suction of secretion was provided right mainstem bronchus with gelatinous secretion down both main airway most notable in RLL RML and left lower lobe and mainstem bronchus . A BAL was preformed in the RLL. The patient tolerated the procedure without any immediate complications with minimal <5ml of blood loss.     Findings significant secretion in both mainstem bronchus right > left.     Luisito Amaro MD  Critical Care Medicine

## 2025-07-05 NOTE — THERAPY
Speech Language Therapy Contact Note    Patient Name: Donte Agustin  Age:  79 y.o., Sex:  male  Medical Record #: [id number]  Today's Date: 7/5/2025 07/05/25 0708   Treatment Variance   Reason For Missed Therapy Medical - Patient on Hold from Therapy;Medical - Patient not Able To Participate;Medical - Patient Is Not Medically Stable   Interdisciplinary Plan of Care Collaboration   IDT Collaboration with  Other (See Comments)  (EMR)   Collaboration Comments Per EMR, patient is currently intubated. SLP to hold and monitor for extubation.

## 2025-07-05 NOTE — PROCEDURES
Intubation    Date/Time: 7/4/2025 9:42 PM    Performed by: Luisito Amaro M.D.  Authorized by: Luisito Amaro M.D.    Consent:     Consent obtained:  Verbal    Consent given by:  Patient and spouse    Risks discussed:  Aspiration, bleeding and hypoxia    Alternatives discussed:  No treatment  Pre-procedure details:     Patient status:  Altered mental status    Pretreatment meds: etomidate.    Paralytics:  Rocuronium  Procedure details:     Preoxygenation:  Nonrebreather mask    Laryngoscope blade:  Mac 4    Cormack-Lehane Classification:  Grade 1    Tube size (mm):  7.5    Tube type:  Cuffed    Number of attempts:  1  Placement assessment:     ETT to teeth:  23cm    Tube secured with:  ETT arce    Breath sounds:  Equal    Placement verification: chest rise, condensation, direct visualization, ETCO2 detector, fiberoptic scope and tube exhalation      CXR findings:  ETT in proper place  Post-procedure details:     Patient tolerance of procedure:  Tolerated well, no immediate complications  Comments:      Intubation without trouble.

## 2025-07-05 NOTE — RESPIRATORY CARE
At approximately 2135 pt was intubated with a 7.5 ETT at a depth of 26 at the teeth. A bronchoscopy was then performed. No adverse effects

## 2025-07-05 NOTE — CARE PLAN
The patient is Watcher - Medium risk of patient condition declining or worsening    Shift Goals  Clinical Goals: improve heart rate, maintain hemodynamic stability  Patient Goals: LASHELL  Family Goals: Update on plan of care    Progress made toward(s) clinical / shift goals:    Problem: Respiratory  Goal: Patient will achieve/maintain optimum respiratory ventilation and gas exchange  Outcome: Progressing  Patient's respiratory status has improved throughout the shift. He has clear lung sounds and is maintaining an SpO2 of high 90s with appropriate ventilator settings.     Problem: Hemodynamics  Goal: Patient's hemodynamics, fluid balance and neurologic status will be stable or improve  Outcome: Progressing  Patient has been hemodynamically stable with HR in the 50s (atrial fibrillation/flutter with ectopy) and BPs 90s-100s systolic, MAP >65.     Problem: Pain - Standard  Goal: Alleviation of pain or a reduction in pain to the patient’s comfort goal  Outcome: Progressing  Patient's pain has been managed through repositioning, rest, and administration of pain medication.      Problem: Safety - Medical Restraint  Goal: Remains free of injury from restraints (Restraint for Interference with Medical Device)  Outcome: Progressing  Patient does not show any apparent injury from being in restraints. Q2H restraint checks are being implemented.     Problem: Skin Integrity  Goal: Skin integrity is maintained or improved  Outcome: Progressing  Patient's skin integrity is being maintained through Q2H turns and repositioning and offloading his pressure areas such as his elbows, heels, and sacrum.    Patient is not progressing towards the following goals:

## 2025-07-05 NOTE — PROGRESS NOTES
Bedside report received from Shirley NO.  Assumed patient care.  No signs of distress at this time.  Tele monitor on, all lines traced and medications verified.  Safety precautions in place, call light and personal belongings within reach.  Educated patient to use call light if assistance is needed.  Will continue to monitor.

## 2025-07-05 NOTE — PROGRESS NOTES
Notified MD Paige Pt will carmella down into the low to mid 40s when ectopy occurs.  Pt currently in afib.  Stat EKG ordered and will continue to monitor.

## 2025-07-05 NOTE — CARE PLAN
The patient is Watcher - Medium risk of patient condition declining or worsening    Shift Goals  Clinical Goals: Hemodynamic stability, Improve O2 levels  Patient Goals: Get better  Family Goals: Updates    Progress made toward(s) clinical / shift goals:    Problem: Respiratory  Goal: Patient will achieve/maintain optimum respiratory ventilation and gas exchange  Outcome: Progressing  Note: Pt remains stable on vent.  Will continue to assess and monitor.     Problem: Knowledge Deficit - Standard  Goal: Patient and family/care givers will demonstrate understanding of plan of care, disease process/condition, diagnostic tests and medications  Outcome: Progressing     Problem: Hemodynamics  Goal: Patient's hemodynamics, fluid balance and neurologic status will be stable or improve  Outcome: Progressing  Note: Pt remains hemodynamically stable, titrating Levo.  Will continue to assess and monitor.     Problem: Urinary - Renal Perfusion  Goal: Ability to achieve and maintain adequate renal perfusion and functioning will improve  Outcome: Progressing  Note: Pt continues to produce adequate urine output.  Will continue to assess and monitor.     Problem: Pain - Standard  Goal: Alleviation of pain or a reduction in pain to the patient’s comfort goal  Outcome: Progressing  Note: Pt has no complaints of pain.  Will continue to assess and monitor.     Problem: Safety - Medical Restraint  Goal: Remains free of injury from restraints (Restraint for Interference with Medical Device)  Outcome: Progressing       Patient is not progressing towards the following goals:

## 2025-07-05 NOTE — ASSESSMENT & PLAN NOTE
Due to Precedex and likely from digoxin  Hold Precedex  Hold digoxin, may be able to restart later   Avoid Precedex in future?

## 2025-07-06 ENCOUNTER — HOSPITAL ENCOUNTER (OUTPATIENT)
Dept: RADIOLOGY | Facility: MEDICAL CENTER | Age: 80
End: 2025-07-06
Attending: INTERNAL MEDICINE
Payer: MEDICARE

## 2025-07-06 LAB
ACTION RANGE TRIGGERED IACRT: NO
ALBUMIN SERPL BCP-MCNC: 3.1 G/DL (ref 3.2–4.9)
ALBUMIN/GLOB SERPL: 1 G/DL
ALP SERPL-CCNC: 56 U/L (ref 30–99)
ALT SERPL-CCNC: <5 U/L (ref 2–50)
ANION GAP SERPL CALC-SCNC: 13 MMOL/L (ref 7–16)
AST SERPL-CCNC: 6 U/L (ref 12–45)
BACTERIA SPEC RESP CULT: NORMAL
BACTERIA UR CULT: NORMAL
BASE EXCESS BLDA CALC-SCNC: 0 MMOL/L (ref -4–3)
BASE EXCESS BLDA CALC-SCNC: 0 MMOL/L (ref -4–3)
BASOPHILS # BLD AUTO: 0.1 % (ref 0–1.8)
BASOPHILS # BLD: 0.03 K/UL (ref 0–0.12)
BILIRUB SERPL-MCNC: 0.3 MG/DL (ref 0.1–1.5)
BODY TEMPERATURE: ABNORMAL DEGREES
BREATHS SETTING VENT: 16
BREATHS SETTING VENT: 16
BUN SERPL-MCNC: 33 MG/DL (ref 8–22)
CALCIUM ALBUM COR SERPL-MCNC: 9.8 MG/DL (ref 8.5–10.5)
CALCIUM SERPL-MCNC: 9.1 MG/DL (ref 8.5–10.5)
CHLORIDE SERPL-SCNC: 104 MMOL/L (ref 96–112)
CO2 BLDA-SCNC: 25 MMOL/L (ref 20–33)
CO2 BLDA-SCNC: 25 MMOL/L (ref 32–48)
CO2 SERPL-SCNC: 24 MMOL/L (ref 20–33)
CREAT SERPL-MCNC: 1.13 MG/DL (ref 0.5–1.4)
DELSYS IDSYS: ABNORMAL
DELSYS IDSYS: ABNORMAL
DIGOXIN SERPL-MCNC: 0.9 NG/ML (ref 0.8–2)
EOSINOPHIL # BLD AUTO: 0 K/UL (ref 0–0.51)
EOSINOPHIL NFR BLD: 0 % (ref 0–6.9)
ERYTHROCYTE [DISTWIDTH] IN BLOOD BY AUTOMATED COUNT: 49.1 FL (ref 35.9–50)
GFR SERPLBLD CREATININE-BSD FMLA CKD-EPI: 67 ML/MIN/1.73 M 2
GLOBULIN SER CALC-MCNC: 3.2 G/DL (ref 1.9–3.5)
GLUCOSE BLD STRIP.AUTO-MCNC: 111 MG/DL (ref 65–99)
GLUCOSE BLD STRIP.AUTO-MCNC: 159 MG/DL (ref 65–99)
GLUCOSE BLD STRIP.AUTO-MCNC: 202 MG/DL (ref 65–99)
GLUCOSE BLD STRIP.AUTO-MCNC: 221 MG/DL (ref 65–99)
GLUCOSE BLD STRIP.AUTO-MCNC: 274 MG/DL (ref 65–99)
GLUCOSE SERPL-MCNC: 259 MG/DL (ref 65–99)
GRAM STN SPEC: NORMAL
HCO3 BLDA-SCNC: 23.7 MMOL/L (ref 21–28)
HCO3 BLDA-SCNC: 24 MMOL/L (ref 21–28)
HCT VFR BLD AUTO: 34.9 % (ref 42–52)
HGB BLD-MCNC: 10.9 G/DL (ref 14–18)
HOROWITZ INDEX BLDA+IHG-RTO: 213 MM[HG]
IMM GRANULOCYTES # BLD AUTO: 0.22 K/UL (ref 0–0.11)
IMM GRANULOCYTES NFR BLD AUTO: 1.1 % (ref 0–0.9)
INST. QUALIFIED PATIENT IIQPT: YES
L PNEUMO AG UR QL IA: NEGATIVE
LACTATE BLD-SCNC: 1.1 MMOL/L (ref 0.5–2)
LACTATE BLD-SCNC: 1.13 MMOL/L (ref 0.5–2)
LYMPHOCYTES # BLD AUTO: 0.63 K/UL (ref 1–4.8)
LYMPHOCYTES NFR BLD: 3.1 % (ref 22–41)
Lab: ABNORMAL
MAGNESIUM SERPL-MCNC: 2.1 MG/DL (ref 1.5–2.5)
MCH RBC QN AUTO: 27.5 PG (ref 27–33)
MCHC RBC AUTO-ENTMCNC: 31.2 G/DL (ref 32.3–36.5)
MCV RBC AUTO: 88.1 FL (ref 81.4–97.8)
MODE IMODE: ABNORMAL
MODE IMODE: ABNORMAL
MONOCYTES # BLD AUTO: 1.49 K/UL (ref 0–0.85)
MONOCYTES NFR BLD AUTO: 7.3 % (ref 0–13.4)
NEUTROPHILS # BLD AUTO: 17.93 K/UL (ref 1.82–7.42)
NEUTROPHILS NFR BLD: 88.4 % (ref 44–72)
NRBC # BLD AUTO: 0 K/UL
NRBC BLD-RTO: 0 /100 WBC (ref 0–0.2)
O2/TOTAL GAS SETTING VFR VENT: 40 %
PCO2 BLDA: 34 MMHG (ref 32–48)
PCO2 BLDA: 34 MMHG (ref 32–48)
PCO2 TEMP ADJ BLDA: 32.7 MMHG (ref 32–48)
PCO2 TEMP ADJ BLDA: 33 MMHG (ref 32–48)
PEEP END EXPIRATORY PRESSURE IPEEP: 8
PEEP END EXPIRATORY PRESSURE IPEEP: 8 CMH20
PH BLDA: 7.45 [PH] (ref 7.35–7.45)
PH BLDA: 7.45 [PH] (ref 7.35–7.45)
PH TEMP ADJ BLDA: 7.46 [PH] (ref 7.35–7.45)
PH TEMP ADJ BLDA: 7.46 [PH] (ref 7.35–7.45)
PHOSPHATE SERPL-MCNC: 2.5 MG/DL (ref 2.5–4.5)
PLATELET # BLD AUTO: 305 K/UL (ref 164–446)
PMV BLD AUTO: 11.6 FL (ref 9–12.9)
PO2 BLDA: 85 MMHG (ref 83–108)
PO2 BLDA: 85 MMHG (ref 83–108)
PO2 TEMP ADJ BLDA: 80 MMHG (ref 83–108)
PO2 TEMP ADJ BLDA: 80 MMHG (ref 83–108)
POTASSIUM SERPL-SCNC: 3.3 MMOL/L (ref 3.6–5.5)
PROCALCITONIN SERPL-MCNC: 0.06 NG/ML
PROT SERPL-MCNC: 6.3 G/DL (ref 6–8.2)
RBC # BLD AUTO: 3.96 M/UL (ref 4.7–6.1)
S PNEUM AG UR QL: NEGATIVE
SAO2 % BLDA: 97 % (ref 93–99)
SAO2 % BLDA: 97 % (ref 93–99)
SIGNIFICANT IND 70042: NORMAL
SIGNIFICANT IND 70042: NORMAL
SITE SITE: NORMAL
SITE SITE: NORMAL
SODIUM SERPL-SCNC: 141 MMOL/L (ref 135–145)
SOURCE SOURCE: NORMAL
SOURCE SOURCE: NORMAL
SPECIMEN DRAWN FROM PATIENT: ABNORMAL
SPECIMEN DRAWN FROM PATIENT: ABNORMAL
TIDAL VOLUME IVT: 400
TIDAL VOLUME IVT: 400 ML
WBC # BLD AUTO: 20.3 K/UL (ref 4.8–10.8)

## 2025-07-06 PROCEDURE — 71045 X-RAY EXAM CHEST 1 VIEW: CPT

## 2025-07-06 PROCEDURE — 700102 HCHG RX REV CODE 250 W/ 637 OVERRIDE(OP): Performed by: INTERNAL MEDICINE

## 2025-07-06 PROCEDURE — 83605 ASSAY OF LACTIC ACID: CPT | Performed by: STUDENT IN AN ORGANIZED HEALTH CARE EDUCATION/TRAINING PROGRAM

## 2025-07-06 PROCEDURE — 97602 WOUND(S) CARE NON-SELECTIVE: CPT

## 2025-07-06 PROCEDURE — 85025 COMPLETE CBC W/AUTO DIFF WBC: CPT

## 2025-07-06 PROCEDURE — 84100 ASSAY OF PHOSPHORUS: CPT

## 2025-07-06 PROCEDURE — A9270 NON-COVERED ITEM OR SERVICE: HCPCS | Performed by: INTERNAL MEDICINE

## 2025-07-06 PROCEDURE — 84145 PROCALCITONIN (PCT): CPT

## 2025-07-06 PROCEDURE — 99291 CRITICAL CARE FIRST HOUR: CPT | Performed by: INTERNAL MEDICINE

## 2025-07-06 PROCEDURE — 700111 HCHG RX REV CODE 636 W/ 250 OVERRIDE (IP): Mod: JZ | Performed by: INTERNAL MEDICINE

## 2025-07-06 PROCEDURE — 36600 WITHDRAWAL OF ARTERIAL BLOOD: CPT

## 2025-07-06 PROCEDURE — 82962 GLUCOSE BLOOD TEST: CPT | Performed by: INTERNAL MEDICINE

## 2025-07-06 PROCEDURE — 700111 HCHG RX REV CODE 636 W/ 250 OVERRIDE (IP)

## 2025-07-06 PROCEDURE — 700111 HCHG RX REV CODE 636 W/ 250 OVERRIDE (IP): Mod: JZ

## 2025-07-06 PROCEDURE — 94003 VENT MGMT INPAT SUBQ DAY: CPT

## 2025-07-06 PROCEDURE — 80053 COMPREHEN METABOLIC PANEL: CPT

## 2025-07-06 PROCEDURE — 83605 ASSAY OF LACTIC ACID: CPT

## 2025-07-06 PROCEDURE — 83735 ASSAY OF MAGNESIUM: CPT

## 2025-07-06 PROCEDURE — 700111 HCHG RX REV CODE 636 W/ 250 OVERRIDE (IP): Performed by: INTERNAL MEDICINE

## 2025-07-06 PROCEDURE — 700105 HCHG RX REV CODE 258: Performed by: INTERNAL MEDICINE

## 2025-07-06 PROCEDURE — 82803 BLOOD GASES ANY COMBINATION: CPT | Performed by: STUDENT IN AN ORGANIZED HEALTH CARE EDUCATION/TRAINING PROGRAM

## 2025-07-06 PROCEDURE — 80162 ASSAY OF DIGOXIN TOTAL: CPT

## 2025-07-06 PROCEDURE — 82803 BLOOD GASES ANY COMBINATION: CPT

## 2025-07-06 PROCEDURE — 770022 HCHG ROOM/CARE - ICU (200)

## 2025-07-06 RX ORDER — ONDANSETRON 2 MG/ML
4 INJECTION INTRAMUSCULAR; INTRAVENOUS EVERY 4 HOURS PRN
Status: DISCONTINUED | OUTPATIENT
Start: 2025-07-06 | End: 2025-07-08 | Stop reason: HOSPADM

## 2025-07-06 RX ORDER — ASPIRIN 81 MG/1
81 TABLET, CHEWABLE ORAL DAILY
Status: DISCONTINUED | OUTPATIENT
Start: 2025-07-07 | End: 2025-07-08 | Stop reason: HOSPADM

## 2025-07-06 RX ORDER — HALOPERIDOL 5 MG/ML
1 INJECTION INTRAMUSCULAR EVERY 4 HOURS PRN
Status: DISCONTINUED | OUTPATIENT
Start: 2025-07-06 | End: 2025-07-08 | Stop reason: HOSPADM

## 2025-07-06 RX ORDER — AMOXICILLIN 250 MG
2 CAPSULE ORAL 2 TIMES DAILY
Status: DISCONTINUED | OUTPATIENT
Start: 2025-07-06 | End: 2025-07-08 | Stop reason: HOSPADM

## 2025-07-06 RX ORDER — BISACODYL 10 MG
10 SUPPOSITORY, RECTAL RECTAL
Status: DISCONTINUED | OUTPATIENT
Start: 2025-07-06 | End: 2025-07-08 | Stop reason: HOSPADM

## 2025-07-06 RX ORDER — GABAPENTIN 300 MG/1
300 CAPSULE ORAL 3 TIMES DAILY
Status: DISCONTINUED | OUTPATIENT
Start: 2025-07-06 | End: 2025-07-08

## 2025-07-06 RX ORDER — ATORVASTATIN CALCIUM 40 MG/1
40 TABLET, FILM COATED ORAL EVERY EVENING
Status: DISCONTINUED | OUTPATIENT
Start: 2025-07-06 | End: 2025-07-08 | Stop reason: HOSPADM

## 2025-07-06 RX ORDER — CLOPIDOGREL BISULFATE 75 MG/1
75 TABLET ORAL DAILY
Status: DISCONTINUED | OUTPATIENT
Start: 2025-07-07 | End: 2025-07-08 | Stop reason: HOSPADM

## 2025-07-06 RX ORDER — ONDANSETRON 4 MG/1
4 TABLET, ORALLY DISINTEGRATING ORAL EVERY 4 HOURS PRN
Status: DISCONTINUED | OUTPATIENT
Start: 2025-07-06 | End: 2025-07-08 | Stop reason: HOSPADM

## 2025-07-06 RX ORDER — ACETAMINOPHEN 325 MG/1
650 TABLET ORAL EVERY 6 HOURS PRN
Status: DISCONTINUED | OUTPATIENT
Start: 2025-07-06 | End: 2025-07-08 | Stop reason: HOSPADM

## 2025-07-06 RX ORDER — POLYETHYLENE GLYCOL 3350 17 G/17G
1 POWDER, FOR SOLUTION ORAL
Status: DISCONTINUED | OUTPATIENT
Start: 2025-07-06 | End: 2025-07-08 | Stop reason: HOSPADM

## 2025-07-06 RX ORDER — HYDROCORTISONE SODIUM SUCCINATE 100 MG/2ML
50 INJECTION INTRAMUSCULAR; INTRAVENOUS EVERY 12 HOURS
Status: DISCONTINUED | OUTPATIENT
Start: 2025-07-07 | End: 2025-07-08 | Stop reason: HOSPADM

## 2025-07-06 RX ORDER — POTASSIUM CHLORIDE 29.8 MG/ML
40 INJECTION INTRAVENOUS ONCE
Status: COMPLETED | OUTPATIENT
Start: 2025-07-06 | End: 2025-07-06

## 2025-07-06 RX ORDER — OMEPRAZOLE 20 MG/1
20 CAPSULE, DELAYED RELEASE ORAL DAILY
Status: DISCONTINUED | OUTPATIENT
Start: 2025-07-07 | End: 2025-07-08 | Stop reason: HOSPADM

## 2025-07-06 RX ADMIN — PROPOFOL 30 MCG/KG/MIN: 10 INJECTION, EMULSION INTRAVENOUS at 03:58

## 2025-07-06 RX ADMIN — HEPARIN SODIUM 5000 UNITS: 5000 INJECTION, SOLUTION INTRAVENOUS; SUBCUTANEOUS at 14:02

## 2025-07-06 RX ADMIN — HEPARIN SODIUM 5000 UNITS: 5000 INJECTION, SOLUTION INTRAVENOUS; SUBCUTANEOUS at 22:03

## 2025-07-06 RX ADMIN — PIPERACILLIN AND TAZOBACTAM 4.5 G: 4; .5 INJECTION, POWDER, FOR SOLUTION INTRAVENOUS at 08:08

## 2025-07-06 RX ADMIN — HYDROMORPHONE HYDROCHLORIDE 1 MG: 1 INJECTION, SOLUTION INTRAMUSCULAR; INTRAVENOUS; SUBCUTANEOUS at 06:14

## 2025-07-06 RX ADMIN — PIPERACILLIN AND TAZOBACTAM 4.5 G: 4; .5 INJECTION, POWDER, FOR SOLUTION INTRAVENOUS at 15:36

## 2025-07-06 RX ADMIN — INSULIN LISPRO 5 UNITS: 100 INJECTION, SOLUTION INTRAVENOUS; SUBCUTANEOUS at 00:45

## 2025-07-06 RX ADMIN — GABAPENTIN 300 MG: 300 CAPSULE ORAL at 12:00

## 2025-07-06 RX ADMIN — DOCUSATE SODIUM 50 MG AND SENNOSIDES 8.6 MG 2 TABLET: 8.6; 5 TABLET, FILM COATED ORAL at 05:17

## 2025-07-06 RX ADMIN — HALOPERIDOL LACTATE 1 MG: 5 INJECTION, SOLUTION INTRAMUSCULAR at 15:30

## 2025-07-06 RX ADMIN — HYDROCORTISONE SODIUM SUCCINATE 50 MG: 100 INJECTION, POWDER, FOR SOLUTION INTRAMUSCULAR; INTRAVENOUS at 05:18

## 2025-07-06 RX ADMIN — ASPIRIN 81 MG: 81 TABLET, CHEWABLE ORAL at 05:18

## 2025-07-06 RX ADMIN — CLOPIDOGREL BISULFATE 75 MG: 75 TABLET, FILM COATED ORAL at 05:18

## 2025-07-06 RX ADMIN — LANSOPRAZOLE 30 MG: 30 TABLET, ORALLY DISINTEGRATING ORAL at 05:18

## 2025-07-06 RX ADMIN — HYDROCORTISONE SODIUM SUCCINATE 50 MG: 100 INJECTION, POWDER, FOR SOLUTION INTRAMUSCULAR; INTRAVENOUS at 00:32

## 2025-07-06 RX ADMIN — HYDROMORPHONE HYDROCHLORIDE 0.5 MG: 1 INJECTION, SOLUTION INTRAMUSCULAR; INTRAVENOUS; SUBCUTANEOUS at 05:31

## 2025-07-06 RX ADMIN — HYDROMORPHONE HYDROCHLORIDE 0.5 MG: 1 INJECTION, SOLUTION INTRAMUSCULAR; INTRAVENOUS; SUBCUTANEOUS at 20:12

## 2025-07-06 RX ADMIN — FLUDROCORTISONE ACETATE 0.05 MG: 0.1 TABLET ORAL at 12:00

## 2025-07-06 RX ADMIN — INSULIN LISPRO 3 UNITS: 100 INJECTION, SOLUTION INTRAVENOUS; SUBCUTANEOUS at 12:11

## 2025-07-06 RX ADMIN — PIPERACILLIN AND TAZOBACTAM 4.5 G: 4; .5 INJECTION, POWDER, FOR SOLUTION INTRAVENOUS at 00:39

## 2025-07-06 RX ADMIN — POTASSIUM CHLORIDE 40 MEQ: 29.8 INJECTION, SOLUTION INTRAVENOUS at 05:41

## 2025-07-06 RX ADMIN — INSULIN LISPRO 3 UNITS: 100 INJECTION, SOLUTION INTRAVENOUS; SUBCUTANEOUS at 05:47

## 2025-07-06 RX ADMIN — INSULIN GLARGINE-YFGN 5 UNITS: 100 INJECTION, SOLUTION SUBCUTANEOUS at 17:06

## 2025-07-06 RX ADMIN — INSULIN LISPRO 2 UNITS: 100 INJECTION, SOLUTION INTRAVENOUS; SUBCUTANEOUS at 17:06

## 2025-07-06 RX ADMIN — PIPERACILLIN AND TAZOBACTAM 4.5 G: 4; .5 INJECTION, POWDER, FOR SOLUTION INTRAVENOUS at 23:45

## 2025-07-06 RX ADMIN — HYDROCORTISONE SODIUM SUCCINATE 50 MG: 100 INJECTION, POWDER, FOR SOLUTION INTRAMUSCULAR; INTRAVENOUS at 12:02

## 2025-07-06 RX ADMIN — GABAPENTIN 300 MG: 300 CAPSULE ORAL at 05:18

## 2025-07-06 RX ADMIN — HEPARIN SODIUM 5000 UNITS: 5000 INJECTION, SOLUTION INTRAVENOUS; SUBCUTANEOUS at 05:18

## 2025-07-06 ASSESSMENT — PAIN DESCRIPTION - PAIN TYPE
TYPE: ACUTE PAIN

## 2025-07-06 ASSESSMENT — ENCOUNTER SYMPTOMS
FEVER: 0
CHILLS: 0
ABDOMINAL PAIN: 0

## 2025-07-06 ASSESSMENT — FIBROSIS 4 INDEX: FIB4 SCORE: 0.71

## 2025-07-06 NOTE — PROGRESS NOTES
4 Eyes Skin Assessment Completed by MARYBEL Asher and MARYBEL Zaragoza RN    Skin assessment is primarily focused on high risk bony prominences. Pay special attention to skin beneath and around medical devices, high risk bony prominences, skin to skin areas and areas where the patient lacks sensation to feel pain and areas where the patient previously had breakdown.     Head (Occipital):  WDL   Ears (Under Medical Devices): WDL   Nose (Under Medical Devices): WDL   Mouth:  WDL   Neck: WDL   Breast/Chest:  WDL   Shoulder Blades:  Scar   Spine:   WDL   (R) Arm/Elbow/Hand: Bruising, discoloration   (L) Arm/Elbow/Hand: Bruising, discoloration   Abdomen: WDL   Pannus/Groin:  WDL   Sacrum/Coccyx:   Red and Non-Blanching   (R) Ischial Tuberosity (Sit Bones):  Pink and Blanching   (L) Ischial Tuberosity (Sit Bones):  Pink and Blanching   (R) Leg:  Discoloration on shin   (L) Leg:  Scar, discoloration on shin   (R) Heel:  Red and Boggy   (R) Foot/Toe: WDL   (L) Heel: Red and Boggy   (L) Foot/Toe:  WDL       DEVICES IN USE:   Respiratory Devices:  ET Tube and Pulse ox  Feeding Devices:  OG tube   Lines & BP Monitoring Devices:  Peripheral IV, Central line, BP cuff, and Pulse ox    Orthopedic Devices:  N/A  Miscellaneous Devices:  Jensen, SCDs, and Soft restraints    PROTOCOL INTERVENTIONS:   ICU Low Airloss Bed:  Already in place  Offloading Dressing - Sacrum:  Already in place  Offloading Dressing - Heel:  Already in place  Heel Float Boots:  Already in place  Q2 Turns with Wedges:  Already in place  Glide Sheet:  Already in place  Dri-Zan/Micro Climate Pads:  Already in place  Jensen:  Already in place  Z-Zan Pillow:  Already in place    WOUND PHOTOS:   To be completed     WOUND CONSULT:   Consult ordered for the following areas sacrum

## 2025-07-06 NOTE — CARE PLAN
The patient is Watcher - Medium risk of patient condition declining or worsening    Shift Goals  Clinical Goals: Hemodynamic stability, Extubate  Patient Goals: LASHELL  Family Goals: Updates    Progress made toward(s) clinical / shift goals:    Problem: Respiratory  Goal: Patient will achieve/maintain optimum respiratory ventilation and gas exchange  Outcome: Progressing     Problem: Knowledge Deficit - Standard  Goal: Patient and family/care givers will demonstrate understanding of plan of care, disease process/condition, diagnostic tests and medications  Outcome: Progressing     Problem: Hemodynamics  Goal: Patient's hemodynamics, fluid balance and neurologic status will be stable or improve  Outcome: Progressing     Problem: Pain - Standard  Goal: Alleviation of pain or a reduction in pain to the patient’s comfort goal  Outcome: Progressing     Problem: Safety - Medical Restraint  Goal: Remains free of injury from restraints (Restraint for Interference with Medical Device)  Outcome: Progressing       Patient is not progressing towards the following goals:

## 2025-07-06 NOTE — HOSPITAL COURSE
Donte Agustin is a 79-year-old male with past medical history significant for COPD (2 to 5 L baseline), HFrEF (25%), pulmonary hypertension, coronary artery disease status post PCI and stent placements, tobacco abuse, prostate cancer who was recently hospitalized in a facility in [place] for pneumonia and released home on 7/2.  His wife called EMS to their house as patient was lethargic and not breathing well.  Noted to be hypoxic on home O2 as well as tachycardic and hypotensive to the 60s.  He was brought directly to [facility] for admission given crystalloid and peripheral Levophed.  For pressure support and started on broad-spectrum antibiotics given concerns for worsening left-sided pneumonia on chest x-ray.  He was intubated as well as had central line placement on 7/4 due to worsening respiratory status and persistent hypotension.  BAL was collected at that time.  He was extubated on 7/6 after passing a spontaneous breathing trial.  His Levophed drip was able to be titrated off.  He continued on broad-spectrum antibiotics while awaiting for culture results from his BAL and blood.

## 2025-07-06 NOTE — CARE PLAN
Problem: Ventilation  Goal: Ability to achieve and maintain unassisted ventilation or tolerate decreased levels of ventilator support  Description: Target End Date:  4 days     Document on Vent flowsheet    1.  Support and monitor invasive and noninvasive mechanical ventilation  2.  Monitor ventilator weaning response  3.  Perform ventilator associated pneumonia prevention interventions  4.  Manage ventilation therapy by monitoring diagnostic test results  Outcome: Not Met     Ventilator Daily Summary    Vent Day #3  Airway: 7.5@26    Ventilator settings: 16, 400, +8, 40%  Weaning trials: none  Respiratory Procedures: none    Plan: Continue current ventilator settings and wean mechanical ventilation as tolerated per physician orders.

## 2025-07-06 NOTE — PROGRESS NOTES
"[facility] Internal Medicine Daily Progress Note    Date of Service  7/6/2025    [facility] Team: [facility] ICU Gold Team   Attending: Yulia Grey D.o.  Senior Resident: Dr. Nixon Lucas  Contact Number: [phone]    Chief Complaint  Donte Agustin is a 79 y.o. male admitted 7/4/2025 with septic shock    Hospital Course  Donte Agustin is a 79-year-old male with past medical history significant for COPD (2 to 5 L baseline), HFrEF (25%), pulmonary hypertension, coronary artery disease status post PCI and stent placements, tobacco abuse, prostate cancer who was recently hospitalized in a facility in [place] for pneumonia and released home on 7/2.  His wife called EMS to their house as patient was lethargic and not breathing well.  Noted to be hypoxic on home O2 as well as tachycardic and hypotensive to the 60s.  He was brought directly to [facility] for admission given crystalloid and peripheral Levophed.  For pressure support and started on broad-spectrum antibiotics given concerns for worsening left-sided pneumonia on chest x-ray.  He was intubated as well as had central line placement on 7/4 due to worsening respiratory status and persistent hypotension.  BAL was collected at that time.  He was extubated on 7/6 after passing a spontaneous breathing trial.  His Levophed drip was able to be titrated off.  He continued on broad-spectrum antibiotics while awaiting for culture results from his BAL and blood.    Interval Problem Update  Patient was seen and examined at bedside.  No acute events overnight.  He was able to be expected this morning and was mildly agitated postextubation.  Patient stated several times that he \"never wanted this\" and repeatedly was calling out for his wife.  Difficult to reorient, but does not complain of any other pain or symptoms at this time.  Social work is working with his wife in order to try and obtain a way up to [facility] however this is complicated due to her significant " blindness and inability to help pay for gas in order to make the drive here from [place].      Consultants/Specialty  None    Code Status  Full Code    Disposition  Medically Cleared  I have placed the appropriate orders for post-discharge needs.    Review of Systems  Review of Systems   Constitutional:  Negative for chills and fever.   Cardiovascular:  Negative for chest pain.   Gastrointestinal:  Negative for abdominal pain.        Physical Exam  Temp:  [35.9 °C (96.6 °F)-36.8 °C (98.2 °F)] 36.2 °C (97.2 °F)  Pulse:  [37-90] 90  Resp:  [15-87] 39  BP: ()/(45-66) 114/57  SpO2:  [90 %-100 %] 99 %    Physical Exam  Constitutional:       General: He is not in acute distress.     Appearance: Normal appearance. He is not ill-appearing or toxic-appearing.   HENT:      Head: Normocephalic and atraumatic.      Nose: Nose normal. No congestion.      Mouth/Throat:      Mouth: Mucous membranes are moist.      Pharynx: Oropharynx is clear.   Cardiovascular:      Rate and Rhythm: Regular rhythm. Bradycardia present.      Pulses: Normal pulses.      Heart sounds: No murmur heard.     No gallop.   Pulmonary:      Effort: Pulmonary effort is normal. No respiratory distress.      Breath sounds: No wheezing, rhonchi or rales.   Abdominal:      General: There is no distension.      Tenderness: There is no abdominal tenderness. There is no guarding.   Skin:     General: Skin is warm and dry.      Capillary Refill: Capillary refill takes less than 2 seconds.   Neurological:      Mental Status: He is alert. Mental status is at baseline.   Psychiatric:         Mood and Affect: Mood is anxious.         Behavior: Behavior is agitated and aggressive.         Fluids    Intake/Output Summary (Last 24 hours) at 7/6/2025 1428  Last data filed at 7/6/2025 1331  Gross per 24 hour   Intake 691.38 ml   Output 860 ml   Net -168.62 ml       Laboratory  Recent Labs     07/04/25  1110 07/05/25  0340 07/06/25  0400   WBC 32.0* 20.0* 20.3*   RBC  4.48* 4.23* 3.96*   HEMOGLOBIN 12.2* 11.7* 10.9*   HEMATOCRIT 39.5* 37.0* 34.9*   MCV 88.2 87.5 88.1   MCH 27.2 27.7 27.5   MCHC 30.9* 31.6* 31.2*   RDW 47.8 48.1 49.1   PLATELETCT 446 355 305   MPV 11.7 11.1 11.6     Recent Labs     07/04/25  1110 07/05/25  0340 07/06/25  0400   SODIUM 137 141 141   POTASSIUM 3.5* 5.2 3.3*   CHLORIDE 97 105 104   CO2 24 25 24   GLUCOSE 187* 194* 259*   BUN 44* 32* 33*   CREATININE 1.71* 1.17 1.13   CALCIUM 8.8 8.8 9.1     Recent Labs     07/04/25  1110   INR 1.18*         Recent Labs     07/05/25  1057   TRIGLYCERIDE 151*       Imaging  DX-CHEST-PORTABLE (1 VIEW)   Final Result         Portable chest radiograph appears unchanged compared to the most recent examination. No significant changes or new abnormalities are identified.      DX-CHEST-PORTABLE (1 VIEW)   Final Result         1.  Hazy right pulmonary infiltrates, stable since prior study   2.  Cardiomegaly   3.  Atherosclerosis      DX-ABDOMEN FOR TUBE PLACEMENT   Final Result      Gastric tube terminates in the stomach.      DX-CHEST-LIMITED (1 VIEW)   Final Result         1.  Hazy right pulmonary infiltrates   2.  Atherosclerosis      EC-ECHOCARDIOGRAM COMPLETE W/ CONT   Final Result      DX-CHEST-PORTABLE (1 VIEW)   Final Result      Right central venous catheter with tip projecting over the expected area of the lower SVC.      CT-CHEST (THORAX) W/O   Final Result      Airspace opacities in the right upper lobe and bilateral lower lobes, likely multifocal pneumonia.         DX-CHEST-PORTABLE (1 VIEW)   Final Result      Airspace opacities in the bilateral lower lobes, left more than right, concerning for pneumonia.           Assessment/Plan  Problem Representation:    * Septic shock (HCC)- (present on admission)  Assessment & Plan  This is Septic shock Present on admission    Cont broad spectrum abx:  Zosyn  Vanco Dced, MRSA nares negative   MAP goals > 65  Off of levophed and epinepherine today   Decrease stress dose  steroids to q12    Bradycardia  Assessment & Plan  Due to Precedex and likely from digoxin  Hold Precedex  Hold digoxin, may be able to restart later       Left lower lobe pneumonia- (present on admission)  Assessment & Plan  Hospital acquired vs aspiration  Cont zosyn  Checking Strep and Legionella urine Ag  COVID/influenza/RSV was negative  RVP negative  SLP evaluation after extubation, suspect that she is aspirating    TIM (acute kidney injury) ([facility])- (present on admission)  Assessment & Plan  ?due to hypotension - Improving    Monitor UOP/creat  Avoid nephrotoxins     Acute on chronic respiratory failure with hypoxia ([facility])- (present on admission)  Assessment & Plan  Due to pneumonia  Intubated 7/4/2025  - Daily SAT/SBT per protocol  - PRN ABG  - Elevate HOB to 30 degrees  - Chlorhexidine rinse to decrease VAP incidence  - Maintain O2 saturation > 90%   - Tube placement verified  - Pepcid for GI prophylaxis  - Sedation with Propofol for RASS +1 to -1 - severe bradycardia with precedex   - Monitor triglycerides periodically   - Analgesia with Fentanyl for CPOT < 2  - Restraints to prevent self extubation  - Utilizing lung protective ventilation with tidal volume 6ml/kg  - Monitoring peak and plateau pressures  ECHO pending    COPD (chronic obstructive pulmonary disease) ([facility])- (present on admission)  Assessment & Plan  Hx of, not in acute exacerbation  RT/O2 protocols  Hold home Anoro while on vent, will restart after extubation   Albuterol as needed    Pulmonary hypertension (HCC)- (present on admission)  Assessment & Plan  ECHO with RVSP of 55 mmHg  Likely group II/III  Status post judicious fluid resuscitation   Avoid hypoxia/acidemia  Diurese when able     Hyperlipidemia- (present on admission)  Assessment & Plan  Cont home statin    Hypertension- (present on admission)  Assessment & Plan  Holding home BP meds coming out of shock    Prostate cancer (HCC)- (present on admission)  Assessment & Plan  Hx of      History of coronary angioplasty with insertion of stent- (present on admission)  Assessment & Plan  Cont ASA and statin therapy    Heart failure with reduced ejection fraction (HCC) (LVEF 20-25% Echo 2/2025)- (present on admission)  Assessment & Plan  Judicious fluid resuscitation with EF of 25%  Maintain euvolemia  Holding GDMT while in shock    Diabetes (HCC)- (present on admission)  Assessment & Plan  BG goals 120-180s  Medium ISS  Monitor for need for long acting insulin while on steroids          VTE prophylaxis: heparin ppx    I have performed a physical exam and reviewed and updated ROS and Plan today (7/6/2025). In review of yesterday's note (7/5/2025), there are no changes except as documented above.    Case for Donte Agustin disucssed with attending, Dr. Yulia Grey, D.o.    Please see attestation for any additional comments and changes to current treatment plan    Nixon Lucas, DO  Internal Medicine PGY2    This note was created with Dragon Dictation software.

## 2025-07-06 NOTE — WOUND TEAM
[facility]  Inpatient Services  Wound and Skin Care Brief Evaluation    Admission Date: 7/4/2025     Last order of IP CONSULT TO WOUND CARE was found on 7/5/2025 from Hospital Encounter on 7/4/2025     HPI, PMH, SH: Reviewed    Chief Complaint   Patient presents with    Shortness of Breath     BIBA from home      Diagnosis: Septic shock (HCC) [A41.9, R65.21]    Unit where seen by Wound Team: [id number]/00     Wound consult placed regarding sacrum. Chart and images reviewed. This discussed with bedside RN. This clinician in to assess patient. Patient pleasant and agreeable. Patient able to turn to side with some assistance. Non-selectively debrided with Moist warm washcloth.     No pressure injuries or advanced wound care needs identified. Sacrum is normal coloration for skin tone, intact and blanching. Sacral offloading foam in place. Wound consult completed. No further follow up unless indicated and consulted.                PREVENTATIVE INTERVENTIONS:    Q shift  - performed per nursing policy  Q shift pressure point assessments - performed per nursing policy    Surface/Positioning  ICU Low Airloss - Currently in Place  Reposition q 2 hours with wedges - Currently in Place    Offloading/Redistribution  Sacral offloading dressing (Silicone dressing) - Currently in Place  Heel offloading dressing (Silicone dressing) - Currently in Place      Respiratory  Silicone O2 tubing - Currently in Place  Gray Foam Ear protectors - Currently in Place    Containment/Moisture Prevention    Dri-sobeida pad - Currently in Place    Mobilization      Unable to assess

## 2025-07-06 NOTE — THERAPY
"Speech Language Therapy Contact Note    Patient Name: Donte Agustin  Age:  79 y.o., Sex:  male  Medical Record #: [id number]  Today's Date: 7/6/2025    Attempted to complete a clinical swallow evaluation. However, pt agitated, yelling \"I want my wife!\" He was difficult to redirect and reason with, stating he are withholding water from him, yet declined to have any sips of water offered until his wife gets here. RN called pt's wife and put her on speaker phone during this attempt and she reassured him she was on her way and encouraged him to drink water, though pt continued to refuse until she got there. This SLP is unable to return later today when wife arrives. Pt will likely have to wait until tomorrow for a formal swallow eval. Ok to give pt ice chips as desired in the meantime.     Per EMR review, pt has a hx of moderate-severe pharyngeal dysphagia and is high risk of aspiration per MBSS 5/29/25. He declined the recommended pureed diet at the time and opted for soft and bite sized. It was recommended he hold the liquids in his mouth, count to three, swallow twice, cough and swallow again to minimize risk of aspiration with thin liquids.   "

## 2025-07-06 NOTE — PROGRESS NOTES
Critical Care Progress Note    Date of admission  2025    History of Presenting Illness  Most history was obtained from Donte via extensive chart review as well as the patient answering a few questions due to his critical illness     Donte Agustin is a 79 y.o. male with the past medical history significant for COPD, chronic hypoxic respiratory failure on 2-5 lpm O2, ischemic CM with an EF of 25% (last ECHO in 2025), pulmonary hypertension with an RVSP of 55mmHg, CAD s/p PCI and stents, tobacco abuse, and prostate cancer who was recently hospitalized at a facility in [place], [place] for pneumonia and released for home on 2025.  EMS was summoned to his house today due to his wife feeling he was more lethargy and not breathing well.  EMS found the patient to be hypoxic on his home O2 as well as tachycardic and hypotensive with SBPs in the 60s.  EMS decided to bypass [place] local ER and bring the patient to [facility].  EN route, the patient received 1 liters of IVF and was started on peripheral levophed.  In the ER, the patient was started on 15 lpm oxymask as well given another 1 liter of IVF.  He was started of broad spectrum antibiotics for a chest x-ray concerning for a left sided pneumonia.  He was be admitted to the ICU for management of septic shock with acute hypoxic respiratory failure and TIM due to a hospital acquired pneumonia.    Hospital Course   - intubated overnight, central line and A line placed   - failed SAT, retry later today, hoping for extubation today     Interval Problem Update  Chart review from the past 24 hours includes imaging, laboratory studies, vital signs and notes available.  Pertinent data for today's visit includes    Neuro: intact  Cardiac:   -Bradycardic  -Levophed 0.08 mcg/kg/min  Pulm:   -Vent: 16/400/10/50%  -AB.40/38.6/73  Heme:   -Hg stable  /renal:   - Cr improving   GI: No acute events  Endo: 10 units ISS   ID:    - RVP negative   - WBC downtrending    -  on Zosyn   I/O: +2588/-500=+8  Imaging:   - CT chest: Signs of aspiration on the right    Vital Signs for last 24 hours   Temp:  [35.9 °C (96.6 °F)-36.8 °C (98.2 °F)] 36.1 °C (97 °F)  Pulse:  [44-94] 50  Resp:  [9-87] 16  BP: ()/(41-66) 112/56  SpO2:  [90 %-100 %] 95 %    Hemodynamic parameters for last 24 hours       Respiratory Information for the last 24 hours  Vent Mode: APVCMV  Rate (breaths/min): 16  Vt Target (mL): 400  PEEP/CPAP: 8  MAP: 9.3  Control VTE (exp VT): 255    Physical Exam  Vitals and nursing note reviewed.   Constitutional:       Appearance: He is obese. He is ill-appearing. He is not toxic-appearing.      Interventions: He is intubated.      Comments: Patient appears older than stated age and chronically ill    HENT:      Head: Normocephalic and atraumatic.      Right Ear: External ear normal.      Left Ear: External ear normal.      Nose: Nose normal. No rhinorrhea.      Mouth/Throat:      Mouth: Mucous membranes are dry.      Pharynx: Oropharynx is clear. No oropharyngeal exudate.   Eyes:      General: No scleral icterus.     Pupils: Pupils are equal, round, and reactive to light.   Cardiovascular:      Rate and Rhythm: Normal rate and regular rhythm.      Pulses:           Dorsalis pedis pulses are 1+ on the right side and 1+ on the left side.      Heart sounds: No murmur heard.  Pulmonary:      Effort: He is intubated.      Breath sounds: No wheezing.      Comments: Coarse breath sounds throughout L>R, no wheezing, diminished left base  Chest:      Chest wall: No tenderness.   Abdominal:      Palpations: Abdomen is soft.      Tenderness: There is no abdominal tenderness. There is no guarding or rebound.   Musculoskeletal:         General: Normal range of motion.      Cervical back: Normal range of motion and neck supple.      Right lower le+ Edema present.      Left lower le+ Edema present.   Lymphadenopathy:      Cervical: No cervical adenopathy.   Skin:     General: Skin  is warm and dry.      Capillary Refill: Capillary refill takes less than 2 seconds.      Findings: No rash.   Neurological:      Mental Status: He is alert and oriented to person, place, and time.      Cranial Nerves: No cranial nerve deficit.      Sensory: No sensory deficit.      Motor: No weakness.         Medications  Current Medications[1]    Fluids    Intake/Output Summary (Last 24 hours) at 7/6/2025 0940  Last data filed at 7/6/2025 0800  Gross per 24 hour   Intake 801.09 ml   Output 1055 ml   Net -253.91 ml       Laboratory  Recent Labs     07/04/25  2250 07/05/25  0240 07/06/25  0411   ISTATAPH 7.429 7.406 7.452*   ISTATAPCO2 37.3 38.6 34.0   ISTATAPO2 96 73* 85   ISTATATCO2 26* 25* 25*   LXHKNGU6DMA 98 95 97   ISTATARTHCO3 24.7 24.2 23.7   ISTATARTBE 1 0 0   ISTATTEMP 96.6 F 96.2 F 97.0 F   ISTATFIO2 60 50 40   ISTATSPEC Arterial Arterial Arterial   ISTATAPHTC 7.445 7.425 7.465*   ITNAAUSK7NB 90 67* 80*         Recent Labs     07/04/25  1110 07/05/25  0340 07/06/25  0400   SODIUM 137 141 141   POTASSIUM 3.5* 5.2 3.3*   CHLORIDE 97 105 104   CO2 24 25 24   BUN 44* 32* 33*   CREATININE 1.71* 1.17 1.13   MAGNESIUM 1.7 1.9 2.1   PHOSPHORUS  --  2.5 2.5   CALCIUM 8.8 8.8 9.1     Recent Labs     07/04/25  1110 07/05/25  0340 07/06/25  0400   ALTSGPT <5 <5 <5   ASTSGOT 11* 9* 6*   ALKPHOSPHAT 63 60 56   TBILIRUBIN 0.3 0.4 0.3   GLUCOSE 187* 194* 259*     Recent Labs     07/04/25  1110 07/05/25  0340 07/06/25  0400   WBC 32.0* 20.0* 20.3*   NEUTSPOLYS 91.70* 89.40* 88.40*   LYMPHOCYTES 3.30* 4.70* 3.10*   MONOCYTES 5.00 5.10 7.30   EOSINOPHILS 0.00 0.10 0.00   BASOPHILS 0.00 0.10 0.10   ASTSGOT 11* 9* 6*   ALTSGPT <5 <5 <5   ALKPHOSPHAT 63 60 56   TBILIRUBIN 0.3 0.4 0.3     Recent Labs     07/04/25  1110 07/05/25  0340 07/06/25  0400   RBC 4.48* 4.23* 3.96*   HEMOGLOBIN 12.2* 11.7* 10.9*   HEMATOCRIT 39.5* 37.0* 34.9*   PLATELETCT 446 355 305   PROTHROMBTM 15.1*  --   --    INR 1.18*  --   --        Imaging  CT:     Reviewed    Assessment/Plan  * Septic shock (HCC)- (present on admission)  Assessment & Plan  This is Septic shock Present on admission    Cont broad spectrum abx:  Zosyn  Vanco Dced, MRSA nares negative   MAP goals > 65  Off of levophed and epinepherine today   Decrease stress dose steroids to q12    Bradycardia  Assessment & Plan  Due to Precedex and likely from digoxin  Hold Precedex  Hold digoxin, may be able to restart later       Left lower lobe pneumonia- (present on admission)  Assessment & Plan  Hospital acquired vs aspiration  Cont zosyn  Checking Strep and Legionella urine Ag  COVID/influenza/RSV was negative  RVP negative  SLP evaluation after extubation, suspect that she is aspirating    TIM (acute kidney injury) ([facility])- (present on admission)  Assessment & Plan  ?due to hypotension - Improving    Monitor UOP/creat  Avoid nephrotoxins     Acute on chronic respiratory failure with hypoxia ([facility])- (present on admission)  Assessment & Plan  Due to pneumonia  Intubated 7/4/2025  - Daily SAT/SBT per protocol  - PRN ABG  - Elevate HOB to 30 degrees  - Chlorhexidine rinse to decrease VAP incidence  - Maintain O2 saturation > 90%   - Tube placement verified  - Pepcid for GI prophylaxis  - Sedation with Propofol for RASS +1 to -1 - severe bradycardia with precedex   - Monitor triglycerides periodically   - Analgesia with Fentanyl for CPOT < 2  - Restraints to prevent self extubation  - Utilizing lung protective ventilation with tidal volume 6ml/kg  - Monitoring peak and plateau pressures    COPD (chronic obstructive pulmonary disease) ([facility])- (present on admission)  Assessment & Plan  Hx of, not in acute exacerbation  RT/O2 protocols  Hold home Anoro while on vent, will restart after extubation   Albuterol as needed    Pulmonary hypertension (HCC)- (present on admission)  Assessment & Plan  ECHO with RVSP of 55 mmHg  Likely group II/III  Status post judicious fluid resuscitation   Avoid hypoxia/acidemia  Diurese  when able     Hyperlipidemia- (present on admission)  Assessment & Plan  Cont home statin    Hypertension- (present on admission)  Assessment & Plan  Holding home BP meds coming out of shock    Prostate cancer (HCC)- (present on admission)  Assessment & Plan  Hx of     History of coronary angioplasty with insertion of stent- (present on admission)  Assessment & Plan  Cont ASA and statin therapy    Heart failure with reduced ejection fraction (HCC) (LVEF 20-25% Echo 2/2025)- (present on admission)  Assessment & Plan  Judicious fluid resuscitation with EF of 25%  Maintain euvolemia  Holding GDMT while in shock    Diabetes (HCC)- (present on admission)  Assessment & Plan  BG goals 120-180s  Medium ISS  Monitor for need for long acting insulin while on steroids     Lines: CVC, A line  Tubes: boone, ETT, NGT   Diet: NPO   DVT ppx: heparin  GI ppx: lansoprazole   Bowel regiment: yes   Ventilator ppx: Cholorohexadine, HOB> 30, oral care    I have performed a physical exam and reviewed and updated ROS and Plan today (7/6/2025). In review of yesterday's note (7/5/2025), there are no changes except as documented above.     Discussed patient condition and risk of morbidity and/or mortality with RN, RT, Therapies, Pharmacy, and Patient    The patient remains critically ill.  Critical care time = 55 minutes in directly providing and coordinating critical care and extensive data review.  No time overlap and excludes procedures.    __________  This note was generated using voice recognition software which has a chance of producing errors of grammar and content.  I have made every reasonable attempt to find and correct any errors, but it should be expected that some may not be found prior to finalization of this note.    Yulia Grey, DO   Pulmonary and Critical Care         [1]   Current Facility-Administered Medications   Medication Dose Route Frequency Provider Last Rate Last Admin    aspirin (Asa) chewable tab 81 mg  81 mg  Enteral Tube DAILY Jazmine Garcia M.D.   81 mg at 07/06/25 0518    lansoprazole (Prevacid) solutab 30 mg  30 mg Enteral Tube DAILY Jazmine Garcia M.D.   30 mg at 07/06/25 0518    acetaminophen (Tylenol) tablet 650 mg  650 mg Enteral Tube Q6HRS PRN Jazmine Garcia M.D.        ondansetron (Zofran) syringe/vial injection 4 mg  4 mg Intravenous Q4HRS PRN Jazmine Garcia M.D.        Or    ondansetron (Zofran ODT) dispertab 4 mg  4 mg Enteral Tube Q4HRS PRN Jazmine Garcia M.D.        gabapentin (Neurontin) capsule 300 mg  300 mg Enteral Tube TID Jazmine Garcia M.D.   300 mg at 07/06/25 0518    propofol (DIPRIVAN) injection  0-80 mcg/kg/min (Ideal) Intravenous Continuous Yulia Grey D.O. 5.9 mL/hr at 07/06/25 0613 15 mcg/kg/min at 07/06/25 0613    EPINEPHrine (Adrenalin) infusion 4 mg/250 mL (premix)  0-0.5 mcg/kg/min (Ideal) Intravenous Continuous Yulia Grey D.O. 5 mL/hr at 07/05/25 1200 0.02 mcg/kg/min at 07/05/25 1200    Respiratory Therapy Consult   Nebulization Continuous RT Jazmine Garcia M.D.        heparin injection 5,000 Units  5,000 Units Subcutaneous Q8HRS Jazmine Garcia M.D.   5,000 Units at 07/06/25 0518    insulin lispro (HumaLOG,AdmeLOG) subcutaneous injection  2-9 Units Subcutaneous Q6HRS Jazmine Garcia M.D.   3 Units at 07/06/25 0547    And    dextrose 50 % (D50W) injection 25 g  25 g Intravenous Q15 MIN PRN Jazmine Garcia M.D.        piperacillin-tazobactam (Zosyn) 4.5 g in  mL IVPB  4.5 g Intravenous Q8HRS Jazmine Garcia M.D. 25 mL/hr at 07/06/25 0808 4.5 g at 07/06/25 0808    norepinephrine (Levophed) 8 mg in 250 mL NS infusion (premix)  0-1 mcg/kg/min (Ideal) Intravenous Continuous Jazmine Garcia M.D. 6.2 mL/hr at 07/05/25 2334 0.05 mcg/kg/min at 07/05/25 2334    hydrocortisone sodium succinate PF (Solu-CORTEF) injection 50 mg  50 mg Intravenous Q6HRS Jazmine Garcia M.D.   50 mg at 07/06/25 0518    And    fludrocortisone (Florinef) tablet  0.05 mg  0.05 mg Enteral Tube QAM Jazmine Garcia M.D.   0.05 mg at 07/05/25 1136    atorvastatin (Lipitor) tablet 40 mg  40 mg Enteral Tube Q EVENING Jazmine Garcia M.D.   40 mg at 07/05/25 1712    clopidogrel (Plavix) tablet 75 mg  75 mg Enteral Tube DAILY Jazmine Garcia M.D.   75 mg at 07/06/25 0518    senna-docusate (Pericolace Or Senokot S) 8.6-50 MG per tablet 2 Tablet  2 Tablet Enteral Tube BID Luisito Amaro M.D.   2 Tablet at 07/06/25 0517    And    polyethylene glycol/lytes (Miralax) Packet 1 Packet  1 Packet Enteral Tube QDAY PRN Luisito Amaro M.D.        And    magnesium hydroxide (Milk Of Magnesia) suspension 30 mL  30 mL Enteral Tube QDAY PRN Luisito Amaro M.D.        And    bisacodyl (Dulcolax) suppository 10 mg  10 mg Rectal QDAY PRN Luisito Amaro M.D.        MD Alert...ICU Electrolyte Replacement per Pharmacy   Other PHARMACY TO DOSE Luisito Amaro M.D.        lidocaine (Xylocaine) 1 % injection 2 mL  2 mL Tracheal Tube Q30 MIN PRN Luisito Amaro M.D.        HYDROmorphone (Dilaudid) injection 0.5 mg  0.5 mg Intravenous Q HOUR PRN Luisito Amaro M.D.   0.5 mg at 07/06/25 0531    Or    HYDROmorphone (Dilaudid) injection 1 mg  1 mg Intravenous Q HOUR PRN Luisito Amaro M.D.   1 mg at 07/06/25 0614

## 2025-07-06 NOTE — DISCHARGE PLANNING
Case Management Discharge Planning    Admission Date: 7/4/2025  GMLOS: 4.9  ALOS: 2    6-Clicks ADL Score:    6-Clicks Mobility Score:    PT and/or OT Eval ordered: No  Post-acute Referrals Ordered: NA  Post-acute Choice Obtained: NA  Has referral(s) been sent to post-acute provider:  LAUREN      Anticipated Discharge Dispo:      DME Needed: No    Action(s) Taken: Family Conference- Patient discussed during IDT rounds with medical team. Spoke with spouse who is in [place], she is trying to find a way here as she is close to blind, she has a friend that can bring her but she would need $100 to help pay for gas. I suggested to call all the local churches and reach out to any [facility] resources on Monday.    Escalations Completed: None    Medically Clear: No    Next Steps: Case Management will continue to follow for discharge planning needs.     Barriers to Discharge: Medical clearance    Is the patient up for discharge tomorrow: No

## 2025-07-06 NOTE — CARE PLAN
The patient is Watcher - Medium risk of patient condition declining or worsening    Shift Goals  Clinical Goals: Hemodynamic stability, extubate  Patient Goals: LASHELL  Family Goals: LASHELL    Progress made toward(s) clinical / shift goals:    Problem: Care Map:  Optimal Outcome for the Pneumonia Patient  Goal: Collection and monitoring of appropriate tests and labs  Outcome: Progressing     Problem: Respiratory  Goal: Patient will achieve/maintain optimum respiratory ventilation and gas exchange  Outcome: Progressing     Problem: Hemodynamics - Pneumonia  Goal: Patient's hemodynamics, fluid balance and neurologic status will be stable or improve  Outcome: Progressing     Problem: Urinary - Renal Perfusion  Goal: Ability to achieve and maintain adequate renal perfusion and functioning will improve  Outcome: Progressing     Problem: Mechanical Ventilation  Goal: Safe management of artificial airway and ventilation  Outcome: Progressing     Problem: Pain - Standard  Goal: Alleviation of pain or a reduction in pain to the patient’s comfort goal  Outcome: Progressing     Problem: Safety - Medical Restraint  Goal: Remains free of injury from restraints (Restraint for Interference with Medical Device)  Outcome: Progressing     Problem: Skin Integrity  Goal: Skin integrity is maintained or improved  Outcome: Progressing       Patient is not progressing towards the following goals:

## 2025-07-06 NOTE — PROGRESS NOTES
Monitor Summary     Rhythm: Atrial fib/Atrial flutter, HR 30s-60s  Ectopy: Frequent PACs and PVCs  FL: None  QRS: 0.188

## 2025-07-06 NOTE — PROGRESS NOTES
Bedside report received from MARYBEL Fernandez.  Assumed patient care.  No signs of distress at this time.  Tele monitor on, all lines traced and medications verified.  Safety precautions in place, call light and personal belongings within reach.  Educated patient to use call light if assistance is needed.  Will continue to monitor.

## 2025-07-07 ENCOUNTER — APPOINTMENT (OUTPATIENT)
Dept: RADIOLOGY | Facility: MEDICAL CENTER | Age: 80
DRG: 871 | End: 2025-07-07
Attending: HOSPITALIST
Payer: MEDICARE

## 2025-07-07 LAB
ALBUMIN SERPL BCP-MCNC: 3 G/DL (ref 3.2–4.9)
ALBUMIN/GLOB SERPL: 1.1 G/DL
ALP SERPL-CCNC: 45 U/L (ref 30–99)
ALT SERPL-CCNC: <5 U/L (ref 2–50)
ANION GAP SERPL CALC-SCNC: 8 MMOL/L (ref 7–16)
AST SERPL-CCNC: 12 U/L (ref 12–45)
BASOPHILS # BLD AUTO: 0.1 % (ref 0–1.8)
BASOPHILS # BLD: 0.01 K/UL (ref 0–0.12)
BILIRUB SERPL-MCNC: 0.4 MG/DL (ref 0.1–1.5)
BUN SERPL-MCNC: 23 MG/DL (ref 8–22)
CALCIUM ALBUM COR SERPL-MCNC: 9.5 MG/DL (ref 8.5–10.5)
CALCIUM SERPL-MCNC: 8.7 MG/DL (ref 8.5–10.5)
CHLORIDE SERPL-SCNC: 109 MMOL/L (ref 96–112)
CO2 SERPL-SCNC: 27 MMOL/L (ref 20–33)
CREAT SERPL-MCNC: 0.9 MG/DL (ref 0.5–1.4)
EOSINOPHIL # BLD AUTO: 0.16 K/UL (ref 0–0.51)
EOSINOPHIL NFR BLD: 1.5 % (ref 0–6.9)
ERYTHROCYTE [DISTWIDTH] IN BLOOD BY AUTOMATED COUNT: 52.1 FL (ref 35.9–50)
GFR SERPLBLD CREATININE-BSD FMLA CKD-EPI: 88 ML/MIN/1.73 M 2
GLOBULIN SER CALC-MCNC: 2.8 G/DL (ref 1.9–3.5)
GLUCOSE BLD STRIP.AUTO-MCNC: 168 MG/DL (ref 65–99)
GLUCOSE BLD STRIP.AUTO-MCNC: 173 MG/DL (ref 65–99)
GLUCOSE BLD STRIP.AUTO-MCNC: 93 MG/DL (ref 65–99)
GLUCOSE SERPL-MCNC: 100 MG/DL (ref 65–99)
HCT VFR BLD AUTO: 32.1 % (ref 42–52)
HGB BLD-MCNC: 9.7 G/DL (ref 14–18)
IMM GRANULOCYTES # BLD AUTO: 0.07 K/UL (ref 0–0.11)
IMM GRANULOCYTES NFR BLD AUTO: 0.7 % (ref 0–0.9)
LYMPHOCYTES # BLD AUTO: 1.77 K/UL (ref 1–4.8)
LYMPHOCYTES NFR BLD: 16.7 % (ref 22–41)
MAGNESIUM SERPL-MCNC: 2.1 MG/DL (ref 1.5–2.5)
MCH RBC QN AUTO: 27.6 PG (ref 27–33)
MCHC RBC AUTO-ENTMCNC: 30.2 G/DL (ref 32.3–36.5)
MCV RBC AUTO: 91.5 FL (ref 81.4–97.8)
MONOCYTES # BLD AUTO: 0.91 K/UL (ref 0–0.85)
MONOCYTES NFR BLD AUTO: 8.6 % (ref 0–13.4)
NEUTROPHILS # BLD AUTO: 7.65 K/UL (ref 1.82–7.42)
NEUTROPHILS NFR BLD: 72.4 % (ref 44–72)
NRBC # BLD AUTO: 0 K/UL
NRBC BLD-RTO: 0 /100 WBC (ref 0–0.2)
PHOSPHATE SERPL-MCNC: 2.1 MG/DL (ref 2.5–4.5)
PLATELET # BLD AUTO: 226 K/UL (ref 164–446)
PMV BLD AUTO: 11.2 FL (ref 9–12.9)
POTASSIUM SERPL-SCNC: 3.3 MMOL/L (ref 3.6–5.5)
PROT SERPL-MCNC: 5.8 G/DL (ref 6–8.2)
RBC # BLD AUTO: 3.51 M/UL (ref 4.7–6.1)
SODIUM SERPL-SCNC: 144 MMOL/L (ref 135–145)
WBC # BLD AUTO: 10.6 K/UL (ref 4.8–10.8)

## 2025-07-07 PROCEDURE — 72100 X-RAY EXAM L-S SPINE 2/3 VWS: CPT

## 2025-07-07 PROCEDURE — 700102 HCHG RX REV CODE 250 W/ 637 OVERRIDE(OP): Performed by: INTERNAL MEDICINE

## 2025-07-07 PROCEDURE — A9270 NON-COVERED ITEM OR SERVICE: HCPCS | Performed by: INTERNAL MEDICINE

## 2025-07-07 PROCEDURE — 82962 GLUCOSE BLOOD TEST: CPT | Performed by: HOSPITALIST

## 2025-07-07 PROCEDURE — 99233 SBSQ HOSP IP/OBS HIGH 50: CPT | Performed by: INTERNAL MEDICINE

## 2025-07-07 PROCEDURE — 700111 HCHG RX REV CODE 636 W/ 250 OVERRIDE (IP): Mod: JZ | Performed by: INTERNAL MEDICINE

## 2025-07-07 PROCEDURE — 84153 ASSAY OF PSA TOTAL: CPT

## 2025-07-07 PROCEDURE — 700102 HCHG RX REV CODE 250 W/ 637 OVERRIDE(OP)

## 2025-07-07 PROCEDURE — A9270 NON-COVERED ITEM OR SERVICE: HCPCS

## 2025-07-07 PROCEDURE — 700101 HCHG RX REV CODE 250: Performed by: INTERNAL MEDICINE

## 2025-07-07 PROCEDURE — 5A1945Z RESPIRATORY VENTILATION, 24-96 CONSECUTIVE HOURS: ICD-10-PCS | Performed by: INTERNAL MEDICINE

## 2025-07-07 PROCEDURE — 92610 EVALUATE SWALLOWING FUNCTION: CPT

## 2025-07-07 PROCEDURE — 99497 ADVNCD CARE PLAN 30 MIN: CPT | Performed by: NURSE PRACTITIONER

## 2025-07-07 PROCEDURE — 700105 HCHG RX REV CODE 258: Performed by: INTERNAL MEDICINE

## 2025-07-07 PROCEDURE — 700111 HCHG RX REV CODE 636 W/ 250 OVERRIDE (IP): Performed by: INTERNAL MEDICINE

## 2025-07-07 PROCEDURE — 85025 COMPLETE CBC W/AUTO DIFF WBC: CPT

## 2025-07-07 PROCEDURE — 82962 GLUCOSE BLOOD TEST: CPT | Performed by: INTERNAL MEDICINE

## 2025-07-07 PROCEDURE — 99222 1ST HOSP IP/OBS MODERATE 55: CPT | Performed by: HOSPITALIST

## 2025-07-07 PROCEDURE — 84100 ASSAY OF PHOSPHORUS: CPT

## 2025-07-07 PROCEDURE — 770020 HCHG ROOM/CARE - TELE (206)

## 2025-07-07 PROCEDURE — 80053 COMPREHEN METABOLIC PANEL: CPT

## 2025-07-07 PROCEDURE — 99498 ADVNCD CARE PLAN ADDL 30 MIN: CPT | Performed by: NURSE PRACTITIONER

## 2025-07-07 PROCEDURE — 83735 ASSAY OF MAGNESIUM: CPT

## 2025-07-07 PROCEDURE — 99223 1ST HOSP IP/OBS HIGH 75: CPT | Mod: 25 | Performed by: NURSE PRACTITIONER

## 2025-07-07 PROCEDURE — 84154 ASSAY OF PSA FREE: CPT

## 2025-07-07 RX ORDER — UMECLIDINIUM BROMIDE AND VILANTEROL TRIFENATATE 62.5; 25 UG/1; UG/1
1 POWDER RESPIRATORY (INHALATION)
Status: DISCONTINUED | OUTPATIENT
Start: 2025-07-07 | End: 2025-07-08 | Stop reason: HOSPADM

## 2025-07-07 RX ADMIN — UMECLIDINIUM BROMIDE AND VILANTEROL TRIFENATATE 1 PUFF: 62.5; 25 POWDER RESPIRATORY (INHALATION) at 11:01

## 2025-07-07 RX ADMIN — AMPICILLIN AND SULBACTAM 3 G: 1; 2 INJECTION, POWDER, FOR SOLUTION INTRAMUSCULAR; INTRAVENOUS at 18:28

## 2025-07-07 RX ADMIN — GABAPENTIN 300 MG: 300 CAPSULE ORAL at 18:28

## 2025-07-07 RX ADMIN — HEPARIN SODIUM 5000 UNITS: 5000 INJECTION, SOLUTION INTRAVENOUS; SUBCUTANEOUS at 05:40

## 2025-07-07 RX ADMIN — INSULIN LISPRO 2 UNITS: 100 INJECTION, SOLUTION INTRAVENOUS; SUBCUTANEOUS at 11:28

## 2025-07-07 RX ADMIN — NICOTINE 7 MG: 7 PATCH TRANSDERMAL at 06:00

## 2025-07-07 RX ADMIN — HYDROMORPHONE HYDROCHLORIDE 0.5 MG: 1 INJECTION, SOLUTION INTRAMUSCULAR; INTRAVENOUS; SUBCUTANEOUS at 02:37

## 2025-07-07 RX ADMIN — HEPARIN SODIUM 5000 UNITS: 5000 INJECTION, SOLUTION INTRAVENOUS; SUBCUTANEOUS at 13:46

## 2025-07-07 RX ADMIN — HEPARIN SODIUM 5000 UNITS: 5000 INJECTION, SOLUTION INTRAVENOUS; SUBCUTANEOUS at 21:10

## 2025-07-07 RX ADMIN — ATORVASTATIN CALCIUM 40 MG: 40 TABLET, FILM COATED ORAL at 18:28

## 2025-07-07 RX ADMIN — POTASSIUM PHOSPHATE, MONOBASIC POTASSIUM PHOSPHATE, DIBASIC INJECTION, 30 MMOL: 236; 224 SOLUTION, CONCENTRATE INTRAVENOUS at 11:06

## 2025-07-07 RX ADMIN — HYDROCORTISONE SODIUM SUCCINATE 50 MG: 100 INJECTION, POWDER, FOR SOLUTION INTRAMUSCULAR; INTRAVENOUS at 18:28

## 2025-07-07 RX ADMIN — HYDROMORPHONE HYDROCHLORIDE 0.5 MG: 1 INJECTION, SOLUTION INTRAMUSCULAR; INTRAVENOUS; SUBCUTANEOUS at 05:41

## 2025-07-07 RX ADMIN — HYDROCORTISONE SODIUM SUCCINATE 50 MG: 100 INJECTION, POWDER, FOR SOLUTION INTRAMUSCULAR; INTRAVENOUS at 05:41

## 2025-07-07 RX ADMIN — GABAPENTIN 300 MG: 300 CAPSULE ORAL at 11:14

## 2025-07-07 RX ADMIN — PIPERACILLIN AND TAZOBACTAM 4.5 G: 4; .5 INJECTION, POWDER, FOR SOLUTION INTRAVENOUS at 07:42

## 2025-07-07 RX ADMIN — HYDROMORPHONE HYDROCHLORIDE 1 MG: 1 INJECTION, SOLUTION INTRAMUSCULAR; INTRAVENOUS; SUBCUTANEOUS at 21:48

## 2025-07-07 RX ADMIN — DOCUSATE SODIUM 50 MG AND SENNOSIDES 8.6 MG 2 TABLET: 8.6; 5 TABLET, FILM COATED ORAL at 18:27

## 2025-07-07 ASSESSMENT — ENCOUNTER SYMPTOMS
SHORTNESS OF BREATH: 0
COUGH: 0
BACK PAIN: 0
FEVER: 0
SHORTNESS OF BREATH: 1
CHILLS: 0
NERVOUS/ANXIOUS: 0
ABDOMINAL PAIN: 0
MUSCULOSKELETAL NEGATIVE: 1
NERVOUS/ANXIOUS: 1
VOMITING: 0
CARDIOVASCULAR NEGATIVE: 1
NAUSEA: 0
COUGH: 1
GASTROINTESTINAL NEGATIVE: 1
EYES NEGATIVE: 1
HEADACHES: 0
HEMOPTYSIS: 0
WEAKNESS: 1
SPUTUM PRODUCTION: 0
FLANK PAIN: 0

## 2025-07-07 ASSESSMENT — PAIN DESCRIPTION - PAIN TYPE
TYPE: ACUTE PAIN

## 2025-07-07 ASSESSMENT — COGNITIVE AND FUNCTIONAL STATUS - GENERAL
MOBILITY SCORE: 12
TOILETING: TOTAL
HELP NEEDED FOR BATHING: A LOT
MOVING FROM LYING ON BACK TO SITTING ON SIDE OF FLAT BED: A LITTLE
DAILY ACTIVITIY SCORE: 12
DRESSING REGULAR UPPER BODY CLOTHING: A LOT
MOVING TO AND FROM BED TO CHAIR: A LOT
SUGGESTED CMS G CODE MODIFIER MOBILITY: CL
STANDING UP FROM CHAIR USING ARMS: A LOT
WALKING IN HOSPITAL ROOM: TOTAL
TURNING FROM BACK TO SIDE WHILE IN FLAT BAD: A LITTLE
EATING MEALS: A LITTLE
PERSONAL GROOMING: A LOT
SUGGESTED CMS G CODE MODIFIER DAILY ACTIVITY: CL
CLIMB 3 TO 5 STEPS WITH RAILING: TOTAL
DRESSING REGULAR LOWER BODY CLOTHING: A LOT

## 2025-07-07 ASSESSMENT — LIFESTYLE VARIABLES: SUBSTANCE_ABUSE: 1

## 2025-07-07 NOTE — THERAPY
Speech Language Pathology   Clinical Swallow Evaluation     Patient Name:  Donte Agustin  AGE:  79 y.o., SEX:  male  Medical Record #:  [id number]  Date of Service:  2025    Precautions  Communication/Language: Dementia    History of Present Illness  78 y/o male admitted 27 with acute on chronic respiratory failure, septic shock. Pt was intubated -.     CMHx: septic shock, bradycardia, LLL PNA, TIM, acute on chronic respiratory failure with hypoxia, COPD (not in acute exacerbation)    PMHx: Heart failure, COPD (5L baseline), pulmonary HTN, CAD s/p PCI and stent placements, dementia, diabetes, GERD, CKD, prostate cancer, tobacco use, debility, prostate cancer    SLP Hx:  MBSS 25 Moderate-severe pharyngeal dysphagia; SB6/TN0 w/ strict strategies (3sp, 3 swallows, cough reswallow)    CXR : Portable chest radiograph appears unchanged compared to the most recent examination. No significant changes or new abnormalities are identified.    CXR : 1.  Hazy right pulmonary infiltrates, stable since prior study  2.  Cardiomegaly  3.  Atherosclerosis        General Information:  Vitals  O2 (LPM): 2  O2 Delivery Device: Silicone Nasal Cannula  Level of Consciousness: Alert, Awake  Patient Behaviors: Agitated, Flat Affect, Irritable, Uncooperative  Orientation: Self, , General place, Current year  Follows Directives: Inconsistent (behavioral)      Prior Living Situation & Level of Function:  Prior Services: Intermittent Physical Support for ADL Per Family  Housing / Facility: [address]  Lives with - Patient's Self Care Capacity: Spouse, Adult Children  Communication: h/o dementia  Swallowing: WFL       Oral Mechanism Evaluation:  Dentition: Edentulous   Facial Symmetry: Equal  Facial Sensation: Equal     Labial Observations: WFL   Lingual Observations: Xerostomia  Motor Speech: mild dysarthria            Laryngeal Function:  Secretion Management: Excess secretions  Voice Quality: WFL  Cough:  Perceptually WNL            Subjective  RN cleared patient for evaluation. Patient received awake, begrudgingly agreeable to participate in evaluation. Pt's spouse at bedside.      Assessment  Current Method of Nutrition: NPO until cleared by speech pathology  Positioning: Siddiqui's (60-90 degrees)  Bolus Administration: SLP, Patient  O2 (LPM): 2 O2 Delivery Device: Silicone Nasal Cannula  Factor(s) Affecting Performance: None  Tracheostomy : No       Swallowing Trials:  Swallowing Trials  Ice: WFL  Thin Liquid (TN0): WFL  Liquidised (LQ3): WFL  Regular (RG7): WFL      Comments: Oral care provided via suction toothbrush prior to PO presentation. Adequate oral bolus acceptance/containment. Prolonged but functional mastication of dry solids. Swallow initiation appeared timely. Vocal quality remained clear.The 3 ounce water challenge is a swallow screen which if passed has a predictive rate of 96% sensitivity for identifying individuals safe to swallow (Jessica et al, 2008). Patient completed without incident.         Clinical Impressions  Patient presents without overt s/sx of aspiration. Diet modification is not indicated. Service will continue to follow to maximize swallow outcomes. HOLD PO with any difficulty and contact SLP. Dysphagia outcomes can be maximized with use of mobility as pt is able and frequent, thorough oral care.          Recommendations  Diet Consistency: Regular solids, thin liquids  Instrumentation: None indicated at this time (will consider with any ongoing concerns for aspiration)  Medication: As tolerated  Supervision: Distant supervision - check on patient 2-3 times per meal  Positioning: Fully upright and midline during oral intake, Meals sitting upright in a chair, as tolerated  Risk Management : Slow rate of intake, Physical mobility, as tolerated  Oral Care: Q8h         SLP Treatment Plan  Treatment Plan: Dysphagia Treatment, Patient/Family/Caregiver Training  SLP Frequency: 3x Per  "Week  Estimated Duration: Until Therapy Goals Met      Anticipated Discharge Needs  Discharge Recommendations: Other (TBD pending clinical progress)   Therapy Recommendations Upon DC: Patient / Family / Caregiver Education        Patient / Family Goals  Patient / Family Goal #1: \"Oh no! She's going to take my food away Marge!\"  Short Term Goals  Short Term Goal # 1: Pt will consume rg/tn diet with no overt s/sx of aspiration or decline in pulmonary status      Erika Sandoval, SLP   "

## 2025-07-07 NOTE — ASSESSMENT & PLAN NOTE
This is Septic shock Present on admission    Cont broad spectrum abx:  Zosyn-transition to Unasyn  Vanco Dced, MRSA nares negative   MAP goals > 65  Off of levophed and epinepherine today   Decrease stress dose steroids to b90-kfoqj off

## 2025-07-07 NOTE — ASSESSMENT & PLAN NOTE
The patient complains of lower extremity weakness, inability to ambulate, this has been going on for the last year  Unclear development  Check a PSA, consider further workup

## 2025-07-07 NOTE — PROGRESS NOTES
"During bath/skin check, mobilization was attempted. Pt began screaming \"Stop I can't move\" several times. When trying to console him and reassure that he would be safe, he began yelling indiscernibly. Attempts to calm him down with wife at bedside were unsuccessful. He was given education on the importance of mobilizing, and that his pneumonia will only worsen without mobilization. He further denied any attempt at mobilization throughout shift. Charge RN notified.  "

## 2025-07-07 NOTE — CONSULTS
"MRN: [id number]  Date of palliative consult: 25  Reason for consult: [facility]  Referring provider: Dr. Garcia \"no AD on file, multiple readmissions in the last 3 months.\"  Location of consult: [facility]  Additional consulting services: None    HPI:   Donte Agustin is a 79 y.o. male with medical history significant for COPD 2-5 L/min baseline home oxygen, HFrEF 25%, pulmonary hypertension, CAD status post PCI and stent placement, tobacco abuse, prostate cancer, HLD, HTN, diabetes recently hospitalized in [place] for about a week and treated for sepsis due to pneumonia and released home  brought in from EMS directly to [facility] from home in [facility] 2025 for altered mental status.     ROS:    Review of Systems   Respiratory:  Positive for shortness of breath.    Neurological:  Positive for weakness.       PE:   Recent vital signs  BMI: Body mass index is 29.65 kg/m².    Temp (24hrs), Av.2 °C (97.1 °F), Min:35.8 °C (96.4 °F), Max:36.8 °C (98.2 °F)  Temperature: 35.9 °C (96.7 °F)  Pulse  Av.9  Min: 33  Max: 104   Blood Pressure : 104/54       Physical Exam  Pulmonary:      Effort: No respiratory distress.   Neurological:      Mental Status: He is alert and oriented to person, place, and time.   Psychiatric:         Attention and Perception: He is inattentive.         Mood and Affect: Mood is anxious.         Speech: Speech is tangential.         Behavior: Behavior is uncooperative.       ASSESSMENT/PLAN WITH SHARED DECISION MAKING:   PHYSICAL ASPECTS OF CARE  Palliative Performance Scale: 40%    #Acute on chronic respiratory failure with hypoxia  # Septic shock  # Left lower lobe pneumonia  # Bradycardia possibly related to Precedex and digoxin  # TIM  # COPD  # HFrEF 25%  # Pulmonary hypertension    SOCIAL ASPECTS OF CARE  Patient resides in [facility] with his wife Sherin.  They live in a studio/hotel.  They get assistance from their son Michael he has a [id number] smoking history and " "continues to smoke.  No alcohol or drug use.    SPIRITUAL ASPECTS OF CARE   Both patient and his wife are wearing matching process that their daughter about for them.  He denied need for spiritual care visit stating \"I am not dying yet.\"  Discussed spiritual care visits can be arranged for support or prayer.     GOALS OF CARE/SERIOUS ILLNESS CONVERSATION  Introduced myself to patient and patient's wife. Discussed role of palliative care and reason for consult.  Patient agreeable to discuss goals of care.  Patient perseverated on the need to get his back x-rayed/imaged as he is convinced he is paralyzed.  He continued to state he is \"crippled.\"  Discussed I do not believe he is paralyzed but is likely very deconditioned.  Per his wife she reports that patient never started walking again after prolonged hospitalization for pneumonia at [facility] near [place] September 2024.  She reports he has been mainly wheelchair-bound since.  Patient's wife Marge and that her son Michael help mobilize patient.  She reports he is fallen multiple times and it is somewhat difficult getting him back in his wheelchair but they manage.    They believe he has completed an advance directive at [facility] in [place].  Confirmed we can request records.  Reviewed POLST form.  Patient was very irritable and stated he would \"not sign a contract.\"  I discussed elements on POLST form and CODE STATUS.  Patient would not give me an answer for his preferences in regards to CPR but did confirm he would want his wife to make decisions.  Sherin expressed she understands that patient's condition is quite poor and CPR may not be advised given multiple chronic conditions and advanced age.  I confirmed with the patient and his wife that they not on decision equals a decision for CPR/resuscitation in the event of an emergency.  Recommended completion of POLST form should patient wish to place any limits on his care.  Conversation was difficult as patient interrupted " "frequently and perseverated on his ability to understand.  He seemed paranoid about being put in an institution.  Reassured him multiple times our goal is to ensure we provide care that is in line with his wishes.  His wife understood and confirms she would talk to him further about POLST form and CODE STATUS.  Confirmed our services are available if they would like to speak again. Provided palliative care contact information and encouraged patient and his wife Marge to reach out with any questions/needs.     Code Status: Full    ACP Documents: None    Updated: Bedside nurse    47 minutes spent discussing advance care planning, this time excludes any other billed services.    Interval diagnostic studies and medical documentation entries pertinent to this case were reviewed independently by me. This patient has at least one acute or chronic illness or injury that poses a threat to life or bodily function. This patient suffers from a high risk of morbidity from additional invasive diagnostic testing or intensive treatment. Discussion of recommendations and coordination of care undertaken with primary provider/treatment team.      Inez \"Valerie\" FLORA Cage, [facility]  Inpatient Palliative Care (service hours Mon-Fri 8AM - 5PM)  [phone]      "

## 2025-07-07 NOTE — CARE PLAN
The patient is Watcher - Medium risk of patient condition declining or worsening    Shift Goals  Clinical Goals: Hemodynamics, wean o2; pain mgmt  Patient Goals: rest; pain control  Family Goals: updates    Progress made toward(s) clinical / shift goals:      Problem: Respiratory  Goal: Patient will achieve/maintain optimum respiratory ventilation and gas exchange  Outcome: Progressing  Pt respiratory status remains stable post-extubation     Problem: Hemodynamics  Goal: Patient's hemodynamics, fluid balance and neurologic status will be stable or improve  Outcome: Progressing  Pt hemodynamics remain stable off of pressors     Problem: Pain - Standard  Goal: Alleviation of pain or a reduction in pain to the patient’s comfort goal  Outcome: Progressing  Pt pain is managed through use of PRN     Problem: Skin Integrity  Goal: Skin integrity is maintained or improved  Outcome: Progressing  Pt skin is maintained through q2 turns with wedges and offloading measures       Patient is not progressing towards the following goals:

## 2025-07-07 NOTE — PROGRESS NOTES
4 Eyes Skin Assessment Completed by [employer] and [employer]ARY[employer] [address].    Skin assessment is primarily focused on high risk bony prominences. Pay special attention to skin beneath and around medical devices, high risk bony prominences, skin to skin areas and areas where the patient lacks sensation to feel pain and areas where the patient previously had breakdown.     Head (Occipital):  WDL   Ears (Under Medical Devices): WDL   Nose (Under Medical Devices): WDL   Mouth:  WDL   Neck: Pink and Blanching, right IJ CVC   Breast/Chest:  WDL   Shoulder Blades:  Pink and Blanching   Spine:   Pink and Blanching   (R) Arm/Elbow/Hand: Pink, Blanching, and Bruising, yellow, dark discolored fingernail beds, see photos   (L) Arm/Elbow/Hand: Pink, Blanching, and Bruising, yellow, dark discolor fingernail beds, see photos   Abdomen: Bruising   Pannus/Groin:  Pink and Blanching   Sacrum/Coccyx:   Red and Blanching   (R) Ischial Tuberosity (Sit Bones):  Pink and Blanching   (L) Ischial Tuberosity (Sit Bones):  Pink and Blanching   (R) Leg:  Abrasion and Scar   (L) Leg:  Abrasion and Scar   (R) Heel:  Red and Blanching   (R) Foot/Toe: Pink and Blanching   (L) Heel: Red and Blanching   (L) Foot/Toe:  Pink and Blanching       DEVICES IN USE:   Respiratory Devices:  Nasal cannula and Pulse ox  Feeding Devices:  N/A   Lines & BP Monitoring Devices:  Peripheral IV, Central line, BP cuff, and Pulse ox    Orthopedic Devices:  N/A  Miscellaneous Devices:  Jensen and SCDs    PROTOCOL INTERVENTIONS:   ICU Low Airloss Bed:  Already in place  Elbows Padded with Offloading Dressing:  Reinforced/reapplied  Offloading Dressing - Sacrum:  Reinforced/reapplied  Offloading Dressing - Heel:  Reinforced/reapplied  Heel Float Boots:  Already in place  Q2 Turns with Wedges:  Already in place  Glide Sheet:  Already in place  Dri-Zan/Micro Climate Pads:  Already in place  Barrier Paste:  Reinforced/reapplied  Jensen:  Already in place  Silicone Nasal Cannula Tubing:   Already in place  Nasal Cannula with Gray Foams:  Already in place    WOUND PHOTOS:   Completed and in EPIC , finger nail beds    WOUND CONSULT:   Consult ordered for the following areas Finger nail beds

## 2025-07-07 NOTE — CARE PLAN
The patient is Watcher - Medium risk of patient condition declining or worsening    Shift Goals  Clinical Goals: Hemodynamics, wean o2; pain mgmt  Patient Goals: rest; pain control  Family Goals: updates    Progress made toward(s) clinical / shift goals:    Problem: Respiratory  Goal: Patient will achieve/maintain optimum respiratory ventilation and gas exchange  Outcome: Progressing     Problem: Hemodynamics - Pneumonia  Goal: Patient's hemodynamics, fluid balance and neurologic status will be stable or improve  Outcome: Progressing     Problem: Skin Integrity  Goal: Skin integrity is maintained or improved  Outcome: Progressing       Patient is not progressing towards the following goals:

## 2025-07-07 NOTE — PROGRESS NOTES
MONITOR SUMMARY  Rate: 50's-90's  Rhythm: afib/flutter BBB  Ectopy: Frequent PVC  Measurements:    QRS: 0.148

## 2025-07-07 NOTE — PROGRESS NOTES
Critical Care Progress Note    Date of admission  7/4/2025    History of Presenting Illness  Most history was obtained from Donte via extensive chart review as well as the patient answering a few questions due to his critical illness     Donte Agustin is a 79 y.o. male with the past medical history significant for COPD, chronic hypoxic respiratory failure on 2-5 lpm O2, ischemic CM with an EF of 25% (last ECHO in April 2025), pulmonary hypertension with an RVSP of 55mmHg, CAD s/p PCI and stents, tobacco abuse, and prostate cancer who was recently hospitalized at a facility in [place], [place] for pneumonia and released for home on 7/2/2025.  EMS was summoned to his house today due to his wife feeling he was more lethargy and not breathing well.  EMS found the patient to be hypoxic on his home O2 as well as tachycardic and hypotensive with SBPs in the 60s.  EMS decided to bypass [place] local ER and bring the patient to [facility].  EN route, the patient received 1 liters of IVF and was started on peripheral levophed.  In the ER, the patient was started on 15 lpm oxymask as well given another 1 liter of IVF.  He was started of broad spectrum antibiotics for a chest x-ray concerning for a left sided pneumonia.  He was be admitted to the ICU for management of septic shock with acute hypoxic respiratory failure and TIM due to a hospital acquired pneumonia.    Hospital Course  7/4 - intubated overnight, central line and A line placed  7/5 - failed SAT, retry later today, hoping for extubation today   7/6 -extubated , weaned off both epinephrine and norepinephrine    Interval Problem Update  Chart review from the past 24 hours includes imaging, laboratory studies, vital signs and notes available.  Pertinent data for today's visit includes:    A&O x4  Follows, refusing mobility  RASS 0  SB/SR 48-68   -110s   cc / 24 hours  Epinephrine and norepinephrine weaned off today  Extubated yesterday  WOB low  O2 sats  95-98%  4-5L MP NC  CXR no film  Tmax 98.2   WBC 10.6  Piperacillin/tazobactam day 4  Cultures all neg  Hemoglobin 9.7,   , K3.3, HCO3 27, AG 8, Mg 2.1, Phos 2.1  BUN 23, creatinine 0.90  Transaminases, alk phos and bilirubin normal, albumin 3.0  ASA, clopidogrel, atorvastatin  Heparin/omeprazole PPx  Hydrocortisone 50 IV every 12  Glargine/SSI  Gabapentin, nicotine  Nicotine patch  Refused mobility        Unasyn x 3 days  Swallow eval ongoing  IS encouraged  Replete K/phos  Tapering HC  Transfer to tele    Patient refusing mobility, stated he was paralyzed but actually has decent lower extremity strength  Wife appears frustrated with him and his immobility and she has been encouraging him as well.  Patient requesting we get records from [facility] in [place] in regards to lower extremity weakness numbness that he calls paralysis, patient refusing mobility and refusing some parts of the exam.  PT/OT evaluate and treat-patient encouraged to participate by myself and his wife     YESTERDAY  Neuro: intact  Cardiac:   -Bradycardic  -Levophed 0.08 mcg/kg/min  Pulm:   -Vent: 16/400/10/50%  -AB.40/38.6/73  Heme:   -Hg stable  /renal:   - Cr improving   GI: No acute events  Endo: 10 units ISS   ID:    - RVP negative   - WBC downtrending    - on Zosyn   I/O: +2588/-500=+2088  Imaging:   - CT chest: Signs of aspiration on the right    Review of Systems   Constitutional:  Negative for fever.   HENT:  Negative for congestion.    Eyes: Negative.    Respiratory:  Positive for cough (Improved). Negative for hemoptysis, sputum production (Improved) and shortness of breath (Improved).    Cardiovascular:  Negative for chest pain.   Gastrointestinal:  Negative for abdominal pain, nausea and vomiting.   Genitourinary:  Negative for flank pain.   Neurological:  Positive for weakness (Diffuse). Negative for headaches.   Psychiatric/Behavioral:  The patient is not nervous/anxious.          Vital Signs for last 24 hours    Temp:  [35.8 °C (96.4 °F)-36.8 °C (98.2 °F)] 35.9 °C (96.7 °F)  Pulse:  [46-93] 48  Resp:  [8-74] 11  BP: ()/(46-71) 104/54  SpO2:  [94 %-100 %] 95 %    Hemodynamic parameters for last 24 hours       Respiratory Information for the last 24 hours       Physical Exam  Vitals and nursing note reviewed.   Constitutional:       Appearance: He is obese. He is ill-appearing. He is not toxic-appearing.      Interventions: He is intubated.      Comments: Patient appears older than stated age and chronically ill    HENT:      Head: Normocephalic and atraumatic.      Right Ear: External ear normal.      Left Ear: External ear normal.      Nose: Nose normal. No rhinorrhea.      Mouth/Throat:      Mouth: Mucous membranes are dry.      Pharynx: Oropharynx is clear. No oropharyngeal exudate.   Eyes:      General: No scleral icterus.     Pupils: Pupils are equal, round, and reactive to light.   Cardiovascular:      Rate and Rhythm: Normal rate and regular rhythm.      Pulses:           Dorsalis pedis pulses are 1+ on the right side and 1+ on the left side.      Heart sounds: No murmur heard.  Pulmonary:      Effort: He is intubated.      Breath sounds: No wheezing.      Comments: Coarse breath sounds throughout L>R, no wheezing, diminished left base  Chest:      Chest wall: No tenderness.   Abdominal:      Palpations: Abdomen is soft.      Tenderness: There is no abdominal tenderness. There is no guarding or rebound.   Musculoskeletal:         General: Normal range of motion.      Cervical back: Normal range of motion and neck supple.      Right lower le+ Edema present.      Left lower le+ Edema present.   Lymphadenopathy:      Cervical: No cervical adenopathy.   Skin:     General: Skin is warm and dry.      Capillary Refill: Capillary refill takes less than 2 seconds.      Findings: No rash.   Neurological:      Mental Status: He is alert and oriented to person, place, and time.      Cranial Nerves: No cranial  nerve deficit.      Sensory: No sensory deficit.      Motor: No weakness.         Medications  Current Medications[1]    Fluids    Intake/Output Summary (Last 24 hours) at 7/7/2025 1019  Last data filed at 7/7/2025 0600  Gross per 24 hour   Intake 61.07 ml   Output 925 ml   Net -863.93 ml       Laboratory  Recent Labs     07/04/25  2250 07/05/25  0240 07/06/25  0411   ISTATAPH 7.429 7.406 7.452*   ISTATAPCO2 37.3 38.6 34.0   ISTATAPO2 96 73* 85   ISTATATCO2 26* 25* 25*   TONNPVV8IAD 98 95 97   ISTATARTHCO3 24.7 24.2 23.7   ISTATARTBE 1 0 0   ISTATTEMP 96.6 F 96.2 F 97.0 F   ISTATFIO2 60 50 40   ISTATSPEC Arterial Arterial Arterial   ISTATAPHTC 7.445 7.425 7.465*   ZHHFTXQC8CR 90 67* 80*         Recent Labs     07/05/25  0340 07/06/25  0400 07/07/25  0506   SODIUM 141 141 144   POTASSIUM 5.2 3.3* 3.3*   CHLORIDE 105 104 109   CO2 25 24 27   BUN 32* 33* 23*   CREATININE 1.17 1.13 0.90   MAGNESIUM 1.9 2.1 2.1   PHOSPHORUS 2.5 2.5 2.1*   CALCIUM 8.8 9.1 8.7     Recent Labs     07/05/25  0340 07/06/25  0400 07/07/25  0506   ALTSGPT <5 <5 <5   ASTSGOT 9* 6* 12   ALKPHOSPHAT 60 56 45   TBILIRUBIN 0.4 0.3 0.4   GLUCOSE 194* 259* 100*     Recent Labs     07/05/25  0340 07/06/25  0400 07/07/25  0506   WBC 20.0* 20.3* 10.6   NEUTSPOLYS 89.40* 88.40* 72.40*   LYMPHOCYTES 4.70* 3.10* 16.70*   MONOCYTES 5.10 7.30 8.60   EOSINOPHILS 0.10 0.00 1.50   BASOPHILS 0.10 0.10 0.10   ASTSGOT 9* 6* 12   ALTSGPT <5 <5 <5   ALKPHOSPHAT 60 56 45   TBILIRUBIN 0.4 0.3 0.4     Recent Labs     07/04/25  1110 07/05/25  0340 07/06/25  0400 07/07/25  0506   RBC 4.48* 4.23* 3.96* 3.51*   HEMOGLOBIN 12.2* 11.7* 10.9* 9.7*   HEMATOCRIT 39.5* 37.0* 34.9* 32.1*   PLATELETCT 446 355 305 226   PROTHROMBTM 15.1*  --   --   --    INR 1.18*  --   --   --        Imaging  CT:    Reviewed    Assessment/Plan  * Septic shock (HCC)- (present on admission)  Assessment & Plan  This is Septic shock Present on admission    Cont broad spectrum abx:  Zosyn-transition to  Unasyn  Vanco Dced, MRSA nares negative   MAP goals > 65  Off of levophed and epinepherine today   Decrease stress dose steroids to [id number] off    Left lower lobe pneumonia- (present on admission)  Assessment & Plan  Hospital acquired vs aspiration-all cultures negative/clinically improved  Transition Zosyn which has been on for 4 days to Unasyn x 3 days and reassess daily  COVID/influenza/RSV was negative  Urine antigen for Legionella and strep pneumo both negative  RVP negative  SLP evaluation after extubation, suspect that she is aspirating-ongoing per speech therapist    Acute on chronic respiratory failure with hypoxia (HCC)- (present on admission)  Assessment & Plan  Due to pneumonia  Intubated 7/4/2025, extubated 7/6  RT protocols  I-S/PEP  Mobilize but patient refusing  Patient on home O2    Heart failure with reduced ejection fraction (HCC) (LVEF 20-25% Echo 2/2025)- (present on admission)  Assessment & Plan  Judicious fluid resuscitation with EF of 25%  Maintain euvolemia  Holding GDMT while in shock-begin resuming home regimen in the coming day or 2 while on telemetry    Bradycardia  Assessment & Plan  Due to Precedex and likely from digoxin  Hold Precedex  Hold digoxin, may be able to restart later   Avoid Precedex in future?      TIM (acute kidney injury) (HCC)- (present on admission)  Assessment & Plan  ?due to hypotension  Improved creatinine and BUN again  Monitor UOP/creat  Avoid nephrotoxins     Pulmonary hypertension (HCC)- (present on admission)  Assessment & Plan  ECHO with RVSP of 55 mmHg  Likely group II/III  Status post judicious fluid resuscitation   Avoid hypoxia/acidemia  Diurese when able, was on home diuretics, probably start that first before his BP meds    Hypertension- (present on admission)  Assessment & Plan  Holding home BP meds coming out of shock  Blood pressure normal/soft, not quite ready for complete resumption of GDMT    Diabetes (HCC)- (present on admission)  Assessment &  Plan  BG goals 120-180s  Medium ISS  Monitor for need for long acting insulin while on steroids  Steroids now tapering, glucose levels better    COPD (chronic obstructive pulmonary disease) (HCC)- (present on admission)  Assessment & Plan  Hx of, not in acute exacerbation at this time  RT/O2 protocols  Resume Anoro-Home medication  DuoNeb PRN    Prostate cancer (HCC)- (present on admission)  Assessment & Plan  Hx of     Hyperlipidemia- (present on admission)  Assessment & Plan  Cont home statin-atorvastatin    History of coronary angioplasty with insertion of stent- (present on admission)  Assessment & Plan  Cont ASA and statin therapy  Monitor for symptoms    Lines: CVC, A line  Tubes: boone  Diet: NPO   DVT ppx: heparin  GI ppx: lansoprazole   Bowel regiment: yes       I have performed a physical exam and reviewed and updated ROS and Plan today (7/7/2025). In review of yesterday's note (7/6/2025), there are no changes except as documented above.     Discussed patient condition and risk of morbidity and/or mortality with Hospitalist, Family, RN, RT, Pharmacy, [facility] resident, Charge nurse / hot rounds, and Patient    Case reviewed with hospitalist team who kindly accepts the patient in transfer to telemetry, RCC will sign off.             [1]   Current Facility-Administered Medications   Medication Dose Route Frequency Provider Last Rate Last Admin    potassium phosphate IVPB 30 mmol in 500 mL D5W (premix)  30 mmol Intravenous Once Nathaniel Balderrama M.D.        ampicillin/sulbactam (Unasyn) 3 g in  mL IVPB  3 g Intravenous Q6HRS Nathaniel Balderrama M.D.        umeclidinium-vilanterol (Anoro Ellipta) inhaler 1 Puff  1 Puff Inhalation QDAILY (RT) Nathaniel Balderrama M.D.        hydrocortisone sodium succinate PF (Solu-CORTEF) injection 50 mg  50 mg Intravenous Q12HRS Yulia Grey D.O.   50 mg at 07/07/25 0541    haloperidol lactate (Haldol) injection 1 mg  1 mg Intravenous Q4HRS PRN Nixon Lucas,  D.O.   1 mg at 07/06/25 1530    aspirin (Asa) chewable tab 81 mg  81 mg Oral DAILY Yulia Grey D.O.        atorvastatin (Lipitor) tablet 40 mg  40 mg Oral Q EVENING Yulia Grey D.O.        clopidogrel (Plavix) tablet 75 mg  75 mg Oral DAILY Yulia Grey D.O.        gabapentin (Neurontin) capsule 300 mg  300 mg Oral TID Yulia Grey D.O.        senna-docusate (Pericolace Or Senokot S) 8.6-50 MG per tablet 2 Tablet  2 Tablet Oral BID Yulia Grey D.O.        And    polyethylene glycol/lytes (Miralax) Packet 1 Packet  1 Packet Oral QDAY PRN Yulia Grey D.O.        And    magnesium hydroxide (Milk Of Magnesia) suspension 30 mL  30 mL Oral QDAY PRN Yulia Grey D.O.        And    bisacodyl (Dulcolax) suppository 10 mg  10 mg Rectal QDAY PRN Yulia Grey D.O.        omeprazole (PriLOSEC) capsule 20 mg  20 mg Oral DAILY Yulia Grey D.O.        acetaminophen (Tylenol) tablet 650 mg  650 mg Oral Q6HRS PRN Yulia Grey D.O.        ondansetron (Zofran) syringe/vial injection 4 mg  4 mg Intravenous Q4HRS PRN Yulia Grey D.O.        Or    ondansetron (Zofran ODT) dispertab 4 mg  4 mg Oral Q4HRS PRN Yulia Grey D.O.        nicotine (Nicoderm) 7 MG/24HR 7 mg  7 mg Transdermal Daily-0600 Glenn Paige M.D.   7 mg at 07/07/25 0600    Respiratory Therapy Consult   Nebulization Continuous RT Jazmine Garcia M.D.        heparin injection 5,000 Units  5,000 Units Subcutaneous Q8HRS Jazmine Garcia M.D.   5,000 Units at 07/07/25 0540    insulin lispro (HumaLOG,AdmeLOG) subcutaneous injection  2-9 Units Subcutaneous Q6HRS Jazmine Garcia M.D.   2 Units at 07/06/25 1706    And    dextrose 50 % (D50W) injection 25 g  25 g Intravenous Q15 MIN PRN Jazmine Garcia M.D.        piperacillin-tazobactam (Zosyn) 4.5 g in  mL IVPB  4.5 g Intravenous Q8HRS Nathaniel Balderrama M.D. 25 mL/hr at 07/07/25 0742 4.5 g at 07/07/25 0742    MD  Alert...ICU Electrolyte Replacement per Pharmacy   Other PHARMACY TO DOSE Luisito Amaro M.D.        lidocaine (Xylocaine) 1 % injection 2 mL  2 mL Tracheal Tube Q30 MIN PRN Luisito Amaro M.D.        HYDROmorphone (Dilaudid) injection 0.5 mg  0.5 mg Intravenous Q HOUR PRN Luisito Amaro M.D.   0.5 mg at 07/07/25 0541    Or    HYDROmorphone (Dilaudid) injection 1 mg  1 mg Intravenous Q HOUR PRN Luisito Amaro M.D.   1 mg at 07/06/25 0614

## 2025-07-07 NOTE — CONSULTS
Hospital Medicine Consultation    Date of Service  7/7/2025    Referring Physician  Nathaniel Balderrama M.D.    Consulting Physician  Roger Arreguin M.D.    Reason for Consultation  Hospital medicine consultation requested the patient admitted with dyspnea    History of Presenting Illness  79 y.o. male who presented 7/4/2025 with a past med history consisting of COPD, chronically on oxygen, 2 to 5 L at home, ischemic cardiomyopathy with ejection fraction of 25%, pulm hypertension with an RVSP of 55 m mercury, coronary disease s/p PCI and stenting, tobacco abuse, ongoing, heavy, history of prostate cancer, reportedly recently hospitalized at a facility in [facility] for pneumonia, reportedly released to home on 7/2/2025, EMS was called to the house because of increasing lethargy and difficulty breathing, the patient was found hypoxic in his home on home O2, was found tachycardic, hypotensive with blood pressure in the 60s, the patient was brought to this facility, the patient was volume resuscitated with 1 L of IV fluids, was started on peripheral Levophed and route, here in the emergency room the patient had been placed on 15 L per OxyMask, started on broad-spectrum antibiotics, and a chest x-ray was concerning for a left sided pneumonia, the patient was admitted to the ICU level of care with the impression of septic shock, acute hypoxic respiratory failure and TIM, in the course the patient required to be intubated, central line and arterial line was placed, on 7/6 the patient successfully extubated, weaned off on epinephrine and norepinephrine, on 7/7 the patient overall further stabilizing and then defied to be stabilized to transfer out of the ICU level of care.  The patient continues to complain about lower extremity weakness, being wheelchair-bound for the last year, he states he was never explained why he cannot walk.  The patient has current fevers or chills, no productive cough.  He would like to  have a nicotine patch  Fingers are significantly discolored from tobacco  Review of Systems  Review of Systems   Constitutional:  Positive for malaise/fatigue.   HENT: Negative.     Eyes: Negative.    Respiratory:  Positive for shortness of breath. Negative for cough.    Cardiovascular: Negative.    Gastrointestinal: Negative.    Genitourinary: Negative.    Musculoskeletal: Negative.  Negative for back pain.   Skin: Negative.    Neurological:  Positive for weakness.   Endo/Heme/Allergies: Negative.    Psychiatric/Behavioral:  Positive for substance abuse. The patient is nervous/anxious.    All other systems reviewed and are negative.      Past Medical History  History of prostate cancer  COPD  Chronic domicile oxygen use 2 to 5 L  Ischemic cardiomyopathy  Systolic heart failure ejection fraction 25%  Pulmonary hypertension with a pressure of 55  History of coronary disease, PCI and stenting  Ongoing severe tobacco abuse  Chronically immobile and wheelchair-bound  History of [facility] discharge    Surgical History  No recent surgical history    Family History  History reviewed. No pertinent family history.    Social History   reports that he has been smoking cigarettes. He started smoking about 40 years ago. He has a 20.3 pack-year smoking history. He has been exposed to tobacco smoke. He has never used smokeless tobacco. He reports that he does not currently use alcohol. He reports that he does not use drugs.    Medications  Prior to Admission Medications   Prescriptions Last Dose Informant Patient Reported? Taking?   ANORO ELLIPTA 62.5-25 MCG/ACT AEROSOL POWDER, BREATH ACTIVATED inhaler New Rx Patient's Home Pharmacy Yes Yes   Sig: Inhale 1 Puff every day.   JARDIANCE 10 MG Tab tablet New Rx Patient's Home Pharmacy Yes Yes   Sig: Take 10 mg by mouth every day.   acetaminophen-codeine #3 (TYLENOL #3) 300-30 MG Tab 7/4/2025 at  6:00 AM Significant Other, Patient's Home Pharmacy Yes Yes   Sig: Take 1 Tablet by  mouth every 6 hours as needed for Moderate Pain.   albuterol 108 (90 Base) MCG/ACT Aero Soln inhalation aerosol New Rx Patient's Home Pharmacy Yes Yes   Sig: Inhale 1-2 Puffs every four hours as needed for Shortness of Breath.   aspirin 81 MG EC tablet Unknown Patient's Home Pharmacy No No   Sig: Take 1 Tablet by mouth every day.   atorvastatin (LIPITOR) 40 MG Tab Unknown Patient's Home Pharmacy No No   Sig: Take 1 Tablet by mouth every evening.   cefdinir (OMNICEF) 300 MG Cap New Rx Patient's Home Pharmacy Yes Yes   Sig: Take 300 mg by mouth every 12 hours. 3 day course prescribed 7/3/2025.   clopidogrel (PLAVIX) 75 MG Tab Unknown Patient's Home Pharmacy Yes No   Sig: Take 75 mg by mouth every day.   digoxin (LANOXIN) 125 MCG Tab Unknown Patient's Home Pharmacy Yes No   Sig: Take 125 mcg by mouth every day.   furosemide (LASIX) 40 MG Tab New Rx Patient's Home Pharmacy Yes Yes   Sig: Take 40 mg by mouth 2 times a day.   gabapentin (NEURONTIN) 600 MG tablet 7/4/2025 at  6:00 AM Significant Other, Patient's Home Pharmacy Yes Yes   Sig: Take 600 mg by mouth 3 times a day.   lisinopril (PRINIVIL) 5 MG Tab New Rx Patient's Home Pharmacy Yes Yes   Sig: Take 5 mg by mouth every day.   metFORMIN (GLUCOPHAGE) 500 MG Tab New Rx Patient's Home Pharmacy Yes Yes   Sig: Take 1,000 mg by mouth 2 times a day with meals. 2 tablets = 1,000 mg.   metoprolol SR (TOPROL XL) 25 MG TABLET SR 24 HR New Rx Patient's Home Pharmacy Yes Yes   Sig: Take 25 mg by mouth every day.   nitroglycerin (NITROSTAT) 0.4 MG SL Tab Unknown Patient's Home Pharmacy No No   Sig: Place 1 Tablet under the tongue as needed for Chest Pain.   pantoprazole (PROTONIX) 40 MG Tablet Delayed Response Unknown Patient's Home Pharmacy Yes No   Sig: Take 40 mg by mouth every day.   potassium chloride SA (KDUR) 20 MEQ Tab CR Unknown Patient's Home Pharmacy Yes No   Sig: Take 20 mEq by mouth every day.   predniSONE (DELTASONE) 20 MG Tab New Rx Patient's Home Pharmacy Yes Yes    Sig: Take 20-40 mg by mouth every day. 10 day course prescribed 7/3/2025. Take 2 tablets (40 mg) every day for 5 days, then 1 tablet (20 mg) every day for 5 days.   spironolactone (ALDACTONE) 25 MG Tab New Rx Patient's Home Pharmacy Yes Yes   Sig: Take 25 mg by mouth every day.   tamsulosin (FLOMAX) 0.4 MG capsule Unknown Patient's Home Pharmacy Yes No   Sig: Take 0.4 mg by mouth every 8 hours.      Facility-Administered Medications: None       Allergies  Allergies[1]    Physical Exam  Temp:  [35.8 °C (96.4 °F)-36.8 °C (98.2 °F)] 35.9 °C (96.7 °F)  Pulse:  [46-93] 48  Resp:  [8-74] 11  BP: ()/(47-71) 104/54  SpO2:  [94 %-100 %] 95 %    Physical Exam  Vitals and nursing note reviewed.   Constitutional:       Appearance: He is well-developed. He is ill-appearing.      Comments: Pt seen and examined.   HENT:      Head: Normocephalic and atraumatic.   Eyes:      Pupils: Pupils are equal, round, and reactive to light.   Cardiovascular:      Rate and Rhythm: Normal rate and regular rhythm.      Heart sounds: Normal heart sounds.   Pulmonary:      Effort: Pulmonary effort is normal.      Breath sounds: Rhonchi present.   Abdominal:      General: Bowel sounds are normal.      Palpations: Abdomen is soft.   Musculoskeletal:         General: Normal range of motion.      Cervical back: Normal range of motion and neck supple.      Right lower leg: Edema present.      Left lower leg: Edema present.   Skin:     General: Skin is warm and dry.      Coloration: Skin is pale.   Neurological:      General: No focal deficit present.      Mental Status: He is alert and oriented to person, place, and time.      Motor: Weakness present.   Psychiatric:         Behavior: Behavior normal.         Fluids      Laboratory  Recent Labs     07/05/25  0340 07/06/25  0400 07/07/25  0506   WBC 20.0* 20.3* 10.6   RBC 4.23* 3.96* 3.51*   HEMOGLOBIN 11.7* 10.9* 9.7*   HEMATOCRIT 37.0* 34.9* 32.1*   MCV 87.5 88.1 91.5   MCH 27.7 27.5 27.6   MCHC  31.6* 31.2* 30.2*   RDW 48.1 49.1 52.1*   PLATELETCT 355 305 226   MPV 11.1 11.6 11.2     Recent Labs     07/05/25  0340 07/06/25  0400 07/07/25  0506   SODIUM 141 141 144   POTASSIUM 5.2 3.3* 3.3*   CHLORIDE 105 104 109   CO2 25 24 27   GLUCOSE 194* 259* 100*   BUN 32* 33* 23*   CREATININE 1.17 1.13 0.90   CALCIUM 8.8 9.1 8.7              Recent Labs     07/05/25  1057   TRIGLYCERIDE 151*        Imaging  DX-CHEST-PORTABLE (1 VIEW)   Final Result         Portable chest radiograph appears unchanged compared to the most recent examination. No significant changes or new abnormalities are identified.      DX-CHEST-PORTABLE (1 VIEW)   Final Result         1.  Hazy right pulmonary infiltrates, stable since prior study   2.  Cardiomegaly   3.  Atherosclerosis      DX-ABDOMEN FOR TUBE PLACEMENT   Final Result      Gastric tube terminates in the stomach.      DX-CHEST-LIMITED (1 VIEW)   Final Result         1.  Hazy right pulmonary infiltrates   2.  Atherosclerosis      EC-ECHOCARDIOGRAM COMPLETE W/ CONT   Final Result      DX-CHEST-PORTABLE (1 VIEW)   Final Result      Right central venous catheter with tip projecting over the expected area of the lower SVC.      CT-CHEST (THORAX) W/O   Final Result      Airspace opacities in the right upper lobe and bilateral lower lobes, likely multifocal pneumonia.         DX-CHEST-PORTABLE (1 VIEW)   Final Result      Airspace opacities in the bilateral lower lobes, left more than right, concerning for pneumonia.      DX-LUMBAR SPINE-2 OR 3 VIEWS    (Results Pending)       Assessment/Plan  * Septic shock (HCC)- (present on admission)  Assessment & Plan  This is Septic shock Present on admission    Cont broad spectrum abx:  Zosyn-transition to Unasyn  Vanco Dced, MRSA nares negative   MAP goals > 65  Off of levophed and epinepherine today   Decrease stress dose steroids to [id number] off    Bradycardia  Assessment & Plan  Due to Precedex and likely from digoxin  Hold Precedex  Hold  digoxin, may be able to restart later   Avoid Precedex in future?    Left lower lobe pneumonia- (present on admission)  Assessment & Plan  Hospital acquired vs aspiration-all cultures negative/clinically improved  Transition Zosyn which has been on for 4 days to Unasyn x 3 days and reassess daily  COVID/influenza/RSV was negative  Urine antigen for Legionella and strep pneumo both negative  RVP negative  SLP evaluation after extubation, suspect that she is aspirating-ongoing per speech therapist    TIM (acute kidney injury) ([facility])- (present on admission)  Assessment & Plan  ?due to hypotension  Improved creatinine and BUN again  Monitor UOP/creat  Avoid nephrotoxins     Acute on chronic respiratory failure with hypoxia ([facility])- (present on admission)  Assessment & Plan  Due to pneumonia  Intubated 7/4/2025, extubated 7/6  RT protocols  I-S/PEP  Mobilize but patient refusing  Patient on home O2    Tobacco abuse- (present on admission)  Assessment & Plan  Ongoing, severe, 1 pack/day, cessation advised    COPD (chronic obstructive pulmonary disease) ([facility])- (present on admission)  Assessment & Plan  Hx of, not in acute exacerbation at this time  RT/O2 protocols  Resume Anoro-Home medication  DuoNeb PRN    Pulmonary hypertension ([facility])- (present on admission)  Assessment & Plan  ECHO with RVSP of 55 mmHg  Likely group II/III  Status post judicious fluid resuscitation   Avoid hypoxia/acidemia  Diurese when able, was on home diuretics, probably start that first before his BP meds    Hyperlipidemia- (present on admission)  Assessment & Plan  Cont home statin-atorvastatin    Hypertension- (present on admission)  Assessment & Plan  Holding home BP meds coming out of shock  Blood pressure normal/soft, not quite ready for complete resumption of GDMT    Prostate cancer (HCC)- (present on admission)  Assessment & Plan  Hx of   Check PSA    Debility- (present on admission)  Assessment & Plan  The patient complains of lower extremity  weakness, inability to ambulate, this has been going on for the last year  Unclear development  Check a PSA, consider further workup    History of coronary angioplasty with insertion of stent- (present on admission)  Assessment & Plan  Cont ASA and statin therapy  Monitor for symptoms    Heart failure with reduced ejection fraction (HCC) (LVEF 20-25% Echo 2/2025)- (present on admission)  Assessment & Plan  Judicious fluid resuscitation with EF of 25%  Maintain euvolemia  Holding GDMT while in shock-begin resuming home regimen in the coming day or 2 while on telemetry    Diabetes (HCC)- (present on admission)  Assessment & Plan  BG goals 120-180s  Medium ISS  Monitor for need for long acting insulin while on steroids  Steroids now tapering, glucose levels better    Plan  7/7  Continue empiric antibiotic therapy  Maintain medication for the patient's coronary disease, dual antiplatelet therapy  Maintain adequate glycemic control  Diuresis within the next day or 2 as the patient is tolerating  Respiratory care,  Oxygen weaning  See orders  A.m. labs  Patient is has a high medical complexity, complex decision making and is at high risk for complication, morbidity, and mortality.  I spent 62 minutes, reviewing the chart, obtaining and/or reviewing separately obtained history. Performing a medically appropriate examination and evaluation.  Counseling and educating the patient. Ordering and reviewing medications, tests, or procedures.   Documenting clinical information in EPIC. Independently interpreting results and communicating results to patient. Discussing future disposition of care with patient, RN and case management.      Please note that this dictation was created using voice recognition software. I have made every reasonable attempt to correct obvious errors, but I expect that there are errors of grammar and possibly context that I did not discover before finalizing the note.         [1] No Known Allergies

## 2025-07-07 NOTE — PROGRESS NOTES
[facility] Internal Medicine Daily Progress Note    Date of Service  7/7/2025    R Team: [facility] ICU Gold Team   Attending: Steve Balderrama MD  Senior Resident: Dr. Nixon Lucas  Contact Number: [phone]    Chief Complaint  Donte Agustin is a 79 y.o. male admitted 7/4/2025 with shortness of breath and lethargy    Hospital Course  Donte Agustin is a 79-year-old male with past medical history significant for COPD (2 to 5 L baseline), HFrEF (25%), pulmonary hypertension, coronary artery disease status post PCI and stent placements, tobacco abuse, prostate cancer who was recently hospitalized in a facility in [place] for pneumonia and released home on 7/2.  His wife called EMS to their house as patient was lethargic and not breathing well.  Noted to be hypoxic on home O2 as well as tachycardic and hypotensive to the 60s.  He was brought directly to [facility] for admission given crystalloid and peripheral Levophed.  For pressure support and started on broad-spectrum antibiotics given concerns for worsening left-sided pneumonia on chest x-ray.  He was intubated as well as had central line placement on 7/4 due to worsening respiratory status and persistent hypotension.  BAL was collected at that time.  He was extubated on 7/6 after passing a spontaneous breathing trial.  His Levophed drip was able to be titrated off.  He continued on broad-spectrum antibiotics while awaiting for culture results from his BAL and blood.    Interval Problem Update  Patient was seen and examined at bedside.  Much more pleasant than he was yesterday now that his wife is here.  Participatory in our examination stating that he is feeling better and was wondering when he could go home.  Questions as to why he continues to have this ongoing lung infection which we discussed he probably needed a longer course of antibiotics so it is possible that he developed a subsequent infection after his previous hospitalizations.  We discussed the  "importance of performing physical therapy however patient became angry and upset at this stating that he was \"paralyzed.\"  States that he has records that he would like to be requested from [facility].  He also is requesting that we do a full body x-ray and CT for continued evaluation of his \"paralysis\"    Patient is able to move lower and upper extremities equally with good sensation in both.    I have discussed this patient's plan of care and discharge plan at IDT rounds today with Case Management, Nursing, Nursing leadership, and other members of the IDT team.    Consultants/Specialty  None    Code Status  Full Code    Disposition  The patient is not medically cleared for discharge to home or a post-acute facility.  Anticipate discharge to: skilled nursing facility    I have placed the appropriate orders for post-discharge needs.    Review of Systems  Review of Systems   Constitutional:  Negative for chills and fever.   Cardiovascular:  Negative for chest pain.   Gastrointestinal:  Negative for abdominal pain.        Physical Exam  Temp:  [35.8 °C (96.4 °F)-36.8 °C (98.2 °F)] 35.9 °C (96.7 °F)  Pulse:  [46-93] 48  Resp:  [8-74] 11  BP: ()/(47-71) 104/54  SpO2:  [94 %-100 %] 95 %    Physical Exam  Constitutional:       General: He is not in acute distress.     Appearance: Normal appearance. He is not ill-appearing or toxic-appearing.   HENT:      Head: Normocephalic and atraumatic.      Nose: Nose normal. No congestion.      Mouth/Throat:      Mouth: Mucous membranes are moist.      Pharynx: Oropharynx is clear.   Cardiovascular:      Rate and Rhythm: Regular rhythm. Bradycardia present.      Pulses: Normal pulses.      Heart sounds: No murmur heard.     No gallop.   Pulmonary:      Effort: Pulmonary effort is normal. No respiratory distress.      Breath sounds: No wheezing, rhonchi or rales.   Abdominal:      General: There is no distension.      Tenderness: There is no abdominal tenderness. There is no " guarding.   Skin:     General: Skin is warm and dry.      Capillary Refill: Capillary refill takes less than 2 seconds.   Neurological:      Mental Status: He is alert. Mental status is at baseline.   Psychiatric:         Mood and Affect: Mood is anxious.         Behavior: Behavior is agitated and aggressive.         Fluids    Intake/Output Summary (Last 24 hours) at 7/7/2025 1234  Last data filed at 7/7/2025 0600  Gross per 24 hour   Intake 61.07 ml   Output 925 ml   Net -863.93 ml       Laboratory  Recent Labs     07/05/25  0340 07/06/25  0400 07/07/25  0506   WBC 20.0* 20.3* 10.6   RBC 4.23* 3.96* 3.51*   HEMOGLOBIN 11.7* 10.9* 9.7*   HEMATOCRIT 37.0* 34.9* 32.1*   MCV 87.5 88.1 91.5   MCH 27.7 27.5 27.6   MCHC 31.6* 31.2* 30.2*   RDW 48.1 49.1 52.1*   PLATELETCT 355 305 226   MPV 11.1 11.6 11.2     Recent Labs     07/05/25  0340 07/06/25  0400 07/07/25  0506   SODIUM 141 141 144   POTASSIUM 5.2 3.3* 3.3*   CHLORIDE 105 104 109   CO2 25 24 27   GLUCOSE 194* 259* 100*   BUN 32* 33* 23*   CREATININE 1.17 1.13 0.90   CALCIUM 8.8 9.1 8.7             Recent Labs     07/05/25  1057   TRIGLYCERIDE 151*       Imaging  DX-CHEST-PORTABLE (1 VIEW)   Final Result         Portable chest radiograph appears unchanged compared to the most recent examination. No significant changes or new abnormalities are identified.      DX-CHEST-PORTABLE (1 VIEW)   Final Result         1.  Hazy right pulmonary infiltrates, stable since prior study   2.  Cardiomegaly   3.  Atherosclerosis      DX-ABDOMEN FOR TUBE PLACEMENT   Final Result      Gastric tube terminates in the stomach.      DX-CHEST-LIMITED (1 VIEW)   Final Result         1.  Hazy right pulmonary infiltrates   2.  Atherosclerosis      EC-ECHOCARDIOGRAM COMPLETE W/ CONT   Final Result      DX-CHEST-PORTABLE (1 VIEW)   Final Result      Right central venous catheter with tip projecting over the expected area of the lower SVC.      CT-CHEST (THORAX) W/O   Final Result      Airspace  opacities in the right upper lobe and bilateral lower lobes, likely multifocal pneumonia.         DX-CHEST-PORTABLE (1 VIEW)   Final Result      Airspace opacities in the bilateral lower lobes, left more than right, concerning for pneumonia.      DX-LUMBAR SPINE-2 OR 3 VIEWS    (Results Pending)        Assessment/Plan  Problem Representation:    * Septic shock (HCC)- (present on admission)  Assessment & Plan  This is Septic shock Present on admission    Cont broad spectrum abx:  Zosyn-transition to Unasyn  Vanco Dced, MRSA nares negative   MAP goals > 65  Off of levophed and epinepherine today   Decrease stress dose steroids to [id number] off    Bradycardia  Assessment & Plan  Due to Precedex and likely from digoxin  Hold Precedex  Hold digoxin, may be able to restart later   Avoid Precedex in future?      Left lower lobe pneumonia- (present on admission)  Assessment & Plan  Hospital acquired vs aspiration-all cultures negative/clinically improved  Transition Zosyn which has been on for 4 days to Unasyn x 3 days and reassess daily  COVID/influenza/RSV was negative  Urine antigen for Legionella and strep pneumo both negative  RVP negative  SLP evaluation after extubation, suspect that she is aspirating-ongoing per speech therapist    TIM (acute kidney injury) ([facility])- (present on admission)  Assessment & Plan  ?due to hypotension  Improved creatinine and BUN again  Monitor UOP/creat  Avoid nephrotoxins     Acute on chronic respiratory failure with hypoxia ([facility])- (present on admission)  Assessment & Plan  Due to pneumonia  Intubated 7/4/2025, extubated 7/6  RT protocols  I-S/PEP  Mobilize but patient refusing  Patient on home O2    COPD (chronic obstructive pulmonary disease) ([facility])- (present on admission)  Assessment & Plan  Hx of, not in acute exacerbation at this time  RT/O2 protocols  Resume Anoro-Home medication  DuoNeb PRN    Pulmonary hypertension ([facility])- (present on admission)  Assessment & Plan  ECHO with RVSP of  55 mmHg  Likely group II/III  Status post judicious fluid resuscitation   Avoid hypoxia/acidemia  Diurese when able, was on home diuretics, probably start that first before his BP meds    Hyperlipidemia- (present on admission)  Assessment & Plan  Cont home statin-atorvastatin    Hypertension- (present on admission)  Assessment & Plan  Holding home BP meds coming out of shock  Blood pressure normal/soft, not quite ready for complete resumption of GDMT    Prostate cancer (HCC)- (present on admission)  Assessment & Plan  Hx of     History of coronary angioplasty with insertion of stent- (present on admission)  Assessment & Plan  Cont ASA and statin therapy  Monitor for symptoms    Heart failure with reduced ejection fraction (HCC) (LVEF 20-25% Echo 2/2025)- (present on admission)  Assessment & Plan  Judicious fluid resuscitation with EF of 25%  Maintain euvolemia  Holding GDMT while in shock-begin resuming home regimen in the coming day or 2 while on telemetry    Diabetes (HCC)- (present on admission)  Assessment & Plan  BG goals 120-180s  Medium ISS  Monitor for need for long acting insulin while on steroids  Steroids now tapering, glucose levels better         VTE prophylaxis: heparin ppx    I have performed a physical exam and reviewed and updated ROS and Plan today (7/7/2025). In review of yesterday's note (7/6/2025), there are no changes except as documented above.      Case for Donte Agustin disucssed with attending, Dr. Steve Balderrama MD    Please see attestation for any additional comments and changes to current treatment plan    Nixon Lucas, DO  Internal Medicine PGY2    This note was created with Dragon Dictation software.

## 2025-07-07 NOTE — WOUND TEAM
Wound consult received regarding pts fingernails. Chart and images reviewed. Pt has intact fingernails. NO open wounds or advanced wound care needs identified. Wound consult completed.

## 2025-07-08 ENCOUNTER — PHARMACY VISIT (OUTPATIENT)
Dept: PHARMACY | Facility: MEDICAL CENTER | Age: 80
End: 2025-07-08
Payer: COMMERCIAL

## 2025-07-08 VITALS
OXYGEN SATURATION: 95 % | HEIGHT: 67 IN | TEMPERATURE: 97 F | RESPIRATION RATE: 17 BRPM | HEART RATE: 60 BPM | BODY MASS INDEX: 30.14 KG/M2 | SYSTOLIC BLOOD PRESSURE: 110 MMHG | DIASTOLIC BLOOD PRESSURE: 65 MMHG | WEIGHT: 192.02 LBS

## 2025-07-08 LAB
ALBUMIN SERPL BCP-MCNC: 3 G/DL (ref 3.2–4.9)
ALBUMIN/GLOB SERPL: 1 G/DL
ALP SERPL-CCNC: 49 U/L (ref 30–99)
ALT SERPL-CCNC: <5 U/L (ref 2–50)
ANION GAP SERPL CALC-SCNC: 12 MMOL/L (ref 7–16)
AST SERPL-CCNC: 12 U/L (ref 12–45)
BASOPHILS # BLD AUTO: 0.1 % (ref 0–1.8)
BASOPHILS # BLD: 0.01 K/UL (ref 0–0.12)
BILIRUB SERPL-MCNC: 0.4 MG/DL (ref 0.1–1.5)
BUN SERPL-MCNC: 18 MG/DL (ref 8–22)
CALCIUM ALBUM COR SERPL-MCNC: 9.4 MG/DL (ref 8.5–10.5)
CALCIUM SERPL-MCNC: 8.6 MG/DL (ref 8.5–10.5)
CHLORIDE SERPL-SCNC: 105 MMOL/L (ref 96–112)
CO2 SERPL-SCNC: 22 MMOL/L (ref 20–33)
CREAT SERPL-MCNC: 0.79 MG/DL (ref 0.5–1.4)
EOSINOPHIL # BLD AUTO: 0.05 K/UL (ref 0–0.51)
EOSINOPHIL NFR BLD: 0.6 % (ref 0–6.9)
ERYTHROCYTE [DISTWIDTH] IN BLOOD BY AUTOMATED COUNT: 51.8 FL (ref 35.9–50)
GFR SERPLBLD CREATININE-BSD FMLA CKD-EPI: 91 ML/MIN/1.73 M 2
GLOBULIN SER CALC-MCNC: 3.1 G/DL (ref 1.9–3.5)
GLUCOSE BLD STRIP.AUTO-MCNC: 165 MG/DL (ref 65–99)
GLUCOSE BLD STRIP.AUTO-MCNC: 211 MG/DL (ref 65–99)
GLUCOSE SERPL-MCNC: 224 MG/DL (ref 65–99)
HCT VFR BLD AUTO: 33.2 % (ref 42–52)
HGB BLD-MCNC: 10.1 G/DL (ref 14–18)
IMM GRANULOCYTES # BLD AUTO: 0.04 K/UL (ref 0–0.11)
IMM GRANULOCYTES NFR BLD AUTO: 0.5 % (ref 0–0.9)
LYMPHOCYTES # BLD AUTO: 0.62 K/UL (ref 1–4.8)
LYMPHOCYTES NFR BLD: 7.8 % (ref 22–41)
MAGNESIUM SERPL-MCNC: 1.9 MG/DL (ref 1.5–2.5)
MCH RBC QN AUTO: 27.7 PG (ref 27–33)
MCHC RBC AUTO-ENTMCNC: 30.4 G/DL (ref 32.3–36.5)
MCV RBC AUTO: 91.2 FL (ref 81.4–97.8)
MONOCYTES # BLD AUTO: 0.6 K/UL (ref 0–0.85)
MONOCYTES NFR BLD AUTO: 7.6 % (ref 0–13.4)
NEUTROPHILS # BLD AUTO: 6.6 K/UL (ref 1.82–7.42)
NEUTROPHILS NFR BLD: 83.4 % (ref 44–72)
NRBC # BLD AUTO: 0 K/UL
NRBC BLD-RTO: 0 /100 WBC (ref 0–0.2)
NT-PROBNP SERPL IA-MCNC: 9191 PG/ML (ref 0–125)
PHOSPHATE SERPL-MCNC: 3.4 MG/DL (ref 2.5–4.5)
PLATELET # BLD AUTO: 201 K/UL (ref 164–446)
PMV BLD AUTO: 11.8 FL (ref 9–12.9)
POTASSIUM SERPL-SCNC: 4.3 MMOL/L (ref 3.6–5.5)
PROCALCITONIN SERPL-MCNC: <0.05 NG/ML
PROT SERPL-MCNC: 6.1 G/DL (ref 6–8.2)
RBC # BLD AUTO: 3.64 M/UL (ref 4.7–6.1)
SODIUM SERPL-SCNC: 139 MMOL/L (ref 135–145)
WBC # BLD AUTO: 7.9 K/UL (ref 4.8–10.8)

## 2025-07-08 PROCEDURE — 700111 HCHG RX REV CODE 636 W/ 250 OVERRIDE (IP): Performed by: INTERNAL MEDICINE

## 2025-07-08 PROCEDURE — RXMED WILLOW AMBULATORY MEDICATION CHARGE: Performed by: STUDENT IN AN ORGANIZED HEALTH CARE EDUCATION/TRAINING PROGRAM

## 2025-07-08 PROCEDURE — 80053 COMPREHEN METABOLIC PANEL: CPT

## 2025-07-08 PROCEDURE — 92526 ORAL FUNCTION THERAPY: CPT

## 2025-07-08 PROCEDURE — 82962 GLUCOSE BLOOD TEST: CPT | Performed by: HOSPITALIST

## 2025-07-08 PROCEDURE — 85025 COMPLETE CBC W/AUTO DIFF WBC: CPT

## 2025-07-08 PROCEDURE — 82962 GLUCOSE BLOOD TEST: CPT | Performed by: STUDENT IN AN ORGANIZED HEALTH CARE EDUCATION/TRAINING PROGRAM

## 2025-07-08 PROCEDURE — 83880 ASSAY OF NATRIURETIC PEPTIDE: CPT

## 2025-07-08 PROCEDURE — A9270 NON-COVERED ITEM OR SERVICE: HCPCS | Performed by: INTERNAL MEDICINE

## 2025-07-08 PROCEDURE — 97166 OT EVAL MOD COMPLEX 45 MIN: CPT

## 2025-07-08 PROCEDURE — 700105 HCHG RX REV CODE 258: Performed by: INTERNAL MEDICINE

## 2025-07-08 PROCEDURE — 99239 HOSP IP/OBS DSCHRG MGMT >30: CPT | Performed by: STUDENT IN AN ORGANIZED HEALTH CARE EDUCATION/TRAINING PROGRAM

## 2025-07-08 PROCEDURE — 700102 HCHG RX REV CODE 250 W/ 637 OVERRIDE(OP): Performed by: INTERNAL MEDICINE

## 2025-07-08 PROCEDURE — 83735 ASSAY OF MAGNESIUM: CPT

## 2025-07-08 PROCEDURE — 700111 HCHG RX REV CODE 636 W/ 250 OVERRIDE (IP): Mod: JZ | Performed by: INTERNAL MEDICINE

## 2025-07-08 PROCEDURE — A9270 NON-COVERED ITEM OR SERVICE: HCPCS

## 2025-07-08 PROCEDURE — 84145 PROCALCITONIN (PCT): CPT

## 2025-07-08 PROCEDURE — 700102 HCHG RX REV CODE 250 W/ 637 OVERRIDE(OP)

## 2025-07-08 PROCEDURE — 36415 COLL VENOUS BLD VENIPUNCTURE: CPT

## 2025-07-08 PROCEDURE — 84100 ASSAY OF PHOSPHORUS: CPT

## 2025-07-08 RX ORDER — GABAPENTIN 300 MG/1
600 CAPSULE ORAL 3 TIMES DAILY
Status: DISCONTINUED | OUTPATIENT
Start: 2025-07-08 | End: 2025-07-08 | Stop reason: HOSPADM

## 2025-07-08 RX ADMIN — HEPARIN SODIUM 5000 UNITS: 5000 INJECTION, SOLUTION INTRAVENOUS; SUBCUTANEOUS at 14:58

## 2025-07-08 RX ADMIN — AMPICILLIN AND SULBACTAM 3 G: 1; 2 INJECTION, POWDER, FOR SOLUTION INTRAMUSCULAR; INTRAVENOUS at 00:22

## 2025-07-08 RX ADMIN — INSULIN LISPRO 2 UNITS: 100 INJECTION, SOLUTION INTRAVENOUS; SUBCUTANEOUS at 13:50

## 2025-07-08 RX ADMIN — DOCUSATE SODIUM 50 MG AND SENNOSIDES 8.6 MG 2 TABLET: 8.6; 5 TABLET, FILM COATED ORAL at 04:32

## 2025-07-08 RX ADMIN — HYDROCORTISONE SODIUM SUCCINATE 50 MG: 100 INJECTION, POWDER, FOR SOLUTION INTRAMUSCULAR; INTRAVENOUS at 04:33

## 2025-07-08 RX ADMIN — HYDROMORPHONE HYDROCHLORIDE 1 MG: 1 INJECTION, SOLUTION INTRAMUSCULAR; INTRAVENOUS; SUBCUTANEOUS at 04:49

## 2025-07-08 RX ADMIN — OMEPRAZOLE 20 MG: 20 CAPSULE, DELAYED RELEASE ORAL at 04:32

## 2025-07-08 RX ADMIN — AMPICILLIN AND SULBACTAM 3 G: 1; 2 INJECTION, POWDER, FOR SOLUTION INTRAMUSCULAR; INTRAVENOUS at 12:57

## 2025-07-08 RX ADMIN — ASPIRIN 81 MG: 81 TABLET, CHEWABLE ORAL at 04:32

## 2025-07-08 RX ADMIN — UMECLIDINIUM BROMIDE AND VILANTEROL TRIFENATATE 1 PUFF: 62.5; 25 POWDER RESPIRATORY (INHALATION) at 08:00

## 2025-07-08 RX ADMIN — HEPARIN SODIUM 5000 UNITS: 5000 INJECTION, SOLUTION INTRAVENOUS; SUBCUTANEOUS at 04:33

## 2025-07-08 RX ADMIN — GABAPENTIN 300 MG: 300 CAPSULE ORAL at 12:57

## 2025-07-08 RX ADMIN — INSULIN LISPRO 3 UNITS: 100 INJECTION, SOLUTION INTRAVENOUS; SUBCUTANEOUS at 04:34

## 2025-07-08 RX ADMIN — NICOTINE 7 MG: 7 PATCH TRANSDERMAL at 04:33

## 2025-07-08 RX ADMIN — AMPICILLIN AND SULBACTAM 3 G: 1; 2 INJECTION, POWDER, FOR SOLUTION INTRAMUSCULAR; INTRAVENOUS at 04:53

## 2025-07-08 RX ADMIN — CLOPIDOGREL BISULFATE 75 MG: 75 TABLET, FILM COATED ORAL at 04:32

## 2025-07-08 RX ADMIN — GABAPENTIN 300 MG: 300 CAPSULE ORAL at 04:32

## 2025-07-08 ASSESSMENT — COGNITIVE AND FUNCTIONAL STATUS - GENERAL
DRESSING REGULAR LOWER BODY CLOTHING: A LOT
TOILETING: A LOT
HELP NEEDED FOR BATHING: A LOT
DAILY ACTIVITIY SCORE: 14
EATING MEALS: A LITTLE
SUGGESTED CMS G CODE MODIFIER DAILY ACTIVITY: CK
DRESSING REGULAR UPPER BODY CLOTHING: A LOT
PERSONAL GROOMING: A LITTLE

## 2025-07-08 ASSESSMENT — PAIN DESCRIPTION - PAIN TYPE: TYPE: ACUTE PAIN

## 2025-07-08 ASSESSMENT — FIBROSIS 4 INDEX: FIB4 SCORE: 2.17

## 2025-07-08 ASSESSMENT — ACTIVITIES OF DAILY LIVING (ADL): TOILETING: REQUIRES ASSIST

## 2025-07-08 NOTE — THERAPY
Physical Therapy Contact Note    Patient Name: Donte Agustin  Age:  79 y.o., Sex:  male  Medical Record #: [id number]  Today's Date: 7/8/2025 07/08/25 0936   Initial Contact Note    Initial Contact Note Order Received and Verified, Physical Therapy Evaluation in Progress with Full Report to Follow.   Interdisciplinary Plan of Care Collaboration   IDT Collaboration with  Nursing;Occupational Therapist   Collaboration Comments PT orders received. OT just worked with pt; pt refusing OOB mobility until his xray results are read to him. RN aware. Will re-attempt as able.   Session Information   Date / Session Number  7/8 - refused  (EVAL)     Hortencia Braun, PT, DPT

## 2025-07-08 NOTE — THERAPY
Occupational Therapy   Initial Evaluation     Patient Name:  Donte Agustin  Age:  79 y.o., Sex:  male  Medical Record #:  [id number]  Today's Date:  7/8/2025     Precautions  Medical: Fall Risk    Assessment    Patient is 79 y.o. male admitted with AMS, was recently DC from [facility] hospital in [place] for treatment of sepsis due to PNA, was only home two days before readmitted to acute, required intubation 7/4-7/6. Pmhx includes  COPD 2-5 L/min baseline home oxygen, HFrEF 25%, pulmonary hypertension, CAD status post PCI and stent placement, tobacco abuse, prostate cancer, HLD, HTN, diabetes. Pt presents to OT eval with ADL independence limited by generalized weakness, refusal to mobilize until an MD to explains his lumbar spine x-ray results. Spouse reports pt has had 6-7 months of decreased OOB activity at home (motel in [place]) where she and her 46yo son assist pt to his wheelchair for appts but otherwise he remains mostly in bed. Pt with increased debility 2/2 recent string of hospitalizations, will benefit from acute OT as well as post-acute placement for additional therapy prior to DC home.     Plan    Occupational Therapy Initial Treatment Plan   Treatment Interventions: Self Care / Activities of Daily Living, Adaptive Equipment, Therapeutic Exercises, Therapeutic Activity, Family / Caregiver Training, Neuro Re-Education / Balance  Treatment Frequency: 3 Times per Week  Duration: Until Therapy Goals Met    DC Equipment Recommendations: Unable to determine at this time  Discharge Recommendations: Recommend post-acute placement for additional occupational therapy services prior to discharge home     Objective     07/08/25 0935   Prior Living Situation   Prior Services Continuous (24 Hour) Care Giving Per Service;Continuous (24 Hour) Care Giving Family   Housing / Facility Motel  (ground floor studio)   Bathroom Set up Bathtub / Shower Combination   Equipment Owned Wheelchair;Front-Wheel Walker   Lives with - Patient's  "Self Care Capacity Spouse;Adult Children   Comments lives with wife and 44yo son who assist with transfers to wheelchair   Prior Level of ADL Function   Self Feeding Independent   Grooming / Hygiene Independent   Bathing Requires Assist   Dressing Requires Assist   Toileting Requires Assist   Comments wife assists   Prior Level of IADL Function   Prior Level Of Mobility Uses Wheel Chair for Community Mobility   Driving / Transportation Relatives / Others Provide Transportation   Comments wife manages all IADLs for pt   Precautions   Medical Fall Risk   Cognition    Cognition / Consciousness X   Level of Consciousness Alert   Comments irritable, decreased insight to medical course, emphatically repeating that he's waiting for his x-ray results to know if his \"injuries are permanent or not\" before he mobilizes to EOB. Wife reassuring pt however he continued to become increasingly agitated and loud regarding his x-ray results and refusing EOB mobilization   Strength Upper Body   Upper Body Strength  X   Gross Strength Generalized Weakness, Equal Bilaterally.    Sensation Upper Body   Upper Extremity Sensation  WDL   Coordination Upper Body   Coordination WDL   Balance Assessment   Comments declined sitting EOB despite assistance available 2/2 \"waiting for x-ray results\"   Bed Mobility    Supine to Sit Refused   Scooting Maximal Assist  (scoot higher in bed)   Rolling Minimal Assist to Rt.;Moderate Assist to Lt.  (for weight shift/skin protection)   Comments wife reports she and her son assist him to sit EOB and into wheelchair at home   ADL Assessment   Eating Supervision   Grooming Supervision  (bed level)   Bathing Maximal Assist  (bed baths, wife reports his wheelchair doesn't fit in their motel bathroom)   Upper Body Dressing Moderate Assist   Lower Body Dressing Maximal Assist   Toileting Maximal Assist   How much help from another person does the patient currently need...   Putting on and taking off regular lower " "body clothing? 2   Bathing (including washing, rinsing, and drying)? 2   Toileting, which includes using a toilet, bedpan, or urinal? 2   Putting on and taking off regular upper body clothing? 2   Taking care of personal grooming such as brushing teeth? 3   Eating meals? 3   6 Clicks Daily Activity Score 14   Functional Mobility   Sit to Stand Refused   Comments refused OOB 2/2 \"waiting on my x-ray results\"   Patient / Family Goals   Patient / Family Goal #1 \"I need my x-ray results\"   Short Term Goals   Short Term Goal # 1 pt will complete seated grooming ADLs at EOB or in chair with set-up assist   Short Term Goal # 2 pt will complete functional transfer to wheelchair or bedside chair with ModA   Short Term Goal # 3 pt will complete UB dress with Vladimir   Education Group   Education Provided Activities of Daily Living;Role of Occupational Therapist;Transfers;Home Safety   Role of Occupational Therapist Patient Response Patient;Acceptance;Explanation;Verbal Demonstration;Significant Other;Reinforcement Needed   Home Safety Patient Response Patient;Acceptance;Explanation;Verbal Demonstration;Significant Other;Reinforcement Needed   Transfers Patient Response Patient;Acceptance;Explanation;Verbal Demonstration;Significant Other;Reinforcement Needed   ADL Patient Response Patient;Acceptance;Explanation;Verbal Demonstration;Significant Other;Reinforcement Needed   Occupational Therapy Initial Treatment Plan    Treatment Interventions Self Care / Activities of Daily Living;Adaptive Equipment;Therapeutic Exercises;Therapeutic Activity;Family / Caregiver Training;Neuro Re-Education / Balance   Treatment Frequency 3 Times per Week   Duration Until Therapy Goals Met   Problem List   Problem List Decreased Active Daily Living Skills;Decreased Homemaking Skills;Decreased Upper Extremity Strength Right;Decreased Upper Extremity Strength Left;Decreased Functional Mobility;Decreased Activity Tolerance;Safety Awareness Deficits / " Cognition;Impaired Cognitive Function;Impaired Postural Control / Balance   Anticipated Discharge Equipment and Recommendations   DC Equipment Recommendations Unable to determine at this time   Discharge Recommendations Recommend post-acute placement for additional occupational therapy services prior to discharge home   Interdisciplinary Plan of Care Collaboration   IDT Collaboration with  Family / Caregiver;Nursing   Patient Position at End of Therapy In Bed;Tray Table within Reach;Phone within Reach;Family / Friend in Room;Call Light within Reach

## 2025-07-08 NOTE — CARE PLAN
Problem: Care Map:  Optimal Outcome for the Pneumonia Patient  Goal: Collection and monitoring of appropriate tests and labs  Description: Target End Date:  end of day 1  Outcome: Progressing     Problem: Respiratory  Goal: Patient will achieve/maintain optimum respiratory ventilation and gas exchange  Description: Target End Date:  Prior to discharge or change in level of care    Document on Assessment flowsheet    1.  Assess and monitor rate, rhythm, depth and effort of respiration  2.  Breath sounds assessed qshift and/or as needed  3.  Assess O2 saturation, administer/titrate oxygen as ordered  4.  Position patient for maximum ventilatory efficiency  5.  Turn, cough, and deep breath with splinting to improve effectiveness  6.  Collaborate with RT to administer medication/treatments per order  7.  Encourage use of incentive spirometer and encourage patient to cough after use and utilize splinting techniques if applicable  8.  Airway suctioning  9.  Monitor sputum production for changes in color, consistency and frequency  10. Perform frequent oral hygiene  11. Alternate physical activity with rest periods  Outcome: Progressing     Problem: Risk for Aspiration  Goal: Patient's risk for aspiration will be absent or decrease  Description: Target End Date:  Prior to discharge or change in level of care    1.   Complete dysphagia screening on admission  2.   NPO until dysphagia screening complete or medically cleared  3.   Collaborate with Speech Therapy, Clinical Dietitian and interdisciplinary team  4.   Implement aspiration precautions  5.   Assist patient up to chair for meals  6.   Elevate head of bed 90 degrees if patient is unable to get out of bed  7.   Encourage small bites  8.   Ensure foods/liquids are of appropriate consistency  9.   Assess for any signs/symptoms of aspiration  10. Assess breath sounds and vital signs after oral intake  Outcome: Progressing     Problem: Hemodynamics - Pneumonia  Goal:  Patient's hemodynamics, fluid balance and neurologic status will be stable or improve  Description: Target End Date:  Prior to discharge or change in level of care    Document on Assessment and I/O flowsheet templates    1.  Monitor vital signs, pulse oximetry and cardiac monitor per provider order and/or policy  2.  Manage IV fluids and IV infusions  3.  Monitor intake and output  4.  Daily weights per unit policy or provider order  5.  Assess peripheral pulses and capillary refill  6.  Monitor body temperature and assist with comfort measures to reduce fever and chills  7.  Position patient for maximum circulation/cardiac output  8.  Assess for peripheral, sacral, periorbital and abdominal edema  9. Assess for signs of deterioration - tachycardia, tachypnea, dyspnea, hypertension, hypoxemia, pallor, changes in consciousness or restlessness  Outcome: Progressing     Problem: Self Care  Goal: Patient will have the ability to perform ADLs independently or with assistance (bathe, groom, dress, toilet and feed)  Description: Target End Date:  Prior to discharge or change in level of care    Document on ADL flowsheet    1.  Assess the capability and level of deficiency to perform ADLs  2.  Encourage family/care giver involvement  3.  Provide assistive devices  4.  Consider PT/OT evaluations  5.  Maintain support, give positive feedback, encourage self-care allowing extra time and verbal cuing as needed  6.  Avoid doing something for patients they can do themselves, but provide assistance as needed  7.  Assist in anticipating/planning individual needs  8.  Collaborate with Case Management and  to meet discharge needs  Outcome: Progressing     Problem: Discharge Planning - Pneumonia  Goal: Patient will verbalize understanding of lifestyle changes and therapeutic needs post discharge  Description: Target End Date:  Prior to discharge or change in level of care    1.  Assess need for home oxygen throughout  admission.  2.  Discuss debilitating aspects of disease, length of convalescence, and recovery expectations. Identify self-care and homemaker needs.  3.  Reinforce importance of continuing effective coughing and deep-breathing exercises.  4.  Emphasize necessity for continuing antibiotic therapy for prescribed period.  5.  Review the importance of cessation of smoking.  6.  Outline steps to enhance general health and well-being: balanced rest and activity, well-rounded diet, avoidance of crowds during cold/flu season and persons with URIs.  7.  Stress importance of continuing medical follow-up and obtaining vaccinations as appropriate.  8.  Identify signs and symptoms requiring notification of health care provider: increasing dyspnea, chest pain, prolonged fatigue, weight loss, fever, chills, persistence of productive cough, changes in mentation.  9.  Instruct patient to avoid using antibiotics indiscriminately during minor viral infections.  10. Encourage Pneumovax and annual flu shots for high-risk patients.  11. Educate on importance of oral care in prevention of pneumonia  Outcome: Progressing     Problem: Knowledge Deficit - Standard  Goal: Patient and family/care givers will demonstrate understanding of plan of care, disease process/condition, diagnostic tests and medications  Description: Target End Date:  1-3 days or as soon as patient condition allows    Document in Patient Education    1.  Patient and family/caregiver oriented to unit, equipment, visitation policy and means for communicating concern  2.  Complete/review Learning Assessment  3.  Assess knowledge level of disease process/condition, treatment plan, diagnostic tests and medications  4.  Explain disease process/condition, treatment plan, diagnostic tests and medications  Outcome: Progressing     Problem: Hemodynamics  Goal: Patient's hemodynamics, fluid balance and neurologic status will be stable or improve  Description: Target End Date:   Prior to discharge or change in level of care    Document on Assessment and I/O flowsheet templates    1.  Monitor vital signs, pulse oximetry and cardiac monitor per provider order and/or policy  2.  Maintain blood pressure per provider order  3.  Hemodynamic monitoring per provider order  4.  Manage IV fluids and IV infusions  5.  Monitor intake and output  6.  Daily weights per unit policy or provider order  7.  Assess peripheral pulses and capillary refill  8.  Assess color and body temperature  9.  Position patient for maximum circulation/cardiac output  10. Monitor for signs/symptoms of excessive bleeding  11. Assess mental status, restlessness and changes in level of consciousness  12. Monitor temperature and report fever or hypothermia to provider immediately. Consideration of targeted temperature management.  Outcome: Progressing     Problem: Fluid Volume  Goal: Fluid volume balance will be maintained  Description: Target End Date:  Prior to discharge or change in level of care    Document on I/O flowsheet    1.  Monitor intake and output as ordered  2.  Promote oral intake as appropriate  3.  Report inadequate intake or output to physician  4.  Administer IV therapy as ordered  5.  Weights per provider order  6.  Assess for signs and symptoms of bleeding  7.  Monitor for signs of fluid overload (respiratory changes, edema, weight gain, increased abdominal girth)  8.  Monitor of signs for inadequate fluid volume (poor skin turgor, dry mucous membranes)  9.  Instruct patient on adherence to fluid restrictions  Outcome: Progressing     Problem: Urinary - Renal Perfusion  Goal: Ability to achieve and maintain adequate renal perfusion and functioning will improve  Description: Target End Date:  Prior to discharge or change in level of care    Document on I/O and Assessment flowsheet    1.  Urine output will remain greater than 0.5ml/Kg/HR  2.  Monitor amount and/or characteristics of urine per order/policy.  Specific gravity per order/policy  3.  Assess signs and symptoms of renal dysfunction  Outcome: Progressing     Problem: Physical Regulation  Goal: Diagnostic test results will improve  Description: Target End Date:  Prior to discharge or change in level of care    1.  Monitor lactic acid levels  2.  Monitor ABG's  3.  Monitor diagnostic test results  Outcome: Progressing  Goal: Signs and symptoms of infection will decrease  Description: Target End Date:  Prior to discharge or change in level of care    1.  Remove potential routes of infection, such as central lines and urinary catheter  2.  Follow facility protocol for changing IV tubing and sites  3.  Collaborate with Infectious Disease  4.  Antibiotic therapy per provider order  5.  Note drug effects and monitor for antibiotic toxicity  Outcome: Progressing     Problem: Pain - Standard  Goal: Alleviation of pain or a reduction in pain to the patient’s comfort goal  Description: Target End Date:  Prior to discharge or change in level of care    Document on Vitals flowsheet    1.  Document pain using the appropriate pain scale per order or unit policy  2.  Educate and implement non-pharmacologic comfort measures (i.e. relaxation, distraction, massage, cold/heat therapy, etc.)  3.  Pain management medications as ordered  4.  Reassess pain after pain med administration per policy  5.  If opiods administered assess patient's response to pain medication is appropriate per POSS sedation scale  6.  Follow pain management plan developed in collaboration with patient and interdisciplinary team (including palliative care or pain specialists if applicable)  Outcome: Progressing     Problem: Safety - Medical Restraint  Goal: Remains free of injury from restraints (Restraint for Interference with Medical Device)  Description: INTERVENTIONS:  1. Determine that other, less restrictive measures have been tried or would not be effective before applying the restraint  2. Evaluate  the patient's condition at the time of restraint application  3. Educate patient/family regarding the reason for restraint  4. Q2H: Monitor safety, psychosocial status, comfort, circulation, respiratory status, LOC, nutrition and hydration  Outcome: Progressing  Goal: Free from restraint(s) (Restraint for Interference with Medical Device)  Description: INTERVENTIONS:  1.  ONCE/SHIFT or MINIMUM Q12H: Assess and document the continuing need for restraints  2.  Q24H: Continued use of restraint requires LIP to perform face to face examination and written order  3.  Identify and implement measures to help patient regain control  4.  Educate patient/family on discontinuation criteria   5.  Assess patient's understanding and retention of education provided  6.  Assess readiness for release & initiate progressive release per protocol  7.  Identify and document criteria for restraints  Outcome: Progressing     Problem: Skin Integrity  Goal: Skin integrity is maintained or improved  Description: Target End Date:  Prior to discharge or change in level of care    Document interventions on Skin Risk/ flowsheet groups and corresponding LDA    1.  Assess and monitor skin integrity, appearance and/or temperature  2.  Assess risk factors for impaired skin integrity and/or pressures ulcers  3.  Implement precautions to protect skin integrity in collaboration with interdisciplinary team  4.  Implement pressure ulcer prevention protocol if at risk for skin breakdown  5.  Confirm wound care consult if at risk for skin breakdown  6.  Ensure patient use of pressure relieving devices  (Low air loss bed, waffle overlay, heel protectors, ROHO cushion, etc)  Outcome: Progressing     Problem: Fall Risk  Goal: Patient will remain free from falls  Description: Target End Date:  Prior to discharge or change in level of care    Document interventions on the Machelle Rosario Fall Risk Assessment    1.  Assess for fall risk factors  2.  Implement  fall precautions  Outcome: Progressing   The patient is Stable - Low risk of patient condition declining or worsening    Shift Goals  Clinical Goals: Inhalers, O2 monitor, antibiotics therapy, Vitals/Labs monitor  Patient Goals: Get better  Family Goals: Get well soon    Progress made toward(s) clinical / shift goals: Q2 turns, Iv antibiotics therapy    Patient is not progressing towards the following goals:

## 2025-07-08 NOTE — DISCHARGE SUMMARY
"Discharge Summary    CHIEF COMPLAINT ON ADMISSION  Chief Complaint   Patient presents with    Shortness of Breath     BIBA from home        Reason for Admission  ems     Admission Date  7/4/2025    CODE STATUS  Full Code    HPI & HOSPITAL COURSE  Donte Agustin is a 79-year-old man with history of COPD on 2 L baseline oxygen at home, ischemic cardiomyopathy with ejection fraction 25%, pulmonary hypertension, CAD status post PCI and stenting, continued tobacco abuse, prostate cancer.  He was recently hospitalized in [place], [place] for pneumonia.  He had increasing lethargy and difficulty breathing at home, found to be hypoxic on his home O2, tachycardic, hypotensive and was transferred to [facility] for higher level of care.  He was treated with volume resuscitation and required peripheral Levophed, he was intubated and it was felt that he was septic shock secondary to pneumonia.  Central line and arterial line were placed.  On 7/6 patient was successfully extubated and weaned off of pressors on 7/7.  On 7/8 patient was evaluated in the floor, he expressed insistence on going home.  He describes himself as \"crippled\" and feels he is at his baseline, would like patient to work with physical therapy for further evaluation, Occupational Therapy recommended postacute placement.  I discussed with patient and again he was very insistent on discharge home, was not interested in any postacute placement nor working with physical therapy further.  He reports he has plenty of help at home with family who are with him all the time, refused any consideration for SNF placement.  He reported he felt much improved and very eager and comfortable with going home.  He was at his baseline oxygen requirement with adequate saturation.  He was given return precautions.  He reports he has ample supply of medications at home.  He will be prescribed Augmentin to finish a 5-day course for community acquired pneumonia.    I had extensive " discussion with him and his wife at bedside and again he was very insistent on returning home did not wish for further monitoring and/or considering postacute placement.  Discussed concern for aspiration however patient declined any further SLP treatment or evaluation here in house, he accepts the risk of aspiration.  I again encouraged him to accept further treatment and evaluations however he was insistent on returning home.    Therefore, he is discharged in guarded and stable condition to home with close outpatient follow-up.    The patient met 2-midnight criteria for an inpatient stay at the time of discharge.    Discharge Date  7/8    FOLLOW UP ITEMS POST DISCHARGE  Follow-up with PCP  Pursue outpatient workup of weakness etc., encouraged him to pursue PT/OT    DISCHARGE DIAGNOSES  Principal Problem:    Septic shock (HCC) (POA: Yes)  Active Problems:    Diabetes (HCC) (POA: Yes)    Heart failure with reduced ejection fraction (HCC) (LVEF 20-25% Echo 2/2025) (POA: Yes)    History of coronary angioplasty with insertion of stent (POA: Yes)    Debility (POA: Yes)    Prostate cancer (HCC) (POA: Yes)    Hypertension (POA: Yes)    Hyperlipidemia (POA: Yes)    Pulmonary hypertension (HCC) (POA: Yes)    COPD (chronic obstructive pulmonary disease) (HCC) (POA: Yes)    Tobacco abuse (POA: Yes)    Acute on chronic respiratory failure with hypoxia (HCC) (POA: Yes)    TIM (acute kidney injury) (HCC) (POA: Yes)    Left lower lobe pneumonia (POA: Yes)    Bradycardia (POA: Unknown)  Resolved Problems:    * No resolved hospital problems. *      FOLLOW UP  No future appointments.  No follow-up provider specified.    MEDICATIONS ON DISCHARGE     Medication List        PAUSE taking these medications        Instructions   digoxin 125 MCG Tabs  Wait to take this until your doctor or other care provider tells you to start again.  Commonly known as: Lanoxin   Take 125 mcg by mouth every day.  Dose: 125 mcg     metoprolol SR 25 MG  Tb24  Wait to take this until your doctor or other care provider tells you to start again.  Commonly known as: Toprol XL   Take 25 mg by mouth every day.  Dose: 25 mg            START taking these medications        Instructions   amoxicillin-clavulanate 875-125 MG Tabs  Commonly known as: Augmentin   Take 1 Tablet by mouth 2 times a day for 4 days.  Dose: 1 Tablet            CONTINUE taking these medications        Instructions   acetaminophen-codeine #3 300-30 MG Tabs  Commonly known as: Tylenol #3   Take 1 Tablet by mouth every 6 hours as needed for Moderate Pain.  Dose: 1 Tablet     albuterol 108 (90 Base) MCG/ACT Aers inhalation aerosol   Inhale 1-2 Puffs every four hours as needed for Shortness of Breath.  Dose: 1-2 Puff     Anoro Ellipta 62.5-25 MCG/ACT Aepb inhaler  Generic drug: umeclidinium-vilanterol   Inhale 1 Puff every day.  Dose: 1 Puff     Aspirin Low Dose 81 MG EC tablet  Generic drug: aspirin   Take 1 Tablet by mouth every day.  Dose: 81 mg     atorvastatin 40 MG Tabs  Commonly known as: Lipitor   Take 1 Tablet by mouth every evening.  Dose: 40 mg     clopidogrel 75 MG Tabs  Commonly known as: Plavix   Take 75 mg by mouth every day.  Dose: 75 mg     furosemide 40 MG Tabs  Commonly known as: Lasix   Take 40 mg by mouth 2 times a day.  Dose: 40 mg     gabapentin 600 MG tablet  Commonly known as: Neurontin   Take 600 mg by mouth 3 times a day.  Dose: 600 mg     Jardiance 10 MG Tabs tablet  Generic drug: Empagliflozin   Take 10 mg by mouth every day.  Dose: 10 mg     lisinopril 5 MG Tabs  Commonly known as: Prinivil   Take 5 mg by mouth every day.  Dose: 5 mg     metFORMIN 500 MG Tabs  Commonly known as: Glucophage   Take 1,000 mg by mouth 2 times a day with meals. 2 tablets = 1,000 mg.  Dose: 1,000 mg     nitroglycerin 0.4 MG Subl  Commonly known as: Nitrostat   Place 1 Tablet under the tongue as needed for Chest Pain.  Dose: 0.4 mg     pantoprazole 40 MG Tbec  Commonly known as: Protonix   Take 40 mg  by mouth every day.  Dose: 40 mg     potassium chloride SA 20 MEQ Tbcr  Commonly known as: Kdur   Take 20 mEq by mouth every day.  Dose: 20 mEq     spironolactone 25 MG Tabs  Commonly known as: Aldactone   Take 25 mg by mouth every day.  Dose: 25 mg     tamsulosin 0.4 MG capsule  Commonly known as: Flomax   Take 0.4 mg by mouth every 8 hours.  Dose: 0.4 mg            STOP taking these medications      cefdinir 300 MG Caps  Commonly known as: Omnicef     predniSONE 20 MG Tabs  Commonly known as: Deltasone              Allergies  Allergies[1]    DIET  Orders Placed This Encounter   Procedures    Diet Order Diet: Consistent CHO (Diabetic)     Standing Status:   Standing     Number of Occurrences:   1     Diet::   Consistent CHO (Diabetic) [4]       ACTIVITY  As tolerated.  Weight bearing as tolerated    CONSULTATIONS  Critical care    PROCEDURES  Intubation/extubation  CBC, arterial line    LABORATORY  Lab Results   Component Value Date    SODIUM 139 07/08/2025    POTASSIUM 4.3 07/08/2025    CHLORIDE 105 07/08/2025    CO2 22 07/08/2025    GLUCOSE 224 (H) 07/08/2025    BUN 18 07/08/2025    CREATININE 0.79 07/08/2025        Lab Results   Component Value Date    WBC 7.9 07/08/2025    HEMOGLOBIN 10.1 (L) 07/08/2025    HEMATOCRIT 33.2 (L) 07/08/2025    PLATELETCT 201 07/08/2025        Total time of the discharge process exceeds 43 minutes.         [1] No Known Allergies

## 2025-07-08 NOTE — DISCHARGE PLANNING
"1615: Medically cleared to discharge home ([facility] at [address], [address], [facility] [address]).     -Writer met with patient and spouse, Sherin, who are eager to discharge home. Currently on 2 LPM of supplemental oxygen via [place] (His baseline). Confirmed he has personal oxygen supplies at his destination. Sherin confirmed they have house keys on them and her Son, Michael: [phone], will be home to receive patient. Patient's spouse and son provide assistance with patient's ADLs and IADLs.     -Discharge order placed by attending.   2nd IMM delivered to and signed by patient today, 7/8/2025, at 1604.   Patient adamantly refusing placement, despite OT recommendations. Refusing to work with PT. Attending physician is aware and agreeable with sending patient home. Patient declined HH services ([facility] only servicing agency in [place]).     -[facility] at [facility]. Patient's spouse, Sherin, at bedside is wishing to ride home with patient in [facility] which she reports she does \"everytime\". [facility] [employer] also aware of this request. Pending transportation confirmation.     -Anticipate no further needs from case management prior to discharge.   "

## 2025-07-08 NOTE — PROGRESS NOTES
Assessment completed. Pt A&Ox4. Respirations are even and unlabored on 2L n/c, baseline. Pt reports 10/10 bilateral lower ext. pain at this time, medication per MAR. Monitors applied, VS stable, call light and belongings within reach. POC updated (abx, pain control). Pt educated on room and call light, pt verbalized understanding. Communication board updated. Needs met. Wife at bedside. Q2 turns in place.

## 2025-07-08 NOTE — CARE PLAN
The patient is Stable - Low risk of patient condition declining or worsening    Shift Goals  Clinical Goals: abx, pain control  Patient Goals: pain meds  Family Goals: feel better    Progress made toward(s) clinical / shift goals:          Problem: Risk for Aspiration  Goal: Patient's risk for aspiration will be absent or decrease  Outcome: Progressing     Problem: Skin Integrity  Goal: Skin integrity is maintained or improved  Outcome: Progressing     Problem: Fall Risk  Goal: Patient will remain free from falls  Outcome: Progressing       Patient is not progressing towards the following goals:

## 2025-07-08 NOTE — PROGRESS NOTES
.4 Eyes Skin Assessment Completed by Dawit Go RN and MARYBEL Moreno.    Skin assessment is primarily focused on high risk bony prominences. Pay special attention to skin beneath and around medical devices, high risk bony prominences, skin to skin areas and areas where the patient lacks sensation to feel pain and areas where the patient previously had breakdown.     Head (Occipital):  WDL   Ears (Under Medical Devices): WDL   Nose (Under Medical Devices): WDL   Mouth:  WDL   Neck: WDL   Breast/Chest:  WDL   Shoulder Blades:  WDL   Spine:   WDL   (R) Arm/Elbow/Hand: WDL   (L) Arm/Elbow/Hand: WDL   Abdomen: WDL   Pannus/Groin:  WDL   Sacrum/Coccyx:   WDL   (R) Ischial Tuberosity (Sit Bones):  WDL   (L) Ischial Tuberosity (Sit Bones):  WDL   (R) Leg:  WDL   (L) Leg:  WDL   (R) Heel:  WDL   (R) Foot/Toe: WDL   (L) Heel: WDL   (L) Foot/Toe:  WDL       DEVICES IN USE:   Respiratory Devices:  Nasal cannula  Feeding Devices:  N/A   Lines & BP Monitoring Devices:  Peripheral IV    Orthopedic Devices:  N/A  Miscellaneous Devices:  Jensen    PROTOCOL INTERVENTIONS:   Standard/Trauma Bed:  Already in place    WOUND PHOTOS:   N/A no wounds identified    WOUND CONSULT:   N/A, no advanced wound care needs identified

## 2025-07-08 NOTE — DISCHARGE PLANNING
DC Transport Scheduled    Transport Company Scheduled:  [employer]  Spoke with Apurva at [facility] to schedule transport.    Scheduled Date: 7/8/2025  Scheduled Time: 1730- JO[facility] will call nurses station if there are any issues, but tentatively 1730.    Transport Type: [facility]  Destination:   Home   Destination Name: [facility] (Patient's long-term residence)  Destination address: [address] [place] [address], [address], [place] [id number]      Notified care team of scheduled transport via [employer].     If there are any changes needed to the DC transportation scheduled, please contact [facility] at ext. [phone] between the hours of 7791-6200. If outside those hours, contact the ED Case Manager at ext. [phone].

## 2025-07-08 NOTE — DISCHARGE PLANNING
Transferred to [facility] from ICU/IMCU. Patient has four 200+ billing days (in event he has LTACH needs).   Lives with his functional (Does not drive) Spouse/Legal NOK, Sherin Green: [phone], in long-term motel in [facility]. Also lives with Adult Son, Les: [phone].  Owns and uses FWW and WC.   He is moderate assistance with IADLs and ADLs at baseline.   Has COPD and is 2-5 LPM supplemental oxygen at baseline. Has both home Concentrator and Portable Supplies provided by [employer].   Patient has had multiple recent inpatient admissions at [place] facilities.   Insured by Medicare A/B and [employer] (Has non-emergent transportation benefits).   Patient and spouse do not own a vehicle or drive at baseline.   Denies current or historical Mental health or substance misuse concerns.     -Pending medical clearance. Anticipate patient will need post-acute placement.     -Due to [facility] writer requested DPA send referrals to [facility] SNFs [facility]. Pending acceptance and choice from patient. Per chart review, patient has been opposed to post-acute placement in the past. Patient and spouse, Sherin, have also had transportation issues after discharge from previous admissions. Fortunately writer will be able to obtain ride through [employer] once indicated.     Case Management Discharge Planning    Admission Date: 7/4/2025  GMLOS: 4.9  ALOS: 4    6-Clicks ADL Score: 14  6-Clicks Mobility Score: 12  PT and/or OT Eval ordered: Yes  Post-acute Referrals Ordered: Yes  Post-acute Choice Obtained: No  Has referral(s) been sent to post-acute provider:  Yes    Anticipated Discharge Dispo: Discharge Disposition: D/T to SNF with Medicare cert in anticipation of skilled care (03)  Discharge Address: Anticipate SNF in [facility] ([facility] or [facility])  Discharge Contact Phone Number: NA    DME Needed: No    Action(s) Taken: Updated Provider/Nurse on Discharge Plan, Referral(s) sent, and Completed  PASSR/LOC    Escalations Completed: Pending Discharge Destination    Medically Clear: No    Next Steps: Medical clearance/plan of care and acceptance/choice with post-acute facility.    Barriers to Discharge: Medical clearance, Pending Placement, and Transportation    Is the patient up for discharge tomorrow: Unknown.    Care Transition Team Assessment    Information Source  Orientation Level: Oriented X4  Information Given By: Patient  Informant's Name: Donte  Who is responsible for making decisions for patient? : Patient    Readmission Evaluation  Is this a readmission?: No    Elopement Risk  Legal Hold: No  Ambulatory or Self Mobile in Wheelchair: No-Not an Elopement Risk  Elopement Risk: Not at Risk for Elopement    Interdisciplinary Discharge Planning  Does Admitting Nurse Feel This Could be a Complex Discharge?: No  Lives with - Patient's Self Care Capacity: Spouse, Adult Children  Housing / Facility: [facility] (ground floor studio)  Prior Services: Continuous (24 Hour) Care Giving Per Service, Continuous (24 Hour) Care Giving Family    Discharge Preparedness  What is your plan after discharge?: Uncertain - pending medical team collaboration  What are your discharge supports?: Child, Spouse  Prior Functional Level: Needs Assist with Activities of Daily Living, Independent with Medication Management, Uses Walker, Uses Wheelchair  Difficulity with ADLs: Walking, Bathing  Difficulty with ADLs Comment: Assistance from family.  Difficulity with IADLs: Driving, Shopping  Difficulity with IADL Comments: Assistance from family.    Functional Assesment  Prior Functional Level: Needs Assist with Activities of Daily Living, Independent with Medication Management, Uses Walker, Uses Wheelchair    Finances  Financial Barriers to Discharge: Yes  Source of Income: Social Security Disability  Prescription Coverage: Yes    Vision / Hearing Impairment  Right Eye Vision: Impaired, Wears Glasses  Left Eye Vision: Impaired, Wears  Glasses    Values / Beliefs / Concerns  Values / Beliefs Concerns : No    Advance Directive  Advance Directive?: None  Advance Directive offered?: AD Booklet refused    Domestic Abuse  Have you ever been the victim of abuse or violence?: No    Psychological Assessment  History of Substance Abuse: None  History of Psychiatric Problems: No  Non-compliant with Treatment: No  Newly Diagnosed Illness: No    Discharge Risks or Barriers  Discharge risks or barriers?: Complex medical needs  Patient risk factors: Complex medical needs, Vulnerable adult, Readmission, Noncompliance    Anticipated Discharge Information  Discharge Disposition: D/T to SNF with Medicare cert in anticipation of skilled care (03)  Discharge Address: Anticipate SNF in [facility] ([facility] or [facility])  Discharge Contact Phone Number: NA

## 2025-07-08 NOTE — DISCHARGE PLANNING
1035  DPA sent SNF referrals to [facility], [facility].

## 2025-07-08 NOTE — THERAPY
"Speech Language Pathology   Daily Treatment     Patient Name:  Donte Agustin  AGE:  79 y.o., SEX:  male  Medical Record #:  [id number]  Date of Service:  7/8/2025    Precautions  Medical: Fall Risk  Swallowing: Swallow Precautions  Communication/Language: Dementia      78 y/o male admitted 7/4/25 with acute on chronic respiratory failure, septic shock. Pt was intubated 7/4-7/6.     CMHx: septic shock, bradycardia, LLL PNA, TIM, acute on chronic respiratory failure with hypoxia, COPD (not in acute exacerbation)  PMHx: Heart failure, COPD (5L baseline), pulmonary HTN, CAD s/p PCI and stent placements, dementia, diabetes, GERD, CKD, prostate cancer, tobacco use, debility, prostate cancer      Subjective:  Patient seen this date for dysphagia management. Patient alert and not really agreeable to session, but participated to \"get you (SLP) out of the room.\" Patient irritable throughout session, wife pleasant and chatty. Patient yelled \"this is inflammatory! You have no right to ask me that question!\" When SLP asked he would like further follow up/intervention.       Assessment:  PO presentations of thin liquids (TN0) cup, soft and bite size solids (SB6), and regular solids (RG7) from lunch tray. Adequate oral bolus acceptance/containment. Suspected piecemeal deglutition with multiple swallow during continued mastication of same bolus. Tearing bite of RG7; prolonged mastication. Single instance cough during short session.     Patient ultimately refusing to meaningfully participate in conversation with clinician. Discussed prior history of silent aspiration with wife. She had excellent recall and reported implementing small sips, soft/cup up foods, and frequent oral care (\"I brush his gums and tongue\"). She stated patient would likely not participate in further SLP intervention. She was in agreement with d/c from service and expressed understanding of possible risk for aspiration. Wife did request diet modification to SB6. MD " "also in agreement.     Clinical Impressions:   Patient carries recent hx of silent aspiration on swallow diagnostic imaging (5/2025). He remains at risk for pharyngeal dysphagia with silent aspiration, especially in consideration of admission diagnosis. Patient is a poor candidate for SLP intervention and would like to eat despite possible risk. Both patient's wife and MD are in agreement. SLP to sign off. Diet modified to SB6/TN0 per wife's request.       Recommendations  Diet Consistency: Soft and bite size solids, thin liquids  Instrumentation: None indicated at this time  Medication: As tolerated  Supervision: Assist with meal tray set up, Distant supervision - check on patient 2-3 times per meal  Positioning: Fully upright and midline during oral intake  Risk Management : Small bites/sips, Slow rate of intake  Oral Care: BID                                 SLP Treatment Plan  Treatment Plan: None Indicated (d/c secondary to no likely benefit)            Anticipated Discharge Needs  Discharge Recommendations: Anticipate that the patient will have no further speech therapy needs after discharge from the hospital  Therapy Recommendations Upon DC: Not Indicated      Patient / Family Goals  Patient / Family Goal #1: \"Oh no! She's going to take my food away Marge!\"  Goal #1 Outcome: Goal not met  Short Term Goals  Short Term Goal # 1: Pt will consume rg/tn diet with no overt s/sx of aspiration or decline in pulmonary status  Goal Outcome # 1: Goal not met      Guadalupe Ulrich, EFRAIN  "

## 2025-07-08 NOTE — PROGRESS NOTES
4 Eyes Skin Assessment Completed by MARYBEL Choudhary and MARYBEL Motley.     Skin assessment is primarily focused on high risk bony prominences. Pay special attention to skin beneath and around medical devices, high risk bony prominences, skin to skin areas and areas where the patient lacks sensation to feel pain and areas where the patient previously had breakdown.      Head (Occipital):  WDL   Ears (Under Medical Devices): Red/Blanching   Nose (Under Medical Devices): WDL   Mouth:  WDL   Neck: Red/blanching   Breast/Chest:  WDL   Shoulder Blades:  WDL   Spine:   WDL   (R) Arm/Elbow/Hand: Red, Blanching, and Bruising   (L) Arm/Elbow/Hand: Red, Blanching, and Bruising   Abdomen: WDL   Pannus/Groin:  WDL   Sacrum/Coccyx:   Red, Slow to asif   (R) Ischial Tuberosity (Sit Bones):  WDL   (L) Ischial Tuberosity (Sit Bones):  WDL   (R) Leg:  WDL   (L) Leg:  WDL   (R) Heel:  WDL, Dry/Flaky   (R) Foot/Toe: WDL   (L) Heel: WDL, Dry/Flaky   (L) Foot/Toe:  WDL         DEVICES IN USE:   Respiratory Devices:  Silicone nasal cannula  Feeding Devices:  N/A   Lines & BP Monitoring Devices:  Peripheral IV, BP cuff, and Pulse ox    Orthopedic Devices:  N/A  Miscellaneous Devices:  Telemetry monitor, Jensen, and SCDs     PROTOCOL INTERVENTIONS:   Offloading Dressing - Sacrum:  Already in place  Offloading Dressing - Heel:  Already in place  Float Heels with Pillows:  Already in place  Q2 Turns with Wedges:  Already in place  Glide Sheet:  Already in placeBarrier Paste:  Already in place  Jensen:  Already in place     WOUND PHOTOS:   N/A no wounds identified     WOUND CONSULT:   N/A, no advanced wound care needs identified

## 2025-07-09 LAB
BACTERIA BLD CULT: NORMAL
BACTERIA BLD CULT: NORMAL
PSA FREE MFR SERPL: 4 %
PSA FREE SERPL-MCNC: 0.4 NG/ML
PSA SERPL-MCNC: 8.9 NG/ML (ref 0–4)
SIGNIFICANT IND 70042: NORMAL
SIGNIFICANT IND 70042: NORMAL
SITE SITE: NORMAL
SITE SITE: NORMAL
SOURCE SOURCE: NORMAL
SOURCE SOURCE: NORMAL

## 2025-07-09 PROCEDURE — 84154 ASSAY OF PSA FREE: CPT

## 2025-07-09 NOTE — PROGRESS NOTES
Patient left with [facility] transport via gurney. Patient wife at bedside. Printed AVS discharge instructions to the patient.

## 2025-07-11 ENCOUNTER — DOCUMENTATION (OUTPATIENT)
Dept: CARDIOLOGY | Facility: MEDICAL CENTER | Age: 80
End: 2025-07-11
Payer: MEDICARE

## 2025-07-11 NOTE — PROGRESS NOTES
This patient has been flagged through our echocardiogram surveillance with the following echocardiogram results:    Low Flow Low Gradient Moderate Aortic Stenosis    Surveillance letter mailed to patient

## 2025-08-07 ENCOUNTER — APPOINTMENT (OUTPATIENT)
Dept: RADIOLOGY | Facility: MEDICAL CENTER | Age: 80
DRG: 871 | End: 2025-08-07
Attending: EMERGENCY MEDICINE
Payer: MEDICARE

## 2025-08-07 ENCOUNTER — HOSPITAL ENCOUNTER (INPATIENT)
Facility: MEDICAL CENTER | Age: 80
LOS: 2 days | DRG: 871 | End: 2025-08-09
Attending: EMERGENCY MEDICINE | Admitting: INTERNAL MEDICINE
Payer: MEDICARE

## 2025-08-07 DIAGNOSIS — J18.9 PNEUMONIA OF LEFT LOWER LOBE DUE TO INFECTIOUS ORGANISM: ICD-10-CM

## 2025-08-07 DIAGNOSIS — R00.1 BRADYCARDIA: ICD-10-CM

## 2025-08-07 DIAGNOSIS — I27.20 PULMONARY HYPERTENSION (HCC): ICD-10-CM

## 2025-08-07 DIAGNOSIS — I35.0 AORTIC VALVE STENOSIS, ETIOLOGY OF CARDIAC VALVE DISEASE UNSPECIFIED: ICD-10-CM

## 2025-08-07 DIAGNOSIS — E78.5 HYPERLIPIDEMIA, UNSPECIFIED HYPERLIPIDEMIA TYPE: ICD-10-CM

## 2025-08-07 DIAGNOSIS — R53.81 DEBILITY: ICD-10-CM

## 2025-08-07 DIAGNOSIS — R65.20 SEVERE SEPSIS (HCC): Primary | ICD-10-CM

## 2025-08-07 DIAGNOSIS — A41.9 SEVERE SEPSIS (HCC): Primary | ICD-10-CM

## 2025-08-07 DIAGNOSIS — E87.70 HYPERVOLEMIA, UNSPECIFIED HYPERVOLEMIA TYPE: ICD-10-CM

## 2025-08-07 PROBLEM — J96.90 RESPIRATORY FAILURE (HCC): Status: ACTIVE | Noted: 2025-04-06

## 2025-08-07 PROBLEM — R57.9 SHOCK (HCC): Status: ACTIVE | Noted: 2025-08-07

## 2025-08-07 LAB
ALBUMIN SERPL BCP-MCNC: 3.4 G/DL (ref 3.2–4.9)
ALBUMIN/GLOB SERPL: 1 G/DL
ALP SERPL-CCNC: 62 U/L (ref 30–99)
ALT SERPL-CCNC: <5 U/L (ref 2–50)
ANION GAP SERPL CALC-SCNC: 14 MMOL/L (ref 7–16)
APPEARANCE UR: CLEAR
AST SERPL-CCNC: 13 U/L (ref 12–45)
BACTERIA #/AREA URNS HPF: ABNORMAL /HPF
BASE EXCESS BLDV CALC-SCNC: 5 MMOL/L (ref -2–3)
BASOPHILS # BLD AUTO: 0.2 % (ref 0–1.8)
BASOPHILS # BLD: 0.04 K/UL (ref 0–0.12)
BILIRUB SERPL-MCNC: 0.5 MG/DL (ref 0.1–1.5)
BILIRUB UR QL STRIP.AUTO: NEGATIVE
BODY TEMPERATURE: 36.4 CENTIGRADE
BUN SERPL-MCNC: 13 MG/DL (ref 8–22)
CALCIUM ALBUM COR SERPL-MCNC: 9.1 MG/DL (ref 8.5–10.5)
CALCIUM SERPL-MCNC: 8.6 MG/DL (ref 8.5–10.5)
CASTS URNS QL MICRO: ABNORMAL /LPF (ref 0–2)
CHLORIDE SERPL-SCNC: 103 MMOL/L (ref 96–112)
CO2 SERPL-SCNC: 20 MMOL/L (ref 20–33)
COLOR UR: YELLOW
CREAT SERPL-MCNC: 1.03 MG/DL (ref 0.5–1.4)
EKG IMPRESSION: NORMAL
EOSINOPHIL # BLD AUTO: 0.05 K/UL (ref 0–0.51)
EOSINOPHIL NFR BLD: 0.2 % (ref 0–6.9)
EPITHELIAL CELLS 1715: ABNORMAL /HPF (ref 0–5)
ERYTHROCYTE [DISTWIDTH] IN BLOOD BY AUTOMATED COUNT: 52.3 FL (ref 35.9–50)
GFR SERPLBLD CREATININE-BSD FMLA CKD-EPI: 75 ML/MIN/1.73 M 2
GLOBULIN SER CALC-MCNC: 3.4 G/DL (ref 1.9–3.5)
GLUCOSE BLD STRIP.AUTO-MCNC: 174 MG/DL (ref 65–99)
GLUCOSE BLD STRIP.AUTO-MCNC: 180 MG/DL (ref 65–99)
GLUCOSE BLD STRIP.AUTO-MCNC: 236 MG/DL (ref 65–99)
GLUCOSE SERPL-MCNC: 181 MG/DL (ref 65–99)
GLUCOSE UR STRIP.AUTO-MCNC: 500 MG/DL
HCO3 BLDV-SCNC: 29 MMOL/L (ref 22–29)
HCT VFR BLD AUTO: 35.5 % (ref 42–52)
HGB BLD-MCNC: 11 G/DL (ref 14–18)
HOLDING TUBE BB 8507: NORMAL
IMM GRANULOCYTES # BLD AUTO: 0.15 K/UL (ref 0–0.11)
IMM GRANULOCYTES NFR BLD AUTO: 0.6 % (ref 0–0.9)
KETONES UR STRIP.AUTO-MCNC: NEGATIVE MG/DL
LACTATE SERPL-SCNC: 1.3 MMOL/L (ref 0.5–2)
LACTATE SERPL-SCNC: 1.7 MMOL/L (ref 0.5–2)
LACTATE SERPL-SCNC: 2 MMOL/L (ref 0.5–2)
LEUKOCYTE ESTERASE UR QL STRIP.AUTO: ABNORMAL
LYMPHOCYTES # BLD AUTO: 0.94 K/UL (ref 1–4.8)
LYMPHOCYTES NFR BLD: 3.9 % (ref 22–41)
MCH RBC QN AUTO: 27 PG (ref 27–33)
MCHC RBC AUTO-ENTMCNC: 31 G/DL (ref 32.3–36.5)
MCV RBC AUTO: 87 FL (ref 81.4–97.8)
MICRO URNS: ABNORMAL
MONOCYTES # BLD AUTO: 1.63 K/UL (ref 0–0.85)
MONOCYTES NFR BLD AUTO: 6.7 % (ref 0–13.4)
NEUTROPHILS # BLD AUTO: 21.55 K/UL (ref 1.82–7.42)
NEUTROPHILS NFR BLD: 88.4 % (ref 44–72)
NITRITE UR QL STRIP.AUTO: NEGATIVE
NRBC # BLD AUTO: 0 K/UL
NRBC BLD-RTO: 0 /100 WBC (ref 0–0.2)
NT-PROBNP SERPL IA-MCNC: 2663 PG/ML (ref 0–125)
PCO2 BLDV: 43.6 MMHG (ref 38–54)
PCO2 TEMP ADJ BLDV: 42.4 MMHG (ref 38–54)
PH BLDV: 7.43 [PH] (ref 7.31–7.45)
PH TEMP ADJ BLDV: 7.44 [PH] (ref 7.31–7.45)
PH UR STRIP.AUTO: 5 [PH] (ref 5–8)
PLATELET # BLD AUTO: 281 K/UL (ref 164–446)
PMV BLD AUTO: 10.5 FL (ref 9–12.9)
PO2 BLDV: 41.7 MMHG (ref 23–48)
PO2 TEMP ADJ BLDV: 40 MMHG (ref 23–48)
POTASSIUM SERPL-SCNC: 4 MMOL/L (ref 3.6–5.5)
PROCALCITONIN SERPL-MCNC: 0.34 NG/ML
PROT SERPL-MCNC: 6.8 G/DL (ref 6–8.2)
PROT UR QL STRIP: NEGATIVE MG/DL
RBC # BLD AUTO: 4.08 M/UL (ref 4.7–6.1)
RBC # URNS HPF: ABNORMAL /HPF (ref 0–2)
RBC UR QL AUTO: ABNORMAL
SAO2 % BLDV: 65 % (ref 60–85)
SCCMEC + MECA PNL NOSE NAA+PROBE: NEGATIVE
SODIUM SERPL-SCNC: 137 MMOL/L (ref 135–145)
SP GR UR STRIP.AUTO: 1.01
TROPONIN T SERPL-MCNC: 50 NG/L (ref 6–19)
UROBILINOGEN UR STRIP.AUTO-MCNC: 0.2 EU/DL
WBC # BLD AUTO: 24.4 K/UL (ref 4.8–10.8)
WBC #/AREA URNS HPF: ABNORMAL /HPF

## 2025-08-07 PROCEDURE — 700105 HCHG RX REV CODE 258: Performed by: INTERNAL MEDICINE

## 2025-08-07 PROCEDURE — 96375 TX/PRO/DX INJ NEW DRUG ADDON: CPT

## 2025-08-07 PROCEDURE — 99291 CRITICAL CARE FIRST HOUR: CPT | Performed by: INTERNAL MEDICINE

## 2025-08-07 PROCEDURE — 700111 HCHG RX REV CODE 636 W/ 250 OVERRIDE (IP): Mod: JZ | Performed by: EMERGENCY MEDICINE

## 2025-08-07 PROCEDURE — 99291 CRITICAL CARE FIRST HOUR: CPT

## 2025-08-07 PROCEDURE — 96365 THER/PROPH/DIAG IV INF INIT: CPT

## 2025-08-07 PROCEDURE — 81001 URINALYSIS AUTO W/SCOPE: CPT

## 2025-08-07 PROCEDURE — C1751 CATH, INF, PER/CENT/MIDLINE: HCPCS

## 2025-08-07 PROCEDURE — 36415 COLL VENOUS BLD VENIPUNCTURE: CPT

## 2025-08-07 PROCEDURE — 700111 HCHG RX REV CODE 636 W/ 250 OVERRIDE (IP): Mod: JZ | Performed by: INTERNAL MEDICINE

## 2025-08-07 PROCEDURE — 82962 GLUCOSE BLOOD TEST: CPT | Performed by: INTERNAL MEDICINE

## 2025-08-07 PROCEDURE — 71045 X-RAY EXAM CHEST 1 VIEW: CPT

## 2025-08-07 PROCEDURE — 84484 ASSAY OF TROPONIN QUANT: CPT

## 2025-08-07 PROCEDURE — 87086 URINE CULTURE/COLONY COUNT: CPT

## 2025-08-07 PROCEDURE — 700101 HCHG RX REV CODE 250: Performed by: EMERGENCY MEDICINE

## 2025-08-07 PROCEDURE — 93005 ELECTROCARDIOGRAM TRACING: CPT | Mod: TC | Performed by: EMERGENCY MEDICINE

## 2025-08-07 PROCEDURE — A9270 NON-COVERED ITEM OR SERVICE: HCPCS | Performed by: INTERNAL MEDICINE

## 2025-08-07 PROCEDURE — 700102 HCHG RX REV CODE 250 W/ 637 OVERRIDE(OP): Performed by: INTERNAL MEDICINE

## 2025-08-07 PROCEDURE — 36556 INSERT NON-TUNNEL CV CATH: CPT

## 2025-08-07 PROCEDURE — 87077 CULTURE AEROBIC IDENTIFY: CPT

## 2025-08-07 PROCEDURE — 94640 AIRWAY INHALATION TREATMENT: CPT

## 2025-08-07 PROCEDURE — 94669 MECHANICAL CHEST WALL OSCILL: CPT

## 2025-08-07 PROCEDURE — 85025 COMPLETE CBC W/AUTO DIFF WBC: CPT

## 2025-08-07 PROCEDURE — 83605 ASSAY OF LACTIC ACID: CPT

## 2025-08-07 PROCEDURE — 87641 MR-STAPH DNA AMP PROBE: CPT

## 2025-08-07 PROCEDURE — 82803 BLOOD GASES ANY COMBINATION: CPT

## 2025-08-07 PROCEDURE — 84145 PROCALCITONIN (PCT): CPT

## 2025-08-07 PROCEDURE — 83880 ASSAY OF NATRIURETIC PEPTIDE: CPT

## 2025-08-07 PROCEDURE — 96366 THER/PROPH/DIAG IV INF ADDON: CPT

## 2025-08-07 PROCEDURE — 770022 HCHG ROOM/CARE - ICU (200)

## 2025-08-07 PROCEDURE — 80053 COMPREHEN METABOLIC PANEL: CPT

## 2025-08-07 PROCEDURE — 700105 HCHG RX REV CODE 258: Performed by: EMERGENCY MEDICINE

## 2025-08-07 PROCEDURE — 87040 BLOOD CULTURE FOR BACTERIA: CPT | Mod: 91

## 2025-08-07 PROCEDURE — 87186 SC STD MICRODIL/AGAR DIL: CPT

## 2025-08-07 PROCEDURE — 700101 HCHG RX REV CODE 250: Performed by: INTERNAL MEDICINE

## 2025-08-07 RX ORDER — ATORVASTATIN CALCIUM 40 MG/1
40 TABLET, FILM COATED ORAL EVERY EVENING
Status: DISCONTINUED | OUTPATIENT
Start: 2025-08-07 | End: 2025-08-09 | Stop reason: HOSPADM

## 2025-08-07 RX ORDER — HYDROCORTISONE SODIUM SUCCINATE 100 MG/2ML
50 INJECTION INTRAMUSCULAR; INTRAVENOUS EVERY 6 HOURS
Status: DISCONTINUED | OUTPATIENT
Start: 2025-08-07 | End: 2025-08-08

## 2025-08-07 RX ORDER — FUROSEMIDE 10 MG/ML
20 INJECTION INTRAMUSCULAR; INTRAVENOUS 2 TIMES DAILY
Status: DISCONTINUED | OUTPATIENT
Start: 2025-08-07 | End: 2025-08-09 | Stop reason: HOSPADM

## 2025-08-07 RX ORDER — CLOPIDOGREL BISULFATE 75 MG/1
75 TABLET ORAL DAILY
Status: DISCONTINUED | OUTPATIENT
Start: 2025-08-08 | End: 2025-08-09 | Stop reason: HOSPADM

## 2025-08-07 RX ORDER — POLYETHYLENE GLYCOL 3350 17 G/17G
1 POWDER, FOR SOLUTION ORAL
Status: DISCONTINUED | OUTPATIENT
Start: 2025-08-07 | End: 2025-08-09 | Stop reason: HOSPADM

## 2025-08-07 RX ORDER — HEPARIN SODIUM 5000 [USP'U]/ML
5000 INJECTION, SOLUTION INTRAVENOUS; SUBCUTANEOUS EVERY 8 HOURS
Status: DISCONTINUED | OUTPATIENT
Start: 2025-08-07 | End: 2025-08-09 | Stop reason: HOSPADM

## 2025-08-07 RX ORDER — ASPIRIN 81 MG/1
81 TABLET ORAL DAILY
Status: DISCONTINUED | OUTPATIENT
Start: 2025-08-08 | End: 2025-08-09 | Stop reason: HOSPADM

## 2025-08-07 RX ORDER — LINEZOLID 2 MG/ML
600 INJECTION, SOLUTION INTRAVENOUS EVERY 12 HOURS
Status: DISCONTINUED | OUTPATIENT
Start: 2025-08-07 | End: 2025-08-08

## 2025-08-07 RX ORDER — NOREPINEPHRINE BITARTRATE 0.03 MG/ML
0-1 INJECTION, SOLUTION INTRAVENOUS CONTINUOUS
Status: DISCONTINUED | OUTPATIENT
Start: 2025-08-07 | End: 2025-08-08

## 2025-08-07 RX ORDER — ONDANSETRON 4 MG/1
4 TABLET, ORALLY DISINTEGRATING ORAL EVERY 4 HOURS PRN
Status: DISCONTINUED | OUTPATIENT
Start: 2025-08-07 | End: 2025-08-09 | Stop reason: HOSPADM

## 2025-08-07 RX ORDER — DEXTROSE MONOHYDRATE 25 G/50ML
25 INJECTION, SOLUTION INTRAVENOUS
Status: DISCONTINUED | OUTPATIENT
Start: 2025-08-07 | End: 2025-08-08

## 2025-08-07 RX ORDER — ONDANSETRON 2 MG/ML
4 INJECTION INTRAMUSCULAR; INTRAVENOUS EVERY 4 HOURS PRN
Status: DISCONTINUED | OUTPATIENT
Start: 2025-08-07 | End: 2025-08-09 | Stop reason: HOSPADM

## 2025-08-07 RX ORDER — THIAMINE HYDROCHLORIDE 100 MG/ML
200 INJECTION, SOLUTION INTRAMUSCULAR; INTRAVENOUS DAILY
Status: COMPLETED | OUTPATIENT
Start: 2025-08-07 | End: 2025-08-09

## 2025-08-07 RX ORDER — ACETAMINOPHEN 325 MG/1
650 TABLET ORAL EVERY 6 HOURS PRN
Status: DISCONTINUED | OUTPATIENT
Start: 2025-08-07 | End: 2025-08-09 | Stop reason: HOSPADM

## 2025-08-07 RX ORDER — OMEPRAZOLE 20 MG/1
20 CAPSULE, DELAYED RELEASE ORAL DAILY
Status: DISCONTINUED | OUTPATIENT
Start: 2025-08-07 | End: 2025-08-09 | Stop reason: HOSPADM

## 2025-08-07 RX ORDER — CIPROFLOXACIN 500 MG/1
500 TABLET, FILM COATED ORAL 2 TIMES DAILY
COMMUNITY
Start: 2025-07-28

## 2025-08-07 RX ORDER — AMOXICILLIN 250 MG
2 CAPSULE ORAL EVERY EVENING
Status: DISCONTINUED | OUTPATIENT
Start: 2025-08-07 | End: 2025-08-09 | Stop reason: HOSPADM

## 2025-08-07 RX ORDER — LEVALBUTEROL INHALATION SOLUTION 1.25 MG/3ML
1.25 SOLUTION RESPIRATORY (INHALATION)
Status: DISCONTINUED | OUTPATIENT
Start: 2025-08-07 | End: 2025-08-08

## 2025-08-07 RX ORDER — IPRATROPIUM BROMIDE AND ALBUTEROL SULFATE 2.5; .5 MG/3ML; MG/3ML
3 SOLUTION RESPIRATORY (INHALATION)
Status: DISCONTINUED | OUTPATIENT
Start: 2025-08-07 | End: 2025-08-07

## 2025-08-07 RX ORDER — INSULIN LISPRO 100 [IU]/ML
1-6 INJECTION, SOLUTION INTRAVENOUS; SUBCUTANEOUS EVERY 6 HOURS
Status: DISCONTINUED | OUTPATIENT
Start: 2025-08-07 | End: 2025-08-08

## 2025-08-07 RX ORDER — ALBUTEROL SULFATE 90 UG/1
1-2 INHALANT RESPIRATORY (INHALATION) EVERY 4 HOURS PRN
Status: DISCONTINUED | OUTPATIENT
Start: 2025-08-07 | End: 2025-08-07

## 2025-08-07 RX ADMIN — LEVALBUTEROL 1.25 MG: 1.25 SOLUTION RESPIRATORY (INHALATION) at 16:26

## 2025-08-07 RX ADMIN — ACETAMINOPHEN 650 MG: 325 TABLET ORAL at 21:19

## 2025-08-07 RX ADMIN — THIAMINE HYDROCHLORIDE 200 MG: 100 INJECTION, SOLUTION INTRAMUSCULAR; INTRAVENOUS at 15:27

## 2025-08-07 RX ADMIN — HYDROCORTISONE SODIUM SUCCINATE 50 MG: 100 INJECTION, POWDER, FOR SOLUTION INTRAMUSCULAR; INTRAVENOUS at 23:19

## 2025-08-07 RX ADMIN — FUROSEMIDE 20 MG: 10 INJECTION, SOLUTION INTRAVENOUS at 17:17

## 2025-08-07 RX ADMIN — INSULIN LISPRO 2 UNITS: 100 INJECTION, SOLUTION INTRAVENOUS; SUBCUTANEOUS at 23:23

## 2025-08-07 RX ADMIN — HEPARIN SODIUM 5000 UNITS: 5000 INJECTION, SOLUTION INTRAVENOUS; SUBCUTANEOUS at 21:17

## 2025-08-07 RX ADMIN — NOREPINEPHRINE BITARTRATE 0.18 MCG/KG/MIN: 0.03 INJECTION, SOLUTION INTRAVENOUS at 11:08

## 2025-08-07 RX ADMIN — HYDROCORTISONE SODIUM SUCCINATE 50 MG: 100 INJECTION, POWDER, FOR SOLUTION INTRAMUSCULAR; INTRAVENOUS at 17:18

## 2025-08-07 RX ADMIN — LEVALBUTEROL 1.25 MG: 1.25 SOLUTION RESPIRATORY (INHALATION) at 22:52

## 2025-08-07 RX ADMIN — LINEZOLID 600 MG: 600 INJECTION, SOLUTION INTRAVENOUS at 17:23

## 2025-08-07 RX ADMIN — NOREPINEPHRINE BITARTRATE 0.3 MCG/KG/MIN: 0.03 INJECTION, SOLUTION INTRAVENOUS at 13:00

## 2025-08-07 RX ADMIN — ACETAMINOPHEN 650 MG: 325 TABLET ORAL at 15:24

## 2025-08-07 RX ADMIN — NOREPINEPHRINE BITARTRATE 0.35 MCG/KG/MIN: 0.03 INJECTION, SOLUTION INTRAVENOUS at 12:09

## 2025-08-07 RX ADMIN — ATORVASTATIN CALCIUM 40 MG: 40 TABLET, FILM COATED ORAL at 17:18

## 2025-08-07 RX ADMIN — INSULIN LISPRO 1 UNITS: 100 INJECTION, SOLUTION INTRAVENOUS; SUBCUTANEOUS at 15:47

## 2025-08-07 RX ADMIN — PIPERACILLIN AND TAZOBACTAM 3.38 G: 3; .375 INJECTION, POWDER, FOR SOLUTION INTRAVENOUS at 17:22

## 2025-08-07 RX ADMIN — FENTANYL CITRATE 50 MCG: 50 INJECTION, SOLUTION INTRAMUSCULAR; INTRAVENOUS at 13:14

## 2025-08-07 RX ADMIN — LEVALBUTEROL 1.25 MG: 1.25 SOLUTION RESPIRATORY (INHALATION) at 18:19

## 2025-08-07 RX ADMIN — PIPERACILLIN AND TAZOBACTAM 3.38 G: 3; .375 INJECTION, POWDER, FOR SOLUTION INTRAVENOUS at 15:27

## 2025-08-07 RX ADMIN — HYDROCORTISONE SODIUM SUCCINATE 50 MG: 100 INJECTION, POWDER, FOR SOLUTION INTRAMUSCULAR; INTRAVENOUS at 15:30

## 2025-08-07 RX ADMIN — NOREPINEPHRINE BITARTRATE 0.2 MCG/KG/MIN: 0.03 INJECTION, SOLUTION INTRAVENOUS at 12:05

## 2025-08-07 RX ADMIN — INSULIN LISPRO 1 UNITS: 100 INJECTION, SOLUTION INTRAVENOUS; SUBCUTANEOUS at 17:30

## 2025-08-07 RX ADMIN — OMEPRAZOLE 20 MG: 20 CAPSULE, DELAYED RELEASE ORAL at 15:23

## 2025-08-07 ASSESSMENT — LIFESTYLE VARIABLES
AVERAGE NUMBER OF DAYS PER WEEK YOU HAVE A DRINK CONTAINING ALCOHOL: 0
HAVE PEOPLE ANNOYED YOU BY CRITICIZING YOUR DRINKING: NO
HAVE YOU EVER FELT YOU SHOULD CUT DOWN ON YOUR DRINKING: NO
TOTAL SCORE: 0
HOW MANY TIMES IN THE PAST YEAR HAVE YOU HAD 5 OR MORE DRINKS IN A DAY: 0
EVER FELT BAD OR GUILTY ABOUT YOUR DRINKING: NO
ALCOHOL_USE: NO
ON A TYPICAL DAY WHEN YOU DRINK ALCOHOL HOW MANY DRINKS DO YOU HAVE: 0
TOTAL SCORE: 0
EVER HAD A DRINK FIRST THING IN THE MORNING TO STEADY YOUR NERVES TO GET RID OF A HANGOVER: NO
TOTAL SCORE: 0
CONSUMPTION TOTAL: NEGATIVE

## 2025-08-07 ASSESSMENT — COPD QUESTIONNAIRES
HAVE YOU SMOKED AT LEAST 100 CIGARETTES IN YOUR ENTIRE LIFE: YES
DO YOU EVER COUGH UP ANY MUCUS OR PHLEGM?: YES, A FEW DAYS A WEEK OR MONTH
DURING THE PAST 4 WEEKS HOW MUCH DID YOU FEEL SHORT OF BREATH: NONE/LITTLE OF THE TIME
COPD SCREENING SCORE: 6

## 2025-08-07 ASSESSMENT — PATIENT HEALTH QUESTIONNAIRE - PHQ9
SUM OF ALL RESPONSES TO PHQ9 QUESTIONS 1 AND 2: 0
1. LITTLE INTEREST OR PLEASURE IN DOING THINGS: NOT AT ALL
2. FEELING DOWN, DEPRESSED, IRRITABLE, OR HOPELESS: NOT AT ALL

## 2025-08-07 ASSESSMENT — PAIN DESCRIPTION - PAIN TYPE
TYPE: ACUTE PAIN
TYPE: ACUTE PAIN
TYPE: CHRONIC PAIN
TYPE: ACUTE PAIN
TYPE: CHRONIC PAIN
TYPE: CHRONIC PAIN
TYPE: ACUTE PAIN
TYPE: CHRONIC PAIN
TYPE: ACUTE PAIN;CHRONIC PAIN
TYPE: ACUTE PAIN

## 2025-08-07 ASSESSMENT — FIBROSIS 4 INDEX
FIB4 SCORE: 1.68
FIB4 SCORE: 2.17

## 2025-08-08 LAB
ALBUMIN SERPL BCP-MCNC: 3.2 G/DL (ref 3.2–4.9)
ALBUMIN/GLOB SERPL: 0.9 G/DL
ALP SERPL-CCNC: 62 U/L (ref 30–99)
ALT SERPL-CCNC: <5 U/L (ref 2–50)
ANION GAP SERPL CALC-SCNC: 11 MMOL/L (ref 7–16)
AST SERPL-CCNC: 16 U/L (ref 12–45)
BASOPHILS # BLD AUTO: 0.1 % (ref 0–1.8)
BASOPHILS # BLD: 0.02 K/UL (ref 0–0.12)
BILIRUB SERPL-MCNC: 0.4 MG/DL (ref 0.1–1.5)
BUN SERPL-MCNC: 15 MG/DL (ref 8–22)
CALCIUM ALBUM COR SERPL-MCNC: 9.7 MG/DL (ref 8.5–10.5)
CALCIUM SERPL-MCNC: 9.1 MG/DL (ref 8.5–10.5)
CHLORIDE SERPL-SCNC: 102 MMOL/L (ref 96–112)
CO2 SERPL-SCNC: 26 MMOL/L (ref 20–33)
CREAT SERPL-MCNC: 1.12 MG/DL (ref 0.5–1.4)
EKG IMPRESSION: NORMAL
EOSINOPHIL # BLD AUTO: 0 K/UL (ref 0–0.51)
EOSINOPHIL NFR BLD: 0 % (ref 0–6.9)
ERYTHROCYTE [DISTWIDTH] IN BLOOD BY AUTOMATED COUNT: 52.7 FL (ref 35.9–50)
GFR SERPLBLD CREATININE-BSD FMLA CKD-EPI: 67 ML/MIN/1.73 M 2
GLOBULIN SER CALC-MCNC: 3.5 G/DL (ref 1.9–3.5)
GLUCOSE BLD STRIP.AUTO-MCNC: 181 MG/DL (ref 65–99)
GLUCOSE BLD STRIP.AUTO-MCNC: 255 MG/DL (ref 65–99)
GLUCOSE BLD STRIP.AUTO-MCNC: 269 MG/DL (ref 65–99)
GLUCOSE SERPL-MCNC: 211 MG/DL (ref 65–99)
HCT VFR BLD AUTO: 33.8 % (ref 42–52)
HGB BLD-MCNC: 10.3 G/DL (ref 14–18)
IMM GRANULOCYTES # BLD AUTO: 0.09 K/UL (ref 0–0.11)
IMM GRANULOCYTES NFR BLD AUTO: 0.6 % (ref 0–0.9)
LACTATE SERPL-SCNC: 1 MMOL/L (ref 0.5–2)
LYMPHOCYTES # BLD AUTO: 0.42 K/UL (ref 1–4.8)
LYMPHOCYTES NFR BLD: 2.7 % (ref 22–41)
MAGNESIUM SERPL-MCNC: 1.7 MG/DL (ref 1.5–2.5)
MCH RBC QN AUTO: 26.8 PG (ref 27–33)
MCHC RBC AUTO-ENTMCNC: 30.5 G/DL (ref 32.3–36.5)
MCV RBC AUTO: 87.8 FL (ref 81.4–97.8)
MONOCYTES # BLD AUTO: 0.41 K/UL (ref 0–0.85)
MONOCYTES NFR BLD AUTO: 2.6 % (ref 0–13.4)
NEUTROPHILS # BLD AUTO: 14.8 K/UL (ref 1.82–7.42)
NEUTROPHILS NFR BLD: 94 % (ref 44–72)
NRBC # BLD AUTO: 0 K/UL
NRBC BLD-RTO: 0 /100 WBC (ref 0–0.2)
PHOSPHATE SERPL-MCNC: 4 MG/DL (ref 2.5–4.5)
PLATELET # BLD AUTO: 260 K/UL (ref 164–446)
PMV BLD AUTO: 11.3 FL (ref 9–12.9)
POTASSIUM SERPL-SCNC: 3.7 MMOL/L (ref 3.6–5.5)
PROT SERPL-MCNC: 6.7 G/DL (ref 6–8.2)
RBC # BLD AUTO: 3.85 M/UL (ref 4.7–6.1)
SODIUM SERPL-SCNC: 139 MMOL/L (ref 135–145)
WBC # BLD AUTO: 15.7 K/UL (ref 4.8–10.8)

## 2025-08-08 PROCEDURE — 99233 SBSQ HOSP IP/OBS HIGH 50: CPT | Mod: GC | Performed by: INTERNAL MEDICINE

## 2025-08-08 PROCEDURE — 85025 COMPLETE CBC W/AUTO DIFF WBC: CPT

## 2025-08-08 PROCEDURE — A9270 NON-COVERED ITEM OR SERVICE: HCPCS | Performed by: INTERNAL MEDICINE

## 2025-08-08 PROCEDURE — 99223 1ST HOSP IP/OBS HIGH 75: CPT | Mod: 25 | Performed by: NURSE PRACTITIONER

## 2025-08-08 PROCEDURE — 99497 ADVNCD CARE PLAN 30 MIN: CPT | Performed by: NURSE PRACTITIONER

## 2025-08-08 PROCEDURE — 94669 MECHANICAL CHEST WALL OSCILL: CPT

## 2025-08-08 PROCEDURE — 700105 HCHG RX REV CODE 258: Performed by: HOSPITALIST

## 2025-08-08 PROCEDURE — 700105 HCHG RX REV CODE 258: Performed by: INTERNAL MEDICINE

## 2025-08-08 PROCEDURE — 700102 HCHG RX REV CODE 250 W/ 637 OVERRIDE(OP): Performed by: INTERNAL MEDICINE

## 2025-08-08 PROCEDURE — 93005 ELECTROCARDIOGRAM TRACING: CPT | Mod: TC | Performed by: INTERNAL MEDICINE

## 2025-08-08 PROCEDURE — 770020 HCHG ROOM/CARE - TELE (206)

## 2025-08-08 PROCEDURE — 700101 HCHG RX REV CODE 250: Performed by: INTERNAL MEDICINE

## 2025-08-08 PROCEDURE — 93010 ELECTROCARDIOGRAM REPORT: CPT | Performed by: INTERNAL MEDICINE

## 2025-08-08 PROCEDURE — 82962 GLUCOSE BLOOD TEST: CPT | Performed by: HOSPITALIST

## 2025-08-08 PROCEDURE — 700111 HCHG RX REV CODE 636 W/ 250 OVERRIDE (IP): Mod: JZ | Performed by: HOSPITALIST

## 2025-08-08 PROCEDURE — 82962 GLUCOSE BLOOD TEST: CPT | Performed by: INTERNAL MEDICINE

## 2025-08-08 PROCEDURE — 84100 ASSAY OF PHOSPHORUS: CPT

## 2025-08-08 PROCEDURE — 83735 ASSAY OF MAGNESIUM: CPT

## 2025-08-08 PROCEDURE — 80053 COMPREHEN METABOLIC PANEL: CPT

## 2025-08-08 PROCEDURE — 99222 1ST HOSP IP/OBS MODERATE 55: CPT | Performed by: HOSPITALIST

## 2025-08-08 PROCEDURE — 700111 HCHG RX REV CODE 636 W/ 250 OVERRIDE (IP): Mod: JZ | Performed by: INTERNAL MEDICINE

## 2025-08-08 PROCEDURE — 83605 ASSAY OF LACTIC ACID: CPT

## 2025-08-08 PROCEDURE — 94640 AIRWAY INHALATION TREATMENT: CPT

## 2025-08-08 RX ORDER — DEXTROSE MONOHYDRATE 25 G/50ML
25 INJECTION, SOLUTION INTRAVENOUS
Status: DISCONTINUED | OUTPATIENT
Start: 2025-08-08 | End: 2025-08-09 | Stop reason: HOSPADM

## 2025-08-08 RX ORDER — AZITHROMYCIN 500 MG/5ML
500 INJECTION, POWDER, LYOPHILIZED, FOR SOLUTION INTRAVENOUS EVERY 24 HOURS
Status: DISCONTINUED | OUTPATIENT
Start: 2025-08-08 | End: 2025-08-08

## 2025-08-08 RX ORDER — HYDROCORTISONE SODIUM SUCCINATE 100 MG/2ML
50 INJECTION INTRAMUSCULAR; INTRAVENOUS EVERY 8 HOURS
Status: DISCONTINUED | OUTPATIENT
Start: 2025-08-08 | End: 2025-08-09

## 2025-08-08 RX ORDER — INSULIN LISPRO 100 [IU]/ML
3-14 INJECTION, SOLUTION INTRAVENOUS; SUBCUTANEOUS EVERY 6 HOURS
Status: DISCONTINUED | OUTPATIENT
Start: 2025-08-08 | End: 2025-08-09 | Stop reason: HOSPADM

## 2025-08-08 RX ORDER — DOXYCYCLINE 100 MG/1
100 TABLET ORAL EVERY 12 HOURS
Status: DISCONTINUED | OUTPATIENT
Start: 2025-08-08 | End: 2025-08-09 | Stop reason: HOSPADM

## 2025-08-08 RX ORDER — SODIUM CHLORIDE 9 MG/ML
INJECTION, SOLUTION INTRAVENOUS CONTINUOUS
Status: DISCONTINUED | OUTPATIENT
Start: 2025-08-08 | End: 2025-08-09

## 2025-08-08 RX ORDER — LEVALBUTEROL INHALATION SOLUTION 1.25 MG/3ML
1.25 SOLUTION RESPIRATORY (INHALATION)
Status: DISCONTINUED | OUTPATIENT
Start: 2025-08-08 | End: 2025-08-09 | Stop reason: HOSPADM

## 2025-08-08 RX ADMIN — ASPIRIN 81 MG: 81 TABLET, COATED ORAL at 05:46

## 2025-08-08 RX ADMIN — DOXYCYCLINE 100 MG: 100 TABLET, FILM COATED ORAL at 08:48

## 2025-08-08 RX ADMIN — THIAMINE HYDROCHLORIDE 200 MG: 100 INJECTION, SOLUTION INTRAMUSCULAR; INTRAVENOUS at 05:30

## 2025-08-08 RX ADMIN — LEVALBUTEROL 1.25 MG: 1.25 SOLUTION RESPIRATORY (INHALATION) at 07:09

## 2025-08-08 RX ADMIN — LEVALBUTEROL 1.25 MG: 1.25 SOLUTION RESPIRATORY (INHALATION) at 15:17

## 2025-08-08 RX ADMIN — LINEZOLID 600 MG: 600 INJECTION, SOLUTION INTRAVENOUS at 05:41

## 2025-08-08 RX ADMIN — INSULIN LISPRO 7 UNITS: 100 INJECTION, SOLUTION INTRAVENOUS; SUBCUTANEOUS at 18:17

## 2025-08-08 RX ADMIN — OMEPRAZOLE 20 MG: 20 CAPSULE, DELAYED RELEASE ORAL at 05:47

## 2025-08-08 RX ADMIN — HYDROCORTISONE SODIUM SUCCINATE 50 MG: 100 INJECTION, POWDER, FOR SOLUTION INTRAMUSCULAR; INTRAVENOUS at 05:30

## 2025-08-08 RX ADMIN — LEVALBUTEROL 1.25 MG: 1.25 SOLUTION RESPIRATORY (INHALATION) at 19:03

## 2025-08-08 RX ADMIN — LEVALBUTEROL 1.25 MG: 1.25 SOLUTION RESPIRATORY (INHALATION) at 10:55

## 2025-08-08 RX ADMIN — INSULIN LISPRO 7 UNITS: 100 INJECTION, SOLUTION INTRAVENOUS; SUBCUTANEOUS at 12:51

## 2025-08-08 RX ADMIN — HYDROCORTISONE SODIUM SUCCINATE 50 MG: 100 INJECTION, POWDER, FOR SOLUTION INTRAMUSCULAR; INTRAVENOUS at 22:21

## 2025-08-08 RX ADMIN — HEPARIN SODIUM 5000 UNITS: 5000 INJECTION, SOLUTION INTRAVENOUS; SUBCUTANEOUS at 22:21

## 2025-08-08 RX ADMIN — FUROSEMIDE 20 MG: 10 INJECTION, SOLUTION INTRAVENOUS at 18:15

## 2025-08-08 RX ADMIN — ACETAMINOPHEN 650 MG: 325 TABLET ORAL at 05:45

## 2025-08-08 RX ADMIN — HEPARIN SODIUM 5000 UNITS: 5000 INJECTION, SOLUTION INTRAVENOUS; SUBCUTANEOUS at 05:31

## 2025-08-08 RX ADMIN — HYDROCORTISONE SODIUM SUCCINATE 50 MG: 100 INJECTION, POWDER, FOR SOLUTION INTRAMUSCULAR; INTRAVENOUS at 12:54

## 2025-08-08 RX ADMIN — PIPERACILLIN AND TAZOBACTAM 3.38 G: 3; .375 INJECTION, POWDER, FOR SOLUTION INTRAVENOUS at 20:19

## 2025-08-08 RX ADMIN — ACETAMINOPHEN 650 MG: 325 TABLET ORAL at 20:06

## 2025-08-08 RX ADMIN — SODIUM CHLORIDE: 9 INJECTION, SOLUTION INTRAVENOUS at 20:19

## 2025-08-08 RX ADMIN — PIPERACILLIN AND TAZOBACTAM 3.38 G: 3; .375 INJECTION, POWDER, FOR SOLUTION INTRAVENOUS at 12:59

## 2025-08-08 RX ADMIN — DOXYCYCLINE 100 MG: 100 TABLET, FILM COATED ORAL at 18:17

## 2025-08-08 RX ADMIN — ATORVASTATIN CALCIUM 40 MG: 40 TABLET, FILM COATED ORAL at 18:16

## 2025-08-08 RX ADMIN — FUROSEMIDE 20 MG: 10 INJECTION, SOLUTION INTRAVENOUS at 05:30

## 2025-08-08 RX ADMIN — SENNOSIDES, DOCUSATE SODIUM 2 TABLET: 50; 8.6 TABLET, FILM COATED ORAL at 18:15

## 2025-08-08 RX ADMIN — CLOPIDOGREL BISULFATE 75 MG: 75 TABLET, FILM COATED ORAL at 05:47

## 2025-08-08 RX ADMIN — LEVALBUTEROL 1.25 MG: 1.25 SOLUTION RESPIRATORY (INHALATION) at 01:24

## 2025-08-08 RX ADMIN — INSULIN LISPRO 1 UNITS: 100 INJECTION, SOLUTION INTRAVENOUS; SUBCUTANEOUS at 05:52

## 2025-08-08 RX ADMIN — PIPERACILLIN AND TAZOBACTAM 3.38 G: 3; .375 INJECTION, POWDER, FOR SOLUTION INTRAVENOUS at 04:21

## 2025-08-08 ASSESSMENT — PAIN DESCRIPTION - PAIN TYPE
TYPE: ACUTE PAIN
TYPE: NEUROPATHIC PAIN
TYPE: ACUTE PAIN
TYPE: NEUROPATHIC PAIN
TYPE: ACUTE PAIN

## 2025-08-08 ASSESSMENT — ENCOUNTER SYMPTOMS
NERVOUS/ANXIOUS: 1
CARDIOVASCULAR NEGATIVE: 1
EYES NEGATIVE: 1
GASTROINTESTINAL NEGATIVE: 1
SPEECH CHANGE: 1
MUSCULOSKELETAL NEGATIVE: 1
SHORTNESS OF BREATH: 1

## 2025-08-08 ASSESSMENT — FIBROSIS 4 INDEX: FIB4 SCORE: 2.23

## 2025-08-09 ENCOUNTER — APPOINTMENT (OUTPATIENT)
Dept: RADIOLOGY | Facility: MEDICAL CENTER | Age: 80
DRG: 871 | End: 2025-08-09
Attending: INTERNAL MEDICINE
Payer: MEDICARE

## 2025-08-09 ENCOUNTER — PHARMACY VISIT (OUTPATIENT)
Dept: PHARMACY | Facility: MEDICAL CENTER | Age: 80
End: 2025-08-09
Payer: COMMERCIAL

## 2025-08-09 VITALS
HEART RATE: 74 BPM | WEIGHT: 179.68 LBS | OXYGEN SATURATION: 99 % | BODY MASS INDEX: 28.2 KG/M2 | DIASTOLIC BLOOD PRESSURE: 57 MMHG | HEIGHT: 67 IN | RESPIRATION RATE: 17 BRPM | SYSTOLIC BLOOD PRESSURE: 91 MMHG | TEMPERATURE: 97.7 F

## 2025-08-09 LAB
ALBUMIN SERPL BCP-MCNC: 3.3 G/DL (ref 3.2–4.9)
ALBUMIN/GLOB SERPL: 1 G/DL
ALP SERPL-CCNC: 62 U/L (ref 30–99)
ALT SERPL-CCNC: <5 U/L (ref 2–50)
ANION GAP SERPL CALC-SCNC: 15 MMOL/L (ref 7–16)
AST SERPL-CCNC: 15 U/L (ref 12–45)
BILIRUB SERPL-MCNC: 0.3 MG/DL (ref 0.1–1.5)
BUN SERPL-MCNC: 17 MG/DL (ref 8–22)
CALCIUM ALBUM COR SERPL-MCNC: 9.8 MG/DL (ref 8.5–10.5)
CALCIUM SERPL-MCNC: 9.2 MG/DL (ref 8.5–10.5)
CHLORIDE SERPL-SCNC: 103 MMOL/L (ref 96–112)
CO2 SERPL-SCNC: 25 MMOL/L (ref 20–33)
CREAT SERPL-MCNC: 1.24 MG/DL (ref 0.5–1.4)
ERYTHROCYTE [DISTWIDTH] IN BLOOD BY AUTOMATED COUNT: 52.1 FL (ref 35.9–50)
GFR SERPLBLD CREATININE-BSD FMLA CKD-EPI: 60 ML/MIN/1.73 M 2
GLOBULIN SER CALC-MCNC: 3.4 G/DL (ref 1.9–3.5)
GLUCOSE BLD STRIP.AUTO-MCNC: 184 MG/DL (ref 65–99)
GLUCOSE BLD STRIP.AUTO-MCNC: 198 MG/DL (ref 65–99)
GLUCOSE SERPL-MCNC: 210 MG/DL (ref 65–99)
HCT VFR BLD AUTO: 31.4 % (ref 42–52)
HGB BLD-MCNC: 9.7 G/DL (ref 14–18)
MAGNESIUM SERPL-MCNC: 1.7 MG/DL (ref 1.5–2.5)
MCH RBC QN AUTO: 26.9 PG (ref 27–33)
MCHC RBC AUTO-ENTMCNC: 30.9 G/DL (ref 32.3–36.5)
MCV RBC AUTO: 87 FL (ref 81.4–97.8)
NT-PROBNP SERPL IA-MCNC: 5461 PG/ML (ref 0–125)
PHOSPHATE SERPL-MCNC: 3.2 MG/DL (ref 2.5–4.5)
PLATELET # BLD AUTO: 252 K/UL (ref 164–446)
PMV BLD AUTO: 11.4 FL (ref 9–12.9)
POTASSIUM SERPL-SCNC: 3.6 MMOL/L (ref 3.6–5.5)
PROCALCITONIN SERPL-MCNC: 0.11 NG/ML
PROT SERPL-MCNC: 6.7 G/DL (ref 6–8.2)
RBC # BLD AUTO: 3.61 M/UL (ref 4.7–6.1)
SODIUM SERPL-SCNC: 143 MMOL/L (ref 135–145)
WBC # BLD AUTO: 11.4 K/UL (ref 4.8–10.8)

## 2025-08-09 PROCEDURE — 82962 GLUCOSE BLOOD TEST: CPT | Performed by: INTERNAL MEDICINE

## 2025-08-09 PROCEDURE — 700111 HCHG RX REV CODE 636 W/ 250 OVERRIDE (IP): Mod: JZ | Performed by: HOSPITALIST

## 2025-08-09 PROCEDURE — 82962 GLUCOSE BLOOD TEST: CPT | Performed by: HOSPITALIST

## 2025-08-09 PROCEDURE — 84100 ASSAY OF PHOSPHORUS: CPT

## 2025-08-09 PROCEDURE — 700102 HCHG RX REV CODE 250 W/ 637 OVERRIDE(OP): Performed by: NURSE PRACTITIONER

## 2025-08-09 PROCEDURE — 71045 X-RAY EXAM CHEST 1 VIEW: CPT

## 2025-08-09 PROCEDURE — 83735 ASSAY OF MAGNESIUM: CPT

## 2025-08-09 PROCEDURE — 700102 HCHG RX REV CODE 250 W/ 637 OVERRIDE(OP): Performed by: INTERNAL MEDICINE

## 2025-08-09 PROCEDURE — 99406 BEHAV CHNG SMOKING 3-10 MIN: CPT

## 2025-08-09 PROCEDURE — 36415 COLL VENOUS BLD VENIPUNCTURE: CPT

## 2025-08-09 PROCEDURE — 83880 ASSAY OF NATRIURETIC PEPTIDE: CPT

## 2025-08-09 PROCEDURE — 84145 PROCALCITONIN (PCT): CPT

## 2025-08-09 PROCEDURE — 700111 HCHG RX REV CODE 636 W/ 250 OVERRIDE (IP): Performed by: INTERNAL MEDICINE

## 2025-08-09 PROCEDURE — 99239 HOSP IP/OBS DSCHRG MGMT >30: CPT | Performed by: INTERNAL MEDICINE

## 2025-08-09 PROCEDURE — 80053 COMPREHEN METABOLIC PANEL: CPT

## 2025-08-09 PROCEDURE — 85027 COMPLETE CBC AUTOMATED: CPT

## 2025-08-09 PROCEDURE — RXMED WILLOW AMBULATORY MEDICATION CHARGE: Performed by: INTERNAL MEDICINE

## 2025-08-09 PROCEDURE — 700105 HCHG RX REV CODE 258: Performed by: INTERNAL MEDICINE

## 2025-08-09 PROCEDURE — A9270 NON-COVERED ITEM OR SERVICE: HCPCS | Performed by: INTERNAL MEDICINE

## 2025-08-09 PROCEDURE — A9270 NON-COVERED ITEM OR SERVICE: HCPCS | Performed by: NURSE PRACTITIONER

## 2025-08-09 RX ADMIN — HYDROCORTISONE SODIUM SUCCINATE 50 MG: 100 INJECTION, POWDER, FOR SOLUTION INTRAMUSCULAR; INTRAVENOUS at 06:12

## 2025-08-09 RX ADMIN — HEPARIN SODIUM 5000 UNITS: 5000 INJECTION, SOLUTION INTRAVENOUS; SUBCUTANEOUS at 06:19

## 2025-08-09 RX ADMIN — DOXYCYCLINE 100 MG: 100 TABLET, FILM COATED ORAL at 06:12

## 2025-08-09 RX ADMIN — ACETAMINOPHEN 650 MG: 325 TABLET ORAL at 03:10

## 2025-08-09 RX ADMIN — ASPIRIN 81 MG: 81 TABLET, COATED ORAL at 06:12

## 2025-08-09 RX ADMIN — Medication 5 MG: at 01:37

## 2025-08-09 RX ADMIN — PIPERACILLIN AND TAZOBACTAM 3.38 G: 3; .375 INJECTION, POWDER, FOR SOLUTION INTRAVENOUS at 06:20

## 2025-08-09 RX ADMIN — THIAMINE HYDROCHLORIDE 200 MG: 100 INJECTION, SOLUTION INTRAMUSCULAR; INTRAVENOUS at 06:12

## 2025-08-09 RX ADMIN — INSULIN LISPRO 3 UNITS: 100 INJECTION, SOLUTION INTRAVENOUS; SUBCUTANEOUS at 01:32

## 2025-08-09 RX ADMIN — INSULIN LISPRO 3 UNITS: 100 INJECTION, SOLUTION INTRAVENOUS; SUBCUTANEOUS at 06:31

## 2025-08-09 RX ADMIN — OMEPRAZOLE 20 MG: 20 CAPSULE, DELAYED RELEASE ORAL at 06:12

## 2025-08-09 RX ADMIN — CLOPIDOGREL BISULFATE 75 MG: 75 TABLET, FILM COATED ORAL at 06:12

## 2025-08-09 ASSESSMENT — PAIN DESCRIPTION - PAIN TYPE
TYPE: ACUTE PAIN
TYPE: NEUROPATHIC PAIN
TYPE: ACUTE PAIN

## 2025-08-09 ASSESSMENT — FIBROSIS 4 INDEX: FIB4 SCORE: 2.16060405362556188

## 2025-08-10 LAB
BACTERIA UR CULT: ABNORMAL
BACTERIA UR CULT: ABNORMAL
SIGNIFICANT IND 70042: ABNORMAL
SITE SITE: ABNORMAL
SOURCE SOURCE: ABNORMAL

## 2025-08-12 LAB
BACTERIA BLD CULT: NORMAL
BACTERIA BLD CULT: NORMAL
SIGNIFICANT IND 70042: NORMAL
SIGNIFICANT IND 70042: NORMAL
SITE SITE: NORMAL
SITE SITE: NORMAL
SOURCE SOURCE: NORMAL
SOURCE SOURCE: NORMAL